# Patient Record
Sex: FEMALE | Race: WHITE | NOT HISPANIC OR LATINO | Employment: OTHER | URBAN - METROPOLITAN AREA
[De-identification: names, ages, dates, MRNs, and addresses within clinical notes are randomized per-mention and may not be internally consistent; named-entity substitution may affect disease eponyms.]

---

## 2017-01-03 ENCOUNTER — HOSPITAL ENCOUNTER (OUTPATIENT)
Dept: SLEEP MEDICINE | Facility: HOSPITAL | Age: 67
Discharge: HOME | End: 2017-01-03

## 2017-01-03 DIAGNOSIS — G47.33 OSA (OBSTRUCTIVE SLEEP APNEA): Primary | ICD-10-CM

## 2017-01-04 ENCOUNTER — TELEPHONE (OUTPATIENT)
Dept: CARDIOLOGY | Facility: CLINIC | Age: 67
End: 2017-01-04

## 2017-01-04 NOTE — PROGRESS NOTES
"   DATE OF SERVICE:  01/03/2017    I had the pleasure of seeing this patient in the office today. Below please find summary of pertinent findings and conclusion.     The patient  is a 66-year-old female who had a mini stroke in November 2016. No etiology was discovered. She was recommended a polysomnogram study to ensure that this was not a cause. Her sleep history is quite unremarkable. Sleep time is 9 p.m., awake time 6 a.m. she wakes up feeling rested. She had a tonsillectomy in 1985. She denies any daytime sleepiness. She has no significant weight gain in the last 5 years. She has very mild snoring. No apneas. She sometimes has a dry mouth. She reports occasional insomnia. Her Fitbit, however, does show restless sleep. The patient does not report symptoms. She tends to have \"weird dreams.     PAST MEDICAL HISTORY:   1.  Bypass surgery for a congenital coronary disease.   2.  She had a recent stroke.   3.  She had a brain aneurysm in 08/18/2015.     MEDICATIONS:   1.  Plavix.  2.  Atorvastatin.   3.  Biotin.  4.  Magnesium.  5.  Omega-3.  6.  Vitamin D.    FAMILY HISTORY: Lung cancer in a brother.  Diabetes, obesity, stroke, thyroid disease, heart disease, high blood pressure.     SOCIAL HISTORY: No tobacco, no caffeine.     REVIEW OF SYSTEMS:  A10 point review of systems is unremarkable. Alva score is 5.     PHYSICAL EXAMINATION:  VITAL SIGNS: Height is 5 feet 4 inches, weight 155 pounds, BMI 27, blood pressure is 88/49, heart rate is 68. Neck circumference 14 inches.   HEENT: Oropharynx shows normal size tongue, class II Mallampati airway, no redundant lateral pharyngeal tissue.   LUNGS: Clear to auscultation bilaterally. The rest of the examination is unremarkable, noted in the [facility] form.     IMPRESSION:   1.  Stroke.   2.  Restless sleep with parasomnia.   3.  Nightmares.   4.  Congenital coronary artery disease.     PLAN:   1.  The patient will be scheduled for a home sleep test to rule " out obstructive sleep apnea. Though she restless in her sleep. She denies any symptoms from same. I will therefore not pursue an in lab study unless her sleep quality changes. If there is hypoxia or obstructive events on the home sleep test, it may be beneficial to obtain an in-lab titration study. Trial of CPAP machine can also be done. Her nightmares are somewhat disturbing. These may be explained by obstructive sleep apnea. If not she may benefit from eventual use of clonazepam or similar agent.   2.  I plan to see her back in this office after her sleep study.     I appreciate the opportunity of participating in this patient's care.       MD KATHRYN Owusu/braulio  D:  01/03/2017 15:37:13   T:  01/03/2017 18:50:27   Job ID:  [id number]   Document ID:  [id number]  cc:

## 2017-01-04 NOTE — TELEPHONE ENCOUNTER
Pt called. She said that she has been wearing her event monitor for 2 weeks. She wants to know if the info you have received was sufficient or if she needs to keep wearing it. She also mentioned the electrodes are bothering her skin. She can be reached at #[phone]. Please advise.  Thanks,  Kalee

## 2017-01-05 NOTE — TELEPHONE ENCOUNTER
Pt called again today to see if she can take off the monitor. It has been two weeks and the electrodes are irritating her skin...Meagan

## 2017-01-24 ENCOUNTER — HOSPITAL ENCOUNTER (OUTPATIENT)
Dept: SLEEP MEDICINE | Facility: HOSPITAL | Age: 67
Discharge: HOME | End: 2017-01-24
Admitting: INTERNAL MEDICINE

## 2017-01-24 DIAGNOSIS — G47.33 OSA (OBSTRUCTIVE SLEEP APNEA): ICD-10-CM

## 2017-01-24 PROCEDURE — 95806 SLEEP STUDY UNATT&RESP EFFT: CPT

## 2017-01-27 ENCOUNTER — OFFICE VISIT (OUTPATIENT)
Dept: FAMILY MEDICINE CLINIC | Facility: CLINIC | Age: 67
End: 2017-01-27

## 2017-01-27 VITALS
WEIGHT: 158 LBS | HEART RATE: 71 BPM | TEMPERATURE: 98.7 F | BODY MASS INDEX: 26.98 KG/M2 | RESPIRATION RATE: 16 BRPM | OXYGEN SATURATION: 96 % | DIASTOLIC BLOOD PRESSURE: 60 MMHG | SYSTOLIC BLOOD PRESSURE: 128 MMHG | HEIGHT: 64 IN

## 2017-01-27 DIAGNOSIS — I67.1 CEREBRAL ANEURYSM: Primary | ICD-10-CM

## 2017-01-27 DIAGNOSIS — H66.92 LEFT OTITIS MEDIA, UNSPECIFIED CHRONICITY, UNSPECIFIED OTITIS MEDIA TYPE: ICD-10-CM

## 2017-01-27 DIAGNOSIS — H61.91 SKIN LESION OF RIGHT EAR: ICD-10-CM

## 2017-01-27 PROCEDURE — 99213 OFFICE O/P EST LOW 20 MIN: CPT | Performed by: PHYSICIAN ASSISTANT

## 2017-01-27 RX ORDER — CLOPIDOGREL BISULFATE 75 MG/1
75 TABLET ORAL DAILY
Qty: 90 TABLET | Refills: 3 | Status: SHIPPED | OUTPATIENT
Start: 2017-01-27 | End: 2017-12-18 | Stop reason: SDUPTHER

## 2017-01-27 RX ORDER — AMOXICILLIN 875 MG/1
875 TABLET, COATED ORAL 2 TIMES DAILY
Qty: 20 TABLET | Refills: 0 | Status: SHIPPED | OUTPATIENT
Start: 2017-01-27 | End: 2017-02-06

## 2017-01-27 NOTE — PROGRESS NOTES
"Subjective   Maddison Turcios is a 66 y.o. female.   Chief Complaint   Patient presents with   • Earache     Left        History of Present Illness   Maddison is a 66-year-old female who presents with left ear pain for the past week.  She wears a hearing aide and has had to removed the hearing aid.  She feels like there is something in her right ear.  Denied any fevers,chills,ear drainage,rhinorrhea,sore throat,post nasal drip or sore throat.   Maddison has used OTC ear wax  without relief of ar pain.  Appetite has been normal.  Sleep has been okay. She needs a refill on her Plavix medication.      The following portions of the patient's history were reviewed and updated as appropriate: allergies, current medications, past family history, past medical history, past social history and past surgical history.    Review of Systems   Constitutional: Negative.  Negative for chills, fatigue and fever.   HENT: Positive for ear pain. Negative for congestion, ear discharge, postnasal drip, rhinorrhea, sinus pressure and sore throat.    Eyes: Negative.    Respiratory: Negative.  Negative for cough, shortness of breath and wheezing.    Cardiovascular: Negative.  Negative for chest pain, palpitations and leg swelling.   Gastrointestinal: Negative.    Endocrine: Negative.    Genitourinary: Negative.    Musculoskeletal: Negative.    Skin: Negative.         Right ear sore   Allergic/Immunologic: Negative.    Neurological: Negative.    Hematological: Negative.    Psychiatric/Behavioral: Negative.    All other systems reviewed and are negative.    Vitals:    01/27/17 1413   BP: 128/60   BP Location: Right arm   Patient Position: Sitting   Cuff Size: Adult   Pulse: 71   Resp: 16   Temp: 98.7 °F (37.1 °C)   TempSrc: Oral   SpO2: 96%   Weight: 158 lb (71.7 kg)   Height: 64\" (162.6 cm)     Wt Readings from Last 3 Encounters:   01/27/17 158 lb (71.7 kg)   12/16/16 156 lb (70.8 kg)   12/08/16 152 lb (68.9 kg)     BP Readings from Last 3 " Encounters:   01/27/17 128/60   12/16/16 130/88   12/08/16 126/60     Body mass index is 27.12 kg/(m^2).    Allergies   Allergen Reactions   • Adhesive Tape Other (See Comments)     Redness, bruising and peeling of skin    *Use Paper Tape Only*   • Beta Adrenergic Blockers    • Sulfa Antibiotics        Objective   Physical Exam   Constitutional: She is oriented to person, place, and time. Vital signs are normal. She appears well-developed and well-nourished.   HENT:   Head: Normocephalic and atraumatic.   Right Ear: Hearing, tympanic membrane, external ear and ear canal normal.   Left Ear: Hearing, external ear and ear canal normal. Tympanic membrane is injected and erythematous.   Ears:    Nose: Nose normal. Right sinus exhibits no maxillary sinus tenderness and no frontal sinus tenderness. Left sinus exhibits no maxillary sinus tenderness and no frontal sinus tenderness.   Mouth/Throat: Uvula is midline, oropharynx is clear and moist and mucous membranes are normal.   Eyes: Conjunctivae, EOM and lids are normal.   Neck: Trachea normal and phonation normal. Neck supple. Carotid bruit is not present. No edema present.   Cardiovascular: Normal rate, regular rhythm, S1 normal, S2 normal, normal heart sounds and normal pulses.    Pulmonary/Chest: Effort normal and breath sounds normal.   Abdominal: Soft. Normal appearance, normal aorta and bowel sounds are normal. There is no hepatomegaly. There is no tenderness.   Lymphadenopathy:     She has no cervical adenopathy.   Neurological: She is alert and oriented to person, place, and time.   Skin: Skin is warm, dry and intact.   Psychiatric: She has a normal mood and affect. Her speech is normal and behavior is normal. Judgment and thought content normal. Cognition and memory are normal.       Assessment/Plan   Maddison was seen today for earache.    Diagnoses and all orders for this visit:    Cerebral aneurysm  -     clopidogrel (PLAVIX) 75 MG tablet; Take 1 tablet by mouth  Daily.    Left otitis media, unspecified chronicity, unspecified otitis media type  -     amoxicillin (AMOXIL) 875 MG tablet; Take 1 tablet by mouth 2 (Two) Times a Day for 10 days.    Skin lesion of right ear        1.  Chronic cerebral aneurysm: I have refilled her Plavix to pharmacy.  She'll keep her follow-up appointments with her neurologist.  2.  New left otitis media: I have prescribed amoxicillin antibiotic to pharmacy.  3.  New lesion in right ear: She will schedule appointment with her dermatologist for further evaluation and treatment.  Possible referral to ENT specialist if warranted.      Dragon transcription disclaimer    Much of this encounter note is an electronic transcription/translation of spoken language to printed text.  The electronic translation of spoken language may permit erroneous, or at times, nonsensical words or phrases to be inadvertently transcribed.  Although I have reviewed the note for such errors, some may still exist.    Kasandra Mcdowell PA-C  Family Practice

## 2017-01-27 NOTE — MR AVS SNAPSHOT
Maddison Turcios   1/27/2017 2:00 PM   Office Visit    Provider:  Kasandra Mcdowell PA-C   Department:  [facility]ILY MEDICINE   Dept Phone:  [phone]                Your Full Care Plan              Today's Medication Changes          These changes are accurate as of: 1/27/17  2:56 PM.  If you have any questions, ask your nurse or doctor.               Medication(s)that have changed:     clopidogrel 75 MG tablet   Commonly known as:  PLAVIX   Take 1 tablet by mouth Daily.   What changed:  when to take this   Changed by:  Kasandra Mcdowell PA-C            Where to Get Your Medications      These medications were sent to [facility] - [place], [place] - [address] [place] [address] - [phone]  - [phone] [address] [address]     Phone:  [phone]     clopidogrel 75 MG tablet                  Your Updated Medication List          This list is accurate as of: 1/27/17  2:56 PM.  Always use your most recent med list.                Biotin 10 MG tablet       clopidogrel 75 MG tablet   Commonly known as:  PLAVIX   Take 1 tablet by mouth Daily.       Cranberry 1000 MG capsule       diclofenac 1 % gel gel   Commonly known as:  VOLTAREN       FIBER COMPLETE tablet       LIVALO 2 MG tablet tablet   Generic drug:  pitavastatin calcium   TAKE 1 TABLET AT BEDTIME       Magnesium 100 MG tablet       omega-3 acid ethyl esters 1 G capsule   Commonly known as:  LOVAZA       vitamin D3 5000 UNITS capsule capsule               You Were Diagnosed With        Codes Comments    Cerebral aneurysm    -  Primary ICD-10-CM: I67.1  ICD-9-CM: 437.3     Left otitis media, unspecified chronicity, unspecified otitis media type     ICD-10-CM: H66.92  ICD-9-CM: 382.9     Skin lesion of right ear     ICD-10-CM: L98.9  ICD-9-CM: 709.9       Instructions     None    Patient Instructions History      MyChart Signup     Our records indicate that you have an active  "[facility] [employer] account.    You can view your After Visit Summary by going to [employer] and logging in with your [email or web address] username and password.  If you don't have a [email or web address] username and password but a parent or guardian has access to your record, the parent or guardian should login with their own [email or web address] username and password and access your record to view the After Visit Summary.    If you have questions, you can email [email or web address] or call [phone] to talk to our [email or web address] staff.  Remember, [email or web address] is NOT to be used for urgent needs.  For medical emergencies, dial 911.               Other Info from Your Visit           Your Appointments     Jun 26, 2017 10:30 AM EDT   Follow Up with Helen Urbina MD   [facility] CARDIOLOGY (--)    [address]  [facility] [address] [phone]           Arrive 15 minutes prior to appointment.              Allergies     Adhesive Tape  Other (See Comments)    Redness, bruising and peeling of skin  *Use Paper Tape Only*    Beta Adrenergic Blockers      Sulfa Antibiotics        Reason for Visit     Earache Left      Vital Signs     Blood Pressure Pulse Temperature Respirations Height Weight    128/60 (BP Location: Right arm, Patient Position: Sitting, Cuff Size: Adult) 71 98.7 °F (37.1 °C) (Oral) 16 64\" (162.6 cm) 158 lb (71.7 kg)    Oxygen Saturation Body Mass Index Smoking Status             96% 27.12 kg/m2 Never Smoker         Problems and Diagnoses Noted     CAD (coronary artery disease) of bypass graft    Bilateral enlargement of atria    Brain aneurysm    Left middle ear infection    Sinus bradycardia    Skin lesion of right ear    Temporary cerebral vascular dysfunction      No Longer an Issue     Acute respiratory infection    Chronic headache    Difficult or painful urination    Fall on same level from slipping, tripping or stumbling    Left forearm pain    Left leg pain    Left " wrist pain    Lower abdominal pain

## 2017-02-01 ENCOUNTER — TELEPHONE (OUTPATIENT)
Dept: SLEEP MEDICINE | Facility: HOSPITAL | Age: 67
End: 2017-02-01

## 2017-02-03 ENCOUNTER — APPOINTMENT (OUTPATIENT)
Dept: SLEEP MEDICINE | Facility: HOSPITAL | Age: 67
End: 2017-02-03

## 2017-02-07 ENCOUNTER — HOSPITAL ENCOUNTER (OUTPATIENT)
Dept: SLEEP MEDICINE | Facility: HOSPITAL | Age: 67
Discharge: HOME | End: 2017-02-07
Admitting: INTERNAL MEDICINE

## 2017-02-07 ENCOUNTER — TELEPHONE (OUTPATIENT)
Dept: SLEEP MEDICINE | Facility: HOSPITAL | Age: 67
End: 2017-02-07

## 2017-02-07 ENCOUNTER — APPOINTMENT (OUTPATIENT)
Dept: SLEEP MEDICINE | Facility: HOSPITAL | Age: 67
End: 2017-02-07

## 2017-02-07 PROCEDURE — G0463 HOSPITAL OUTPT CLINIC VISIT: HCPCS

## 2017-02-07 NOTE — PROGRESS NOTES
DATE OF SERVICE: 02/07/2017     I had the pleasure of seeing Maddison in the office for followup. She had a home sleep test on 01/30/2017. This showed no evidence of obstructive sleep apnea and the RDI was 6. She had snoring for 29% of sleep time. She comes in today to review results.     The patient states she had a cardiovascular evaluation that was unremarkable. She wakes up rested and her [place] score is 2. She denies other interval health changes.     PHYSICAL EXAMINATION:  VITAL SIGNS: Height 64 inches, weight 145 pounds, BMI 27. Blood pressure 126/76 and heart rate 69.   HEENT: Her oropharynx shows class II Mallampati airway, no redundant lateral pharyngeal tissue, and normal-sized tongue.   LUNGS: Clear to auscultation bilaterally.     The rest of the examination deferred.     IMPRESSION:   1.  Restless sleep with parasomnias.   2.  Recent stroke.   3.  Congenital coronary artery disease.   4.  Nightmares.    PLAN: The patient states that her sleep quality has improved and she is sleeping better. Her [facility] sleep study did not show evidence of significant obstructive sleep apnea. She does not require treatment. Given improvement in sleep quality, she was asked to contact us in case of any further difficulties with her with sleep or restlessness during sleep.     I appreciate the opportunity of participating in this patient's care.       Carlos Patrick MD  SJ/so  D:  02/07/2017 15:26:55   T:  02/07/2017 16:28:33   Job ID:  [id number]   Document ID:  [id number]  cc:

## 2017-02-22 ENCOUNTER — OFFICE VISIT (OUTPATIENT)
Dept: FAMILY MEDICINE CLINIC | Facility: CLINIC | Age: 67
End: 2017-02-22

## 2017-02-22 VITALS
DIASTOLIC BLOOD PRESSURE: 80 MMHG | SYSTOLIC BLOOD PRESSURE: 150 MMHG | OXYGEN SATURATION: 97 % | HEIGHT: 64 IN | WEIGHT: 154 LBS | HEART RATE: 67 BPM | TEMPERATURE: 98.5 F | BODY MASS INDEX: 26.29 KG/M2 | RESPIRATION RATE: 16 BRPM

## 2017-02-22 DIAGNOSIS — Z90.81 H/O SPLENECTOMY: ICD-10-CM

## 2017-02-22 DIAGNOSIS — I10 ESSENTIAL HYPERTENSION: Primary | ICD-10-CM

## 2017-02-22 DIAGNOSIS — Z11.59 NEED FOR HEPATITIS C SCREENING TEST: ICD-10-CM

## 2017-02-22 PROBLEM — R53.83 FATIGUE: Status: ACTIVE | Noted: 2017-02-07

## 2017-02-22 PROCEDURE — 99214 OFFICE O/P EST MOD 30 MIN: CPT | Performed by: PHYSICIAN ASSISTANT

## 2017-02-22 RX ORDER — LISINOPRIL 2.5 MG/1
2.5 TABLET ORAL DAILY
Qty: 30 TABLET | Refills: 3 | Status: SHIPPED | OUTPATIENT
Start: 2017-02-22 | End: 2017-04-12 | Stop reason: SDUPTHER

## 2017-02-22 NOTE — PROGRESS NOTES
Subjective   Maddison Turcios is a 66 y.o. female.   Chief Complaint   Patient presents with   • Follow-up   • Hypertension   • Immunizations     HIB- due to splenectomy   • Hepatitis C     testing       History of Present Illness     Maddison is a 66-year-old female who presents with management, updated immunizations and Hepatitis C testing.  Maddison states she has been under stress at home due to her live-in boyfriend.  His health is poor.  She is dealing with his adult children and his health care.  Maddison has had a TIA last year.  She denied any vision changes, headache, shortness of breath, vision changes, chest pain, dizziness, or swelling of her ankles.  Her appetite has been healthy and sleep has been normal.  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  She also needs updated immunization with her HIB.   Maddison has had splenectomy in the past.      The following portions of the patient's history were reviewed and updated as appropriate: allergies, current medications, past family history, past medical history, past social history and past surgical history.    Review of Systems   Constitutional: Negative.  Negative for chills, fatigue and fever.   HENT: Negative.    Eyes: Negative.    Respiratory: Negative.  Negative for cough, shortness of breath and wheezing.    Cardiovascular: Negative.  Negative for chest pain, palpitations and leg swelling.   Gastrointestinal: Negative.    Endocrine: Negative.    Genitourinary: Negative.    Musculoskeletal: Negative.    Skin: Negative.    Allergic/Immunologic: Negative.    Neurological: Negative.  Negative for dizziness, light-headedness and headaches.   Hematological: Negative.    Psychiatric/Behavioral: Negative.    All other systems reviewed and are negative.    Vitals:    02/22/17 1417   BP: 150/80   BP Location: Right arm   Patient Position: Sitting   Cuff Size: Adult   Pulse: 67   Resp: 16   Temp: 98.5 °F (36.9 °C)   TempSrc: Oral   SpO2: 97%  "  Weight: 154 lb (69.9 kg)   Height: 64\" (162.6 cm)     Wt Readings from Last 3 Encounters:   02/22/17 154 lb (69.9 kg)   01/27/17 158 lb (71.7 kg)   12/16/16 156 lb (70.8 kg)       BP Readings from Last 3 Encounters:   02/22/17 150/80   01/27/17 128/60   12/16/16 130/88     Body mass index is 26.43 kg/(m^2).    Allergies   Allergen Reactions   • Adhesive Tape Other (See Comments)     Redness, bruising and peeling of skin    *Use Paper Tape Only*   • Beta Adrenergic Blockers    • Sulfa Antibiotics        Objective   Physical Exam   Constitutional: She is oriented to person, place, and time. Vital signs are normal. She appears well-developed and well-nourished.   Neck: Trachea normal and phonation normal. Neck supple. Carotid bruit is not present. No edema present.   Cardiovascular: Normal rate, regular rhythm, S1 normal, S2 normal, normal heart sounds and normal pulses.    Pulmonary/Chest: Effort normal and breath sounds normal.   Abdominal: Soft. Normal appearance and bowel sounds are normal. There is no hepatomegaly. There is no tenderness.   Neurological: She is alert and oriented to person, place, and time.   Skin: Skin is warm, dry and intact.   Psychiatric: She has a normal mood and affect. Her speech is normal and behavior is normal. Judgment and thought content normal. Cognition and memory are normal.       Assessment/Plan   Maddison was seen today for follow-up, hypertension, immunizations and hepatitis c.    Diagnoses and all orders for this visit:    Essential hypertension  -     lisinopril (PRINIVIL,ZESTRIL) 2.5 MG tablet; Take 1 tablet by mouth Daily.    Need for hepatitis C screening test  -     Hepatitis C antibody    H/O splenectomy  -     Haemophilus B Polysac Conj Vac (PEDVAXHIB) injection 0.5 mL; Inject 0.5 mL into the shoulder, thigh, or buttocks 1 (One) Time.      1.  Uncontrolled hypertension: I have rechecked her blood pressure today's office visit and got 160/90 in left arm.  I will start Maddison " on lisinopril 2.5 mg once a day.  The prescription was sent to her local pharmacy.  Maddison will return to office in one month for reevaluation.  2.  Need for hepatitis C screening testing: Maddison will have the hepatitis C testing at today's office visit.  This is highly recommended per CDC for baby tumors.  She'll be notified of her test results when completed.  3.  History of splenectomy: Maddison will have an updated Hib vaccine at today's office visit.  She has given written consent and will receive immunization.      Dragon transcription disclaimer    Much of this encounter note is an electronic transcription/translation of spoken language to printed text.  The electronic translation of spoken language may permit erroneous, or at times, nonsensical words or phrases to be inadvertently transcribed.  Although I have reviewed the note for such errors, some may still exist.    Kasandra Mcdowell PA-C  Family Practice

## 2017-02-23 LAB — HCV AB S/CO SERPL IA: 0.1 S/CO RATIO (ref 0–0.9)

## 2017-02-27 ENCOUNTER — TELEPHONE (OUTPATIENT)
Dept: FAMILY MEDICINE CLINIC | Facility: CLINIC | Age: 67
End: 2017-02-27

## 2017-02-27 NOTE — TELEPHONE ENCOUNTER
----- Message from Madelaine Aaron sent at 2/27/2017 12:33 PM EST -----      ----- Message -----     From: Kasandra Mcdowell PA-C     Sent: 2/23/2017   6:57 AM       To: Sukhwinder Thornton MA    Notify patient that hepatitis C testing was negative

## 2017-03-02 ENCOUNTER — TRANSCRIBE ORDERS (OUTPATIENT)
Dept: ADMINISTRATIVE | Facility: HOSPITAL | Age: 67
End: 2017-03-02

## 2017-03-02 DIAGNOSIS — Z13.9 SCREENING: Primary | ICD-10-CM

## 2017-04-12 ENCOUNTER — OFFICE VISIT (OUTPATIENT)
Dept: FAMILY MEDICINE CLINIC | Facility: CLINIC | Age: 67
End: 2017-04-12

## 2017-04-12 VITALS
SYSTOLIC BLOOD PRESSURE: 134 MMHG | RESPIRATION RATE: 16 BRPM | OXYGEN SATURATION: 96 % | DIASTOLIC BLOOD PRESSURE: 70 MMHG | HEART RATE: 62 BPM | HEIGHT: 64 IN | TEMPERATURE: 98.6 F | WEIGHT: 150 LBS | BODY MASS INDEX: 25.61 KG/M2

## 2017-04-12 DIAGNOSIS — I10 ESSENTIAL HYPERTENSION: ICD-10-CM

## 2017-04-12 DIAGNOSIS — Z00.00 MEDICARE ANNUAL WELLNESS VISIT, SUBSEQUENT: Primary | ICD-10-CM

## 2017-04-12 PROCEDURE — G0439 PPPS, SUBSEQ VISIT: HCPCS | Performed by: PHYSICIAN ASSISTANT

## 2017-04-12 RX ORDER — LISINOPRIL 2.5 MG/1
2.5 TABLET ORAL DAILY
Qty: 90 TABLET | Refills: 2 | Status: SHIPPED | OUTPATIENT
Start: 2017-04-12 | End: 2017-04-27 | Stop reason: SINTOL

## 2017-04-12 RX ORDER — ICOSAPENT ETHYL 1000 MG/1
2 CAPSULE ORAL 4 TIMES DAILY
COMMUNITY
End: 2019-04-08 | Stop reason: ALTCHOICE

## 2017-04-12 NOTE — PROGRESS NOTES
QUICK REFERENCE INFORMATION:  The ABCs of the Annual Wellness Visit    Subsequent Medicare Wellness Visit    HEALTH RISK ASSESSMENT    1950    Recent Hospitalizations:  No recent hospitalization(s)..        Current Medical Providers:  Patient Care Team:  Sara Araiza MD as PCP - Ob/Gyn (Obstetrics and Gynecology)  Helen Urbina MD as PCP - Cardiology (Cardiology)  Ruben Valderrama MD (Dermatology)  Bennett Whelan MD as MDS Coordinator (Allergy)        Smoking Status:  History   Smoking Status   • Never Smoker   Smokeless Tobacco   • Not on file       Alcohol Consumption:  History   Alcohol Use No       Depression Screen:   PHQ-9 Depression Screening 4/12/2017   Little interest or pleasure in doing things 0   Feeling down, depressed, or hopeless 0   Trouble falling or staying asleep, or sleeping too much 0   Feeling tired or having little energy 0   Poor appetite or overeating 0   Feeling bad about yourself - or that you are a failure or have let yourself or your family down 0   Trouble concentrating on things, such as reading the newspaper or watching television 0   Moving or speaking so slowly that other people could have noticed. Or the opposite - being so fidgety or restless that you have been moving around a lot more than usual 0   Thoughts that you would be better off dead, or of hurting yourself in some way 0   PHQ-9 Total Score 0   If you checked off any problems, how difficult have these problems made it for you to do your work, take care of things at home, or get along with other people? Not difficult at all       Health Habits and Functional and Cognitive Screening:  Functional & Cognitive Status 4/12/2017   Do you have difficulty preparing food and eating? No   Do you have difficulty bathing yourself? No   Do you have difficulty getting dressed? No   Do you have difficulty using the toilet? No   Do you have difficulty moving around from place to place? No   In the past year  have you fallen or experienced a near fall? No   Do you need help using the phone?  No   Are you deaf or do you have serious difficulty hearing?  No   Do you need help with transportation? No   Do you need help shopping? No   Do you need help preparing meals?  No   Do you need help with housework?  No   Do you need help with laundry? No   Do you need help taking your medications? No   Do you need help managing money? No   Do you have difficulty concentrating, remembering or making decisions? -       Health Habits  Current Diet: Well Balanced Diet  Dental Exam: Up to date  Eye Exam: Up to date  Exercise (times per week): 7 times per week  Current Exercise Activities Include: Walking      Does the patient have evidence of cognitive impairment? No    Aspirin use counseling: On clopidrogel as an alternative (due to ASA contraindication)      Recent Lab Results:  CMP:  Lab Results   Component Value Date    BUN 19 06/02/2016    CREATININE 0.75 06/02/2016    EGFRIFNONA 78 06/02/2016    BCR 25.3 (H) 06/02/2016     06/02/2016    K 4.2 06/02/2016    CO2 25.0 06/02/2016    CALCIUM 10.0 06/02/2016    ALBUMIN 4.80 06/02/2016    LABIL2 1.7 06/02/2016    BILITOT 0.7 06/02/2016    ALKPHOS 65 06/02/2016    AST 29 06/02/2016    ALT 20 06/02/2016     Lipid Panel:  Lab Results   Component Value Date    CHLPL 202 (H) 12/29/2015    TRIG 97 12/29/2015    HDL 92 (H) 12/29/2015    VLDL 19 12/29/2015    LDL 91 12/29/2015     HbA1c:       Visual Acuity:  No exam data present    Age-appropriate Screening Schedule:  Refer to the list below for future screening recommendations based on patient's age, sex and/or medical conditions. Orders for these recommended tests are listed in the plan section. The patient has been provided with a written plan.    Health Maintenance   Topic Date Due   • LIPID PANEL  12/29/2016   • PNEUMOCOCCAL VACCINES (65+ LOW/MEDIUM RISK) (2 of 2 - PPSV23) 01/13/2017   • MAMMOGRAM  04/26/2018   • COLONOSCOPY   12/29/2025   • TDAP/TD VACCINES (2 - Td) 01/08/2026   • INFLUENZA VACCINE  Completed   • ZOSTER VACCINE  Addressed        Subjective   History of Present Illness    Maddison Turcios is a 67 y.o. female who presents for an Subsequent Wellness Visit.  Currently doing well.  She has upcoming cardiology and neurology appointments.  Compliant with medication.  Needs refill on her lisinopril medication.    The following portions of the patient's history were reviewed and updated as appropriate: allergies, current medications, past family history, past medical history, past social history and past surgical history.    Outpatient Medications Prior to Visit   Medication Sig Dispense Refill   • Biotin 10 MG tablet Take by mouth.     • Cholecalciferol (VITAMIN D3) 5000 UNITS capsule capsule Take 1 capsule by mouth.     • clopidogrel (PLAVIX) 75 MG tablet Take 1 tablet by mouth Daily. 90 tablet 3   • Cranberry 1000 MG capsule Take by mouth.     • diclofenac (VOLTAREN) 1 % gel gel Apply 4 g topically 4 (four) times a day as needed.     • FIBER COMPLETE tablet Take by mouth.     • LIVALO 2 MG tablet tablet TAKE 1 TABLET AT BEDTIME 90 tablet 0   • Magnesium 100 MG tablet Take by mouth.     • lisinopril (PRINIVIL,ZESTRIL) 2.5 MG tablet Take 1 tablet by mouth Daily. 30 tablet 3   • omega-3 acid ethyl esters (LOVAZA) 1 G capsule Take by mouth daily.       No facility-administered medications prior to visit.        Patient Active Problem List   Diagnosis   • Abnormal blood chemistry level   • Cerebral aneurysm   • Bloating   • Coronary artery disease   • Abnormal gait   • Hyperlipidemia   • Essential hypertension   • Hyperthyroidism   • Insomnia   • Onychomycosis of toenail   • Pelvic pressure in female   • Right foot pain   • Preoperative cardiovascular examination   • Welcome to Medicare preventive visit   • Onychomycosis   • Menopausal symptoms   • Bilateral enlargement of atria   • Atherosclerosis of coronary artery bypass graft   •  "Sinus bradycardia   • Temporary cerebral vascular dysfunction   • Transient cerebral ischemia   • Left otitis media   • Skin lesion of right ear   • AKIRA (obstructive sleep apnea)   • Fatigue   • Medicare annual wellness visit, subsequent       Advance Care Planning:  has an advance directive - a copy HAS NOT been provided. Have asked the patient to send this to us to add to record.    Identification of Risk Factors:  Risk factors include: cardiovascular risk.    Review of Systems    Compared to one year ago, the patient feels her physical health is better.  Compared to one year ago, the patient feels her mental health is the same.    Objective     Physical Exam    Vitals:    04/12/17 1258   BP: 134/70   BP Location: Right arm   Patient Position: Sitting   Cuff Size: Adult   Pulse: 62   Resp: 16   Temp: 98.6 °F (37 °C)   SpO2: 96%   Weight: 150 lb (68 kg)   Height: 64\" (162.6 cm)   PainSc: 0-No pain     Wt Readings from Last 3 Encounters:   04/12/17 150 lb (68 kg)   02/22/17 154 lb (69.9 kg)   01/27/17 158 lb (71.7 kg)     BP Readings from Last 3 Encounters:   04/12/17 134/70   02/22/17 150/80   01/27/17 128/60     Body mass index is 25.75 kg/(m^2).  Discussed the patient's BMI with her. The BMI is above average; BMI management plan is completed.    Assessment/Plan   Patient Self-Management and Personalized Health Advice  The patient has been provided with information about: diet and exercise and preventive services including:   · Exercise counseling provided.    Visit Diagnoses:    ICD-10-CM ICD-9-CM   1. Medicare annual wellness visit, subsequent Z00.00 V70.0   2. Essential hypertension I10 401.9     Ms. Maddison Turcios was seen in office today for sequential Medicare adult wellness visit.  I have refilled her lisinopril medication to her mail-order pharmacy.  Maddison will return to office and 6 months for hypertension management.  She will keep her follow-up appointment with neurology and cardiology.      No orders of " the defined types were placed in this encounter.      Outpatient Encounter Prescriptions as of 4/12/2017   Medication Sig Dispense Refill   • Biotin 10 MG tablet Take by mouth.     • Cholecalciferol (VITAMIN D3) 5000 UNITS capsule capsule Take 1 capsule by mouth.     • clopidogrel (PLAVIX) 75 MG tablet Take 1 tablet by mouth Daily. 90 tablet 3   • Cranberry 1000 MG capsule Take by mouth.     • diclofenac (VOLTAREN) 1 % gel gel Apply 4 g topically 4 (four) times a day as needed.     • FIBER COMPLETE tablet Take by mouth.     • icosapent ethyl (VASCEPA) 1 G capsule capsule Take 2 g by mouth 4 (Four) Times a Day.     • lisinopril (PRINIVIL,ZESTRIL) 2.5 MG tablet Take 1 tablet by mouth Daily. 90 tablet 2   • LIVALO 2 MG tablet tablet TAKE 1 TABLET AT BEDTIME 90 tablet 0   • Magnesium 100 MG tablet Take by mouth.     • [DISCONTINUED] lisinopril (PRINIVIL,ZESTRIL) 2.5 MG tablet Take 1 tablet by mouth Daily. 30 tablet 3   • [DISCONTINUED] omega-3 acid ethyl esters (LOVAZA) 1 G capsule Take by mouth daily.       No facility-administered encounter medications on file as of 4/12/2017.        Reviewed use of high risk medication in the elderly: yes  Reviewed for potential of harmful drug interactions in the elderly: yes    Follow Up:  Return in about 6 months (around 10/12/2017).     An After Visit Summary and PPPS with all of these plans were given to the patient.

## 2017-04-12 NOTE — PATIENT INSTRUCTIONS
Medicare Wellness  Personal Prevention Plan of Service     Date of Office Visit:  2017  Encounter Provider:  Kasandra Mcdowell PA-C  Place of Service:  [facility]  Patient Name: Maddison Turcios  :  1950    As part of the Medicare Wellness portion of your visit today, we are providing you with this personalized preventive plan of services (PPPS). This plan is based upon recommendations of the [place] Preventive Services Task Force (USPSTF) and the Advisory Committee on Immunization Practices (ACIP).    This lists the preventive care services that should be considered, and provides dates of when you are due. Items listed as completed are up-to-date and do not require any further intervention.    Health Maintenance   Topic Date Due   • LIPID PANEL  2016   • PNEUMOCOCCAL VACCINES (65+ LOW/MEDIUM RISK) (2 of 2 - PPSV23) 2017   • MEDICARE ANNUAL WELLNESS  2017   • MAMMOGRAM  2018   • COLONOSCOPY  2025   • TDAP/TD VACCINES (2 - Td) 2026   • HEPATITIS C SCREENING  Completed   • INFLUENZA VACCINE  Completed   • ZOSTER VACCINE  Addressed       No orders of the defined types were placed in this encounter.      Return in about 6 months (around 10/12/2017).

## 2017-04-13 PROBLEM — Z00.00 MEDICARE ANNUAL WELLNESS VISIT, SUBSEQUENT: Status: ACTIVE | Noted: 2017-04-13

## 2017-04-27 ENCOUNTER — TELEPHONE (OUTPATIENT)
Dept: FAMILY MEDICINE CLINIC | Facility: CLINIC | Age: 67
End: 2017-04-27

## 2017-04-27 DIAGNOSIS — I10 ESSENTIAL HYPERTENSION: Primary | ICD-10-CM

## 2017-04-27 RX ORDER — VALSARTAN 40 MG/1
40 TABLET ORAL DAILY
Qty: 30 TABLET | Refills: 1 | Status: SHIPPED | OUTPATIENT
Start: 2017-04-27 | End: 2017-05-31 | Stop reason: SDUPTHER

## 2017-04-27 NOTE — TELEPHONE ENCOUNTER
Please notify patient to stop the lisinopril medication.  I have sent generic Diovan 40 mg to pharmacy.  Please stop by office next week for blood pressure check.

## 2017-04-27 NOTE — TELEPHONE ENCOUNTER
Pt states that she has acquired a cough ? From Lisinopril. The cough is making it difficult to sleep. Pt. Like to be changes to a different medication.  Please advise

## 2017-05-03 ENCOUNTER — OFFICE VISIT (OUTPATIENT)
Dept: OBSTETRICS AND GYNECOLOGY | Facility: CLINIC | Age: 67
End: 2017-05-03

## 2017-05-03 ENCOUNTER — HOSPITAL ENCOUNTER (OUTPATIENT)
Dept: MAMMOGRAPHY | Facility: HOSPITAL | Age: 67
Discharge: HOME | End: 2017-05-03
Attending: OBSTETRICS & GYNECOLOGY | Admitting: OBSTETRICS & GYNECOLOGY

## 2017-05-03 VITALS
SYSTOLIC BLOOD PRESSURE: 130 MMHG | WEIGHT: 148 LBS | HEIGHT: 64 IN | BODY MASS INDEX: 25.27 KG/M2 | DIASTOLIC BLOOD PRESSURE: 86 MMHG

## 2017-05-03 DIAGNOSIS — Z13.9 SCREENING: Primary | ICD-10-CM

## 2017-05-03 DIAGNOSIS — Z13.9 SCREENING: ICD-10-CM

## 2017-05-03 LAB
BILIRUB BLD-MCNC: NEGATIVE MG/DL
CLARITY, POC: CLEAR
COLOR UR: YELLOW
GLUCOSE UR STRIP-MCNC: NEGATIVE MG/DL
KETONES UR QL: NEGATIVE
LEUKOCYTE EST, POC: NEGATIVE
NITRITE UR-MCNC: NEGATIVE MG/ML
PH UR: 5 [PH] (ref 5–8)
PROT UR STRIP-MCNC: NEGATIVE MG/DL
RBC # UR STRIP: NEGATIVE /UL
SP GR UR: 1.01 (ref 1–1.03)
UROBILINOGEN UR QL: NORMAL

## 2017-05-03 PROCEDURE — G0202 SCR MAMMO BI INCL CAD: HCPCS

## 2017-05-03 PROCEDURE — 81002 URINALYSIS NONAUTO W/O SCOPE: CPT | Performed by: OBSTETRICS & GYNECOLOGY

## 2017-05-03 PROCEDURE — G0101 CA SCREEN;PELVIC/BREAST EXAM: HCPCS | Performed by: OBSTETRICS & GYNECOLOGY

## 2017-05-03 RX ORDER — TERBINAFINE HYDROCHLORIDE 250 MG/1
TABLET ORAL
COMMUNITY
Start: 2017-03-15 | End: 2017-05-03

## 2017-05-10 PROBLEM — M85.80 OSTEOPENIA: Status: ACTIVE | Noted: 2017-05-10

## 2017-05-10 PROBLEM — H66.92 LEFT OTITIS MEDIA: Status: RESOLVED | Noted: 2017-01-27 | Resolved: 2017-05-10

## 2017-05-31 DIAGNOSIS — I10 ESSENTIAL HYPERTENSION: ICD-10-CM

## 2017-05-31 RX ORDER — VALSARTAN 40 MG/1
40 TABLET ORAL DAILY
Qty: 90 TABLET | Refills: 1 | Status: SHIPPED | OUTPATIENT
Start: 2017-05-31 | End: 2017-09-20 | Stop reason: SDUPTHER

## 2017-06-01 ENCOUNTER — TELEPHONE (OUTPATIENT)
Dept: FAMILY MEDICINE CLINIC | Facility: CLINIC | Age: 67
End: 2017-06-01

## 2017-06-02 ENCOUNTER — OFFICE VISIT (OUTPATIENT)
Dept: FAMILY MEDICINE CLINIC | Facility: CLINIC | Age: 67
End: 2017-06-02

## 2017-06-02 ENCOUNTER — TELEPHONE (OUTPATIENT)
Dept: FAMILY MEDICINE CLINIC | Facility: CLINIC | Age: 67
End: 2017-06-02

## 2017-06-02 VITALS
OXYGEN SATURATION: 97 % | HEART RATE: 72 BPM | WEIGHT: 145 LBS | SYSTOLIC BLOOD PRESSURE: 124 MMHG | BODY MASS INDEX: 24.75 KG/M2 | TEMPERATURE: 98.6 F | HEIGHT: 64 IN | DIASTOLIC BLOOD PRESSURE: 82 MMHG

## 2017-06-02 DIAGNOSIS — F43.0 ANXIETY IN ACUTE STRESS REACTION: ICD-10-CM

## 2017-06-02 DIAGNOSIS — I10 ESSENTIAL HYPERTENSION: Primary | ICD-10-CM

## 2017-06-02 DIAGNOSIS — F41.1 ANXIETY IN ACUTE STRESS REACTION: ICD-10-CM

## 2017-06-02 PROCEDURE — 99213 OFFICE O/P EST LOW 20 MIN: CPT | Performed by: PHYSICIAN ASSISTANT

## 2017-06-02 RX ORDER — CLONAZEPAM 0.5 MG/1
TABLET ORAL
Qty: 30 TABLET | Refills: 0
Start: 2017-06-02 | End: 2017-09-20

## 2017-06-02 NOTE — PROGRESS NOTES
I have reviewed the notes, assessments, and/or procedures performed by Kasandra Mcdowell PA-C, I concur with her/his documentation of Maddison Turcios.

## 2017-06-02 NOTE — PROGRESS NOTES
Subjective   Maddison Turcios is a 67 y.o. female.   Chief Complaint   Patient presents with   • Hypertension     Here for issues with blood pressure.  Her cuff reads 137/68       History of Present Illness     Maddison is a 67-year-old female who presents for hypertension management.  She has lost 3 pounds since May 3, 2017. Her blood pressure readings yesterday was 172/98 and 158/81 at home.  Maddison has been under stress dealing with her significant other's family. When she gets stress her blood pressure is elevated.  Her partner has Dementia.  She was in [place] last week.  Her partner's family was supposed to take care of their ather while she was gone.  Maddison got multiple phone calls from family/him.   States that his family didn't take care of her very well.    Maddison denied any chest pain,shortness of air,wheezing,His, headache or swelling of her ankles.  She denied any suicidal or homicidal ideation.  States her blood pressure is usually normal when she is not dealing with his family.  Does not want any daily medication at this time.  Maddison would like a letter written stating about her health and taking care of her significant other.    The following portions of the patient's history were reviewed and updated as appropriate: allergies, current medications, past family history, past medical history, past social history and past surgical history.    Review of Systems   Constitutional: Negative.    HENT: Negative.    Eyes: Negative.    Respiratory: Negative.  Negative for cough, shortness of breath and wheezing.    Cardiovascular: Negative.  Negative for chest pain, palpitations and leg swelling.   Gastrointestinal: Negative.    Endocrine: Negative.    Genitourinary: Negative.    Musculoskeletal: Negative.    Skin: Negative.    Allergic/Immunologic: Negative.    Neurological: Negative.  Negative for dizziness, light-headedness and headaches.   Hematological: Negative.    Psychiatric/Behavioral: Negative for sleep  "disturbance and suicidal ideas. The patient is nervous/anxious.    All other systems reviewed and are negative.      Vitals:    06/02/17 0727   BP: 124/82   Pulse: 72   Temp: 98.6 °F (37 °C)   SpO2: 97%   Weight: 145 lb (65.8 kg)   Height: 64\" (162.6 cm)     Body mass index is 24.89 kg/(m^2).     Allergies   Allergen Reactions   • Adhesive Tape Other (See Comments)     Redness, bruising and peeling of skin    *Use Paper Tape Only*   • Beta Adrenergic Blockers    • Sulfa Antibiotics        Wt Readings from Last 3 Encounters:   06/02/17 145 lb (65.8 kg)   05/03/17 148 lb (67.1 kg)   04/12/17 150 lb (68 kg)       BP Readings from Last 3 Encounters:   06/02/17 124/82   05/03/17 130/86   04/12/17 134/70     Objective   Physical Exam   Constitutional: She is oriented to person, place, and time. Vital signs are normal. She appears well-developed and well-nourished.   Neck: Trachea normal and phonation normal. Neck supple. Carotid bruit is not present. No edema present.   Cardiovascular: Normal rate, regular rhythm, S1 normal, S2 normal, normal heart sounds and normal pulses.    Pulmonary/Chest: Effort normal and breath sounds normal.   Abdominal: Soft. Normal appearance and bowel sounds are normal. There is no hepatomegaly. There is no tenderness.   Neurological: She is alert and oriented to person, place, and time.   Skin: Skin is warm, dry and intact.   Psychiatric: Her speech is normal and behavior is normal. Judgment and thought content normal. Her mood appears anxious. Cognition and memory are normal.       Assessment/Plan   Maddison was seen today for hypertension.    Diagnoses and all orders for this visit:    Essential hypertension    1.  Hypertension: I have rechecked her blood pressure at today's office visit when she was stress and got 170/80 in left arm.  When she is calm her blood pressure returns to normal at 126/82.  I suspect her elevated blood pressure is due to her stress level.  Maddison will continue her " current blood pressure medicine at home.  I've encouraged her to keep her at home blood pressure readings and follow up in 3 weeks.  2.  New stress anxiety: Maddison has increased stress taking care of her significant other who is suffering declining Alzheimer's dementia.  Dr. Siegel has approved a short-term Klonopin prescription to use for her increased stress and anxiety.  Maddison has signed the appropriate agreement and consent forms.  See below for Blaire information.  Maddison will return to office in 3 weeks for reevaluation.  We'll consider possible referral to therapy if warranted.        As part of this patient's treatment plan I am prescribing controlled substances.  The patient has been made aware of appropriate use of such medications, including potential risk of somnolence. Limited ability to drive and/or work safely, and potential for dependence or overdose.  It has also been made clear that these medications are for use by this patient only, without concomitant use of alcohol or other substances unless prescribed.  BLAIRE report has been reviewed and scanned into the patient's chart.  Blaire number is [id number] dated May 31, 2017        Dragon transcription disclaimer    Much of this encounter note is an electronic transcription/translation of spoken language to printed text.  The electronic translation of spoken language may permit erroneous, or at times, nonsensical words or phrases to be inadvertently transcribed.  Although I have reviewed the note for such errors, some may still exist.    Kasandra Mcdowell PA-C  Family Practice

## 2017-06-23 ENCOUNTER — OFFICE VISIT (OUTPATIENT)
Dept: FAMILY MEDICINE CLINIC | Facility: CLINIC | Age: 67
End: 2017-06-23

## 2017-06-23 VITALS
RESPIRATION RATE: 16 BRPM | OXYGEN SATURATION: 98 % | HEART RATE: 63 BPM | BODY MASS INDEX: 25.1 KG/M2 | SYSTOLIC BLOOD PRESSURE: 138 MMHG | HEIGHT: 64 IN | WEIGHT: 147 LBS | TEMPERATURE: 98.3 F | DIASTOLIC BLOOD PRESSURE: 78 MMHG

## 2017-06-23 DIAGNOSIS — I10 ESSENTIAL HYPERTENSION: Primary | ICD-10-CM

## 2017-06-23 DIAGNOSIS — F41.9 ANXIETY: ICD-10-CM

## 2017-06-23 PROCEDURE — 99213 OFFICE O/P EST LOW 20 MIN: CPT | Performed by: PHYSICIAN ASSISTANT

## 2017-06-23 NOTE — PROGRESS NOTES
"Subjective   Maddison Turcios is a 67 y.o. female.   Chief Complaint   Patient presents with   • Hypertension     management       History of Present Illness     Maddison is a 67-year-old female who presents for hypertension and anxiety management.  Maddison is doing well with her blood pressure medication.  She has been taking the Klonopin only occasionally.  States she's had 3 pills since getting the prescription last month.  She has been handling her stress by exercising.  She is trying to make good choices for her healthcare and is looking for a new place to live.  She denied any suicidal or homicidal ideation.  Maddison has not had any chest pain, shortness of air, headache, dizziness or swelling of ankles.  Her appetite has been healthy and sleep has been normal.      The following portions of the patient's history were reviewed and updated as appropriate: allergies, current medications, past family history, past medical history, past social history and past surgical history.    Review of Systems   Respiratory: Negative for cough, shortness of breath and wheezing.    Cardiovascular: Negative for chest pain, palpitations and leg swelling.   Neurological: Negative for dizziness, light-headedness and headaches.   Psychiatric/Behavioral: Negative for sleep disturbance and suicidal ideas.   All other systems reviewed and are negative.    Vitals:    06/23/17 0832   BP: 138/78   BP Location: Right arm   Patient Position: Sitting   Cuff Size: Adult   Pulse: 63   Resp: 16   Temp: 98.3 °F (36.8 °C)   TempSrc: Oral   SpO2: 98%   Weight: 147 lb (66.7 kg)   Height: 64\" (162.6 cm)       Wt Readings from Last 3 Encounters:   06/23/17 147 lb (66.7 kg)   06/02/17 145 lb (65.8 kg)   05/03/17 148 lb (67.1 kg)     BP Readings from Last 3 Encounters:   06/23/17 138/78   06/02/17 124/82   05/03/17 130/86     Body mass index is 25.23 kg/(m^2).  Allergies   Allergen Reactions   • Adhesive Tape Other (See Comments)     Redness, bruising and " peeling of skin    *Use Paper Tape Only*   • Beta Adrenergic Blockers    • Sulfa Antibiotics        Objective   Physical Exam   Constitutional: She is oriented to person, place, and time. Vital signs are normal. She appears well-developed and well-nourished.   Cardiovascular: Normal rate, regular rhythm, S1 normal, S2 normal, normal heart sounds and normal pulses.    Pulmonary/Chest: Effort normal and breath sounds normal.   Abdominal: Soft. Normal appearance and bowel sounds are normal. There is no hepatomegaly. There is no tenderness.   Neurological: She is alert and oriented to person, place, and time.   Skin: Skin is warm, dry and intact.   Psychiatric: She has a normal mood and affect. Her speech is normal and behavior is normal. Judgment and thought content normal. Cognition and memory are normal.       Assessment/Plan   Maddison was seen today for hypertension.    Diagnoses and all orders for this visit:    Essential hypertension    Ms. Maddison Turcios was seen in office today for follow-up for hypertension.  I have rechecked her blood pressure at today's office visit and got 120/80 in left arm.  Maddison will continue her current medication at home.  I've asked her to return to office in 3 months for reevaluation.  She voiced understanding.  Maddison is doing well with her stress by exercising.  She may use her Klonopin as needed.  We'll reevaluate at next office visit.        Dragon transcription disclaimer    Much of this encounter note is an electronic transcription/translation of spoken language to printed text.  The electronic translation of spoken language may permit erroneous, or at times, nonsensical words or phrases to be inadvertently transcribed.  Although I have reviewed the note for such errors, some may still exist.    Kasandra Mcdowell PA-C  Family Practice

## 2017-06-26 ENCOUNTER — OFFICE VISIT (OUTPATIENT)
Dept: CARDIOLOGY | Facility: CLINIC | Age: 67
End: 2017-06-26

## 2017-06-26 VITALS
HEART RATE: 61 BPM | SYSTOLIC BLOOD PRESSURE: 110 MMHG | DIASTOLIC BLOOD PRESSURE: 70 MMHG | BODY MASS INDEX: 25.08 KG/M2 | WEIGHT: 146.9 LBS | HEIGHT: 64 IN

## 2017-06-26 DIAGNOSIS — I25.810 ATHEROSCLEROSIS OF CORONARY ARTERY BYPASS GRAFT OF NATIVE HEART WITHOUT ANGINA PECTORIS: Primary | ICD-10-CM

## 2017-06-26 DIAGNOSIS — I25.10 CORONARY ARTERY DISEASE INVOLVING NATIVE CORONARY ARTERY OF NATIVE HEART WITHOUT ANGINA PECTORIS: ICD-10-CM

## 2017-06-26 DIAGNOSIS — I10 ESSENTIAL HYPERTENSION: ICD-10-CM

## 2017-06-26 DIAGNOSIS — G47.33 OSA (OBSTRUCTIVE SLEEP APNEA): ICD-10-CM

## 2017-06-26 DIAGNOSIS — E78.5 HYPERLIPIDEMIA, UNSPECIFIED HYPERLIPIDEMIA TYPE: ICD-10-CM

## 2017-06-26 PROCEDURE — 99214 OFFICE O/P EST MOD 30 MIN: CPT | Performed by: INTERNAL MEDICINE

## 2017-06-26 PROCEDURE — 93000 ELECTROCARDIOGRAM COMPLETE: CPT | Performed by: INTERNAL MEDICINE

## 2017-06-29 ENCOUNTER — HOSPITAL ENCOUNTER (EMERGENCY)
Facility: HOSPITAL | Age: 67
Discharge: HOME | End: 2017-06-29
Attending: EMERGENCY MEDICINE | Admitting: EMERGENCY MEDICINE

## 2017-06-29 ENCOUNTER — APPOINTMENT (OUTPATIENT)
Dept: GENERAL RADIOLOGY | Facility: HOSPITAL | Age: 67
End: 2017-06-29

## 2017-06-29 VITALS
WEIGHT: 149.4 LBS | BODY MASS INDEX: 25.51 KG/M2 | TEMPERATURE: 98.3 F | HEART RATE: 70 BPM | DIASTOLIC BLOOD PRESSURE: 84 MMHG | OXYGEN SATURATION: 97 % | HEIGHT: 64 IN | RESPIRATION RATE: 16 BRPM | SYSTOLIC BLOOD PRESSURE: 132 MMHG

## 2017-06-29 DIAGNOSIS — S61.452A DOG BITE OF LEFT HAND WITHOUT COMPLICATION, INITIAL ENCOUNTER: Primary | ICD-10-CM
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
## 2017-06-29 PROCEDURE — 25010000002 TETANUS-DIPHTHERIA TOXOIDS (ADULT) PER 0.5 ML

## 2017-06-29 PROCEDURE — 99283 EMERGENCY DEPT VISIT LOW MDM: CPT

## 2017-06-29 PROCEDURE — 99282 EMERGENCY DEPT VISIT SF MDM: CPT | Performed by: EMERGENCY MEDICINE

## 2017-06-29 PROCEDURE — 90471 IMMUNIZATION ADMIN: CPT

## 2017-06-29 PROCEDURE — 90714 TD VACC NO PRESV 7 YRS+ IM: CPT

## 2017-06-29 PROCEDURE — 73130 X-RAY EXAM OF HAND: CPT

## 2017-06-29 PROCEDURE — 12044 INTMD RPR N-HF/GENIT7.6-12.5: CPT | Performed by: EMERGENCY MEDICINE

## 2017-06-29 RX ORDER — LIDOCAINE HYDROCHLORIDE 20 MG/ML
10 INJECTION, SOLUTION INFILTRATION; PERINEURAL ONCE
Status: DISCONTINUED | OUTPATIENT
Start: 2017-06-29 | End: 2017-06-29 | Stop reason: HOSPADM

## 2017-06-29 RX ORDER — AMOXICILLIN AND CLAVULANATE POTASSIUM 500; 125 MG/1; MG/1
1 TABLET, FILM COATED ORAL 3 TIMES DAILY
Qty: 21 TABLET | Refills: 0 | Status: SHIPPED | OUTPATIENT
Start: 2017-06-29 | End: 2017-07-06

## 2017-06-29 RX ORDER — BUPIVACAINE HYDROCHLORIDE AND EPINEPHRINE 5; 5 MG/ML; UG/ML
10 INJECTION, SOLUTION EPIDURAL; INTRACAUDAL; PERINEURAL ONCE
Status: DISCONTINUED | OUTPATIENT
Start: 2017-06-29 | End: 2017-06-29 | Stop reason: HOSPADM

## 2017-06-29 RX ADMIN — CLOSTRIDIUM TETANI TOXOID ANTIGEN (FORMALDEHYDE INACTIVATED) AND CORYNEBACTERIUM DIPHTHERIAE TOXOID ANTIGEN (FORMALDEHYDE INACTIVATED) 0.5 ML: 5; 2 INJECTION, SUSPENSION INTRAMUSCULAR at 16:49

## 2017-06-30 ENCOUNTER — HOSPITAL ENCOUNTER (EMERGENCY)
Facility: HOSPITAL | Age: 67
Discharge: HOME | End: 2017-06-30
Attending: EMERGENCY MEDICINE | Admitting: EMERGENCY MEDICINE

## 2017-06-30 VITALS
BODY MASS INDEX: 24.75 KG/M2 | SYSTOLIC BLOOD PRESSURE: 147 MMHG | RESPIRATION RATE: 16 BRPM | DIASTOLIC BLOOD PRESSURE: 91 MMHG | TEMPERATURE: 98.1 F | WEIGHT: 145 LBS | HEART RATE: 73 BPM | HEIGHT: 64 IN | OXYGEN SATURATION: 97 %
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
## 2017-06-30 DIAGNOSIS — S61.452S DOG BITE OF LEFT HAND, SEQUELA: Primary | ICD-10-CM

## 2017-06-30 PROCEDURE — 99283 EMERGENCY DEPT VISIT LOW MDM: CPT

## 2017-06-30 PROCEDURE — 99282 EMERGENCY DEPT VISIT SF MDM: CPT | Performed by: EMERGENCY MEDICINE

## 2017-07-03 ENCOUNTER — OFFICE VISIT (OUTPATIENT)
Dept: FAMILY MEDICINE CLINIC | Facility: CLINIC | Age: 67
End: 2017-07-03

## 2017-07-03 VITALS
WEIGHT: 145 LBS | DIASTOLIC BLOOD PRESSURE: 68 MMHG | RESPIRATION RATE: 16 BRPM | TEMPERATURE: 98.6 F | HEART RATE: 68 BPM | BODY MASS INDEX: 24.75 KG/M2 | HEIGHT: 64 IN | SYSTOLIC BLOOD PRESSURE: 120 MMHG | OXYGEN SATURATION: 100 %

## 2017-07-03 DIAGNOSIS — S61.452S: Primary | ICD-10-CM
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
## 2017-07-03 PROBLEM — S61.459A DOG BITE OF HAND WITHOUT COMPLICATION: Status: ACTIVE | Noted: 2017-07-03

## 2017-07-03 PROCEDURE — 99212 OFFICE O/P EST SF 10 MIN: CPT | Performed by: PHYSICIAN ASSISTANT

## 2017-07-03 NOTE — PROGRESS NOTES
"Subjective   Maddison Turcios is a 67 y.o. female.   Chief Complaint   Patient presents with   • Animal Bite     left hand         History of Present Illness     Maddison is a 67-year-old female who presents for follow-up from [facility] emergency room on June 29, 2017.  States she was outside of her home which she went to pad her neighbor's dog.  The dog bit her left hand.  She went to the ER for evaluation.  States she was given a tetanus with pertussis shot at ER visit.  She did have a 6 mm laceration to her left hand.  She had approximately 6 sutures placed into her hand until to follow-up with her PCP for removal in 10 days.  She was prescribed Augmentin antibiotic.  Maddison states by the next morning, her hand was bleeding from the suture site.  She noticed increased swelling as well.  She will return to [facility] for further evaluation and treatment.  She states they were able to clean the area without complications.      The following portions of the patient's history were reviewed and updated as appropriate: allergies, current medications, past family history, past medical history, past social history and past surgical history.    Review of Systems   Constitutional: Negative.  Negative for chills and fever.   HENT: Negative.    Eyes: Negative.    Respiratory: Negative.    Cardiovascular: Negative.    Gastrointestinal: Negative.    Endocrine: Negative.    Genitourinary: Negative.    Musculoskeletal: Negative.    Skin: Positive for wound.   Allergic/Immunologic: Negative.    Neurological: Negative.    Hematological: Negative.    Psychiatric/Behavioral: Negative.    All other systems reviewed and are negative.    Vitals:    07/03/17 1117   BP: 120/68   BP Location: Right arm   Patient Position: Sitting   Cuff Size: Adult   Pulse: 68   Resp: 16   Temp: 98.6 °F (37 °C)   TempSrc: Oral   SpO2: 100%   Weight: 145 lb (65.8 kg)   Height: 64\" (162.6 cm)     Wt Readings from Last 3 Encounters: "   07/03/17 145 lb (65.8 kg)   06/30/17 145 lb (65.8 kg)   06/29/17 149 lb 6.4 oz (67.8 kg)       BP Readings from Last 3 Encounters:   07/03/17 120/68   06/30/17 147/91   06/29/17 132/84     Body mass index is 24.89 kg/(m^2).  Allergies   Allergen Reactions   • Adhesive Tape Other (See Comments)     Redness, bruising and peeling of skin    *Use Paper Tape Only*   • Beta Adrenergic Blockers    • Sulfa Antibiotics        Objective   Physical Exam   Constitutional: Vital signs are normal. She appears well-developed and well-nourished.   Musculoskeletal:        Hands:  Skin: Skin is warm, dry and intact. No bruising noted. No erythema.   Psychiatric: She has a normal mood and affect. Her speech is normal and behavior is normal. Judgment and thought content normal. Cognition and memory are normal.       Assessment/Plan   Maddison was seen today for animal bite.    Diagnoses and all orders for this visit:    Dog bite of hand without complication, left, sequela    Ms. Maddison Turcios was seen in office today for follow-up from dog bite.  I have reviewed her ER reports with her at today's office visit.  Maddison was instructed to continue her Augmentin antibiotic.  She will return next week for suture removal.  If she develops any fevers, chills or increased bleeding she'll return to office.  She voiced understanding.        Osvaldo transcription disclaimer    Much of this encounter note is an electronic transcription/translation of spoken language to printed text.  The electronic translation of spoken language may permit erroneous, or at times, nonsensical words or phrases to be inadvertently transcribed.  Although I have reviewed the note for such errors, some may still exist.    Kasandra Mcdowell PA-C  Family Practice

## 2017-07-14 ENCOUNTER — OFFICE VISIT (OUTPATIENT)
Dept: FAMILY MEDICINE CLINIC | Facility: CLINIC | Age: 67
End: 2017-07-14

## 2017-07-14 VITALS
SYSTOLIC BLOOD PRESSURE: 130 MMHG | DIASTOLIC BLOOD PRESSURE: 90 MMHG | RESPIRATION RATE: 16 BRPM | WEIGHT: 145 LBS | OXYGEN SATURATION: 99 % | BODY MASS INDEX: 24.75 KG/M2 | TEMPERATURE: 98.3 F | HEART RATE: 67 BPM | HEIGHT: 64 IN

## 2017-07-14 DIAGNOSIS — S61.402A NECROTIC HAND WOUND, LEFT, INITIAL ENCOUNTER (HCC): Primary | ICD-10-CM

## 2017-07-14 DIAGNOSIS — Z48.02 VISIT FOR SUTURE REMOVAL: ICD-10-CM

## 2017-07-14 DIAGNOSIS — I96 NECROTIC HAND WOUND, LEFT, INITIAL ENCOUNTER (HCC): Primary | ICD-10-CM

## 2017-07-14 PROBLEM — S61.409A: Status: ACTIVE | Noted: 2017-07-14

## 2017-07-14 PROCEDURE — 99212 OFFICE O/P EST SF 10 MIN: CPT | Performed by: PHYSICIAN ASSISTANT

## 2017-07-14 NOTE — PROGRESS NOTES
"Subjective   Maddison Turcios is a 67 y.o. female.   Chief Complaint   Patient presents with   • Suture / Staple Removal     left hand       History of Present Illness     Maddison is a 67 year old female who presents for suture removal. She was seen at [facility] ED on June 29,2017 after a dog bite.  She had 6 sutures place in her left hand by ED provider.  She was prescribed Augmentin which she has completed.  She return to [facility] the next day due to some bleeding at laceration repair site.  Since this time the wound was healing good.  Maddison noticed a day or so ago,that her laceration site was turning a dark black color and was slight swollen.  Denied any fevers,chills, or redness of laceration site. She had an updated TD immunization at the ED on June 29,2017.      The following portions of the patient's history were reviewed and updated as appropriate: allergies, current medications, past family history, past medical history, past social history and past surgical history.    Review of Systems   Constitutional: Negative.  Negative for chills, diaphoresis, fatigue and fever.   HENT: Negative.    Eyes: Negative.    Respiratory: Negative.    Cardiovascular: Negative.    Gastrointestinal: Negative.    Endocrine: Negative.    Genitourinary: Negative.    Musculoskeletal: Negative.    Skin: Positive for color change and wound.   Allergic/Immunologic: Negative.    Neurological: Negative.    Hematological: Negative.    Psychiatric/Behavioral: Negative.    All other systems reviewed and are negative.    Vitals:    07/14/17 1346   BP: 130/90   BP Location: Right arm   Patient Position: Sitting   Cuff Size: Adult   Pulse: 67   Resp: 16   Temp: 98.3 °F (36.8 °C)   TempSrc: Oral   SpO2: 99%   Weight: 145 lb (65.8 kg)   Height: 64\" (162.6 cm)     Body mass index is 24.89 kg/(m^2).  Allergies   Allergen Reactions   • Adhesive Tape Other (See Comments)     Redness, bruising and peeling of skin    *Use Paper Tape Only*   • Beta Adrenergic " Blockers    • Sulfa Antibiotics        Objective   Physical Exam   Constitutional: She is oriented to person, place, and time. Vital signs are normal. She appears well-developed and well-nourished.   Neck: Trachea normal and phonation normal. Neck supple. No edema present.   Cardiovascular: Normal rate, regular rhythm, S1 normal, S2 normal, normal heart sounds and normal pulses.    Pulmonary/Chest: Effort normal and breath sounds normal.   Abdominal: Soft. Normal appearance and bowel sounds are normal. There is no hepatomegaly. There is no tenderness.   Musculoskeletal:        Left hand: She exhibits laceration. She exhibits normal range of motion, no tenderness, no bony tenderness and normal capillary refill. Normal sensation noted. Normal strength noted.        Hands:  Neurological: She is alert and oriented to person, place, and time.   Skin: Skin is warm and dry. Laceration noted.        Psychiatric: She has a normal mood and affect. Her speech is normal and behavior is normal. Judgment and thought content normal. Cognition and memory are normal.         Suture Removal  Date/Time: 7/14/2017 2:01 PM  Performed by: LADY OSEGUERA  Authorized by: LADY OSEGUERA   Consent: Verbal consent obtained. Written consent not obtained.  Risks and benefits: risks, benefits and alternatives were discussed  Consent given by: patient  Patient understanding: patient states understanding of the procedure being performed  Patient consent: the patient's understanding of the procedure matches consent given  Procedure consent: procedure consent matches procedure scheduled  Relevant documents: relevant documents present and verified  Test results: test results available and properly labeled  Patient identity confirmed: verbally with patient  Body area: upper extremity  Location details: left hand  Sutures Removed: 6  Patient tolerance: Patient tolerated the procedure well with no immediate complications  Comments: Necrotic tissue  is noted along laceration site.  No discharge is noted.  Slightly tender to touch but erythema is noted.        Assessment/Plan   Maddison was seen today for suture / staple removal.    Diagnoses and all orders for this visit:    Necrotic hand wound, left, initial encounter    Visit for suture removal  -     Suture Removal      Ms. Maddison Turcios was seen in office today for left hand suture removal.I have reviewed her [facility] ED report with her at today's office visit.  6 sutures were removed and shown to patient.  She has necrotic tissue around laceration site.  I have spoken with Dr. Salomon's office and they will see her on Monday, July 17, 2017, further evaluation and treatment.  I have instructed Maddison if she develops a fever, any discharge from area or redness she is to go to the nearest emergency room for evaluation and treatment.  She voiced understanding.        Dragon transcription disclaimer    Much of this encounter note is an electronic transcription/translation of spoken language to printed text.  The electronic translation of spoken language may permit erroneous, or at times, nonsensical words or phrases to be inadvertently transcribed.  Although I have reviewed the note for such errors, some may still exist.    Kasandra Mcdowell PA-C  Family Practice

## 2017-07-15 ENCOUNTER — HOSPITAL ENCOUNTER (EMERGENCY)
Facility: HOSPITAL | Age: 67
Discharge: HOME | End: 2017-07-15
Attending: EMERGENCY MEDICINE | Admitting: EMERGENCY MEDICINE

## 2017-07-15 VITALS
HEART RATE: 67 BPM | RESPIRATION RATE: 18 BRPM | TEMPERATURE: 99.1 F | SYSTOLIC BLOOD PRESSURE: 132 MMHG | HEIGHT: 64 IN | OXYGEN SATURATION: 98 % | BODY MASS INDEX: 24.75 KG/M2 | WEIGHT: 145 LBS | DIASTOLIC BLOOD PRESSURE: 76 MMHG
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
## 2017-07-15 DIAGNOSIS — S61.452S DOG BITE OF LEFT HAND, SEQUELA: Primary | ICD-10-CM

## 2017-07-15 PROBLEM — Z48.02 VISIT FOR SUTURE REMOVAL: Status: ACTIVE | Noted: 2017-07-15

## 2017-07-15 PROCEDURE — 99282 EMERGENCY DEPT VISIT SF MDM: CPT

## 2017-07-15 PROCEDURE — 11042 DBRDMT SUBQ TIS 1ST 20SQCM/<: CPT | Performed by: EMERGENCY MEDICINE

## 2017-07-15 NOTE — ED NOTES
Pt's wound has hardened dead skin over it.  No signs of infection: no redness, swelling, or purulent drainage.     Tere Randall RN  07/15/17 1242

## 2017-07-15 NOTE — ED PROVIDER NOTES
Subjective   History of Present Illness  History of Present Illness    Chief complaint: Bleeding from partly healed dog bite    Location: Dorsum of left hand over the distal second metacarpal    Quality/Severity:  Intermittent oozing and mild    Timing/Duration: Initial dog bite was on June 29 and the patient has been having some intermittent oozing since last evening    Modifying Factors: Initial repair was done in our department on June 29 and wound has been poorly healing    Associated Symptoms: Mild discomfort    Narrative: The patient is a 67-year-old white female who was initially seen in our department June 29 after a dog bite to her left hand.  The wound was sutured and has not healed very well.  The patient has been seen in follow-up once in our department and twice by her primary care physician.  Patient does have a scheduled follow-up with general surgery in 2 days.  This follow-up was for debridement of devitalized tissue.  Last evening, the wound began to ooze a small amount of blood intermittently.  No increase in pain, swelling or erythema.    Review of Systems   Constitutional: Negative for fever.   Skin: Positive for wound (poorly healing and intermittent oozing of blood).   All other systems reviewed and are negative.      Past Medical History:   Diagnosis Date   • Abnormal blood chemistry    • Acute UTI (urinary tract infection)    • Aneurysm, cerebral    • Bloating    • Brain aneurysm    • CAD (coronary artery disease)    • Chronic headaches    • Coronary artery disease    • Coronary artery stenosis    • Dysuria    • Encounter for screening colonoscopy    • Environmental allergies    • Fall from slip, trip, or stumble    • Gait disturbance    • H/O cardiovascular stress test 09/17/2013    Treadmill   • H/O colonoscopy    • H/O echocardiogram 09/25/2013   • H/O fall    • History of EKG 07/31/2015   • Hyperlipemia    • Hyperlipidemia    • Hypertension    • Hyperthyroidism    • Insomnia    • Left  forearm pain    • Left leg pain    • Left wrist pain    • Lower abdominal pain    • Need for pneumococcal vaccination    • Onychomycosis of toenail    • Pelvic pressure in female    • Right foot pain    • TIA (transient ischemic attack) 11/30/2016   • URI (upper respiratory infection)    • Urine frequency        Allergies   Allergen Reactions   • Adhesive Tape Other (See Comments)     Redness, bruising and peeling of skin    *Use Paper Tape Only*   • Beta Adrenergic Blockers    • Sulfa Antibiotics        Past Surgical History:   Procedure Laterality Date   • BREAST EXCISIONAL BIOPSY Right 1977    b9   • BREAST EXCISIONAL BIOPSY Right 1979    b9   • BUNIONECTOMY     • CARDIAC SURGERY     • CEREBRAL ANEURYSM REPAIR     • CHOLECYSTECTOMY     • CORONARY ARTERY BYPASS GRAFT     • ROTATOR CUFF REPAIR     • ROTATOR CUFF REPAIR     • SPLENECTOMY     • TONSILLECTOMY     • TUBAL ABDOMINAL LIGATION         Family History   Problem Relation Age of Onset   • Heart failure Mother    • Hypertension Mother    • Stroke Mother    • Heart failure Father    • Aneurysm Sister    • Heart attack Brother    • Cancer Brother    • Breast cancer Neg Hx    • Ovarian cancer Neg Hx    • Colon cancer Neg Hx        Social History     Social History   • Marital status:      Spouse name: N/A   • Number of children: N/A   • Years of education: N/A     Social History Main Topics   • Smoking status: Never Smoker   • Smokeless tobacco: None   • Alcohol use No   • Drug use: No   • Sexual activity: No     Other Topics Concern   • None     Social History Narrative           Objective   Physical Exam   Constitutional: She is oriented to person, place, and time. She appears well-developed and well-nourished.   HENT:   Head: Normocephalic and atraumatic.   Eyes: Conjunctivae and EOM are normal.   Musculoskeletal:   The dorsum of the left hand does show a 2.5 cm area that had been previously sutured.  There is a crescent shaped area of devitalized  tissue that is mildly heaped up.  No evidence of acute infection.  All neuromuscular vascular exams intact.   Neurological: She is alert and oriented to person, place, and time.   Nursing note and vitals reviewed.      Procedures         ED Course  ED Course   Comment By Time   07/15/17, 9:05 AM  Options explained to patient and she elected to have me remove the devitalized tissue in the department without anesthesia.  The necrotic tissue was removed by sharp dissection without difficulty.  Wound subsequently cleansed with Hibiclens, covered with a bacitracin and a sterile dressing.  Wound care, expectations and warnings discussed with patient. Morris Mantilla MD 07/15 0906                  MDM  Number of Diagnoses or Management Options  Dog bite of left hand, sequela: established and worsening  Risk of Complications, Morbidity, and/or Mortality  Presenting problems: low  Management options: moderate        Final diagnoses:   Dog bite of left hand, sequela            Morris Mantilla MD  07/15/17 0912

## 2017-07-15 NOTE — DISCHARGE INSTRUCTIONS
Keep wound clean, covered and dry for 24 hours.  Clean with soap and water once a day after that until healed.

## 2017-07-18 ENCOUNTER — OFFICE VISIT (OUTPATIENT)
Dept: SURGERY | Facility: CLINIC | Age: 67
End: 2017-07-18

## 2017-07-18 VITALS
HEIGHT: 64 IN | BODY MASS INDEX: 24.75 KG/M2 | DIASTOLIC BLOOD PRESSURE: 82 MMHG | SYSTOLIC BLOOD PRESSURE: 126 MMHG | WEIGHT: 145 LBS

## 2017-07-18 DIAGNOSIS — T14.8XXA OPEN WOUND: Primary | ICD-10-CM

## 2017-07-18 PROCEDURE — 99202 OFFICE O/P NEW SF 15 MIN: CPT | Performed by: SURGERY

## 2017-07-18 NOTE — PROGRESS NOTES
PATIENT INFORMATION  Maddison Turcios  PT SEEN IN ER, DOG BITE ONSET 17, HAS HAD AREA DEBRIDED AND STITCHED UP, NECROTIC TISSUE AROUND INCISION WAS EXCISED ON 7/15, HERE TO F/U FOR WOUND CHECK     - 1950    CHIEF COMPLAINT  Chief Complaint   Patient presents with   • Wound Check       HISTORY OF PRESENT ILLNESS  HPI he suffered a dog bite to her hand that was sutured in the emergency room and she was placed on an antibiotic.  Subsequently she developed some necrotic areas in the wound and went to the emergency room on the  of this month and it was locally debrided by Dr. Mantilla.  Both Dr. Murphy and Dr. Mantilla's notes were reviewed.  She is on Plavix.  She is soaking the wound for 30 minutes and Hibiclens and using an antibiotic ointment to the site.        REVIEW OF SYSTEMS  Review of Systems   Constitutional: Negative.    HENT: Positive for ear pain.    Eyes: Negative.    Respiratory: Negative.    Cardiovascular: Negative.    Gastrointestinal: Negative.    Endocrine: Negative.    Genitourinary: Negative.    Musculoskeletal: Negative.    Skin:        OPEN WOUND   Allergic/Immunologic: Negative.    Neurological: Negative.    Hematological: Negative.    Psychiatric/Behavioral: Negative.          ACTIVE PROBLEMS  Patient Active Problem List    Diagnosis   • Visit for suture removal [Z48.02]   • Necrotic hand wound [S61.409A]   • Dog bite of hand without complication [S61.459A, W54.0XXA]   • Anxiety [F41.9]   • Osteopenia [M85.80]   • Medicare annual wellness visit, subsequent [Z00.00]   • Fatigue [R53.83]   • AKIRA (obstructive sleep apnea) [G47.33]   • Skin lesion of right ear [L98.9]   • Transient cerebral ischemia [G45.9]   • Bilateral enlargement of atria [I51.7]   • Sinus bradycardia [R00.1]   • Atherosclerosis of coronary artery bypass graft [I25.810]     Overview Note:     Overview:   Thelma Coker triple bypass  Overview:   Thelma Coker triple bypass     • Temporary cerebral vascular  dysfunction [G93.9]   • Onychomycosis [B35.1]   • Menopausal symptoms [N95.1]   • Welcome to Medicare preventive visit [Z00.00]   • Preoperative cardiovascular examination [Z01.810]   • Abnormal blood chemistry level [R79.9]   • Cerebral aneurysm [I67.1]     Overview Note:     Overview:   5 mm right superior hypophyseal aneurysm  Overview:   5 mm right superior hypophyseal aneurysm     • Bloating [R14.0]   • Coronary artery disease [I25.10]   • Abnormal gait [R26.9]   • Hyperlipidemia [E78.5]   • Essential hypertension [I10]   • Hyperthyroidism [E05.90]   • Insomnia [G47.00]   • Onychomycosis of toenail [B35.1]   • Right foot pain [M79.671]         PAST MEDICAL HISTORY  Past Medical History:   Diagnosis Date   • Abnormal blood chemistry    • Acute UTI (urinary tract infection)    • Aneurysm, cerebral    • Bloating    • Brain aneurysm    • CAD (coronary artery disease)    • Chronic headaches    • Coronary artery disease    • Coronary artery stenosis    • Dysuria    • Encounter for screening colonoscopy    • Environmental allergies    • Fall from slip, trip, or stumble    • Gait disturbance    • H/O cardiovascular stress test 09/17/2013    Treadmill   • H/O colonoscopy    • H/O echocardiogram 09/25/2013   • H/O fall    • History of EKG 07/31/2015   • Hyperlipemia    • Hyperlipidemia    • Hypertension    • Hyperthyroidism    • Insomnia    • Left forearm pain    • Left leg pain    • Left wrist pain    • Lower abdominal pain    • Need for pneumococcal vaccination    • Onychomycosis of toenail    • Pelvic pressure in female    • Right foot pain    • TIA (transient ischemic attack) 11/30/2016   • URI (upper respiratory infection)    • Urine frequency          SURGICAL HISTORY  Past Surgical History:   Procedure Laterality Date   • BREAST EXCISIONAL BIOPSY Right 1977    b9   • BREAST EXCISIONAL BIOPSY Right 1979    b9   • BUNIONECTOMY     • CARDIAC SURGERY     • CEREBRAL ANEURYSM REPAIR     • CHOLECYSTECTOMY     • CORONARY  ARTERY BYPASS GRAFT     • ROTATOR CUFF REPAIR     • ROTATOR CUFF REPAIR     • SPLENECTOMY     • TONSILLECTOMY     • TUBAL ABDOMINAL LIGATION           FAMILY HISTORY  Family History   Problem Relation Age of Onset   • Heart failure Mother    • Hypertension Mother    • Stroke Mother    • Heart failure Father    • Aneurysm Sister    • Heart attack Brother    • Cancer Brother    • Breast cancer Neg Hx    • Ovarian cancer Neg Hx    • Colon cancer Neg Hx          SOCIAL HISTORY  Social History     Occupational History   • Not on file.     Social History Main Topics   • Smoking status: Never Smoker   • Smokeless tobacco: Not on file   • Alcohol use No   • Drug use: No   • Sexual activity: No         CURRENT MEDICATIONS    Current Outpatient Prescriptions:   •  aluminum chloride (DRYSOL) 20 % external solution, Apply  topically Every Night., Disp: , Rfl:   •  Biotin 10 MG tablet, Take by mouth., Disp: , Rfl:   •  Cholecalciferol (VITAMIN D3) 5000 UNITS capsule capsule, Take 1 capsule by mouth Daily., Disp: , Rfl:   •  clonazePAM (KLONOPIN) 0.5 MG tablet, take one half to one tablet twice a day as needed for anxiety, Disp: 30 tablet, Rfl: 0  •  clopidogrel (PLAVIX) 75 MG tablet, Take 1 tablet by mouth Daily., Disp: 90 tablet, Rfl: 3  •  Cranberry 1000 MG capsule, Take by mouth., Disp: , Rfl:   •  diclofenac (VOLTAREN) 1 % gel gel, Apply 4 g topically 4 (four) times a day as needed., Disp: , Rfl:   •  FIBER COMPLETE tablet, Take by mouth., Disp: , Rfl:   •  icosapent ethyl (VASCEPA) 1 G capsule capsule, Take 2 g by mouth 4 (Four) Times a Day., Disp: , Rfl:   •  LIVALO 2 MG tablet tablet, TAKE 1 TABLET AT BEDTIME, Disp: 90 tablet, Rfl: 0  •  Magnesium 100 MG tablet, Take by mouth., Disp: , Rfl:   •  valsartan (DIOVAN) 40 MG tablet, Take 1 tablet by mouth Daily., Disp: 90 tablet, Rfl: 1    ALLERGIES  Adhesive tape; Beta adrenergic blockers; and Sulfa antibiotics    VITALS  Vitals:    07/18/17 1050   BP: 126/82   Weight: 145 lb  "(65.8 kg)   Height: 64\" (162.6 cm)       LAST RESULTS   Office Visit on 05/03/2017   Component Date Value Ref Range Status   • Color 05/03/2017 Yellow  Yellow, Straw, Dark Yellow, Matilde Final   • Clarity, UA 05/03/2017 Clear  Clear Final   • Glucose, UA 05/03/2017 Negative  Negative, 1000 mg/dL (3+) mg/dL Final   • Bilirubin 05/03/2017 Negative  Negative Final   • Ketones, UA 05/03/2017 Negative  Negative Final   • Specific Gravity  05/03/2017 1.010  1.005 - 1.030 Final   • Blood, UA 05/03/2017 Negative  Negative Final   • pH, Urine 05/03/2017 5.0  5.0 - 8.0 Final   • Protein, POC 05/03/2017 Negative  Negative mg/dL Final   • Urobilinogen, UA 05/03/2017 Normal  Normal Final   • Leukocytes 05/03/2017 Negative  Negative Final   • Nitrite, UA 05/03/2017 Negative  Negative Final     Xr Hand 3+ View Left    Result Date: 6/30/2017  Narrative: THREE VIEWS LEFT HAND  DATE: 06/29/2017.  HISTORY: Bitten by a neighbors dog on the hand with laceration and pain to the middle metacarpal region. Injury occurred today.  COMPARISON: None.  FINDINGS: Extensive subcutaneous gas is demonstrated within the left hand, particularly at the dorsum of the metacarpals. No retained radiopaque foreign body is seen in the soft tissues. No acute fracture or joint dislocation is seen. Advanced osteoarthritic changes are demonstrated within the 4th and 5th proximal interphalangeal joints, moderate degenerative change at the 1st carpometacarpal junction and interphalangeal joint of the thumb. There is osteoarthritic change with suggestion of partial osseous fusion across the distal interphalangeal joint of the 5th finger.      Impression: 1. Left hand subcutaneous emphysema suggesting laceration injury, greatest at the dorsum of the metacarpals. 2. Osteoarthritic changes, most advanced at the 4th and 5th proximal interphalangeal joints and the 5th distal interphalangeal joint. 3. No acute fracture or dislocation.  This report was finalized on " 6/30/2017 11:07 AM by Dr. Nazia Sharif MD.        PHYSICAL EXAM  Physical Exam she is an open wound on the extensor surface of her hand.  This is slightly heaped up where there is some skin loss.  Centrally there is a 2 mm area of dark tissue.  There is no surrounding cellulitis or abscess.    ASSESSMENT  Open wound      PLAN  I discussed was benefits options with her in detail.  I would like to change her wound care regimen.  I have asked her to clean the area with a Q-tip and hydrogen peroxide and then wash the area off with water twice a day.  I will see her back in a week to assess for improvement.

## 2017-08-01 ENCOUNTER — OFFICE VISIT (OUTPATIENT)
Dept: SURGERY | Facility: CLINIC | Age: 67
End: 2017-08-01

## 2017-08-01 VITALS
BODY MASS INDEX: 24.75 KG/M2 | SYSTOLIC BLOOD PRESSURE: 122 MMHG | DIASTOLIC BLOOD PRESSURE: 74 MMHG | HEIGHT: 64 IN | WEIGHT: 145 LBS

## 2017-08-01 DIAGNOSIS — T14.8XXA OPEN WOUND: Primary | ICD-10-CM

## 2017-08-01 PROCEDURE — 99212 OFFICE O/P EST SF 10 MIN: CPT | Performed by: SURGERY

## 2017-08-01 NOTE — PROGRESS NOTES
2 WK F/U OPEN WOUND OF L HAND, PT STATES AREA IS HEALING WELL  Without complaints.  The wound is healing well and is almost closed.  It is open for about 2 x 4 mm.  There is no evidence of abscess or cellulitis.  I recommended she stop using the hydrogen peroxide at this point but should continue to clean the area with soap and water twice a day.  I will see her back if it does not heal.  I discussed the scar with her and will continue to remodel over the next year.

## 2017-09-20 ENCOUNTER — OFFICE VISIT (OUTPATIENT)
Dept: FAMILY MEDICINE CLINIC | Facility: CLINIC | Age: 67
End: 2017-09-20

## 2017-09-20 VITALS
WEIGHT: 140 LBS | HEART RATE: 70 BPM | TEMPERATURE: 97.9 F | DIASTOLIC BLOOD PRESSURE: 68 MMHG | RESPIRATION RATE: 16 BRPM | BODY MASS INDEX: 23.9 KG/M2 | SYSTOLIC BLOOD PRESSURE: 120 MMHG | HEIGHT: 64 IN | OXYGEN SATURATION: 98 %

## 2017-09-20 DIAGNOSIS — E78.2 MIXED HYPERLIPIDEMIA: ICD-10-CM

## 2017-09-20 DIAGNOSIS — I10 ESSENTIAL HYPERTENSION: Primary | ICD-10-CM

## 2017-09-20 PROCEDURE — 99213 OFFICE O/P EST LOW 20 MIN: CPT | Performed by: PHYSICIAN ASSISTANT

## 2017-09-20 RX ORDER — VALSARTAN 40 MG/1
TABLET ORAL
Qty: 90 TABLET | Refills: 1
Start: 2017-09-20 | End: 2018-06-06 | Stop reason: SDUPTHER

## 2017-09-20 NOTE — PROGRESS NOTES
Subjective   Maddison Turcios is a 67 y.o. female.   She  is here today to f/u on   Chief Complaint   Patient presents with   • Hyperlipidemia   • Hypertension   .     History of Present Illness     Maddison is a 67 showed female who presents for hypertension and hyperlipidemia.  She has lost 5 pounds since August 1, 2017. States she is feeling better since she is living by herself.  Her boyfriend now is living at an assisted care facility.  Maddison has noticed that her blood pressure has been running on the lower side.  She has been getting around 100/60's at home.  She still taking her Diovan 40 mg daily.  She denied any headaches, dizziness, lightheadedness, chest pain, shortness of air or swelling of ankles.  She has not been taking Klonopin medication.  Maddison has noticed that her anxiety level has decreased dramatically.  Her appetite has been healthy.  Sleep has been normal.  She is trying to exercise for walking.  Maddison had her flu shot at her [facility] pharmacy today.      The following portions of the patient's history were reviewed and updated as appropriate: allergies, current medications, past family history, past medical history, past social history and past surgical history.    Review of Systems   Constitutional: Negative.    HENT: Negative.    Eyes: Negative.    Respiratory: Negative.  Negative for cough, shortness of breath and wheezing.    Cardiovascular: Negative.  Negative for chest pain, palpitations and leg swelling.   Gastrointestinal: Negative.    Endocrine: Negative.    Genitourinary: Negative.    Musculoskeletal: Negative.    Skin: Negative.    Allergic/Immunologic: Negative.    Neurological: Negative.    Hematological: Negative.    Psychiatric/Behavioral: Negative.  Negative for sleep disturbance and suicidal ideas.   All other systems reviewed and are negative.      Objective      Vitals:    09/20/17 1350 09/20/17 1433   BP: 130/70 120/68   BP Location: Right arm Right arm   Patient Position: Sitting  "Sitting   Cuff Size: Adult Adult   Pulse: 70    Resp: 16    Temp: 97.9 °F (36.6 °C)    SpO2: 98%    Weight: 140 lb (63.5 kg)    Height: 64\" (162.6 cm)        Wt Readings from Last 3 Encounters:   09/20/17 140 lb (63.5 kg)   08/01/17 145 lb (65.8 kg)   07/18/17 145 lb (65.8 kg)     Body mass index is 24.03 kg/(m^2).  Allergies   Allergen Reactions   • Adhesive Tape Other (See Comments)     Redness, bruising and peeling of skin    *Use Paper Tape Only*   • Beta Adrenergic Blockers    • Sulfa Antibiotics        BP Readings from Last 3 Encounters:   09/20/17 120/68   08/01/17 122/74   07/18/17 126/82       Physical Exam   Constitutional: She is oriented to person, place, and time. Vital signs are normal. She appears well-developed and well-nourished.   Neck: Trachea normal and phonation normal. Neck supple. Carotid bruit is not present. No edema present.   Cardiovascular: Normal rate, regular rhythm, S1 normal, S2 normal, normal heart sounds and normal pulses.    Pulmonary/Chest: Effort normal and breath sounds normal.   Abdominal: Soft. Normal appearance and bowel sounds are normal. There is no hepatomegaly. There is no tenderness.   Neurological: She is alert and oriented to person, place, and time.   Skin: Skin is warm, dry and intact.   Psychiatric: She has a normal mood and affect. Her speech is normal and behavior is normal. Judgment and thought content normal. Cognition and memory are normal.       Invalid input(s): 2W    Assessment/Plan   Maddison was seen today for hyperlipidemia and hypertension.    Diagnoses and all orders for this visit:    Essential hypertension  -     valsartan (DIOVAN) 40 MG tablet; 1/2 tablet once a day    Mixed hyperlipidemia    1.  Chronic hypertension: I have rechecked her blood pressure at today's office visit and got 120/68 in right arm.  I've asked her to decrease her Diovan prescription half a tablet a day.  She will keep a blood pressure log at home.  I suspect since her stress " level has gone down she may be able to come off of her hypertension medication.  She may be able to control by diet and exercise.  I've asked Maddison to return to office in 6 -8 weeks for reevaluation.  2.  Chronic hyperlipidemia: She recently had fasting blood work performed at her hospitals and [facility].  She will get updated lab work for my review.  I've encouraged Maddison continue eating healthy and exercising routinely.  She will continue her current medication at home as directed.    There are no Patient Instructions on file for this visit.      Dragon transcription disclaimer    Much of this encounter note is an electronic transcription/translation of spoken language to printed text.  The electronic translation of spoken language may permit erroneous, or at times, nonsensical words or phrases to be inadvertently transcribed.  Although I have reviewed the note for such errors, some may still exist.    Kasandra Mcdowell PA-C  Family Practice

## 2017-10-04 ENCOUNTER — TELEPHONE (OUTPATIENT)
Dept: FAMILY MEDICINE CLINIC | Facility: CLINIC | Age: 67
End: 2017-10-04

## 2017-10-05 DIAGNOSIS — I10 ESSENTIAL HYPERTENSION: ICD-10-CM

## 2017-10-05 RX ORDER — VALSARTAN 40 MG/1
TABLET ORAL
Qty: 90 TABLET | Refills: 1 | Status: SHIPPED | OUTPATIENT
Start: 2017-10-05 | End: 2018-06-06 | Stop reason: SDUPTHER

## 2017-11-06 ENCOUNTER — OFFICE VISIT (OUTPATIENT)
Dept: FAMILY MEDICINE CLINIC | Facility: CLINIC | Age: 67
End: 2017-11-06

## 2017-11-06 VITALS
BODY MASS INDEX: 24.41 KG/M2 | RESPIRATION RATE: 16 BRPM | DIASTOLIC BLOOD PRESSURE: 78 MMHG | TEMPERATURE: 98.3 F | HEIGHT: 64 IN | WEIGHT: 143 LBS | HEART RATE: 62 BPM | OXYGEN SATURATION: 98 % | SYSTOLIC BLOOD PRESSURE: 114 MMHG

## 2017-11-06 DIAGNOSIS — N63.20 BREAST MASS, LEFT: Primary | ICD-10-CM

## 2017-11-06 PROCEDURE — 99213 OFFICE O/P EST LOW 20 MIN: CPT | Performed by: PHYSICIAN ASSISTANT

## 2017-11-06 NOTE — PROGRESS NOTES
"Subjective   Maddison Turcios is a 67 y.o. female.   Chief Complaint   Patient presents with   • Genetic Evaluation       History of Present Illness     Maddison is a 67 year old female who presents for possible  genetic testing for breast adenocarcinoma .  Her sister was diagnosed stage 2 right breast adenocarcinoma.   States she has been doing monthly breast examinations.  She had a mammogram earlier this year with normal results.  States she has not found any lumps.  Denied any breast changes or breast discharge.  She has questions about genetic testing.  She is unsure if she should get this done or not.  Maddison states she has had 2 biopsies of right breast due to fibrocystic lumps.  They were benign.  She has not had any recent abnormal mammograms.  Appetite and sleep have been normal.  She does not drink caffeine.      The following portions of the patient's history were reviewed and updated as appropriate: allergies, current medications, past family history, past medical history, past social history and past surgical history.    Review of Systems   Constitutional: Negative.    HENT: Negative.    Eyes: Negative.    Respiratory: Negative.    Cardiovascular: Negative.    Gastrointestinal: Negative.    Endocrine: Negative.    Genitourinary: Negative.    Musculoskeletal: Negative.    Skin: Negative.    Allergic/Immunologic: Negative.    Neurological: Negative.    Hematological: Negative.    Psychiatric/Behavioral: Negative.    All other systems reviewed and are negative.    Vitals:    11/06/17 0951   BP: 114/78   BP Location: Left arm   Patient Position: Sitting   Cuff Size: Adult   Pulse: 62   Resp: 16   Temp: 98.3 °F (36.8 °C)   TempSrc: Oral   SpO2: 98%   Weight: 143 lb (64.9 kg)   Height: 64\" (162.6 cm)       Wt Readings from Last 3 Encounters:   11/06/17 143 lb (64.9 kg)   09/20/17 140 lb (63.5 kg)   08/01/17 145 lb (65.8 kg)       BP Readings from Last 3 Encounters:   11/06/17 114/78   09/20/17 120/68   08/01/17 " 122/74     Body mass index is 24.55 kg/(m^2).  Allergies   Allergen Reactions   • Adhesive Tape Other (See Comments)     Redness, bruising and peeling of skin    *Use Paper Tape Only*   • Beta Adrenergic Blockers    • Sulfa Antibiotics        Objective   Physical Exam   Constitutional: She is oriented to person, place, and time. Vital signs are normal. She appears well-developed and well-nourished.   Neck: Trachea normal and phonation normal. Neck supple. No edema present.   Cardiovascular: Normal rate, regular rhythm, S1 normal, S2 normal, normal heart sounds and normal pulses.    Pulmonary/Chest: Effort normal and breath sounds normal. Right breast exhibits no inverted nipple, no mass, no nipple discharge, no skin change and no tenderness. Left breast exhibits mass and tenderness. Left breast exhibits no inverted nipple, no nipple discharge and no skin change.       Abdominal: Soft. Normal appearance and bowel sounds are normal. There is no hepatomegaly. There is no tenderness.   Lymphadenopathy:     She has no axillary adenopathy.   Neurological: She is alert and oriented to person, place, and time.   Skin: Skin is warm, dry and intact.   Psychiatric: She has a normal mood and affect. Her speech is normal and behavior is normal. Judgment and thought content normal. Cognition and memory are normal.       Assessment/Plan   Maddison was seen today for genetic evaluation.    Diagnoses and all orders for this visit:    Breast mass, left  -     Mammo Diagnostic Left With CAD; Future  -     US Breast Left Complete; Future    Ms. Maddison Turcios was seen in office today and diagnosed with new left breast mass: I will schedule a diagnostic left mammogram and left breast ultrasound for further evaluation.  Her sister was recently diagnosed with stage II adenoid carcinoma of right breast.    Dragon transcription disclaimer    Much of this encounter note is an electronic transcription/translation of spoken language to printed  text.  The electronic translation of spoken language may permit erroneous, or at times, nonsensical words or phrases to be inadvertently transcribed.  Although I have reviewed the note for such errors, some may still exist.    Kasandra Mcdowell PA-C  Family Practice

## 2017-11-14 ENCOUNTER — HOSPITAL ENCOUNTER (OUTPATIENT)
Dept: ULTRASOUND IMAGING | Facility: HOSPITAL | Age: 67
Discharge: HOME | End: 2017-11-14

## 2017-11-14 ENCOUNTER — HOSPITAL ENCOUNTER (OUTPATIENT)
Dept: MAMMOGRAPHY | Facility: HOSPITAL | Age: 67
Discharge: HOME | End: 2017-11-14
Admitting: PHYSICIAN ASSISTANT

## 2017-11-14 DIAGNOSIS — N63.20 BREAST MASS, LEFT: ICD-10-CM

## 2017-11-14 PROCEDURE — G0206 DX MAMMO INCL CAD UNI: HCPCS

## 2017-11-14 PROCEDURE — G0279 TOMOSYNTHESIS, MAMMO: HCPCS

## 2017-11-14 PROCEDURE — 76642 ULTRASOUND BREAST LIMITED: CPT

## 2017-11-16 ENCOUNTER — TELEPHONE (OUTPATIENT)
Dept: FAMILY MEDICINE CLINIC | Facility: CLINIC | Age: 67
End: 2017-11-16

## 2017-11-16 NOTE — TELEPHONE ENCOUNTER
----- Message from Kasandra Mcdowell PA-C sent at 11/15/2017  7:46 AM EST -----  Notify patient that her mammogram and ultrasound was benign.  Plan to follow-up in May 2018.

## 2017-11-22 ENCOUNTER — PROCEDURE VISIT (OUTPATIENT)
Dept: OBSTETRICS AND GYNECOLOGY | Facility: CLINIC | Age: 67
End: 2017-11-22

## 2017-11-22 ENCOUNTER — OFFICE VISIT (OUTPATIENT)
Dept: OBSTETRICS AND GYNECOLOGY | Facility: CLINIC | Age: 67
End: 2017-11-22

## 2017-11-22 VITALS
DIASTOLIC BLOOD PRESSURE: 88 MMHG | WEIGHT: 146.75 LBS | HEIGHT: 64 IN | SYSTOLIC BLOOD PRESSURE: 142 MMHG | BODY MASS INDEX: 25.05 KG/M2

## 2017-11-22 DIAGNOSIS — R10.2 PELVIC PAIN IN FEMALE: Primary | ICD-10-CM

## 2017-11-22 DIAGNOSIS — Z80.3 FAMILY HISTORY OF BREAST CANCER: ICD-10-CM

## 2017-11-22 DIAGNOSIS — Z13.9 SCREENING FOR CONDITION: Primary | ICD-10-CM

## 2017-11-22 DIAGNOSIS — R10.31 RLQ ABDOMINAL PAIN: ICD-10-CM

## 2017-11-22 LAB
BILIRUB BLD-MCNC: NEGATIVE MG/DL
CLARITY, POC: CLEAR
COLOR UR: YELLOW
GLUCOSE UR STRIP-MCNC: NEGATIVE MG/DL
KETONES UR QL: NEGATIVE
LEUKOCYTE EST, POC: NEGATIVE
NITRITE UR-MCNC: NEGATIVE MG/ML
PH UR: 5 [PH] (ref 5–8)
PROT UR STRIP-MCNC: NEGATIVE MG/DL
RBC # UR STRIP: NEGATIVE /UL
SP GR UR: 1 (ref 1–1.03)
UROBILINOGEN UR QL: NORMAL

## 2017-11-22 PROCEDURE — 99213 OFFICE O/P EST LOW 20 MIN: CPT | Performed by: OBSTETRICS & GYNECOLOGY

## 2017-11-22 PROCEDURE — 81002 URINALYSIS NONAUTO W/O SCOPE: CPT | Performed by: OBSTETRICS & GYNECOLOGY

## 2017-11-22 PROCEDURE — 76830 TRANSVAGINAL US NON-OB: CPT | Performed by: OBSTETRICS & GYNECOLOGY

## 2017-11-22 NOTE — PROGRESS NOTES
"      Maddison Turcios is a 67 y.o. patient who presents for follow up of   Chief Complaint   Patient presents with   • Follow-up     recheck right ovarian cyst/painful/sister just diagnoised with breast cancer       67-year-old established patient here to discuss right lower quadrant pain.  She's had a two-month history of pain in her right lower quadrant.  She states this pain is intermittent and chronic home starts out a sharp and then transitioned to a dull pain that does not radiate.  She has not had any changes in her general health.  She denies any weight changes, fevers, dominant distention, nausea or vomiting or constipation.  She has had a little bit of mild diarrhea.  She's had no postmenopausal bleeding.  Of note, her sister was just diagnosed with gestational breast cancer at age 67.  Her sister declines any genetic screening.  She should begin concerned about a new finding in her left breast that had a normal left mammogram and breast ultrasound that showed small benign cyst.  She is recently moved to [place] into a memory care unit.      The following portions of the patient's history were reviewed and updated as appropriate: allergies, current medications and problem list.    Review of Systems   Constitutional: Negative for activity change, appetite change, fatigue, fever and unexpected weight change.   Gastrointestinal: Positive for diarrhea. Negative for abdominal distention, abdominal pain, blood in stool, constipation and nausea.   Endocrine: Negative for cold intolerance and heat intolerance.   Genitourinary: Positive for pelvic pain (RLQ). Negative for flank pain, menstrual problem and vaginal bleeding.   All other systems reviewed and are negative.      /88  Ht 64\" (162.6 cm)  Wt 146 lb 12 oz (66.6 kg)  BMI 25.19 kg/m2    Physical Exam   Constitutional: She is oriented to person, place, and time. She appears well-developed and well-nourished.   HENT:   Head: Normocephalic and " atraumatic.   Abdominal: Soft. Bowel sounds are normal. She exhibits no distension and no mass. Tenderness: mild TTp in RLQ. There is no rebound and no guarding. No hernia.   Genitourinary: Uterus normal. Pelvic exam was performed with patient supine. There is no rash, tenderness, lesion or injury on the right labia. There is no rash, tenderness, lesion or injury on the left labia. Cervix exhibits no motion tenderness, no discharge and no friability. Right adnexum displays tenderness. Right adnexum displays no mass and no fullness. Left adnexum displays no mass, no tenderness and no fullness. No erythema, tenderness or bleeding in the vagina. No foreign body in the vagina. No signs of injury around the vagina. No vaginal discharge found.   Genitourinary Comments: Mild TTP in RLQ, no rebound, no guarding, no masses appreciated.    Neurological: She is alert and oriented to person, place, and time.   Skin: Skin is warm and dry.   Psychiatric: She has a normal mood and affect. Her behavior is normal. Judgment and thought content normal.   Nursing note and vitals reviewed.    A/P:  1. RLQ pain -check TVUS today. If US is normal, rec she return to her primary care doctor to continue workup as doubt gyn cause.   2. Family history of breast cancer in mom and sister-  We discussed the possibility of genetic screening.  She is unsure when her mother had breast cancer as they did not grow up living with her mother.  Her sister was diagnosed at age 67 and is not interested in any genetic screening and is unclear whether or not she will qualify for genetic screening anyway.  The patient herself is not interested in genetic screening at this time.  3. RHM- RTO 5/2018 annual or prn.  Assessment/Plan   Maddison was seen today for follow-up.    Diagnoses and all orders for this visit:    Screening for condition  -     POC Urinalysis Dipstick    Family history of breast cancer    RLQ abdominal pain                   No Follow-up on  file.      Sara Araiza MD    11/22/2017  2:40 PM

## 2017-11-27 NOTE — PROGRESS NOTES
11/22/17= US d/w pt at visit. Uterus 5.6 cm uterus, El 0.71 cm, 2 fibroids seen 1.8 x1.7 cm and 1.2 x 1.2 cm. Normal ovaries

## 2017-12-18 DIAGNOSIS — I67.1 CEREBRAL ANEURYSM: ICD-10-CM

## 2017-12-18 RX ORDER — CLOPIDOGREL BISULFATE 75 MG/1
TABLET ORAL
Qty: 90 TABLET | Refills: 3 | Status: SHIPPED | OUTPATIENT
Start: 2017-12-18 | End: 2018-12-13 | Stop reason: SDUPTHER

## 2018-03-30 ENCOUNTER — TRANSCRIBE ORDERS (OUTPATIENT)
Dept: ADMINISTRATIVE | Facility: HOSPITAL | Age: 68
End: 2018-03-30

## 2018-03-30 DIAGNOSIS — Z12.39 SCREENING BREAST EXAMINATION: Primary | ICD-10-CM

## 2018-04-23 RX ORDER — VALSARTAN 40 MG/1
TABLET ORAL
Qty: 90 TABLET | Refills: 1 | Status: SHIPPED | OUTPATIENT
Start: 2018-04-23 | End: 2018-07-25 | Stop reason: RX

## 2018-05-16 ENCOUNTER — HOSPITAL ENCOUNTER (OUTPATIENT)
Dept: MAMMOGRAPHY | Facility: HOSPITAL | Age: 68
Discharge: HOME | End: 2018-05-16
Attending: OBSTETRICS & GYNECOLOGY | Admitting: OBSTETRICS & GYNECOLOGY

## 2018-05-16 DIAGNOSIS — Z12.39 SCREENING BREAST EXAMINATION: ICD-10-CM

## 2018-05-16 PROCEDURE — 77067 SCR MAMMO BI INCL CAD: CPT

## 2018-05-16 PROCEDURE — 77063 BREAST TOMOSYNTHESIS BI: CPT

## 2018-06-06 ENCOUNTER — OFFICE VISIT (OUTPATIENT)
Dept: FAMILY MEDICINE CLINIC | Facility: CLINIC | Age: 68
End: 2018-06-06

## 2018-06-06 VITALS
BODY MASS INDEX: 25.95 KG/M2 | TEMPERATURE: 98.1 F | RESPIRATION RATE: 16 BRPM | OXYGEN SATURATION: 97 % | SYSTOLIC BLOOD PRESSURE: 132 MMHG | HEART RATE: 66 BPM | DIASTOLIC BLOOD PRESSURE: 70 MMHG | WEIGHT: 152 LBS | HEIGHT: 64 IN

## 2018-06-06 DIAGNOSIS — I10 ESSENTIAL HYPERTENSION: ICD-10-CM

## 2018-06-06 DIAGNOSIS — E78.2 MIXED HYPERLIPIDEMIA: ICD-10-CM

## 2018-06-06 DIAGNOSIS — Z00.00 MEDICARE ANNUAL WELLNESS VISIT, SUBSEQUENT: Primary | ICD-10-CM
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
## 2018-06-06 PROBLEM — S61.459A DOG BITE OF HAND WITHOUT COMPLICATION: Status: RESOLVED | Noted: 2017-07-03 | Resolved: 2018-06-06

## 2018-06-06 PROCEDURE — 99212 OFFICE O/P EST SF 10 MIN: CPT | Performed by: PHYSICIAN ASSISTANT

## 2018-06-06 PROCEDURE — G0439 PPPS, SUBSEQ VISIT: HCPCS | Performed by: PHYSICIAN ASSISTANT

## 2018-06-06 RX ORDER — EZETIMIBE 10 MG/1
10 TABLET ORAL DAILY
Qty: 90 TABLET | Refills: 3 | Status: SHIPPED | OUTPATIENT
Start: 2018-06-06 | End: 2018-08-30 | Stop reason: SDUPTHER

## 2018-06-06 NOTE — PROGRESS NOTES
QUICK REFERENCE INFORMATION:  The ABCs of the Annual Wellness Visit    Subsequent Medicare Wellness Visit    HEALTH RISK ASSESSMENT    1950    Recent Hospitalizations:  No hospitalization(s) within the last year..        Current Medical Providers:  Patient Care Team:  Kasandra Mcdowell PA-C as PCP - General (Family Medicine)  Helen Urbina MD as Consulting Physician (Cardiology)  Ruben Valderrama MD (Dermatology)  Bennett Whelan MD as MDS Coordinator (Allergy)  Sara Araiza MD as Consulting Physician (Obstetrics and Gynecology)        Smoking Status:  History   Smoking Status   • Never Smoker   Smokeless Tobacco   • Not on file       Alcohol Consumption:  History   Alcohol Use No       Depression Screen:   PHQ-2/PHQ-9 Depression Screening 6/6/2018   Little interest or pleasure in doing things 0   Feeling down, depressed, or hopeless 0   Trouble falling or staying asleep, or sleeping too much 0   Feeling tired or having little energy 1   Poor appetite or overeating 0   Feeling bad about yourself - or that you are a failure or have let yourself or your family down 0   Trouble concentrating on things, such as reading the newspaper or watching television 0   Moving or speaking so slowly that other people could have noticed. Or the opposite - being so fidgety or restless that you have been moving around a lot more than usual 0   Thoughts that you would be better off dead, or of hurting yourself in some way 0   Total Score 1   If you checked off any problems, how difficult have these problems made it for you to do your work, take care of things at home, or get along with other people? -       Health Habits and Functional and Cognitive Screening:  Functional & Cognitive Status 6/6/2018   Do you have difficulty preparing food and eating? No   Do you have difficulty bathing yourself, getting dressed or grooming yourself? No   Do you have difficulty using the toilet? No   Do you have difficulty  moving around from place to place? No   Do you have trouble with steps or getting out of a bed or a chair? No   In the past year have you fallen or experienced a near fall? No   Current Diet Well Balanced Diet   Dental Exam Up to date   Eye Exam Up to date   Exercise (times per week) 7 times per week   Current Exercise Activities Include Walking   Do you need help using the phone?  No   Are you deaf or do you have serious difficulty hearing?  No   Do you need help with transportation? No   Do you need help shopping? No   Do you need help preparing meals?  No   Do you need help with housework?  No   Do you need help with laundry? No   Do you need help taking your medications? No   Do you need help managing money? No   Do you ever drive or ride in a car without wearing a seat belt? No   Have you felt unusual stress, anger or loneliness in the last month? No   Who do you live with? Alone   If you need help, do you have trouble finding someone available to you? No   Have you been bothered in the last four weeks by sexual problems? No   Do you have difficulty concentrating, remembering or making decisions? No           Does the patient have evidence of cognitive impairment? No    Aspirin use counseling: On clopidrogel as an alternative (due to ASA contraindication)      Recent Lab Results:  CMP:  Lab Results   Component Value Date    BUN 19 06/02/2016    CREATININE 0.75 06/02/2016    EGFRIFNONA 78 06/02/2016    BCR 25.3 (H) 06/02/2016     06/02/2016    K 4.2 06/02/2016    CO2 25.0 06/02/2016    CALCIUM 10.0 06/02/2016    ALBUMIN 4.80 06/02/2016    LABIL2 1.7 06/02/2016    BILITOT 0.7 06/02/2016    ALKPHOS 65 06/02/2016    AST 29 06/02/2016    ALT 20 06/02/2016     Lipid Panel:  Lab Results   Component Value Date    TRIG 97 12/29/2015    HDL 92 (H) 12/29/2015    VLDL 19 12/29/2015     HbA1c:       Visual Acuity:  No exam data present    Age-appropriate Screening Schedule:  Refer to the list below for future  screening recommendations based on patient's age, sex and/or medical conditions. Orders for these recommended tests are listed in the plan section. The patient has been provided with a written plan.    Health Maintenance   Topic Date Due   • ZOSTER VACCINE (2 of 2) 03/17/2013   • LIPID PANEL  12/29/2016   • INFLUENZA VACCINE  08/01/2018   • MAMMOGRAM  05/16/2020   • DXA SCAN  06/06/2020   • COLONOSCOPY  12/29/2025   • TDAP/TD VACCINES (2 - Td) 06/29/2027   • PNEUMOCOCCAL VACCINES (65+ LOW/MEDIUM RISK)  Completed        Subjective   History of Present Illness    Maddison Turcios is a 68 y.o. female who presents for an Subsequent Wellness Visit.  Maddison states she is feeling well at today's office visit.  She had lab work performed by her specialist in [facility] on March 22, 2018.  She has propped these for my review.  States her insurance is not covering her Livalo anymore.  Her testing from specialists showed that sodium may be beneficial for her cholesterol issues.  Maddison had her shingles immunization at [facility] pharmacy on April 4, 2018.  She is due the next shot in the next month.  States she is feeling well.  Diet has been healthy.  He has been normal.  Bowel movements have been daily without dark black tarry stools.  Sometimes she feels bloated and gassy.  Dr. Araiza, GYN, did an ultrasound of pelvis/ovary in November due to similar symptoms.  This was normal.  Colonoscopy was in 2015 with normal results.  Maddison has been checking her blood pressure at home and has been getting 110-120/66.  Denied any chest pain, shortness of air, dizziness, vision changes, upper trunk upper respiratory symptoms or swelling of ankles.  She has been exercising by doing Jazzercise and walking daily.  He refused the DEXA scan at office visit today.    The following portions of the patient's history were reviewed and updated as appropriate: allergies, current medications, past family history, past medical history, past social  history and past surgical history.    Outpatient Medications Prior to Visit   Medication Sig Dispense Refill   • aluminum chloride (DRYSOL) 20 % external solution Apply  topically Every Night.     • Cholecalciferol (VITAMIN D3) 5000 UNITS capsule capsule Take 1 capsule by mouth Daily.     • clopidogrel (PLAVIX) 75 MG tablet TAKE 1 TABLET DAILY 90 tablet 3   • FIBER COMPLETE tablet Take by mouth.     • icosapent ethyl (VASCEPA) 1 G capsule capsule Take 2 g by mouth 4 (Four) Times a Day.     • LIVALO 2 MG tablet tablet TAKE 1 TABLET AT BEDTIME 90 tablet 0   • valsartan (DIOVAN) 40 MG tablet 1/2 tablet once a day 90 tablet 1   • Biotin 10 MG tablet Take by mouth.     • Cranberry 1000 MG capsule Take by mouth.     • diclofenac (VOLTAREN) 1 % gel gel Apply 4 g topically 4 (four) times a day as needed.     • Magnesium 100 MG tablet Take by mouth.     • valsartan (DIOVAN) 40 MG tablet TAKE 1 TABLET DAILY 90 tablet 1   • valsartan (DIOVAN) 40 MG tablet TAKE 1 TABLET DAILY 90 tablet 1     No facility-administered medications prior to visit.        Patient Active Problem List   Diagnosis   • Abnormal blood chemistry level   • Cerebral aneurysm   • Bloating   • Coronary artery disease   • Abnormal gait   • Mixed hyperlipidemia   • Essential hypertension   • Hyperthyroidism   • Insomnia   • Onychomycosis of toenail   • Right foot pain   • Preoperative cardiovascular examination   • Welcome to Medicare preventive visit   • Onychomycosis   • Menopausal symptoms   • Bilateral enlargement of atria   • Atherosclerosis of coronary artery bypass graft   • Sinus bradycardia   • Temporary cerebral vascular dysfunction   • Transient cerebral ischemia   • Skin lesion of right ear   • AKIRA (obstructive sleep apnea)   • Fatigue   • Medicare annual wellness visit, subsequent   • Osteopenia   • Anxiety   • Necrotic hand wound   • Visit for suture removal   • Breast mass, left   • Family history of breast cancer   • RLQ abdominal pain        Advance Care Planning:  power of  for healthcare on file    Identification of Risk Factors:  Risk factors include: weight .    Review of Systems   Constitutional: Negative.    HENT: Negative.    Eyes: Negative.    Respiratory: Negative.  Negative for cough, shortness of breath and wheezing.    Cardiovascular: Negative.  Negative for chest pain, palpitations and leg swelling.   Gastrointestinal: Negative.    Endocrine: Negative.    Genitourinary: Negative.    Musculoskeletal: Negative.    Skin: Negative.    Allergic/Immunologic: Negative.    Neurological: Negative.    Hematological: Negative.    Psychiatric/Behavioral: Negative.    All other systems reviewed and are negative.    Social History   Substance Use Topics   • Smoking status: Never Smoker   • Smokeless tobacco: Not on file   • Alcohol use No       Compared to one year ago, the patient feels her physical health is the same.  Compared to one year ago, the patient feels her mental health is better.    Objective     Physical Exam   Constitutional: She is oriented to person, place, and time. Vital signs are normal. She appears well-developed and well-nourished.   HENT:   Head: Normocephalic and atraumatic.   Right Ear: Hearing, tympanic membrane, external ear and ear canal normal.   Left Ear: Hearing, tympanic membrane, external ear and ear canal normal.   Nose: Nose normal. Right sinus exhibits no maxillary sinus tenderness and no frontal sinus tenderness. Left sinus exhibits no maxillary sinus tenderness and no frontal sinus tenderness.   Mouth/Throat: Uvula is midline, oropharynx is clear and moist and mucous membranes are normal.   Eyes: Conjunctivae, EOM and lids are normal. Pupils are equal, round, and reactive to light.   Neck: Trachea normal and phonation normal. Neck supple. Carotid bruit is not present. No edema present. No thyromegaly present.   Cardiovascular: Normal rate, regular rhythm, S1 normal, S2 normal, normal heart sounds and  "normal pulses.    Pulmonary/Chest: Effort normal and breath sounds normal.   Abdominal: Soft. Normal appearance, normal aorta and bowel sounds are normal. There is no hepatomegaly. There is no tenderness.   Lymphadenopathy:     She has no cervical adenopathy.   Neurological: She is alert and oriented to person, place, and time.   Reflex Scores:       Patellar reflexes are 2+ on the right side and 2+ on the left side.  Skin: Skin is warm, dry and intact. Capillary refill takes less than 2 seconds.   Psychiatric: She has a normal mood and affect. Her speech is normal and behavior is normal. Judgment and thought content normal. Cognition and memory are normal.       Vitals:    06/06/18 0932   BP: 132/70   BP Location: Right arm   Patient Position: Sitting   Cuff Size: Adult   Pulse: 66   Resp: 16   Temp: 98.1 °F (36.7 °C)   SpO2: 97%   Weight: 68.9 kg (152 lb)   Height: 162.6 cm (64\")   PainSc: 0-No pain     Wt Readings from Last 3 Encounters:   06/06/18 68.9 kg (152 lb)   11/22/17 66.6 kg (146 lb 12 oz)   11/06/17 64.9 kg (143 lb)       BP Readings from Last 3 Encounters:   06/06/18 132/70   11/22/17 142/88   11/06/17 114/78     Patient's Body mass index is 26.09 kg/m². BMI is above normal parameters. Recommendations include: exercise counseling and nutrition counseling.      Assessment/Plan   Patient Self-Management and Personalized Health Advice  The patient has been provided with information about: diet and exercise and preventive services including:   · Zostavax vaccine (Herpes Zoster).    Visit Diagnoses:    ICD-10-CM ICD-9-CM   1. Medicare annual wellness visit, subsequent Z00.00 V70.0   2. Mixed hyperlipidemia E78.2 272.2   3. Essential hypertension I10 401.9     1. Annual Medicare wellness examination with hyperlipidemia: I have reviewed Maddison's lab work that she had brought to office for my review from March 22, 2018.  Cholesterol was 194, LDL 99, HDL 90 and triglycerides were 61.  I've asked her to stop the " Livalo medication due to cost.  I have prescribed Zetia 10 mg to her mail-order pharmacy.  Plan to follow-up with her annual blood work with specialist in September.  She will send us a copy of lab work for my review.  I've encouraged her to continue to exercise and eat healthy.  She will have her last shingles immunization at her pharmacy.  2.  Chronic and stable hypertension: I have rechecked her blood pressure at today's office visit and got 110/60 in left arm.  She will continue her current Diovan medication at home.  Maddison will return to office in 6 months.      No orders of the defined types were placed in this encounter.      Outpatient Encounter Prescriptions as of 6/6/2018   Medication Sig Dispense Refill   • aluminum chloride (DRYSOL) 20 % external solution Apply  topically Every Night.     • Cholecalciferol (VITAMIN D3) 5000 UNITS capsule capsule Take 1 capsule by mouth Daily.     • clopidogrel (PLAVIX) 75 MG tablet TAKE 1 TABLET DAILY 90 tablet 3   • FIBER COMPLETE tablet Take by mouth.     • icosapent ethyl (VASCEPA) 1 G capsule capsule Take 2 g by mouth 4 (Four) Times a Day.     • [DISCONTINUED] LIVALO 2 MG tablet tablet TAKE 1 TABLET AT BEDTIME 90 tablet 0   • [DISCONTINUED] valsartan (DIOVAN) 40 MG tablet 1/2 tablet once a day 90 tablet 1   • Biotin 10 MG tablet Take by mouth.     • Cranberry 1000 MG capsule Take by mouth.     • diclofenac (VOLTAREN) 1 % gel gel Apply 4 g topically 4 (four) times a day as needed.     • ezetimibe (ZETIA) 10 MG tablet Take 1 tablet by mouth Daily. 90 tablet 3   • Magnesium 100 MG tablet Take by mouth.     • valsartan (DIOVAN) 40 MG tablet TAKE 1 TABLET DAILY 90 tablet 1   • [DISCONTINUED] valsartan (DIOVAN) 40 MG tablet TAKE 1 TABLET DAILY 90 tablet 1     No facility-administered encounter medications on file as of 6/6/2018.        Reviewed use of high risk medication in the elderly: yes  Reviewed for potential of harmful drug interactions in the elderly: yes    Follow  Up:  Return in about 6 months (around 12/6/2018).     An After Visit Summary and PPPS with all of these plans were given to the patient.

## 2018-07-25 ENCOUNTER — TELEPHONE (OUTPATIENT)
Dept: FAMILY MEDICINE CLINIC | Facility: CLINIC | Age: 68
End: 2018-07-25

## 2018-07-25 DIAGNOSIS — I10 ESSENTIAL HYPERTENSION: Primary | ICD-10-CM

## 2018-07-25 DIAGNOSIS — I10 ESSENTIAL HYPERTENSION: ICD-10-CM

## 2018-07-25 RX ORDER — LOSARTAN POTASSIUM 25 MG/1
25 TABLET ORAL DAILY
Qty: 30 TABLET | Refills: 3 | Status: SHIPPED | OUTPATIENT
Start: 2018-07-25 | End: 2018-07-25 | Stop reason: SDUPTHER

## 2018-07-25 RX ORDER — LOSARTAN POTASSIUM 25 MG/1
25 TABLET ORAL DAILY
Qty: 90 TABLET | Refills: 1 | Status: SHIPPED | OUTPATIENT
Start: 2018-07-25 | End: 2018-07-30 | Stop reason: SDUPTHER

## 2018-07-25 NOTE — TELEPHONE ENCOUNTER
Notify Patient there is a recall on her valsartan medication.  I have changed to losartan 25 mg daily.  This was sent to her pharmacy.  Please schedule follow-up appointment in 4-6 weeks for hypertension management

## 2018-07-30 ENCOUNTER — TELEPHONE (OUTPATIENT)
Dept: FAMILY MEDICINE CLINIC | Facility: CLINIC | Age: 68
End: 2018-07-30

## 2018-07-30 DIAGNOSIS — I10 ESSENTIAL HYPERTENSION: ICD-10-CM

## 2018-07-30 RX ORDER — LOSARTAN POTASSIUM 25 MG/1
25 TABLET ORAL DAILY
Qty: 90 TABLET | Refills: 1 | Status: SHIPPED | OUTPATIENT
Start: 2018-07-30 | End: 2019-04-08 | Stop reason: ALTCHOICE

## 2018-08-03 ENCOUNTER — OFFICE VISIT (OUTPATIENT)
Dept: CARDIOLOGY | Facility: CLINIC | Age: 68
End: 2018-08-03

## 2018-08-03 VITALS
BODY MASS INDEX: 26.12 KG/M2 | SYSTOLIC BLOOD PRESSURE: 162 MMHG | WEIGHT: 153 LBS | HEART RATE: 66 BPM | HEIGHT: 64 IN | DIASTOLIC BLOOD PRESSURE: 80 MMHG

## 2018-08-03 DIAGNOSIS — Z95.1 S/P CABG (CORONARY ARTERY BYPASS GRAFT): ICD-10-CM

## 2018-08-03 DIAGNOSIS — I10 ESSENTIAL HYPERTENSION: ICD-10-CM

## 2018-08-03 DIAGNOSIS — I67.1 CEREBRAL ANEURYSM: ICD-10-CM

## 2018-08-03 DIAGNOSIS — E78.2 MIXED HYPERLIPIDEMIA: ICD-10-CM

## 2018-08-03 DIAGNOSIS — I25.10 CORONARY ARTERY DISEASE INVOLVING NATIVE CORONARY ARTERY OF NATIVE HEART WITHOUT ANGINA PECTORIS: Primary | ICD-10-CM

## 2018-08-03 DIAGNOSIS — G47.33 OSA (OBSTRUCTIVE SLEEP APNEA): ICD-10-CM

## 2018-08-03 PROBLEM — Z48.02 VISIT FOR SUTURE REMOVAL: Status: RESOLVED | Noted: 2017-07-15 | Resolved: 2018-08-03

## 2018-08-03 PROCEDURE — 93000 ELECTROCARDIOGRAM COMPLETE: CPT | Performed by: INTERNAL MEDICINE

## 2018-08-03 PROCEDURE — 99214 OFFICE O/P EST MOD 30 MIN: CPT | Performed by: INTERNAL MEDICINE

## 2018-08-03 NOTE — PROGRESS NOTES
Date of Office Visit: 2018  Encounter Provider: Helen Urbina MD  Place of Service: [facility] CARDIOLOGY  Patient Name: Maddison Turcios  :1950      Patient ID:  Maddison Turcios is a 68 y.o. female is here for  followup for CAD, h/o CABG.         History of Present Illness    She has a history of episodes of severe fatigue and had a stress study  which showed a very mild abnormality. She continued to have the severe fatigue and had a  heart catheterization done in 2004 showing severe left main coronary stenosis.   She was transferred emergently for bypass surgery at [facility] which was performed  2004 by Dr. Cade. He has cardiac catheterization done 2004 that showed  ostial 60% stenosis of the left main coronary artery. The left anterior descending artery  and left circumflex also showed no significant disease. The left circumflex vessel was  dominant. The cardiac catheterization was done at [facility] in  [place], [place]. Bypass 2004 she received a LIMA to mid LAD and sequential  saphenous vein graft to the ramus and intermedius and first obtuse marginal branch done at  [facility].       The last echocardiogram she had done was in [place], [place] on 2010 showing an  ejection fraction of 62% with mild left and right atrial enlargement, mild mitral  insufficiency, mild tricuspid insufficiency, and mild pulmonic insufficiency. Her last  stress nuclear perfusion study that was done I think prior to her cardiac catheterization  in  and showed fixed anterior wall defect with minimal reversibility in the apex.      In 2013, she had a stress nuclear perfusion study  showing no ischemia. She had a 2D echocardiogram with Doppler showing ejection fraction  of 60% to 65% with trace mitral and tricuspid insufficiency. She had a carotid duplex  study done in 2013 which was normal.      She had  an ECG done 2/2/2016 which shows poor R-wave progression sinus rhythm and otherwise appears normal.      She had a cerebral angiogram with Dr. Hewitt and it showed no cerebral aneurysm.      She was at [facility], admitted there on 11/30/2016 through 12/03/2016. At that time she was diagnosed with a TIA. During her hospitalization she had an MRI which was negative for an acute infarct. They felt possible TIA versus complicated migraine. She had had for 2-3 days prior to this a feeling of imbalance and leaned to the left along with some blurred vision. She had a transesophageal echocardiogram whether did not show anything to explain her TIA. There was bilateral atrial enlargement but otherwise looked fine. She was loaded with Plavix 300 mg and then continued with 75 mg of Plavix daily. Neurosurgery was consulted because she had a history of prior pipelined right internal carotid artery aneurysm. Dr. Hewitt had seen her and treated her for his in 08/2015. Neurosurgery said there was no evidence of aneurysm. Her statin medication was increased because of elevated hyperlipidemia. She actually had garbled speech, visual changes and balance issues all with the TIA and they happened over a matter of three days, kind of intermittently.       She had a normal 21 day event recorder 1/2017.     She was tolerating Livalo without muscle complaints.  She had genetic testing showing that zetia was a better cholesterol medication for her, however we talked about the fact that zetia is not as robust for reduction of cardiovascular events.  She said she would go back on Livalo in addition to that area.  She's had no tachycardia, dizziness or syncope.  She is exercising.  She has no dyspnea.  She has no chest tightness or pressure.  She has no orthopnea.  She feels well.  Her blood pressure at home runs 150-120/70.    Past Medical History:   Diagnosis Date   • Abnormal blood chemistry    • Acute UTI (urinary tract  infection)    • Aneurysm, cerebral    • Bloating    • Brain aneurysm    • CAD (coronary artery disease)    • Chronic headaches    • Coronary artery disease    • Coronary artery stenosis    • Dysuria    • Encounter for screening colonoscopy    • Environmental allergies    • Fall from slip, trip, or stumble    • Gait disturbance    • H/O cardiovascular stress test 09/17/2013    Treadmill   • H/O colonoscopy    • H/O echocardiogram 09/25/2013   • H/O fall    • History of EKG 07/31/2015   • Hyperlipemia    • Hyperlipidemia    • Hypertension    • Hyperthyroidism    • Insomnia    • Left forearm pain    • Left leg pain    • Left wrist pain    • Lower abdominal pain    • Need for pneumococcal vaccination    • Onychomycosis of toenail    • Pelvic pressure in female    • Right foot pain    • TIA (transient ischemic attack) 11/30/2016   • URI (upper respiratory infection)    • Urine frequency          Past Surgical History:   Procedure Laterality Date   • BREAST EXCISIONAL BIOPSY Right 1977    b9   • BREAST EXCISIONAL BIOPSY Right 1979    b9   • BUNIONECTOMY     • CARDIAC SURGERY     • CEREBRAL ANEURYSM REPAIR     • CHOLECYSTECTOMY     • CORONARY ARTERY BYPASS GRAFT     • ROTATOR CUFF REPAIR     • ROTATOR CUFF REPAIR     • SPLENECTOMY     • TONSILLECTOMY     • TUBAL ABDOMINAL LIGATION         Current Outpatient Prescriptions on File Prior to Visit   Medication Sig Dispense Refill   • aluminum chloride (DRYSOL) 20 % external solution Apply  topically Every Night.     • Cholecalciferol (VITAMIN D3) 5000 UNITS capsule capsule Take 1 capsule by mouth Daily.     • clopidogrel (PLAVIX) 75 MG tablet TAKE 1 TABLET DAILY 90 tablet 3   • diclofenac (VOLTAREN) 1 % gel gel Apply 4 g topically 4 (four) times a day as needed.     • ezetimibe (ZETIA) 10 MG tablet Take 1 tablet by mouth Daily. 90 tablet 3   • icosapent ethyl (VASCEPA) 1 G capsule capsule Take 2 g by mouth 4 (Four) Times a Day.     • losartan (COZAAR) 25 MG tablet Take 1  tablet by mouth Daily. 90 tablet 1   • [DISCONTINUED] Biotin 10 MG tablet Take by mouth.     • [DISCONTINUED] Cranberry 1000 MG capsule Take by mouth.     • [DISCONTINUED] FIBER COMPLETE tablet Take by mouth.     • [DISCONTINUED] Magnesium 100 MG tablet Take by mouth.       No current facility-administered medications on file prior to visit.        Social History     Social History   • Marital status:      Spouse name: N/A   • Number of children: N/A   • Years of education: N/A     Occupational History   • Not on file.     Social History Main Topics   • Smoking status: Never Smoker   • Smokeless tobacco: Never Used   • Alcohol use No   • Drug use: No   • Sexual activity: No     Other Topics Concern   • Not on file     Social History Narrative   • No narrative on file           Review of Systems   Constitution: Negative.   HENT: Negative for congestion.    Eyes: Negative for vision loss in left eye and vision loss in right eye.   Respiratory: Negative.  Negative for cough, hemoptysis, shortness of breath, sleep disturbances due to breathing, snoring, sputum production and wheezing.    Endocrine: Negative.    Hematologic/Lymphatic: Negative.    Skin: Negative for poor wound healing and rash.   Musculoskeletal: Negative for falls, gout, muscle cramps and myalgias.   Gastrointestinal: Negative for abdominal pain, diarrhea, dysphagia, hematemesis, melena, nausea and vomiting.   Neurological: Negative for excessive daytime sleepiness, dizziness, headaches, light-headedness, loss of balance, seizures and vertigo.   Psychiatric/Behavioral: Negative for depression and substance abuse. The patient is not nervous/anxious.        Procedures    ECG 12 Lead  Date/Time: 8/3/2018 10:26 AM  Performed by: AMY SPARKS  Authorized by: AMY SPARKS   Comparison: compared with previous ECG   Similar to previous ECG  Rhythm: sinus rhythm  Clinical impression: normal ECG                Objective:      Vitals:     "08/03/18 1016   BP: 162/80   BP Location: Right arm   Patient Position: Sitting   Pulse: 66   Weight: 69.4 kg (153 lb)   Height: 162.6 cm (64\")     Body mass index is 26.26 kg/m².    Physical Exam   Constitutional: She is oriented to person, place, and time. She appears well-developed and well-nourished. No distress.   HENT:   Head: Normocephalic and atraumatic.   Eyes: Conjunctivae are normal. No scleral icterus.   Neck: Neck supple. No JVD present. Carotid bruit is not present. No thyromegaly present.   Cardiovascular: Normal rate, regular rhythm, S1 normal, S2 normal, normal heart sounds and intact distal pulses.   No extrasystoles are present. PMI is not displaced.  Exam reveals no gallop.    No murmur heard.  Pulses:       Carotid pulses are 2+ on the right side, and 2+ on the left side.       Radial pulses are 2+ on the right side, and 2+ on the left side.        Dorsalis pedis pulses are 2+ on the right side, and 2+ on the left side.        Posterior tibial pulses are 2+ on the right side, and 2+ on the left side.   Pulmonary/Chest: Effort normal and breath sounds normal. No respiratory distress. She has no wheezes. She has no rhonchi. She has no rales. She exhibits no tenderness.   Abdominal: Soft. Bowel sounds are normal. She exhibits no distension, no abdominal bruit and no mass. There is no tenderness.   Musculoskeletal: She exhibits no edema or deformity.   Lymphadenopathy:     She has no cervical adenopathy.   Neurological: She is alert and oriented to person, place, and time. No cranial nerve deficit.   Skin: Skin is warm and dry. No rash noted. She is not diaphoretic. No cyanosis. No pallor. Nails show no clubbing.   Psychiatric: She has a normal mood and affect. Judgment normal.   Vitals reviewed.      Lab Review:       Assessment:      Diagnosis Plan   1. Coronary artery disease involving native coronary artery of native heart without angina pectoris     2. Essential hypertension     3. Mixed " hyperlipidemia     4. Cerebral aneurysm     5. AKIRA (obstructive sleep apnea)     6. S/P CABG (coronary artery bypass graft)       1. TIA. The etiology for this is uncertain. We will schedule her for a Zio patch. She has rare palpitations but has never had any tachycardias. She has had no further instances since being on Plavix.   2. Hyperlipidemia, restart Livalo and continue zetia.   3. Essential hypertension which is well controlled.   4. History of coronary artery disease. She has no current angina.   5. Mild biatrial enlargement on her transesophageal echocardiogram but otherwise, the DENY was normal.   6. Possible obstructive sleep apnea.      Plan:       See back in 1 year, no changes.     Heart Failure  Subjective/Objective    • Physical exam findings negative for rales and elevated JVP.      Coronary Artery Disease  Assessment  • The patient has no angina    Subjective - Objective  • There is a history of previous coronary artery bypass graft  • Current antiplatelet therapy includes clopidogrel 75 mg

## 2018-08-24 ENCOUNTER — OFFICE VISIT (OUTPATIENT)
Dept: FAMILY MEDICINE CLINIC | Facility: CLINIC | Age: 68
End: 2018-08-24

## 2018-08-24 VITALS
OXYGEN SATURATION: 98 % | HEIGHT: 64 IN | DIASTOLIC BLOOD PRESSURE: 68 MMHG | SYSTOLIC BLOOD PRESSURE: 120 MMHG | RESPIRATION RATE: 16 BRPM | HEART RATE: 68 BPM | WEIGHT: 153 LBS | TEMPERATURE: 97.9 F | BODY MASS INDEX: 26.12 KG/M2

## 2018-08-24 DIAGNOSIS — I10 ESSENTIAL HYPERTENSION: Primary | ICD-10-CM

## 2018-08-24 PROCEDURE — 99212 OFFICE O/P EST SF 10 MIN: CPT | Performed by: PHYSICIAN ASSISTANT

## 2018-08-24 NOTE — PROGRESS NOTES
"Subjective   Maddison Turcios is a 68 y.o. female.   Chief Complaint   Patient presents with   • Hypertension     management       History of Present Illness     Maddison is a 68-year-old female who presents for hypertension management. She was changed to Cozaar medication due to FDA recall.  She saw Dr. Urbina,cardiology. 2 weeks ago.  States she will be going to [facility] in February 2019 on a mission trip.  Maddison is doing well. Denied any chest pain,shortness of air,wheezing,headaches,or swelling of ankles.  Appetite and sleep has been normal.      The following portions of the patient's history were reviewed and updated as appropriate: allergies, current medications, past family history, past medical history, past social history and past surgical history.    Review of Systems   Constitutional: Negative.    HENT: Negative.    Eyes: Negative.    Respiratory: Negative.  Negative for cough, shortness of breath and wheezing.    Cardiovascular: Negative.  Negative for chest pain, palpitations and leg swelling.   Gastrointestinal: Negative.  Negative for constipation, diarrhea, nausea and vomiting.   Endocrine: Negative.    Genitourinary: Negative.    Musculoskeletal: Negative.    Skin: Negative.    Allergic/Immunologic: Negative.    Neurological: Negative.    Hematological: Negative.    Psychiatric/Behavioral: Negative.    All other systems reviewed and are negative.      Social History   Substance Use Topics   • Smoking status: Never Smoker   • Smokeless tobacco: Never Used   • Alcohol use No     Vitals:    08/24/18 1556 08/24/18 1621   BP: 130/84 120/68   BP Location: Left arm Left arm   Patient Position: Sitting Sitting   Cuff Size: Adult    Pulse: 68    Resp: 16    Temp: 97.9 °F (36.6 °C)    TempSrc: Oral    SpO2: 98%    Weight: 69.4 kg (153 lb)    Height: 162.6 cm (64\")        Wt Readings from Last 3 Encounters:   08/24/18 69.4 kg (153 lb)   08/03/18 69.4 kg (153 lb)   06/06/18 68.9 kg (152 lb)       BP Readings " from Last 3 Encounters:   08/24/18 120/68   08/03/18 162/80   06/06/18 132/70     Body mass index is 26.26 kg/m².  Allergies   Allergen Reactions   • Beta Adrenergic Blockers Other (See Comments)     hypotension   • Adhesive Tape Other (See Comments)     Redness, bruising and peeling of skin    *Use Paper Tape Only*   • Sulfa Antibiotics Rash       Objective   Physical Exam   Constitutional: She is oriented to person, place, and time. Vital signs are normal. She appears well-developed and well-nourished.   Neck: Trachea normal and phonation normal. Neck supple. Carotid bruit is not present. No edema present.   Cardiovascular: Normal rate, regular rhythm, S1 normal, S2 normal, normal heart sounds and normal pulses.    Pulmonary/Chest: Effort normal and breath sounds normal.   Abdominal: Soft. Normal appearance, normal aorta and bowel sounds are normal. There is no hepatomegaly. There is no tenderness.   Neurological: She is alert and oriented to person, place, and time.   Skin: Skin is warm, dry and intact. Capillary refill takes less than 2 seconds.   Psychiatric: She has a normal mood and affect. Her speech is normal and behavior is normal. Judgment and thought content normal. Cognition and memory are normal.       Assessment/Plan   Maddison was seen today for hypertension.    Diagnoses and all orders for this visit:    Essential hypertension      Ms. Maddison Turcios was seen in office today for hypertension management.  She was recently changed from her Diovan medication due to FDA recall to losartan medication.  She is doing well with the losartan medication.  I have rechecked blood pressure at office visit today in got a/68 in left arm.  I have asked Maddison to return to office in 6 months for reevaluation.  She voiced understanding.

## 2018-08-30 DIAGNOSIS — E78.2 MIXED HYPERLIPIDEMIA: ICD-10-CM

## 2018-08-30 RX ORDER — EZETIMIBE 10 MG/1
10 TABLET ORAL DAILY
Qty: 90 TABLET | Refills: 3 | Status: SHIPPED | OUTPATIENT
Start: 2018-08-30 | End: 2018-12-17 | Stop reason: SDUPTHER

## 2018-11-30 ENCOUNTER — TELEPHONE (OUTPATIENT)
Dept: FAMILY MEDICINE CLINIC | Facility: CLINIC | Age: 68
End: 2018-11-30

## 2018-12-13 DIAGNOSIS — I67.1 CEREBRAL ANEURYSM: ICD-10-CM

## 2018-12-13 RX ORDER — CLOPIDOGREL BISULFATE 75 MG/1
TABLET ORAL
Qty: 90 TABLET | Refills: 3 | Status: SHIPPED | OUTPATIENT
Start: 2018-12-13 | End: 2019-12-30 | Stop reason: SDUPTHER

## 2018-12-15 ENCOUNTER — APPOINTMENT (OUTPATIENT)
Dept: GENERAL RADIOLOGY | Facility: HOSPITAL | Age: 68
End: 2018-12-15

## 2018-12-15 ENCOUNTER — HOSPITAL ENCOUNTER (EMERGENCY)
Facility: HOSPITAL | Age: 68
Discharge: HOME | End: 2018-12-15
Admitting: EMERGENCY MEDICINE

## 2018-12-15 VITALS
BODY MASS INDEX: 25.1 KG/M2 | HEART RATE: 77 BPM | OXYGEN SATURATION: 95 % | SYSTOLIC BLOOD PRESSURE: 161 MMHG | DIASTOLIC BLOOD PRESSURE: 88 MMHG | TEMPERATURE: 98 F | WEIGHT: 147 LBS | HEIGHT: 64 IN | RESPIRATION RATE: 14 BRPM

## 2018-12-15 DIAGNOSIS — M25.561 ACUTE PAIN OF RIGHT KNEE: ICD-10-CM
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
## 2018-12-15 DIAGNOSIS — S96.912A STRAIN OF LEFT ANKLE, INITIAL ENCOUNTER: ICD-10-CM

## 2018-12-15 PROCEDURE — 73610 X-RAY EXAM OF ANKLE: CPT

## 2018-12-15 PROCEDURE — 73560 X-RAY EXAM OF KNEE 1 OR 2: CPT

## 2018-12-15 PROCEDURE — 99283 EMERGENCY DEPT VISIT LOW MDM: CPT

## 2018-12-15 PROCEDURE — 99282 EMERGENCY DEPT VISIT SF MDM: CPT | Performed by: NURSE PRACTITIONER

## 2018-12-16 NOTE — ED PROVIDER NOTES
Subjective   History of Present Illness  History of Present Illness    Chief complaint: right knee and left ankle pain     Location: right knee and left ankle    Quality/Severity:  Minor     Timing/Duration: just prior to arrival     Modifying Factors: movement and palpation make it hurt worse. Rest is better     Associated Symptoms: none    Narrative: 68 year old female presented to the ED with her friend after she fell and landed on her right knee with her left leg bent underneath her about 1830 this evening as she was leaving her friends house.  She said she was walking out of her house through the garage and down two steps when she slipped.  She denies hitting her head or loss of consciousness.        Review of Systems  General: Denies fevers or chills.  Denies any weakness or fatigue.  Denies any weight loss or weight gain.  SKIN: Denies any rashes lesions or ulcers.  Denies color change.  ENT: Denies sore throat or rhinorrhea.  Denies ear pain.    EYES: Denies any blurred vision.  Denies any change in vision.  Denies any photophobia.  Denies any vision loss.  LUNGS: Denies any shortness of breath or wheezing.  Denies any cough.  Denies any hemoptysis.  CARDIAC: Denies any chest pain.  Denies palpitations.  Denies syncope.  Denies any edema  ABD: Denies any abdominal pain.  Denies any nausea or vomiting or diarrhea.  Denies any rectal bleeding.  Denies constipation  : Denies any dysuria, urgency, frequency or hematuria.  Denies discharge.  Denies flank pain.  NEURO: Denies any focal weakness.  Denies headache.  Denies seizures.  Denies changes in speech or difficulty walking.  ENDOCRINE: Denies polydipsia and polyuria  M/S: Denies arthralgias, back pain, myalgias or neck pain. + pain with right knee and left ankle  HEME/LYMPH: Negative for adenopathy. Does not bruise/bleed easily.   PSYCH: Negative for suicidal ideas. Denies anxiety or depression  review was performed in addition to those in the above all  other reviews are negative.    Past Medical History:   Diagnosis Date   • Abnormal blood chemistry    • Acute UTI (urinary tract infection)    • Aneurysm, cerebral    • Bloating    • Brain aneurysm    • CAD (coronary artery disease)    • Chronic headaches    • Coronary artery disease    • Coronary artery stenosis    • Dysuria    • Encounter for screening colonoscopy    • Environmental allergies    • Fall from slip, trip, or stumble    • Gait disturbance    • H/O cardiovascular stress test 09/17/2013    Treadmill   • H/O colonoscopy    • H/O echocardiogram 09/25/2013   • H/O fall    • History of EKG 07/31/2015   • Hyperlipemia    • Hyperlipidemia    • Hypertension    • Hyperthyroidism    • Insomnia    • Left forearm pain    • Left leg pain    • Left wrist pain    • Lower abdominal pain    • Need for pneumococcal vaccination    • Onychomycosis of toenail    • Pelvic pressure in female    • Right foot pain    • TIA (transient ischemic attack) 11/30/2016   • URI (upper respiratory infection)    • Urine frequency        Allergies   Allergen Reactions   • Beta Adrenergic Blockers Other (See Comments)     hypotension  bradycardic   • Adhesive Tape Other (See Comments)     Redness, bruising and peeling of skin    *Use Paper Tape Only*  Redness, bruising and peeling of skin    *Use Paper Tape Only*   • Sulfa Antibiotics Rash   • Sulfur Rash       Past Surgical History:   Procedure Laterality Date   • BREAST EXCISIONAL BIOPSY Right 1977    b9   • BREAST EXCISIONAL BIOPSY Right 1979    b9   • BUNIONECTOMY     • CARDIAC SURGERY     • CEREBRAL ANEURYSM REPAIR     • CHOLECYSTECTOMY     • CORONARY ARTERY BYPASS GRAFT     • ROTATOR CUFF REPAIR     • ROTATOR CUFF REPAIR     • SPLENECTOMY     • TONSILLECTOMY     • TUBAL ABDOMINAL LIGATION         Family History   Problem Relation Age of Onset   • Heart failure Mother    • Hypertension Mother    • Stroke Mother    • Cervical cancer Mother    • Heart failure Father    • Aneurysm Sister     • Breast cancer Sister    • Heart attack Brother    • Cancer Brother    • Lung cancer Brother    • No Known Problems Son    • No Known Problems Daughter    • No Known Problems Sister    • No Known Problems Sister    • Ovarian cancer Neg Hx    • Colon cancer Neg Hx        Social History     Socioeconomic History   • Marital status:      Spouse name: Not on file   • Number of children: Not on file   • Years of education: Not on file   • Highest education level: Not on file   Tobacco Use   • Smoking status: Never Smoker   • Smokeless tobacco: Never Used   Substance and Sexual Activity   • Alcohol use: No   • Drug use: No   • Sexual activity: No     Partners: Male       No current facility-administered medications for this encounter.     Current Outpatient Medications:   •  Cholecalciferol (VITAMIN D3) 5000 UNITS capsule capsule, Take 1 capsule by mouth Daily., Disp: , Rfl:   •  clopidogrel (PLAVIX) 75 MG tablet, TAKE 1 TABLET DAILY, Disp: 90 tablet, Rfl: 3  •  ezetimibe (ZETIA) 10 MG tablet, Take 1 tablet by mouth Daily., Disp: 90 tablet, Rfl: 3  •  icosapent ethyl (VASCEPA) 1 G capsule capsule, Take 2 g by mouth 4 (Four) Times a Day., Disp: , Rfl:   •  losartan (COZAAR) 25 MG tablet, Take 1 tablet by mouth Daily., Disp: 90 tablet, Rfl: 1  •  pitavastatin calcium (LIVALO) 2 MG tablet tablet, Take 2 mg by mouth Every Night., Disp: , Rfl:   •  aluminum chloride (DRYSOL) 20 % external solution, Apply  topically Every Night., Disp: , Rfl:   •  diclofenac (VOLTAREN) 1 % gel gel, Apply 4 g topically 4 (four) times a day as needed., Disp: , Rfl:     Vitals:    12/15/18 2009   BP: 155/83   Pulse: 76   Resp: 15   Temp: 97.9 °F (36.6 °C)   SpO2: 98%         Objective   Physical Exam  General: NAD, alert and oriented x 4  HEENT: NCAT, PERRL, no nasal drainage  Cardiac: S1, S2, RRR, no murmur, no edema  Pulmonary: CTA bilaterally, no wheezing   Abdomen: soft, non-tender, non-distended  : Voids independently, no CVA  tenderness   Musculoskeletal: Moves all extremities, equal strength bilaterally, tenderness with palpation and movement with left ankle and right knee  Neuro: alert and oriented x 3, CN 2 - 12 grossly intact  Skin: warm, dry, and intact.  No abrasions, erythema, edema, or ecchymosis seen    Procedures           ED Course    Patient seen and examined.  Imaging ordered.    Reviewed right knee x-ray. Independently viewed by me. Will be interpreted by radiologist. Discussed with patient.  -no acute body abnormalities seen    Reviewed left ankle x-ray. Independently viewed by me. Will be interpreted by radiologist. Discussed with patient.    -no acute bony abnormalities seen    Discussed all results with the patient.  Follow up with PCP this coming week.  Can use over the counter pain medication if needed.  Can use ice for any swelling seen.  Return to the ED with any worsening symptoms.      Return to the emergency department with worsening symptoms, uncontrolled pain, inability to tolerate oral liquids, fever greater than 101°F not controlled by Tylenol or as needed with emergent concerns.      Air cast placed to left ankle.          MDM  Number of Diagnoses or Management Options  Acute pain of right knee: new and requires workup  Fall, initial encounter: new and does not require workup  Strain of left ankle, initial encounter: new and requires workup     Amount and/or Complexity of Data Reviewed  Tests in the radiology section of CPT®: reviewed    Risk of Complications, Morbidity, and/or Mortality  Presenting problems: low  Diagnostic procedures: low  Management options: low    Patient Progress  Patient progress: stable        Final diagnoses:   Fall, initial encounter   Acute pain of right knee   Strain of left ankle, initial encounter            Genesis Cruz, APRN  12/15/18 2049

## 2018-12-16 NOTE — DISCHARGE INSTRUCTIONS
Follow up with PCP this week.  Can use over the counter pain medication for pain.  Return to the ED with any worsening symptoms.  Elevate and rest left ankle.  Can use ice if needed for any swelling.     Return to the emergency department with worsening symptoms, uncontrolled pain, inability to tolerate oral liquids, fever greater than 101° F not controlled by Tylenol or as needed with emergent concerns.

## 2018-12-17 ENCOUNTER — OFFICE VISIT (OUTPATIENT)
Dept: FAMILY MEDICINE CLINIC | Facility: CLINIC | Age: 68
End: 2018-12-17

## 2018-12-17 VITALS
BODY MASS INDEX: 25.23 KG/M2 | RESPIRATION RATE: 16 BRPM | DIASTOLIC BLOOD PRESSURE: 82 MMHG | TEMPERATURE: 98 F | HEART RATE: 76 BPM | OXYGEN SATURATION: 96 % | SYSTOLIC BLOOD PRESSURE: 130 MMHG | HEIGHT: 64 IN

## 2018-12-17 DIAGNOSIS — E78.2 MIXED HYPERLIPIDEMIA: ICD-10-CM

## 2018-12-17 DIAGNOSIS — S82.892A CLOSED FRACTURE OF LEFT ANKLE, INITIAL ENCOUNTER: Primary | ICD-10-CM

## 2018-12-17 PROCEDURE — 99213 OFFICE O/P EST LOW 20 MIN: CPT | Performed by: NURSE PRACTITIONER

## 2018-12-17 RX ORDER — EZETIMIBE 10 MG/1
10 TABLET ORAL DAILY
Qty: 90 TABLET | Refills: 3 | Status: SHIPPED | OUTPATIENT
Start: 2018-12-17 | End: 2020-01-02

## 2018-12-17 NOTE — PATIENT INSTRUCTIONS
Ankle Fracture  A fracture is a break in a bone. A cast or splint may be used to protect the ankle and heal the break. Sometimes, surgery is needed.  Follow these instructions at home:  · Use crutches as told by your doctor. It is very important that you use your crutches correctly.  · Do not put weight or pressure on the injured ankle until told by your doctor.  · Keep your ankle raised (elevated) when sitting or lying down.  · Apply ice to the ankle:  ? Put ice in a plastic bag.  ? Place a towel between your cast and the bag.  ? Leave the ice on for 20 minutes, 2-3 times a day.  · If you have a plaster or fiberglass cast:  ? Do not try to scratch under the cast with any objects.  ? Check the skin around the cast every day. You may put lotion on red or sore areas.  ? Keep your cast dry and clean.  · If you have a plaster splint:  ? Wear the splint as told by your doctor.  ? You can loosen the elastic around the splint if your toes get numb, tingle, or turn cold or blue.  · Do not put pressure on any part of your cast or splint. It may break. Rest your plaster splint or cast only on a pillow the first 24 hours until it is fully hardened.  · Cover your cast or splint with a plastic bag during showers.  · Do not lower your cast or splint into water.  · Take medicine as told by your doctor.  · Do not drive until your doctor says it is safe.  · Follow-up with your doctor as told. It is very important that you go to your follow-up visits.  Contact a doctor if:  The swelling and discomfort gets worse.  Get help right away if:  · Your splint or cast breaks.  · You continue to have very bad pain.  · You have new pain or swelling after your splint or cast was put on.  · Your skin or toes below the injured ankle:  ? Turn blue or gray.  ? Feel cold, numb, or you cannot feel them.  · There is a bad smell or yellowish white fluid (pus) coming from under the splint or cast.  This information is not intended to replace advice  given to you by your health care provider. Make sure you discuss any questions you have with your health care provider.  Document Released: 10/15/2010 Document Revised: 05/25/2017 Document Reviewed: 07/17/2014  Elsevier Interactive Patient Education © 2017 [employer]

## 2018-12-17 NOTE — PROGRESS NOTES
Patient ID: Maddison Turcios is a 68 y.o. female     Subjective     Chief Complaint   Patient presents with   • Ankle Pain     left       History of Present Illness    Maddison Turcios presents to the office today for left ankle pain.  She fell on Saturday when going down steps into garage at a friend's house.  She landed on right knee and left leg went under her.  Went to ER for evaluation.  Had right knee xray which showed no acute findings.  Left ankle xray showed possible acute fracture of left ankle.  Pain controlled with Tylenol.  Has been wearing air cast and using cane for ambulation.  She has seen Dr. Arnold in the past for left ankle fracture.  She also needs refill on cholesterol med.  Had recent labs completed in October.      She denies any complaints of fever, chills, cough, chest pain, shortness of air, abdominal pain, nausea, or any other concerns.     The following portions of the patient's history were reviewed and updated as appropriate: allergies, current medications, past family history, past medical history, past social history, past surgical history and problem list.       Review of Systems   Musculoskeletal:        Left ankle pain     All other systems reviewed and are negative.      Vitals:    12/17/18 1548   BP: 130/82   Pulse: 76   Resp: 16   Temp: 98 °F (36.7 °C)   SpO2: 96%       Documented weights       Results for orders placed or performed in visit on 11/22/17   POC Urinalysis Dipstick   Result Value Ref Range    Color Yellow Yellow, Straw, Dark Yellow, Matilde    Clarity, UA Clear Clear    Glucose, UA Negative Negative, 1000 mg/dL (3+) mg/dL    Bilirubin Negative Negative    Ketones, UA Negative Negative    Specific Gravity  1.005 1.005 - 1.030    Blood, UA Negative Negative    pH, Urine 5.0 5.0 - 8.0    Protein, POC Negative Negative mg/dL    Urobilinogen, UA Normal Normal    Leukocytes Negative Negative    Nitrite, UA Negative Negative     Study Result     LEFT ANKLE         HISTORY:  Fell  today with left anterior and lateral ankle pain     TECHNIQUE:  3 views left ankle     FINDINGS:  There is evidence for old trauma to the medial and lateral malleolus.  One of the ossific densities at the inferior aspect lateral malleolus is  more indistinct and could be an age-indeterminate avulsion type  fracture. There is however no dislocation. Bones are mildly  demineralized. There is mild anterior soft tissue swelling. No  radiopaque foreign body     IMPRESSION:  There is age-indeterminate avulsion type fracture fragment  inferior to the lateral malleolus. There is at least evidence of prior  ankle trauma. Please correlate for clinical concern for more acute  avulsion type fracture from the distal end of the lateral malleolus.  There is no dislocation.     This report was finalized on 12/16/2018 1:03 PM by Dr. Connie Martínez MD.         Objective     Physical Exam   Constitutional: She appears well-developed and well-nourished. No distress.   Cardiovascular: Normal rate and intact distal pulses.   Pulmonary/Chest: Effort normal.   Musculoskeletal: She exhibits tenderness (lateral aspect of left ankle with slight bruising.  No soft tissue swelling.  Decreased ROM of left foot and ankle due to discomfort.  ).   Uses cane with ambulation          Assessment/Plan     Assessment/Plan   Maddison was seen today for ankle pain.    Diagnoses and all orders for this visit:    Closed fracture of left ankle, initial encounter  -     Ambulatory Referral to Orthopedic Surgery    Mixed hyperlipidemia  -     ezetimibe (ZETIA) 10 MG tablet; Take 1 tablet by mouth Daily.   Most recent labs noted in chart from March.  She is to bring in up dated labs which were completed in October.        Summary:  Maddison Turcios was seen in ER for right knee and pain after fall.  Her knee pain has improved however her left ankle is still causing pain to the lateral aspect of ankle.  Reviewed recent xray results which mentioned possible avulsion  type fracture distal end of left ankle.  No dislocation.  Instructed to continue to wear air cast when up.  Keep left leg elevated when able.  Apply ice.  Tylenol as needed for pain.  Referral to ortho surgery, Dr. Arnold.      In the meantime, instructed to contact us sooner for any problems or concerns.    Sara Reno, APRN  Family Medicine  [facility]  12/17/18  4:21 PM

## 2018-12-18 DIAGNOSIS — S82.892A CLOSED FRACTURE OF LEFT ANKLE, INITIAL ENCOUNTER: Primary | ICD-10-CM

## 2019-04-01 DIAGNOSIS — Z00.00 PHYSICAL EXAM, ANNUAL: Primary | ICD-10-CM

## 2019-04-02 ENCOUNTER — TRANSCRIBE ORDERS (OUTPATIENT)
Dept: OBSTETRICS AND GYNECOLOGY | Facility: CLINIC | Age: 69
End: 2019-04-02

## 2019-04-02 DIAGNOSIS — Z12.31 VISIT FOR SCREENING MAMMOGRAM: Primary | ICD-10-CM

## 2019-04-02 LAB
ALBUMIN SERPL-MCNC: 4.6 G/DL (ref 3.5–5.2)
ALBUMIN/GLOB SERPL: 1.7 G/DL
ALP SERPL-CCNC: 55 U/L (ref 39–117)
ALT SERPL-CCNC: 20 U/L (ref 1–33)
AST SERPL-CCNC: 25 U/L (ref 1–32)
BASOPHILS # BLD AUTO: (no result) 10*3/UL
BILIRUB SERPL-MCNC: 1 MG/DL (ref 0.2–1.2)
BUN SERPL-MCNC: 13 MG/DL (ref 8–23)
BUN/CREAT SERPL: 17.6 (ref 7–25)
CALCIUM SERPL-MCNC: 10.3 MG/DL (ref 8.6–10.5)
CHLORIDE SERPL-SCNC: 100 MMOL/L (ref 98–107)
CHOLEST SERPL-MCNC: 165 MG/DL (ref 0–200)
CHOLEST/HDLC SERPL: 2.14 {RATIO}
CO2 SERPL-SCNC: 25.8 MMOL/L (ref 22–29)
CREAT SERPL-MCNC: 0.74 MG/DL (ref 0.57–1)
DIFFERENTIAL COMMENT: NORMAL
EOSINOPHIL # BLD AUTO: (no result) 10*3/UL
EOSINOPHIL # BLD MANUAL: 0.14 10*3/MM3 (ref 0–0.4)
EOSINOPHIL NFR BLD AUTO: (no result) %
EOSINOPHIL NFR BLD MANUAL: 2 % (ref 0.3–6.2)
ERYTHROCYTE [DISTWIDTH] IN BLOOD BY AUTOMATED COUNT: 12.5 % (ref 12.3–15.4)
GLOBULIN SER CALC-MCNC: 2.7 GM/DL
GLUCOSE SERPL-MCNC: 94 MG/DL (ref 65–99)
HCT VFR BLD AUTO: 40.4 % (ref 34–46.6)
HDLC SERPL-MCNC: 77 MG/DL (ref 40–60)
HGB BLD-MCNC: 13.5 G/DL (ref 12–15.9)
LDLC SERPL CALC-MCNC: 76 MG/DL (ref 0–100)
LYMPHOCYTES # BLD AUTO: (no result) 10*3/UL
LYMPHOCYTES # BLD MANUAL: 2.53 10*3/MM3 (ref 0.7–3.1)
LYMPHOCYTES NFR BLD AUTO: (no result) %
LYMPHOCYTES NFR BLD MANUAL: 36.4 % (ref 19.6–45.3)
MCH RBC QN AUTO: 31.9 PG (ref 26.6–33)
MCHC RBC AUTO-ENTMCNC: 33.4 G/DL (ref 31.5–35.7)
MCV RBC AUTO: 95.5 FL (ref 79–97)
MONOCYTES # BLD MANUAL: 0.63 10*3/MM3 (ref 0.1–0.9)
MONOCYTES NFR BLD AUTO: (no result) %
MONOCYTES NFR BLD MANUAL: 9.1 % (ref 5–12)
NEUTROPHILS # BLD MANUAL: 3.65 10*3/MM3 (ref 1.4–7)
NEUTROPHILS NFR BLD AUTO: (no result) %
NEUTROPHILS NFR BLD MANUAL: 52.5 % (ref 42.7–76)
PLATELET # BLD AUTO: 318 10*3/MM3 (ref 140–450)
PLATELET BLD QL SMEAR: NORMAL
POTASSIUM SERPL-SCNC: 5.2 MMOL/L (ref 3.5–5.2)
PROT SERPL-MCNC: 7.3 G/DL (ref 6–8.5)
RBC # BLD AUTO: 4.23 10*6/MM3 (ref 3.77–5.28)
RBC MORPH BLD: NORMAL
SODIUM SERPL-SCNC: 139 MMOL/L (ref 136–145)
TRIGL SERPL-MCNC: 59 MG/DL (ref 0–150)
VLDLC SERPL CALC-MCNC: 11.8 MG/DL (ref 5–40)
WBC # BLD AUTO: 6.96 10*3/MM3 (ref 3.4–10.8)

## 2019-04-08 ENCOUNTER — HOSPITAL ENCOUNTER (OUTPATIENT)
Dept: CARDIOLOGY | Facility: HOSPITAL | Age: 69
Discharge: HOME | End: 2019-04-08

## 2019-04-08 ENCOUNTER — HOSPITAL ENCOUNTER (OUTPATIENT)
Facility: HOSPITAL | Age: 69
Setting detail: HOSPITAL OUTPATIENT SURGERY
Discharge: HOME | End: 2019-04-08
Attending: INTERNAL MEDICINE | Admitting: INTERNAL MEDICINE

## 2019-04-08 ENCOUNTER — APPOINTMENT (OUTPATIENT)
Dept: GENERAL RADIOLOGY | Facility: HOSPITAL | Age: 69
End: 2019-04-08

## 2019-04-08 ENCOUNTER — HOSPITAL ENCOUNTER (EMERGENCY)
Facility: HOSPITAL | Age: 69
Discharge: ANOTHER HOSPITAL | End: 2019-04-08
Attending: EMERGENCY MEDICINE | Admitting: EMERGENCY MEDICINE

## 2019-04-08 VITALS
DIASTOLIC BLOOD PRESSURE: 77 MMHG | HEART RATE: 71 BPM | TEMPERATURE: 97.8 F | HEIGHT: 64 IN | OXYGEN SATURATION: 97 % | RESPIRATION RATE: 16 BRPM | SYSTOLIC BLOOD PRESSURE: 128 MMHG | WEIGHT: 150 LBS | BODY MASS INDEX: 25.61 KG/M2

## 2019-04-08 VITALS
HEIGHT: 64 IN | WEIGHT: 150 LBS | OXYGEN SATURATION: 96 % | SYSTOLIC BLOOD PRESSURE: 149 MMHG | BODY MASS INDEX: 25.61 KG/M2 | DIASTOLIC BLOOD PRESSURE: 75 MMHG | HEART RATE: 60 BPM | RESPIRATION RATE: 16 BRPM

## 2019-04-08 VITALS
RESPIRATION RATE: 16 BRPM | WEIGHT: 151 LBS | BODY MASS INDEX: 25.78 KG/M2 | HEART RATE: 62 BPM | HEIGHT: 64 IN | OXYGEN SATURATION: 97 % | SYSTOLIC BLOOD PRESSURE: 138 MMHG | TEMPERATURE: 97.9 F | DIASTOLIC BLOOD PRESSURE: 74 MMHG

## 2019-04-08 DIAGNOSIS — R07.9 CHEST PAIN, UNSPECIFIED TYPE: Primary | ICD-10-CM

## 2019-04-08 DIAGNOSIS — R07.9 CHEST PAIN, UNSPECIFIED TYPE: ICD-10-CM

## 2019-04-08 LAB
ALBUMIN SERPL-MCNC: 4.4 G/DL (ref 3.5–5.2)
ALBUMIN/GLOB SERPL: 1.8 G/DL
ALP SERPL-CCNC: 55 U/L (ref 39–117)
ALT SERPL W P-5'-P-CCNC: 21 U/L (ref 1–33)
ANION GAP SERPL CALCULATED.3IONS-SCNC: 8.8 MMOL/L
AST SERPL-CCNC: 23 U/L (ref 1–32)
BASOPHILS # BLD AUTO: 0.04 10*3/MM3 (ref 0–0.2)
BASOPHILS NFR BLD AUTO: 0.7 % (ref 0–1.5)
BILIRUB SERPL-MCNC: 0.7 MG/DL (ref 0.2–1.2)
BUN BLD-MCNC: 11 MG/DL (ref 8–23)
BUN/CREAT SERPL: 15.7 (ref 7–25)
CALCIUM SPEC-SCNC: 8.9 MG/DL (ref 8.6–10.5)
CHLORIDE SERPL-SCNC: 107 MMOL/L (ref 98–107)
CO2 SERPL-SCNC: 26.2 MMOL/L (ref 22–29)
CREAT BLD-MCNC: 0.7 MG/DL (ref 0.57–1)
DEPRECATED RDW RBC AUTO: 40 FL (ref 37–54)
EOSINOPHIL # BLD AUTO: 0.18 10*3/MM3 (ref 0–0.4)
EOSINOPHIL NFR BLD AUTO: 3.2 % (ref 0.3–6.2)
ERYTHROCYTE [DISTWIDTH] IN BLOOD BY AUTOMATED COUNT: 11.9 % (ref 12.3–15.4)
GFR SERPL CREATININE-BSD FRML MDRD: 83 ML/MIN/1.73
GLOBULIN UR ELPH-MCNC: 2.4 GM/DL
GLUCOSE BLD-MCNC: 109 MG/DL (ref 65–99)
HCT VFR BLD AUTO: 35.4 % (ref 34–46.6)
HGB BLD-MCNC: 12.3 G/DL (ref 12–15.9)
IMM GRANULOCYTES # BLD AUTO: 0.01 10*3/MM3 (ref 0–0.05)
IMM GRANULOCYTES NFR BLD AUTO: 0.2 % (ref 0–0.5)
LYMPHOCYTES # BLD AUTO: 2.74 10*3/MM3 (ref 0.7–3.1)
LYMPHOCYTES NFR BLD AUTO: 49.4 % (ref 19.6–45.3)
MCH RBC QN AUTO: 31.9 PG (ref 26.6–33)
MCHC RBC AUTO-ENTMCNC: 34.7 G/DL (ref 31.5–35.7)
MCV RBC AUTO: 91.9 FL (ref 79–97)
MONOCYTES # BLD AUTO: 0.45 10*3/MM3 (ref 0.1–0.9)
MONOCYTES NFR BLD AUTO: 8.1 % (ref 5–12)
NEUTROPHILS # BLD AUTO: 2.13 10*3/MM3 (ref 1.4–7)
NEUTROPHILS NFR BLD AUTO: 38.4 % (ref 42.7–76)
NRBC BLD AUTO-RTO: 0 /100 WBC (ref 0–0)
PLATELET # BLD AUTO: 269 10*3/MM3 (ref 140–450)
PMV BLD AUTO: 9.2 FL (ref 6–12)
POTASSIUM BLD-SCNC: 4.3 MMOL/L (ref 3.5–5.2)
PROT SERPL-MCNC: 6.8 G/DL (ref 6–8.5)
RBC # BLD AUTO: 3.85 10*6/MM3 (ref 3.77–5.28)
SODIUM BLD-SCNC: 142 MMOL/L (ref 136–145)
TROPONIN T SERPL-MCNC: <0.01 NG/ML (ref 0–0.03)
WBC NRBC COR # BLD: 5.55 10*3/MM3 (ref 3.4–10.8)

## 2019-04-08 PROCEDURE — 99284 EMERGENCY DEPT VISIT MOD MDM: CPT

## 2019-04-08 PROCEDURE — 84484 ASSAY OF TROPONIN QUANT: CPT | Performed by: PHYSICIAN ASSISTANT

## 2019-04-08 PROCEDURE — 71045 X-RAY EXAM CHEST 1 VIEW: CPT

## 2019-04-08 PROCEDURE — 93459 L HRT ART/GRFT ANGIO: CPT | Performed by: INTERNAL MEDICINE

## 2019-04-08 PROCEDURE — 80053 COMPREHEN METABOLIC PANEL: CPT | Performed by: PHYSICIAN ASSISTANT

## 2019-04-08 PROCEDURE — C1769 GUIDE WIRE: HCPCS | Performed by: INTERNAL MEDICINE

## 2019-04-08 PROCEDURE — 93005 ELECTROCARDIOGRAM TRACING: CPT | Performed by: PHYSICIAN ASSISTANT

## 2019-04-08 PROCEDURE — 93010 ELECTROCARDIOGRAM REPORT: CPT | Performed by: INTERNAL MEDICINE

## 2019-04-08 PROCEDURE — 25010000002 MIDAZOLAM PER 1 MG: Performed by: INTERNAL MEDICINE

## 2019-04-08 PROCEDURE — 0 IOPAMIDOL PER 1 ML: Performed by: INTERNAL MEDICINE

## 2019-04-08 PROCEDURE — 25010000002 FENTANYL CITRATE (PF) 100 MCG/2ML SOLUTION: Performed by: INTERNAL MEDICINE

## 2019-04-08 PROCEDURE — 99152 MOD SED SAME PHYS/QHP 5/>YRS: CPT | Performed by: INTERNAL MEDICINE

## 2019-04-08 PROCEDURE — C1894 INTRO/SHEATH, NON-LASER: HCPCS | Performed by: INTERNAL MEDICINE

## 2019-04-08 PROCEDURE — 99220 PR INITIAL OBSERVATION CARE/DAY 70 MINUTES: CPT | Performed by: INTERNAL MEDICINE

## 2019-04-08 PROCEDURE — 85025 COMPLETE CBC W/AUTO DIFF WBC: CPT | Performed by: PHYSICIAN ASSISTANT

## 2019-04-08 PROCEDURE — 94760 N-INVAS EAR/PLS OXIMETRY 1: CPT

## 2019-04-08 RX ORDER — SODIUM CHLORIDE 0.9 % (FLUSH) 0.9 %
10 SYRINGE (ML) INJECTION AS NEEDED
Status: DISCONTINUED | OUTPATIENT
Start: 2019-04-08 | End: 2019-04-08 | Stop reason: HOSPADM

## 2019-04-08 RX ORDER — SODIUM CHLORIDE 0.9 % (FLUSH) 0.9 %
10 SYRINGE (ML) INJECTION AS NEEDED
Status: DISCONTINUED | OUTPATIENT
Start: 2019-04-08 | End: 2020-12-24

## 2019-04-08 RX ORDER — SODIUM CHLORIDE 0.9 % (FLUSH) 0.9 %
3-10 SYRINGE (ML) INJECTION AS NEEDED
Status: CANCELLED | OUTPATIENT
Start: 2019-04-08

## 2019-04-08 RX ORDER — SODIUM CHLORIDE 0.9 % (FLUSH) 0.9 %
3 SYRINGE (ML) INJECTION EVERY 12 HOURS SCHEDULED
Status: CANCELLED | OUTPATIENT
Start: 2019-04-08

## 2019-04-08 RX ORDER — LIDOCAINE HYDROCHLORIDE 10 MG/ML
0.1 INJECTION, SOLUTION EPIDURAL; INFILTRATION; INTRACAUDAL; PERINEURAL ONCE AS NEEDED
Status: DISCONTINUED | OUTPATIENT
Start: 2019-04-08 | End: 2019-04-08 | Stop reason: HOSPADM

## 2019-04-08 RX ORDER — NITROGLYCERIN 0.4 MG/1
0.4 TABLET SUBLINGUAL
Status: DISCONTINUED | OUTPATIENT
Start: 2019-04-08 | End: 2020-12-24

## 2019-04-08 RX ORDER — SODIUM CHLORIDE 9 MG/ML
75 INJECTION, SOLUTION INTRAVENOUS CONTINUOUS
Status: DISCONTINUED | OUTPATIENT
Start: 2019-04-08 | End: 2019-04-08 | Stop reason: HOSPADM

## 2019-04-08 RX ORDER — FENTANYL CITRATE 50 UG/ML
INJECTION, SOLUTION INTRAMUSCULAR; INTRAVENOUS AS NEEDED
Status: DISCONTINUED | OUTPATIENT
Start: 2019-04-08 | End: 2019-04-08 | Stop reason: HOSPADM

## 2019-04-08 RX ORDER — LIDOCAINE HYDROCHLORIDE 20 MG/ML
INJECTION, SOLUTION INFILTRATION; PERINEURAL AS NEEDED
Status: DISCONTINUED | OUTPATIENT
Start: 2019-04-08 | End: 2019-04-08 | Stop reason: HOSPADM

## 2019-04-08 RX ORDER — MIDAZOLAM HYDROCHLORIDE 1 MG/ML
INJECTION INTRAMUSCULAR; INTRAVENOUS AS NEEDED
Status: DISCONTINUED | OUTPATIENT
Start: 2019-04-08 | End: 2019-04-08 | Stop reason: HOSPADM

## 2019-04-08 RX ORDER — SODIUM CHLORIDE 0.9 % (FLUSH) 0.9 %
3 SYRINGE (ML) INJECTION EVERY 12 HOURS SCHEDULED
Status: DISCONTINUED | OUTPATIENT
Start: 2019-04-08 | End: 2019-04-08 | Stop reason: HOSPADM

## 2019-04-08 RX ORDER — SODIUM CHLORIDE 0.9 % (FLUSH) 0.9 %
3-10 SYRINGE (ML) INJECTION AS NEEDED
Status: DISCONTINUED | OUTPATIENT
Start: 2019-04-08 | End: 2019-04-08 | Stop reason: HOSPADM

## 2019-04-08 RX ADMIN — SODIUM CHLORIDE 75 ML/HR: 9 INJECTION, SOLUTION INTRAVENOUS at 11:14

## 2019-04-08 NOTE — ED NOTES
Report given to ALEC Mandujano at [facility]     Marti Guerrero RN  04/08/19 0812

## 2019-04-08 NOTE — ED PROVIDER NOTES
Pt presents to the ED c/o intermittent sharp midsternal CP for 3 days. Pt states she had an episode of CP that woke her at 2:00AM. The pain has been constant but improved with NTG and ASA. Pt has a h/o CAD.   Cardiology Dr. Urbina    On exam,   Heart RRR, lungs CTAB    EKG          EKG time: 6:14 AM  Rhythm/Rate: NSR 61  P waves and IN: normal  QRS, axis: normal   ST and T waves: normal     Interpreted Contemporaneously by me, independently viewed  unchanged compared to prior 8/2018      Attestation:  The OSMIN and I have discussed this patient's history, physical exam, and treatment plan.  I have reviewed the documentation and personally had a face to face interaction with the patient. I affirm the documentation and agree with the treatment and plan.  The attached note describes my personal findings.      Documentation assistance provided by neil Ayers for Dr. Albert. Information recorded by the scribe was done at my direction and has been verified and validated by me.     Kimberly Ayers  04/08/19 0734       Rodrigo Albert MD  04/08/19 0802

## 2019-04-08 NOTE — ED NOTES
"Pt c/o mid-sternal chest pain and SOA that began Saturday afternoon and worsened this morning when she woke up. Rates pain 4/10, gets worse with activity. States it was \"much worse\" before she called EMS today. Pt had moderate relief with 325mg ASA, 2x SL NG given by EMS enroute. Reports a dry, non-productive, intermittent cough. States she feels \"more tired than normal for the past couple of months.\" PT has had CABG x3 in 2004, states she has never had a heart attack. She is A&O x4, able to ambulate independently, vitals stable, NAD noted at this time.      Yanique Isaac RN  04/08/19 0691    "

## 2019-04-08 NOTE — DISCHARGE INSTRUCTIONS
[facility]  [address] [place] [address]      Coronary Angiogram (Femoral Approach) After Care     Refer to this sheet in the next few weeks. These instructions provide you with information on caring for yourself after your procedure. Your health care provider may also give you more specific instructions. Your treatment has been planned according to current medical practices, but problems sometimes occur. Call your health care provider if you have any problems or questions after your procedure.      What to Expect After the Procedure:  After your procedure, it is typical to have the following sensations:  · Minor discomfort or tenderness and a small bump at the catheter insertion site. The bump should usually decrease in size and tenderness within 1 to 2 weeks.  · Any bruising will usually fade within 2 to 4 weeks.  Home Care Instructions:  · Do not apply powder or lotion to the site.  · Do not take baths, swim, or use a hot tub until your health care provider approves and the site is completely healed.  · Do not bend, squat, or lift anything over 20 lb (9 kg) or as directed by your health care provider. However, we recommend lifting nothing heavier than a gallon of milk.    · You may shower 24 hours after the procedure. Remove the bandage (dressing) and gently wash the site with plain soap and water. Gently pat the site dry. You may apply a band aid daily for 2 days if desired.    · Inspect the site at least twice daily.  · Increase your fluid intake for the next 2 days.    · Limit your activity for the first 48 hours. .    · Avoid strenuous activity for 1 week or as advised by your physician.    · Follow instructions about when you can drive or return to work as directed by your physician.    · Hold direct pressure over the site when you cough, sneeze, laugh or change positions.  Do this for the next 2 days.    · Do not operate machinery or power tools for 24 hours.  · A responsible adult  should be with you for the first 24 hours after you arrive home. Do not make any important legal decisions or sign legal papers for 24 hours.  Do not drink alcohol for 24 hours.  · Metformin or any medications containing Metformin should not be taken for 48 hours after your procedure.    Call Your Doctor If:  · You have drainage (other than a small amount of blood on the dressing).  · You have chills or a fever > 101.  · You have redness, warmth, swelling(larger than a walnut), or pain at the insertion site  · .You have heavy bleeding from the site. If this happens, hold pressure on the site and call 911.  · You develop chest pain or shortness of breath, feel faint, or pass out.  · You develop pain, discoloration, coldness, numbness, tingling, or severe bruising in the leg that held the catheter.  · You develop bleeding from any other place, such as the bowels.    Make Sure You:  · Understand these instructions.  · Will watch your condition.  · Will get help right away if you are not doing well or get worse.

## 2019-04-08 NOTE — H&P (VIEW-ONLY)
Patient Name: Maddison Turcios  :1950  69 y.o.    Date of Admission: 2019  Date of Consultation:  19  Encounter Provider: Marsha Goldstein RN  Place of Service: [facility] CARDIOLOGY  Referring Provider: Helen Urbina MD  Patient Care Team:  Kasandra Mcdowell PA-C as PCP - General (Family Medicine)  Helen Urbina MD as Consulting Physician (Cardiology)  Ruben Valderrama MD (Dermatology)  Bennett Whelan MD as MDS Coordinator (Allergy)  Sara Araiza MD as Consulting Physician (Obstetrics and Gynecology)      Chief complaint: Chest pain    History of Present Illness: .    69-year-old female who is usually followed by Dr. Urbina.  Patient has a history of coronary artery disease.  In  she presented with increasing fatigue.  At that time she was found to have a 60% left main disease and underwent a coronary artery bypass from the LIMA to the LAD.  Patient actually been doing very well until the past several weeks.  Patient said that she started experiencing the fatigue that she had prior to her bypass surgery in .  She said that she was going to bed at 6:30 at night and just really could not do anything that she would normally do.  Multiple family members had commented that she was very very lethargic and had no exercise tolerance.  Patient had contacted our office and was going to be set up for an evaluation because of the symptoms.  Patient went out and mow the lawn on Saturday suffered substernal chest discomfort.  Patient got short of breath with it.  She described it as midsternal chest pressure.  She said that when she took a rest it did relieve her discomfort.  She then went back to mowing again and got the pain also to recur.  She stopped and the symptoms resolved.  On  she had some more discomfort.  Last night she was woke up at 2 AM with substernal discomfort shortness of breath clamminess nausea.  She called EMS  she received 2 sublingual nitroglycerin in route which relieved the discomfort completely.  She was seen in the emergency room and subsequently sent over to the [facility] for further evaluation.  Patient was pain-free at the current time.      Past Medical History:   Diagnosis Date   • Abnormal blood chemistry    • Acute UTI (urinary tract infection)    • Aneurysm, cerebral    • Bloating    • Brain aneurysm    • CAD (coronary artery disease)    • Chronic headaches    • Coronary artery disease    • Coronary artery stenosis    • Dysuria    • Encounter for screening colonoscopy    • Environmental allergies    • Fall from slip, trip, or stumble    • Gait disturbance    • H/O cardiovascular stress test 09/17/2013    Treadmill   • H/O colonoscopy    • H/O echocardiogram 09/25/2013   • H/O fall    • History of EKG 07/31/2015   • Hyperlipemia    • Hyperlipidemia    • Hypertension    • Hyperthyroidism    • Insomnia    • Left forearm pain    • Left leg pain    • Left wrist pain    • Lower abdominal pain    • Need for pneumococcal vaccination    • Onychomycosis of toenail    • Pelvic pressure in female    • Right foot pain    • TIA (transient ischemic attack) 11/30/2016   • URI (upper respiratory infection)    • Urine frequency        Past Surgical History:   Procedure Laterality Date   • BREAST EXCISIONAL BIOPSY Right 1977    b9   • BREAST EXCISIONAL BIOPSY Right 1979    b9   • BUNIONECTOMY     • CARDIAC SURGERY     • CEREBRAL ANEURYSM REPAIR     • CHOLECYSTECTOMY     • CORONARY ARTERY BYPASS GRAFT     • ROTATOR CUFF REPAIR     • ROTATOR CUFF REPAIR     • SPLENECTOMY     • TONSILLECTOMY     • TUBAL ABDOMINAL LIGATION           Prior to Admission medications    Medication Sig Start Date End Date Taking? Authorizing Provider   aluminum chloride (DRYSOL) 20 % external solution Apply  topically Every Night.   Yes Provider, MD Toya   Cholecalciferol (VITAMIN D3) 5000 UNITS capsule capsule Take 1 capsule by mouth Daily. 8/20/12   Yes Provider, MD Toya   clopidogrel (PLAVIX) 75 MG tablet TAKE 1 TABLET DAILY 12/13/18  Yes Kasandra Mcdowell PA-C   diclofenac (VOLTAREN) 1 % gel gel Apply 4 g topically 4 (four) times a day as needed.   Yes Emergency, Nurse Jean, RN   ezetimibe (ZETIA) 10 MG tablet Take 1 tablet by mouth Daily. 12/17/18  Yes Head, RENATE Hernandez   pitavastatin calcium (LIVALO) 2 MG tablet tablet Take 2 mg by mouth Every Night.   Yes Provider, MD Toya   icosapent ethyl (VASCEPA) 1 G capsule capsule Take 2 g by mouth 4 (Four) Times a Day.  4/8/19  ProviderToya MD   losartan (COZAAR) 25 MG tablet Take 1 tablet by mouth Daily. 7/30/18 4/8/19  Kasandra Mcdowell PA-C       Allergies   Allergen Reactions   • Beta Adrenergic Blockers Other (See Comments)     hypotension  bradycardic   • Adhesive Tape Other (See Comments)     Redness, bruising and peeling of skin    *Use Paper Tape Only*  Redness, bruising and peeling of skin    *Use Paper Tape Only*   • Sulfa Antibiotics Rash   • Sulfur Rash       Social History     Socioeconomic History   • Marital status:      Spouse name: Not on file   • Number of children: Not on file   • Years of education: Not on file   • Highest education level: Not on file   Tobacco Use   • Smoking status: Never Smoker   • Smokeless tobacco: Never Used   Substance and Sexual Activity   • Alcohol use: No   • Drug use: No   • Sexual activity: No     Partners: Male       Family History   Problem Relation Age of Onset   • Heart failure Mother    • Hypertension Mother    • Stroke Mother    • Cervical cancer Mother    • Heart failure Father    • Aneurysm Sister    • Breast cancer Sister    • Heart attack Brother    • Cancer Brother    • Lung cancer Brother    • No Known Problems Son    • No Known Problems Daughter    • No Known Problems Sister    • No Known Problems Sister    • Ovarian cancer Neg Hx    • Colon cancer Neg Hx        REVIEW OF SYSTEMS:   All systems reviewed.  Pertinent  positives identified in HPI.  All other systems are negative.      Objective:   There were no vitals filed for this visit.  There is no height or weight on file to calculate BMI.    General Appearance:    Alert, cooperative, in no acute distress   Head:    Normocephalic, without obvious abnormality, atraumatic   Eyes:            Lids and lashes normal, conjunctivae and sclerae normal, no   icterus, no pallor, corneas clear, PERRLA   Ears:    Ears appear intact with no abnormalities noted   Throat:   No oral lesions, no thrush, oral mucosa moist   Neck:   No adenopathy, supple, trachea midline, no thyromegaly, no   carotid bruit, no JVD   Back:     No kyphosis present, no scoliosis present, no skin lesions, erythema or scars, no tenderness to percussion or palpation, range of motion normal   Lungs:     Clear to auscultation,respirations regular, even and unlabored    Heart:    Regular rhythm and normal rate, normal S1 and S2, no murmur, no gallop, no rub, no click   Chest Wall:    No abnormalities observed   Abdomen:     Normal bowel sounds, no masses, no organomegaly, soft        non-tender, non-distended, no guarding, no rebound  tenderness   Extremities:   Moves all extremities well, no edema, no cyanosis, no redness   Pulses:   Pulses palpable and equal bilaterally. Normal radial, carotid, femoral, dorsalis pedis and posterior tibial pulses bilaterally. Normal abdominal aorta   Skin:  Psychiatric:   No bleeding, bruising or rash    Alert and oriented x 3, normal mood and affect   Lab Review:     Results from last 7 days   Lab Units 04/08/19  0602   SODIUM mmol/L 142   POTASSIUM mmol/L 4.3   CHLORIDE mmol/L 107   CO2 mmol/L 26.2   BUN mg/dL 11   CREATININE mg/dL 0.70   CALCIUM mg/dL 8.9   BILIRUBIN mg/dL 0.7   ALK PHOS U/L 55   ALT (SGPT) U/L 21   AST (SGOT) U/L 23   GLUCOSE mg/dL 109*     Results from last 7 days   Lab Units 04/08/19  0602   TROPONIN T ng/mL <0.010     Results from last 7 days   Lab Units  04/08/19  0602   WBC 10*3/mm3 5.55   HEMOGLOBIN g/dL 12.3   HEMATOCRIT % 35.4   PLATELETS 10*3/mm3 269             Results from last 7 days   Lab Units 04/02/19  0941   TRIGLYCERIDES mg/dL 59   HDL CHOL mg/dL 77*   LDL CHOL mg/dL 76            8/3/2019            I personally viewed and interpreted the patient's EKG/Telemetry data.        Assessment and Plan:   1.  Classic unstable angina.  Patient did not have chest discomfort prior to her last bypass surgery but did have fatigue and exercise intolerance which this patient had been experiencing in the past several weeks.  It now progressed to the point where she had chest discomfort that was recurrent with exertion and woke her up at rest this morning at 2 AM relieved with nitroglycerin.  Would proceed with a cardiac catheterization for definitive diagnosis.  Discussed with Dr. Urbina will set up for the cath today.  Risk and benefits were rereviewed also explained the radial approach.  Last time she had a cath in 2004 there was a femoral approach.  Time spent was 25 minutes face-to-face total time 40 minutes reviewing past medical records obtaining history reviewing catheterization risks    Marsha Goldstein RN  04/08/19  9:12 AM      John Crabtree MD  [facility]  4/8/2019 10:32 AM

## 2019-04-08 NOTE — ED NOTES
"Pt called 911 d/t chest pain that began Saturday afternoon, worsened this AM. Hx of CABG, no hx of AMI. Reports that pain is mostly resolved at this time but \"it scared me at the time.\" ECG shows NSR at this time, NAD distress noted at this time.             Yanique Isaac RN  04/08/19 0619    "

## 2019-04-08 NOTE — ED PROVIDER NOTES
"EMERGENCY DEPARTMENT ENCOUNTER    Room Number:  25/25  Date seen:  4/8/2019  Time seen: 6:11 AM  PCP: Kasandra Mcdowell PA-C  Cardiology: Dr. Urbina    HPI:  Chief complaint:Chest pain  Context:Maddison Turcios is a 69 y.o. female who presents to the ED with c/o intermittent \"sharp\" and \"pressure\" type central chest pain for the last 3 days. She reports her first episode of pain was 2 days ago when she was mowing her lawn that eventually resolved, and occurred yesterday at rest, and again today at 0200 this morning that woke her from sleep with associated nausea and been constant since onset. She states she is currently having 4/10 chest pain. She reports her pain is worse with exertion. She reports her pain is improved with changing positions, deep breathing, and antacids. She denies radiation of her pain. Pt also c/o fatigue for the last few months and dyspnea on exertion that occurs with and without her episodes of chest pain. She denies diaphoresis, leg pain or leg swelling. She states she was given 324 mg of ASA and 2 of NTG by EMS which is improved her pain. She has a hx of CAD and is s/p CABG in 2004, and states her fatigue is similar to her sx prior to her CABG.     Onset: gradual  Location:central chest  Radiation: none  Duration: intermittent for 3 days, but constant since 0200 this morning  Timing: constant since 0200  Character:\"sharp\" and \"pressure\"  Aggravating Factors: exertion  Alleviating Factors: changing positions, deep breath, antacids, NTG  Severity: moderate    MEDICAL RECORD REVIEW    Reviewed pt's cardiology visit on 08/3/2019 with Dr. Urbina.     ALLERGIES  Beta adrenergic blockers; Adhesive tape; Sulfa antibiotics; and Sulfur    PAST MEDICAL HISTORY  Active Ambulatory Problems     Diagnosis Date Noted   • Abnormal blood chemistry level 02/01/2016   • Cerebral aneurysm 02/01/2016   • Bloating 02/01/2016   • Coronary artery disease 02/01/2016   • Abnormal gait 02/01/2016   • Mixed " hyperlipidemia 02/01/2016   • Essential hypertension 02/01/2016   • Hyperthyroidism 02/01/2016   • Insomnia 02/01/2016   • Onychomycosis of toenail 02/01/2016   • Right foot pain 02/01/2016   • Preoperative cardiovascular examination 02/05/2016   • Menopausal symptoms 04/05/2016   • Bilateral enlargement of atria 12/02/2016   • Sinus bradycardia 12/02/2016   • Transient cerebral ischemia 12/08/2016   • Skin lesion of right ear 01/27/2017   • AKIRA (obstructive sleep apnea)    • Fatigue 02/07/2017   • Medicare annual wellness visit, subsequent 04/13/2017   • Osteopenia 05/10/2017   • Anxiety 06/23/2017   • Necrotic hand wound (CMS/HCC) 07/14/2017   • Breast mass, left 11/06/2017   • Family history of breast cancer 11/22/2017   • RLQ abdominal pain 11/22/2017   • S/P CABG (coronary artery bypass graft) 08/03/2018     Resolved Ambulatory Problems     Diagnosis Date Noted   • Acute UTI (urinary tract infection) 02/01/2016   • Chronic headache 02/01/2016   • Fall on same level from slipping, tripping or stumbling 02/01/2016   • Dysuria 02/01/2016   • Left forearm pain 02/01/2016   • Left leg pain 02/01/2016   • Left wrist pain 02/01/2016   • Lower abdominal pain 02/01/2016   • Pelvic pressure in female 02/01/2016   • Acute respiratory infection 02/01/2016   • Urine frequency 02/01/2016   • Welcome to Medicare preventive visit 04/04/2016   • Onychomycosis 04/05/2016   • Atherosclerosis of coronary artery bypass graft 11/30/2016   • Temporary cerebral vascular dysfunction 11/30/2016   • Left otitis media 01/27/2017   • Dog bite of hand without complication 07/03/2017   • Visit for suture removal 07/15/2017     Past Medical History:   Diagnosis Date   • Abnormal blood chemistry    • Acute UTI (urinary tract infection)    • Aneurysm, cerebral    • Bloating    • Brain aneurysm    • CAD (coronary artery disease)    • Chronic headaches    • Coronary artery disease    • Coronary artery stenosis    • Dysuria    • Encounter for  screening colonoscopy    • Environmental allergies    • Fall from slip, trip, or stumble    • Gait disturbance    • H/O cardiovascular stress test 09/17/2013   • H/O colonoscopy    • H/O echocardiogram 09/25/2013   • H/O fall    • History of EKG 07/31/2015   • Hyperlipemia    • Hyperlipidemia    • Hypertension    • Hyperthyroidism    • Insomnia    • Left forearm pain    • Left leg pain    • Left wrist pain    • Lower abdominal pain    • Need for pneumococcal vaccination    • Onychomycosis of toenail    • Pelvic pressure in female    • Right foot pain    • TIA (transient ischemic attack) 11/30/2016   • URI (upper respiratory infection)    • Urine frequency        PAST SURGICAL HISTORY  Past Surgical History:   Procedure Laterality Date   • BREAST EXCISIONAL BIOPSY Right 1977    b9   • BREAST EXCISIONAL BIOPSY Right 1979    b9   • BUNIONECTOMY     • CARDIAC SURGERY     • CEREBRAL ANEURYSM REPAIR     • CHOLECYSTECTOMY     • CORONARY ARTERY BYPASS GRAFT     • ROTATOR CUFF REPAIR     • ROTATOR CUFF REPAIR     • SPLENECTOMY     • TONSILLECTOMY     • TUBAL ABDOMINAL LIGATION         FAMILY HISTORY  Family History   Problem Relation Age of Onset   • Heart failure Mother    • Hypertension Mother    • Stroke Mother    • Cervical cancer Mother    • Heart failure Father    • Aneurysm Sister    • Breast cancer Sister    • Heart attack Brother    • Cancer Brother    • Lung cancer Brother    • No Known Problems Son    • No Known Problems Daughter    • No Known Problems Sister    • No Known Problems Sister    • Ovarian cancer Neg Hx    • Colon cancer Neg Hx        SOCIAL HISTORY  Social History     Socioeconomic History   • Marital status:      Spouse name: Not on file   • Number of children: Not on file   • Years of education: Not on file   • Highest education level: Not on file   Tobacco Use   • Smoking status: Never Smoker   • Smokeless tobacco: Never Used   Substance and Sexual Activity   • Alcohol use: No   • Drug use:  No   • Sexual activity: No     Partners: Male       REVIEW OF SYSTEMS  Review of Systems   Constitutional: Positive for fatigue. Negative for chills, diaphoresis and fever.   HENT: Negative.    Eyes: Negative.    Respiratory: Positive for shortness of breath.    Cardiovascular: Positive for chest pain.   Gastrointestinal: Positive for nausea. Negative for abdominal pain.   Genitourinary: Negative.    Musculoskeletal: Negative.    Skin: Negative.    Neurological: Negative.    Psychiatric/Behavioral: Negative.        PHYSICAL EXAM  ED Triage Vitals [04/08/19 0556]   Temp Heart Rate Resp BP SpO2   98.4 °F (36.9 °C) 60 16 146/75 97 %      Temp src Heart Rate Source Patient Position BP Location FiO2 (%)   Oral Monitor Lying Right arm --     Physical Exam   Constitutional: She is oriented to person, place, and time and well-developed, well-nourished, and in no distress.   HENT:   Head: Normocephalic and atraumatic.   Right Ear: External ear normal.   Left Ear: External ear normal.   Nose: Nose normal.   Eyes: Conjunctivae are normal.   Neck: Normal range of motion.   Cardiovascular: Normal rate and regular rhythm.   Pulmonary/Chest: Effort normal and breath sounds normal.   Abdominal: Soft. She exhibits no distension. There is no tenderness.   Musculoskeletal: Normal range of motion. She exhibits no edema (BLE).   Neurological: She is alert and oriented to person, place, and time.   Skin: Skin is warm and dry.   Psychiatric: Affect normal.   Nursing note and vitals reviewed.      LAB RESULTS  Recent Results (from the past 24 hour(s))   Comprehensive Metabolic Panel    Collection Time: 04/08/19  6:02 AM   Result Value Ref Range    Glucose 109 (H) 65 - 99 mg/dL    BUN 11 8 - 23 mg/dL    Creatinine 0.70 0.57 - 1.00 mg/dL    Sodium 142 136 - 145 mmol/L    Potassium 4.3 3.5 - 5.2 mmol/L    Chloride 107 98 - 107 mmol/L    CO2 26.2 22.0 - 29.0 mmol/L    Calcium 8.9 8.6 - 10.5 mg/dL    Total Protein 6.8 6.0 - 8.5 g/dL    Albumin  4.40 3.50 - 5.20 g/dL    ALT (SGPT) 21 1 - 33 U/L    AST (SGOT) 23 1 - 32 U/L    Alkaline Phosphatase 55 39 - 117 U/L    Total Bilirubin 0.7 0.2 - 1.2 mg/dL    eGFR Non African Amer 83 >60 mL/min/1.73    Globulin 2.4 gm/dL    A/G Ratio 1.8 g/dL    BUN/Creatinine Ratio 15.7 7.0 - 25.0    Anion Gap 8.8 mmol/L   Troponin    Collection Time: 04/08/19  6:02 AM   Result Value Ref Range    Troponin T <0.010 0.000 - 0.030 ng/mL   CBC Auto Differential    Collection Time: 04/08/19  6:02 AM   Result Value Ref Range    WBC 5.55 3.40 - 10.80 10*3/mm3    RBC 3.85 3.77 - 5.28 10*6/mm3    Hemoglobin 12.3 12.0 - 15.9 g/dL    Hematocrit 35.4 34.0 - 46.6 %    MCV 91.9 79.0 - 97.0 fL    MCH 31.9 26.6 - 33.0 pg    MCHC 34.7 31.5 - 35.7 g/dL    RDW 11.9 (L) 12.3 - 15.4 %    RDW-SD 40.0 37.0 - 54.0 fl    MPV 9.2 6.0 - 12.0 fL    Platelets 269 140 - 450 10*3/mm3    Neutrophil % 38.4 (L) 42.7 - 76.0 %    Lymphocyte % 49.4 (H) 19.6 - 45.3 %    Monocyte % 8.1 5.0 - 12.0 %    Eosinophil % 3.2 0.3 - 6.2 %    Basophil % 0.7 0.0 - 1.5 %    Immature Grans % 0.2 0.0 - 0.5 %    Neutrophils, Absolute 2.13 1.40 - 7.00 10*3/mm3    Lymphocytes, Absolute 2.74 0.70 - 3.10 10*3/mm3    Monocytes, Absolute 0.45 0.10 - 0.90 10*3/mm3    Eosinophils, Absolute 0.18 0.00 - 0.40 10*3/mm3    Basophils, Absolute 0.04 0.00 - 0.20 10*3/mm3    Immature Grans, Absolute 0.01 0.00 - 0.05 10*3/mm3    nRBC 0.0 0.0 - 0.0 /100 WBC       I ordered the above labs and reviewed the results    RADIOLOGY  XR Chest 1 View   Final Result   1.  Asymmetric elevation of right hemidiaphragm. Otherwise, no findings   of acute cardiopulmonary pathology.       This report was finalized on 4/8/2019 7:01 AM by Dr. Tru Ryan M.D.              I ordered the above noted radiological studies and reviewed the images on the PACS system.     MEDICATIONS GIVEN IN ER  Medications   sodium chloride 0.9 % flush 10 mL (not administered)       EKG  Interpreted by ED  "Physician    PROCEDURES  Procedures      PROGRESS AND CONSULTS    Progress Notes:         0721 - Reviewed pt's history and workup with Dr. Albert.  After a bedside evaluation; Dr Albert agrees with the plan of care    0747 - Discussed pt care with Dr. rUbina ([facility]) who would like the pt to be sent to the chest pain unit, but that is currently closed as of right now. She states she will call to make plans for the pt.     0751 - Spoke with Dr. Urbina again who states that the chest pain unit opens at 0800 this morning and requests that the pt be sent now. She requests leaving the pt's IV in place, which I will do.     0752 - Rechecked pt. Pt is resting comfortably in NAD. Informed pt of the results of her lab work including negative troponin, unremarkable EKG, and her CXR which was negative acute. Stated that I have spoken with Dr. Urbina and we will send her to the chest pain unit for further evaluation. Pt understands and agrees with plan. All questions answered.       Disposition vitals:  /78   Pulse 63   Temp 98.4 °F (36.9 °C) (Oral)   Resp 15   Ht 162.6 cm (64\")   Wt 68.5 kg (151 lb)   SpO2 95%   BMI 25.92 kg/m²       DIAGNOSIS  Final diagnoses:   Chest pain, unspecified type       DISPOSITION:  Send to the chest pain clinic    Documentation assistance provided by neil Estrada for Esperanza Chanel PA-C.  Information recorded by the neil was done at my direction and has been verified and validated by me.          Omi Estrada  04/08/19 0809       Esperanza Chanel PA  04/08/19 0821    "

## 2019-04-08 NOTE — PROGRESS NOTES
Patient Name: Maddison Turcios  :1950  69 y.o.    Date of Admission: 2019  Date of Consultation:  19  Encounter Provider: Marsha Goldstein RN  Place of Service: [facility] CARDIOLOGY  Referring Provider: Helen Urbina MD  Patient Care Team:  Kasandra Mcdowell PA-C as PCP - General (Family Medicine)  Helen Urbina MD as Consulting Physician (Cardiology)  Ruben Valderrama MD (Dermatology)  Bennett Whelan MD as MDS Coordinator (Allergy)  Sara Araiza MD as Consulting Physician (Obstetrics and Gynecology)      Chief complaint: Chest pain    History of Present Illness: .    69-year-old female who is usually followed by Dr. Urbina.  Patient has a history of coronary artery disease.  In  she presented with increasing fatigue.  At that time she was found to have a 60% left main disease and underwent a coronary artery bypass from the LIMA to the LAD.  Patient actually been doing very well until the past several weeks.  Patient said that she started experiencing the fatigue that she had prior to her bypass surgery in .  She said that she was going to bed at 6:30 at night and just really could not do anything that she would normally do.  Multiple family members had commented that she was very very lethargic and had no exercise tolerance.  Patient had contacted our office and was going to be set up for an evaluation because of the symptoms.  Patient went out and mow the lawn on Saturday suffered substernal chest discomfort.  Patient got short of breath with it.  She described it as midsternal chest pressure.  She said that when she took a rest it did relieve her discomfort.  She then went back to mowing again and got the pain also to recur.  She stopped and the symptoms resolved.  On  she had some more discomfort.  Last night she was woke up at 2 AM with substernal discomfort shortness of breath clamminess nausea.  She called EMS  she received 2 sublingual nitroglycerin in route which relieved the discomfort completely.  She was seen in the emergency room and subsequently sent over to the [facility] for further evaluation.  Patient was pain-free at the current time.      Past Medical History:   Diagnosis Date   • Abnormal blood chemistry    • Acute UTI (urinary tract infection)    • Aneurysm, cerebral    • Bloating    • Brain aneurysm    • CAD (coronary artery disease)    • Chronic headaches    • Coronary artery disease    • Coronary artery stenosis    • Dysuria    • Encounter for screening colonoscopy    • Environmental allergies    • Fall from slip, trip, or stumble    • Gait disturbance    • H/O cardiovascular stress test 09/17/2013    Treadmill   • H/O colonoscopy    • H/O echocardiogram 09/25/2013   • H/O fall    • History of EKG 07/31/2015   • Hyperlipemia    • Hyperlipidemia    • Hypertension    • Hyperthyroidism    • Insomnia    • Left forearm pain    • Left leg pain    • Left wrist pain    • Lower abdominal pain    • Need for pneumococcal vaccination    • Onychomycosis of toenail    • Pelvic pressure in female    • Right foot pain    • TIA (transient ischemic attack) 11/30/2016   • URI (upper respiratory infection)    • Urine frequency        Past Surgical History:   Procedure Laterality Date   • BREAST EXCISIONAL BIOPSY Right 1977    b9   • BREAST EXCISIONAL BIOPSY Right 1979    b9   • BUNIONECTOMY     • CARDIAC SURGERY     • CEREBRAL ANEURYSM REPAIR     • CHOLECYSTECTOMY     • CORONARY ARTERY BYPASS GRAFT     • ROTATOR CUFF REPAIR     • ROTATOR CUFF REPAIR     • SPLENECTOMY     • TONSILLECTOMY     • TUBAL ABDOMINAL LIGATION           Prior to Admission medications    Medication Sig Start Date End Date Taking? Authorizing Provider   aluminum chloride (DRYSOL) 20 % external solution Apply  topically Every Night.   Yes Provider, MD Toya   Cholecalciferol (VITAMIN D3) 5000 UNITS capsule capsule Take 1 capsule by mouth Daily. 8/20/12   Yes Provider, MD Toya   clopidogrel (PLAVIX) 75 MG tablet TAKE 1 TABLET DAILY 12/13/18  Yes Kasandra Mcdowell PA-C   diclofenac (VOLTAREN) 1 % gel gel Apply 4 g topically 4 (four) times a day as needed.   Yes Emergency, Nurse Jean, RN   ezetimibe (ZETIA) 10 MG tablet Take 1 tablet by mouth Daily. 12/17/18  Yes Head, RENATE Hernandez   pitavastatin calcium (LIVALO) 2 MG tablet tablet Take 2 mg by mouth Every Night.   Yes Provider, MD Toya   icosapent ethyl (VASCEPA) 1 G capsule capsule Take 2 g by mouth 4 (Four) Times a Day.  4/8/19  ProviderToya MD   losartan (COZAAR) 25 MG tablet Take 1 tablet by mouth Daily. 7/30/18 4/8/19  Kasandra Mcdowell PA-C       Allergies   Allergen Reactions   • Beta Adrenergic Blockers Other (See Comments)     hypotension  bradycardic   • Adhesive Tape Other (See Comments)     Redness, bruising and peeling of skin    *Use Paper Tape Only*  Redness, bruising and peeling of skin    *Use Paper Tape Only*   • Sulfa Antibiotics Rash   • Sulfur Rash       Social History     Socioeconomic History   • Marital status:      Spouse name: Not on file   • Number of children: Not on file   • Years of education: Not on file   • Highest education level: Not on file   Tobacco Use   • Smoking status: Never Smoker   • Smokeless tobacco: Never Used   Substance and Sexual Activity   • Alcohol use: No   • Drug use: No   • Sexual activity: No     Partners: Male       Family History   Problem Relation Age of Onset   • Heart failure Mother    • Hypertension Mother    • Stroke Mother    • Cervical cancer Mother    • Heart failure Father    • Aneurysm Sister    • Breast cancer Sister    • Heart attack Brother    • Cancer Brother    • Lung cancer Brother    • No Known Problems Son    • No Known Problems Daughter    • No Known Problems Sister    • No Known Problems Sister    • Ovarian cancer Neg Hx    • Colon cancer Neg Hx        REVIEW OF SYSTEMS:   All systems reviewed.  Pertinent  positives identified in HPI.  All other systems are negative.      Objective:   There were no vitals filed for this visit.  There is no height or weight on file to calculate BMI.    General Appearance:    Alert, cooperative, in no acute distress   Head:    Normocephalic, without obvious abnormality, atraumatic   Eyes:            Lids and lashes normal, conjunctivae and sclerae normal, no   icterus, no pallor, corneas clear, PERRLA   Ears:    Ears appear intact with no abnormalities noted   Throat:   No oral lesions, no thrush, oral mucosa moist   Neck:   No adenopathy, supple, trachea midline, no thyromegaly, no   carotid bruit, no JVD   Back:     No kyphosis present, no scoliosis present, no skin lesions, erythema or scars, no tenderness to percussion or palpation, range of motion normal   Lungs:     Clear to auscultation,respirations regular, even and unlabored    Heart:    Regular rhythm and normal rate, normal S1 and S2, no murmur, no gallop, no rub, no click   Chest Wall:    No abnormalities observed   Abdomen:     Normal bowel sounds, no masses, no organomegaly, soft        non-tender, non-distended, no guarding, no rebound  tenderness   Extremities:   Moves all extremities well, no edema, no cyanosis, no redness   Pulses:   Pulses palpable and equal bilaterally. Normal radial, carotid, femoral, dorsalis pedis and posterior tibial pulses bilaterally. Normal abdominal aorta   Skin:  Psychiatric:   No bleeding, bruising or rash    Alert and oriented x 3, normal mood and affect   Lab Review:     Results from last 7 days   Lab Units 04/08/19  0602   SODIUM mmol/L 142   POTASSIUM mmol/L 4.3   CHLORIDE mmol/L 107   CO2 mmol/L 26.2   BUN mg/dL 11   CREATININE mg/dL 0.70   CALCIUM mg/dL 8.9   BILIRUBIN mg/dL 0.7   ALK PHOS U/L 55   ALT (SGPT) U/L 21   AST (SGOT) U/L 23   GLUCOSE mg/dL 109*     Results from last 7 days   Lab Units 04/08/19  0602   TROPONIN T ng/mL <0.010     Results from last 7 days   Lab Units  04/08/19  0602   WBC 10*3/mm3 5.55   HEMOGLOBIN g/dL 12.3   HEMATOCRIT % 35.4   PLATELETS 10*3/mm3 269             Results from last 7 days   Lab Units 04/02/19  0941   TRIGLYCERIDES mg/dL 59   HDL CHOL mg/dL 77*   LDL CHOL mg/dL 76            8/3/2019            I personally viewed and interpreted the patient's EKG/Telemetry data.        Assessment and Plan:   1.  Classic unstable angina.  Patient did not have chest discomfort prior to her last bypass surgery but did have fatigue and exercise intolerance which this patient had been experiencing in the past several weeks.  It now progressed to the point where she had chest discomfort that was recurrent with exertion and woke her up at rest this morning at 2 AM relieved with nitroglycerin.  Would proceed with a cardiac catheterization for definitive diagnosis.  Discussed with Dr. Urbina will set up for the cath today.  Risk and benefits were rereviewed also explained the radial approach.  Last time she had a cath in 2004 there was a femoral approach.  Time spent was 25 minutes face-to-face total time 40 minutes reviewing past medical records obtaining history reviewing catheterization risks    Marsha Goldstein RN  04/08/19  9:12 AM      John Crabtree MD  [facility]  4/8/2019 10:32 AM

## 2019-04-10 ENCOUNTER — OFFICE VISIT (OUTPATIENT)
Dept: FAMILY MEDICINE CLINIC | Facility: CLINIC | Age: 69
End: 2019-04-10

## 2019-04-10 VITALS
HEIGHT: 64 IN | HEART RATE: 63 BPM | RESPIRATION RATE: 16 BRPM | DIASTOLIC BLOOD PRESSURE: 80 MMHG | SYSTOLIC BLOOD PRESSURE: 130 MMHG | TEMPERATURE: 98.2 F | OXYGEN SATURATION: 98 % | BODY MASS INDEX: 26.12 KG/M2 | WEIGHT: 153 LBS

## 2019-04-10 DIAGNOSIS — Z09 HOSPITAL DISCHARGE FOLLOW-UP: Primary | ICD-10-CM

## 2019-04-10 DIAGNOSIS — R53.82 CHRONIC FATIGUE: ICD-10-CM

## 2019-04-10 PROBLEM — F41.9 ANXIETY: Status: RESOLVED | Noted: 2017-06-23 | Resolved: 2019-04-10

## 2019-04-10 PROBLEM — R10.31 RLQ ABDOMINAL PAIN: Status: RESOLVED | Noted: 2017-11-22 | Resolved: 2019-04-10

## 2019-04-10 PROCEDURE — 99214 OFFICE O/P EST MOD 30 MIN: CPT | Performed by: PHYSICIAN ASSISTANT

## 2019-04-10 NOTE — PROGRESS NOTES
Subjective   Maddison Turcios is a 69 y.o. female presents for   Chief Complaint   Patient presents with   • Follow-up     hospital follow up       History of Present Illness     Maddison is a 69 year old female who presents for hospital follow for chest pain.  She was seen at the ED [facility] on April 8,2019 and was admitted for 23 hours hold. She had a heart cath with normal results.  She states yard work over the weekend and started having chest pain.  She thought it would go away but by Monday morning around 4 AM the chest pain worsened.  She called 911 and went to [facility].. 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  Bowel movements are daily without dark black tarry stools.  Last colonoscopy in December 2015 with Dr. White at [facility].  States she has had chronic fatigue over the past several months.  States she has a hard time getting up in the morning.  Denied any hair loss, dry skin, mood swings or swelling of ankles.  Her appetite has been healthy and sleep has been normal.  She is goes to [employer] several days a week and also walks 2 miles daily.  Denied any current chest pain, shortness of air, dizziness, vision changes or swelling of ankles.    The following portions of the patient's history were reviewed and updated as appropriate: allergies, current medications, past family history, past medical history, past social history, past surgical history and problem list.    Review of Systems   Constitutional: Positive for fatigue. Negative for chills and fever.   HENT: Negative.    Eyes: Negative.    Respiratory: Negative.  Negative for cough, shortness of breath and wheezing.    Cardiovascular: Negative.  Negative for chest pain, palpitations and leg swelling.   Gastrointestinal: Negative.  Negative for abdominal pain, blood in stool, constipation, diarrhea, nausea and vomiting.   Endocrine: Negative.    Genitourinary: Negative.    Musculoskeletal: Negative.    Skin:  "Negative.    Allergic/Immunologic: Negative.    Neurological: Negative.  Negative for dizziness, light-headedness and headaches.   Hematological: Negative.    Psychiatric/Behavioral: Positive for sleep disturbance. Negative for suicidal ideas.   All other systems reviewed and are negative.        Vitals:    04/10/19 0810 04/10/19 0859   BP: 140/85 130/80   BP Location:  Right arm   Patient Position:  Sitting   Cuff Size:  Adult   Pulse: 63    Resp: 16    Temp: 98.2 °F (36.8 °C)    SpO2: 98%    Weight: 69.4 kg (153 lb)    Height: 162.6 cm (64\")      Wt Readings from Last 3 Encounters:   04/10/19 69.4 kg (153 lb)   04/08/19 68 kg (150 lb)   04/08/19 68 kg (150 lb)     BP Readings from Last 3 Encounters:   04/10/19 130/80   04/08/19 149/75   04/08/19 128/77     Social History     Socioeconomic History   • Marital status:      Spouse name: Not on file   • Number of children: Not on file   • Years of education: Not on file   • Highest education level: Not on file   Tobacco Use   • Smoking status: Never Smoker   • Smokeless tobacco: Never Used   Substance and Sexual Activity   • Alcohol use: No   • Drug use: No   • Sexual activity: No     Partners: Male       Allergies   Allergen Reactions   • Beta Adrenergic Blockers Other (See Comments)     hypotension  bradycardic   • Adhesive Tape Other (See Comments)     Redness, bruising and peeling of skin    *Use Paper Tape Only*  Redness, bruising and peeling of skin    *Use Paper Tape Only*   • Sulfa Antibiotics Rash   • Sulfur Rash       Body mass index is 26.26 kg/m².    Objective   Physical Exam   Constitutional: She is oriented to person, place, and time. Vital signs are normal. She appears well-developed and well-nourished.   HENT:   Head: Normocephalic and atraumatic.   Right Ear: Hearing, tympanic membrane, external ear and ear canal normal.   Left Ear: Hearing, tympanic membrane, external ear and ear canal normal.   Nose: Nose normal. Right sinus exhibits no " maxillary sinus tenderness and no frontal sinus tenderness. Left sinus exhibits no maxillary sinus tenderness and no frontal sinus tenderness.   Mouth/Throat: Uvula is midline, oropharynx is clear and moist and mucous membranes are normal.   Eyes: Conjunctivae, EOM and lids are normal.   Neck: Trachea normal and phonation normal. Neck supple. Carotid bruit is not present. No edema present. No thyroid mass and no thyromegaly present.   Cardiovascular: Normal rate, regular rhythm, S1 normal, S2 normal, normal heart sounds and normal pulses.   Pulmonary/Chest: Effort normal and breath sounds normal.   Abdominal: Soft. Normal appearance, normal aorta and bowel sounds are normal. There is no hepatomegaly. There is no tenderness.   Lymphadenopathy:     She has no cervical adenopathy.   Neurological: She is alert and oriented to person, place, and time.   Skin: Skin is warm, dry and intact. Capillary refill takes less than 2 seconds.   Psychiatric: She has a normal mood and affect. Her speech is normal and behavior is normal. Judgment and thought content normal. Cognition and memory are normal.       Assessment/Plan   Maddison was seen today for follow-up.    Diagnoses and all orders for this visit:    Hospital discharge follow-up    Chronic fatigue  -     Vitamin B12  -     Folate  -     Thyroid Panel With TSH  -     CBC & Differential    1.  New hospital discharge follow-up: I have reviewed Maddison's hospital discharge, heart catheterization and laboratory results from [facility] on April 8, 2019.  She was discharged on April 9, 2019.  She will keep her follow-up appointments with cardiology.  I have reviewed her lab work from April 2, 2019.  She will continue her current medications at home as directed.  2.  New fatigue: Maddison has been feeling tired for the past few months.  She will have a vitamin B12, folate, thyroid profile TSH and a CBC collected office visit today.  She will be notified of test results when  completed.      [facility]  [address]  [place] [address]  Dept: [phone]  Dept Fax: [phone]  Loc: [phone]  Loc Fax: [phone]        Admission on 04/08/2019, Discharged on 04/08/2019   Component Date Value Ref Range Status   • Glucose 04/08/2019 109* 65 - 99 mg/dL Final   • BUN 04/08/2019 11  8 - 23 mg/dL Final   • Creatinine 04/08/2019 0.70  0.57 - 1.00 mg/dL Final   • Sodium 04/08/2019 142  136 - 145 mmol/L Final   • Potassium 04/08/2019 4.3  3.5 - 5.2 mmol/L Final   • Chloride 04/08/2019 107  98 - 107 mmol/L Final   • CO2 04/08/2019 26.2  22.0 - 29.0 mmol/L Final   • Calcium 04/08/2019 8.9  8.6 - 10.5 mg/dL Final   • Total Protein 04/08/2019 6.8  6.0 - 8.5 g/dL Final   • Albumin 04/08/2019 4.40  3.50 - 5.20 g/dL Final   • ALT (SGPT) 04/08/2019 21  1 - 33 U/L Final   • AST (SGOT) 04/08/2019 23  1 - 32 U/L Final   • Alkaline Phosphatase 04/08/2019 55  39 - 117 U/L Final   • Total Bilirubin 04/08/2019 0.7  0.2 - 1.2 mg/dL Final   • eGFR Non African Amer 04/08/2019 83  >60 mL/min/1.73 Final   • Globulin 04/08/2019 2.4  gm/dL Final   • A/G Ratio 04/08/2019 1.8  g/dL Final   • BUN/Creatinine Ratio 04/08/2019 15.7  7.0 - 25.0 Final   • Anion Gap 04/08/2019 8.8  mmol/L Final   • Troponin T 04/08/2019 <0.010  0.000 - 0.030 ng/mL Final   • WBC 04/08/2019 5.55  3.40 - 10.80 10*3/mm3 Final   • RBC 04/08/2019 3.85  3.77 - 5.28 10*6/mm3 Final   • Hemoglobin 04/08/2019 12.3  12.0 - 15.9 g/dL Final   • Hematocrit 04/08/2019 35.4  34.0 - 46.6 % Final   • MCV 04/08/2019 91.9  79.0 - 97.0 fL Final   • MCH 04/08/2019 31.9  26.6 - 33.0 pg Final   • MCHC 04/08/2019 34.7  31.5 - 35.7 g/dL Final   • RDW 04/08/2019 11.9* 12.3 - 15.4 % Final   • RDW-SD 04/08/2019 40.0  37.0 - 54.0 fl Final   • MPV 04/08/2019 9.2  6.0 - 12.0 fL Final   • Platelets 04/08/2019 269  140 - 450 10*3/mm3 Final   • Neutrophil % 04/08/2019 38.4*  42.7 - 76.0 % Final   • Lymphocyte % 04/08/2019 49.4* 19.6 - 45.3 % Final   • Monocyte % 04/08/2019 8.1  5.0 - 12.0 % Final   • Eosinophil % 04/08/2019 3.2  0.3 - 6.2 % Final   • Basophil % 04/08/2019 0.7  0.0 - 1.5 % Final   • Immature Grans % 04/08/2019 0.2  0.0 - 0.5 % Final   • Neutrophils, Absolute 04/08/2019 2.13  1.40 - 7.00 10*3/mm3 Final   • Lymphocytes, Absolute 04/08/2019 2.74  0.70 - 3.10 10*3/mm3 Final   • Monocytes, Absolute 04/08/2019 0.45  0.10 - 0.90 10*3/mm3 Final   • Eosinophils, Absolute 04/08/2019 0.18  0.00 - 0.40 10*3/mm3 Final   • Basophils, Absolute 04/08/2019 0.04  0.00 - 0.20 10*3/mm3 Final   • Immature Grans, Absolute 04/08/2019 0.01  0.00 - 0.05 10*3/mm3 Final   • nRBC 04/08/2019 0.0  0.0 - 0.0 /100 WBC Final   Orders Only on 04/01/2019   Component Date Value Ref Range Status   • Glucose 04/02/2019 94  65 - 99 mg/dL Final   • BUN 04/02/2019 13  8 - 23 mg/dL Final   • Creatinine 04/02/2019 0.74  0.57 - 1.00 mg/dL Final   • eGFR Non African Am 04/02/2019 78  >60 mL/min/1.73 Final   • eGFR African Am 04/02/2019 95  >60 mL/min/1.73 Final   • BUN/Creatinine Ratio 04/02/2019 17.6  7.0 - 25.0 Final   • Sodium 04/02/2019 139  136 - 145 mmol/L Final   • Potassium 04/02/2019 5.2  3.5 - 5.2 mmol/L Final   • Chloride 04/02/2019 100  98 - 107 mmol/L Final   • Total CO2 04/02/2019 25.8  22.0 - 29.0 mmol/L Final   • Calcium 04/02/2019 10.3  8.6 - 10.5 mg/dL Final   • Total Protein 04/02/2019 7.3  6.0 - 8.5 g/dL Final   • Albumin 04/02/2019 4.60  3.50 - 5.20 g/dL Final   • Globulin 04/02/2019 2.7  gm/dL Final   • A/G Ratio 04/02/2019 1.7  g/dL Final   • Total Bilirubin 04/02/2019 1.0  0.2 - 1.2 mg/dL Final   • Alkaline Phosphatase 04/02/2019 55  39 - 117 U/L Final   • AST (SGOT) 04/02/2019 25  1 - 32 U/L Final   • ALT (SGPT) 04/02/2019 20  1 - 33 U/L Final   • Total Cholesterol 04/02/2019 165  0 - 200 mg/dL Final   • Triglycerides 04/02/2019 59  0 - 150 mg/dL Final   • HDL Cholesterol 04/02/2019 77* 40  - 60 mg/dL Final   • VLDL Cholesterol 04/02/2019 11.8  5 - 40 mg/dL Final   • LDL Cholesterol  04/02/2019 76  0 - 100 mg/dL Final   • Chol/HDL Ratio 04/02/2019 2.14   Final   • WBC 04/02/2019 6.96  3.40 - 10.80 10*3/mm3 Final   • RBC 04/02/2019 4.23  3.77 - 5.28 10*6/mm3 Final   • Hemoglobin 04/02/2019 13.5  12.0 - 15.9 g/dL Final   • Hematocrit 04/02/2019 40.4  34.0 - 46.6 % Final   • MCV 04/02/2019 95.5  79.0 - 97.0 fL Final   • MCH 04/02/2019 31.9  26.6 - 33.0 pg Final   • MCHC 04/02/2019 33.4  31.5 - 35.7 g/dL Final   • RDW 04/02/2019 12.5  12.3 - 15.4 % Final   • Platelets 04/02/2019 318  140 - 450 10*3/mm3 Final   • Neutrophil Rel % 04/02/2019 CANCELED   Final-Edited    Comment: Test not performed    Result canceled by the ancillary.     • Lymphocyte Rel % 04/02/2019 CANCELED   Final-Edited    Comment: Test not performed    Result canceled by the ancillary.     • Monocyte Rel % 04/02/2019 CANCELED   Final-Edited    Comment: Test not performed    Result canceled by the ancillary.     • Eosinophil Rel % 04/02/2019 CANCELED   Final-Edited    Comment: Test not performed    Result canceled by the ancillary.     • Lymphocytes Absolute 04/02/2019 CANCELED   Final-Edited    Comment: Test not performed    Result canceled by the ancillary.     • Eosinophils Absolute 04/02/2019 CANCELED   Final-Edited    Comment: Test not performed    Result canceled by the ancillary.     • Basophils Absolute 04/02/2019 CANCELED   Final-Edited    Comment: Test not performed    Result canceled by the ancillary.     • Neutrophil Rel % 04/02/2019 52.5  42.7 - 76.0 % Final   • Lymphocyte Rel % 04/02/2019 36.4  19.6 - 45.3 % Final   • Monocyte Rel % 04/02/2019 9.1  5.0 - 12.0 % Final   • Eosinophil Rel % 04/02/2019 2.0  0.3 - 6.2 % Final   • Neutrophils Absolute 04/02/2019 3.65  1.40 - 7.00 10*3/mm3 Final   • Lymphocytes Absolute 04/02/2019 2.53  0.70 - 3.10 10*3/mm3 Final   • Monocytes Absolute 04/02/2019 0.63  0.10 - 0.90 10*3/mm3 Final   •  Eosinophil Abs 04/02/2019 0.14  0.00 - 0.40 10*3/mm3 Final   • Differential Comment 04/02/2019 Comment   Final    WBC MORPHOLOGY               Normal                      Normal     N   • Comment 04/02/2019 Comment   Final    ANISOCYTOSIS                 Slight/1+                   None Seen  N   • Plt Comment 04/02/2019 Comment   Final    PLATELET MORPHOLOGY          Normal                      Normal     N

## 2019-04-11 PROBLEM — Z09 HOSPITAL DISCHARGE FOLLOW-UP: Status: ACTIVE | Noted: 2019-04-11

## 2019-04-11 LAB
BASOPHILS # BLD AUTO: 0.08 10*3/MM3 (ref 0–0.2)
BASOPHILS NFR BLD AUTO: 1.3 % (ref 0–1.5)
EOSINOPHIL # BLD AUTO: 0.16 10*3/MM3 (ref 0–0.4)
EOSINOPHIL NFR BLD AUTO: 2.5 % (ref 0.3–6.2)
ERYTHROCYTE [DISTWIDTH] IN BLOOD BY AUTOMATED COUNT: 13.4 % (ref 12.3–15.4)
FOLATE SERPL-MCNC: >20 NG/ML (ref 4.78–24.2)
FT4I SERPL CALC-MCNC: 2 (ref 1.2–4.9)
HCT VFR BLD AUTO: 40.4 % (ref 34–46.6)
HGB BLD-MCNC: 13.1 G/DL (ref 12–15.9)
IMM GRANULOCYTES # BLD AUTO: 0.02 10*3/MM3 (ref 0–0.05)
IMM GRANULOCYTES NFR BLD AUTO: 0.3 % (ref 0–0.5)
LYMPHOCYTES # BLD AUTO: 2.25 10*3/MM3 (ref 0.7–3.1)
LYMPHOCYTES NFR BLD AUTO: 35.8 % (ref 19.6–45.3)
MCH RBC QN AUTO: 31.8 PG (ref 26.6–33)
MCHC RBC AUTO-ENTMCNC: 32.4 G/DL (ref 31.5–35.7)
MCV RBC AUTO: 98.1 FL (ref 79–97)
MONOCYTES # BLD AUTO: 0.5 10*3/MM3 (ref 0.1–0.9)
MONOCYTES NFR BLD AUTO: 8 % (ref 5–12)
NEUTROPHILS # BLD AUTO: 3.27 10*3/MM3 (ref 1.4–7)
NEUTROPHILS NFR BLD AUTO: 52.1 % (ref 42.7–76)
NRBC BLD AUTO-RTO: 0.2 /100 WBC (ref 0–0)
PLATELET # BLD AUTO: 296 10*3/MM3 (ref 140–450)
RBC # BLD AUTO: 4.12 10*6/MM3 (ref 3.77–5.28)
T3RU NFR SERPL: 25 % (ref 24–39)
T4 SERPL-MCNC: 7.9 UG/DL (ref 4.5–12)
TSH SERPL DL<=0.005 MIU/L-ACNC: 2.49 UIU/ML (ref 0.45–4.5)
VIT B12 SERPL-MCNC: 1356 PG/ML (ref 211–946)
WBC # BLD AUTO: 6.28 10*3/MM3 (ref 3.4–10.8)

## 2019-04-12 ENCOUNTER — PATIENT MESSAGE (OUTPATIENT)
Dept: CARDIOLOGY | Facility: CLINIC | Age: 69
End: 2019-04-12

## 2019-04-12 NOTE — TELEPHONE ENCOUNTER
From: Maddison Turcios  To: Helen Urbina MD  Sent: 4/12/2019 10:57 AM EDT  Subject: Test Results Question    Dr Burrell said my heart was fine yet he mentioned one of the by pass had shut done. If I needed a triple by pass 15 years ago why can it be okay that one shut down    Also the nitro pills took away the chest pain. They did not cause a headache. Would that not say it was the heart

## 2019-04-12 NOTE — TELEPHONE ENCOUNTER
8/5/19. Pt cancelled her appointment with Jose Cruz DEWITT on 5/219. Do you want the pt to be seen sooner?......Laya SANTOS

## 2019-04-15 DIAGNOSIS — R05.9 COUGH: Primary | ICD-10-CM

## 2019-04-15 DIAGNOSIS — J06.9 ACUTE URI: ICD-10-CM

## 2019-04-15 RX ORDER — BENZONATATE 200 MG/1
200 CAPSULE ORAL 3 TIMES DAILY PRN
Qty: 30 CAPSULE | Refills: 0 | Status: SHIPPED | OUTPATIENT
Start: 2019-04-15 | End: 2019-05-02

## 2019-04-15 RX ORDER — AMOXICILLIN 875 MG/1
875 TABLET, COATED ORAL 2 TIMES DAILY
Qty: 20 TABLET | Refills: 0 | Status: SHIPPED | OUTPATIENT
Start: 2019-04-15 | End: 2019-04-25

## 2019-04-16 DIAGNOSIS — J06.9 ACUTE URI: Primary | ICD-10-CM

## 2019-04-16 DIAGNOSIS — R05.9 COUGH: ICD-10-CM

## 2019-04-16 RX ORDER — DEXTROMETHORPHAN HYDROBROMIDE AND PROMETHAZINE HYDROCHLORIDE 15; 6.25 MG/5ML; MG/5ML
5 SYRUP ORAL 4 TIMES DAILY PRN
Qty: 118 ML | Refills: 0 | Status: SHIPPED | OUTPATIENT
Start: 2019-04-16 | End: 2019-06-14

## 2019-04-18 ENCOUNTER — LAB (OUTPATIENT)
Dept: LAB | Facility: HOSPITAL | Age: 69
End: 2019-04-18

## 2019-04-18 DIAGNOSIS — R05.9 COUGH: Primary | ICD-10-CM

## 2019-04-18 DIAGNOSIS — R06.00 DYSPNEA, UNSPECIFIED TYPE: ICD-10-CM

## 2019-04-18 DIAGNOSIS — R05.9 COUGH: ICD-10-CM

## 2019-04-18 LAB — D DIMER PPP FEU-MCNC: 0.55 MCGFEU/ML (ref 0–0.46)

## 2019-04-18 PROCEDURE — 85379 FIBRIN DEGRADATION QUANT: CPT

## 2019-04-18 RX ORDER — PREDNISONE 10 MG/1
10 TABLET ORAL 2 TIMES DAILY
Qty: 10 TABLET | Refills: 0 | Status: SHIPPED | OUTPATIENT
Start: 2019-04-18 | End: 2019-05-02

## 2019-04-19 ENCOUNTER — HOSPITAL ENCOUNTER (OUTPATIENT)
Dept: CT IMAGING | Facility: HOSPITAL | Age: 69
Discharge: HOME | End: 2019-04-19
Admitting: PHYSICIAN ASSISTANT

## 2019-04-19 DIAGNOSIS — R06.00 DYSPNEA, UNSPECIFIED TYPE: Primary | ICD-10-CM

## 2019-04-19 PROCEDURE — 0 IOPAMIDOL PER 1 ML: Performed by: PHYSICIAN ASSISTANT

## 2019-04-19 PROCEDURE — 71275 CT ANGIOGRAPHY CHEST: CPT

## 2019-04-19 RX ORDER — SODIUM CHLORIDE 9 MG/ML
INJECTION, SOLUTION INTRAVENOUS
Status: DISPENSED
Start: 2019-04-19 | End: 2019-04-20

## 2019-04-19 RX ADMIN — IOPAMIDOL 100 ML: 755 INJECTION, SOLUTION INTRAVENOUS at 14:30

## 2019-04-25 RX ORDER — LOSARTAN POTASSIUM 25 MG/1
TABLET ORAL
Qty: 90 TABLET | Refills: 1 | Status: SHIPPED | OUTPATIENT
Start: 2019-04-25 | End: 2019-10-02

## 2019-05-02 ENCOUNTER — OFFICE VISIT (OUTPATIENT)
Dept: CARDIOLOGY | Facility: CLINIC | Age: 69
End: 2019-05-02

## 2019-05-02 VITALS
BODY MASS INDEX: 26.63 KG/M2 | HEART RATE: 62 BPM | HEIGHT: 64 IN | SYSTOLIC BLOOD PRESSURE: 128 MMHG | WEIGHT: 156 LBS | DIASTOLIC BLOOD PRESSURE: 82 MMHG

## 2019-05-02 DIAGNOSIS — I25.10 CORONARY ARTERY DISEASE INVOLVING NATIVE CORONARY ARTERY OF NATIVE HEART WITHOUT ANGINA PECTORIS: Primary | ICD-10-CM

## 2019-05-02 DIAGNOSIS — Z95.1 S/P CABG (CORONARY ARTERY BYPASS GRAFT): ICD-10-CM

## 2019-05-02 DIAGNOSIS — I10 ESSENTIAL HYPERTENSION: ICD-10-CM

## 2019-05-02 DIAGNOSIS — E78.2 MIXED HYPERLIPIDEMIA: ICD-10-CM

## 2019-05-02 PROCEDURE — 99214 OFFICE O/P EST MOD 30 MIN: CPT | Performed by: INTERNAL MEDICINE

## 2019-05-02 PROCEDURE — 93000 ELECTROCARDIOGRAM COMPLETE: CPT | Performed by: INTERNAL MEDICINE

## 2019-05-02 RX ORDER — ICOSAPENT ETHYL 1000 MG/1
4 CAPSULE ORAL DAILY
COMMUNITY
End: 2020-12-17 | Stop reason: SDUPTHER

## 2019-05-02 RX ORDER — ISOSORBIDE DINITRATE 5 MG/1
5 TABLET ORAL 2 TIMES DAILY
Qty: 180 TABLET | Refills: 3 | Status: SHIPPED | OUTPATIENT
Start: 2019-05-02 | End: 2019-08-05

## 2019-05-02 RX ORDER — ISOSORBIDE DINITRATE 5 MG/1
10 TABLET ORAL 2 TIMES DAILY
Qty: 180 TABLET | Refills: 3 | Status: SHIPPED | OUTPATIENT
Start: 2019-05-02 | End: 2019-05-02 | Stop reason: SDUPTHER

## 2019-05-02 NOTE — PROGRESS NOTES
Date of Office Visit: 2019  Encounter Provider: Helen Urbina MD  Place of Service: [facility]  Patient Name: Maddison Turcios  :1950      Patient ID:  Maddison Turcios is a 69 y.o. female is here for  followup for CAD, status post CABG.        History of Present Illness    She has a history of episodes of severe fatigue and had a stress study  which showed a very mild abnormality. She continued to have the severe fatigue and had a  heart catheterization done in 2004 showing severe left main coronary stenosis.   She was transferred emergently for bypass surgery at [facility] which was performed  2004 by Dr. Cade. He has cardiac catheterization done 2004 that showed  ostial 60% stenosis of the left main coronary artery. The left anterior descending artery  and left circumflex also showed no significant disease. The left circumflex vessel was  dominant. The cardiac catheterization was done at [facility] in  [place], [place]. Bypass 2004 she received a LIMA to mid LAD and sequential  saphenous vein graft to the ramus and intermedius and first obtuse marginal branch done at  [facility].     In 2013, she had a stress nuclear perfusion study  showing no ischemia. She had a 2D echocardiogram with Doppler showing ejection fraction  of 60% to 65% with trace mitral and tricuspid insufficiency. She had a carotid duplex  study done in 2013 which was normal.      She had an ECG done 2016 which shows poor R-wave progression sinus rhythm and otherwise appears normal.      She had a cerebral angiogram with Dr. Hewitt and it showed no cerebral aneurysm.      She was at [facility], admitted there on 2016 through 2016. At that time she was diagnosed with a TIA. During her hospitalization she had an MRI which was negative for an acute infarct. They felt possible TIA versus  complicated migraine. She had had for 2-3 days prior to this a feeling of imbalance and leaned to the left along with some blurred vision. She had a transesophageal echocardiogram whether did not show anything to explain her TIA. There was bilateral atrial enlargement but otherwise looked fine. She was loaded with Plavix 300 mg and then continued with 75 mg of Plavix daily. Neurosurgery was consulted because she had a history of prior pipelined right internal carotid artery aneurysm. Dr. Hewitt had seen her and treated her for his in 08/2015. Neurosurgery said there was no evidence of aneurysm. Her statin medication was increased because of elevated hyperlipidemia. She actually had garbled speech, visual changes and balance issues all with the TIA and they happened over a matter of three days, kind of intermittently.         She had a normal 21 day event recorder 1/2017.      She was tolerating Livalo without muscle complaints.  She is on zetia  And vascepa.     She called 911 on 4/8/2019 because she was having exertional chest pressure with doing mowing the normal household activities.  When EMS arrived, they administered 2 nitroglycerin and it helped her chest pressure.  She was taken to [facility] and there she ruled out for myocardial infarction.  She was sent to [facility] and then return to the hospital for cardiac catheterization which showed atretic LIMA to LAD, patent SVG to ramus intermedius then to OM, 30% left main stenosis proximally but normal LAD, left circumflex and RCA.  Her ejection fraction was normal.  She was dismissed from the hospital.  Since dismissal, she continues to have exertional dyspnea and fatigue.  She does not feel her heart racing or skipping and she has had really no chest pain or pressure.  Her presentation prior to bypass was exertional fatigue.  She had no nausea, vomiting, fevers or chills.  She is been taking her medications as directed.  She just does not feel quite  right.    Past Medical History:   Diagnosis Date   • Abnormal blood chemistry    • Acute UTI (urinary tract infection)    • Aneurysm, cerebral    • Bloating    • Brain aneurysm    • CAD (coronary artery disease)    • Chronic headaches    • Coronary artery disease    • Coronary artery stenosis    • Dysuria    • Encounter for screening colonoscopy    • Environmental allergies    • Fall from slip, trip, or stumble    • Gait disturbance    • H/O cardiovascular stress test 09/17/2013    Treadmill   • H/O colonoscopy    • H/O echocardiogram 09/25/2013   • H/O fall    • History of EKG 07/31/2015   • Hyperlipemia    • Hyperlipidemia    • Hypertension    • Hyperthyroidism    • Insomnia    • Left forearm pain    • Left leg pain    • Left wrist pain    • Lower abdominal pain    • Need for pneumococcal vaccination    • Onychomycosis of toenail    • AKIRA (obstructive sleep apnea)    • Pelvic pressure in female    • Right foot pain    • TIA (transient ischemic attack) 11/30/2016   • URI (upper respiratory infection)    • Urine frequency          Past Surgical History:   Procedure Laterality Date   • BREAST EXCISIONAL BIOPSY Right 1977    b9   • BREAST EXCISIONAL BIOPSY Right 1979    b9   • BUNIONECTOMY     • CARDIAC CATHETERIZATION N/A 4/8/2019    Procedure: Left Heart Cath;  Surgeon: Jayme Ramirez MD;  Location:  [facility];  Service: Cardiovascular   • CARDIAC CATHETERIZATION N/A 4/8/2019    Procedure: Coronary angiography;  Surgeon: Jayme Ramirez MD;  Location:  [facility];  Service: Cardiovascular   • CARDIAC CATHETERIZATION N/A 4/8/2019    Procedure: Left ventriculography;  Surgeon: Jayme Ramirez MD;  Location:  [facility];  Service: Cardiovascular   • CEREBRAL ANEURYSM REPAIR     • CHOLECYSTECTOMY     • CORONARY ARTERY BYPASS GRAFT     • ROTATOR CUFF REPAIR Left    • SPLENECTOMY     • TONSILLECTOMY     • TUBAL ABDOMINAL LIGATION         Current  Outpatient Medications on File Prior to Visit   Medication Sig Dispense Refill   • aluminum chloride (DRYSOL) 20 % external solution Apply  topically Every Night.     • Cholecalciferol (VITAMIN D3) 5000 UNITS capsule capsule Take 5,000 Units by mouth Daily.     • clopidogrel (PLAVIX) 75 MG tablet TAKE 1 TABLET DAILY 90 tablet 3   • diclofenac (VOLTAREN) 1 % gel gel Apply 4 g topically 4 (four) times a day as needed.     • ezetimibe (ZETIA) 10 MG tablet Take 1 tablet by mouth Daily. 90 tablet 3   • FIBER PO Take 1 tablet by mouth Daily.     • icosapent ethyl (VASCEPA) 1 g capsule capsule Take 4 g by mouth Daily.     • losartan (COZAAR) 25 MG tablet TAKE 1 TABLET DAILY 90 tablet 1   • pitavastatin calcium (LIVALO) 2 MG tablet tablet Take 2 mg by mouth Every Night.     • promethazine-dextromethorphan (PROMETHAZINE-DM) 6.25-15 MG/5ML syrup Take 5 mL by mouth 4 (Four) Times a Day As Needed for Cough. 118 mL 0   • [DISCONTINUED] benzonatate (TESSALON) 200 MG capsule Take 1 capsule by mouth 3 (Three) Times a Day As Needed for Cough. 30 capsule 0   • [DISCONTINUED] predniSONE (DELTASONE) 10 MG tablet Take 1 tablet by mouth 2 (Two) Times a Day. 10 tablet 0     Current Facility-Administered Medications on File Prior to Visit   Medication Dose Route Frequency Provider Last Rate Last Dose   • nitroglycerin (NITROSTAT) SL tablet 0.4 mg  0.4 mg Sublingual Q5 Min PRN John Crabtree MD       • sodium chloride 0.9 % flush 10 mL  10 mL Intravenous PRN John Crabtree MD           Social History     Socioeconomic History   • Marital status:      Spouse name: Not on file   • Number of children: Not on file   • Years of education: Not on file   • Highest education level: Not on file   Tobacco Use   • Smoking status: Never Smoker   • Smokeless tobacco: Never Used   Substance and Sexual Activity   • Alcohol use: No   • Drug use: No   • Sexual activity: No     Partners: Male           Review of Systems   Constitution:  "Positive for malaise/fatigue.   HENT: Negative for congestion.    Eyes: Negative for vision loss in left eye and vision loss in right eye.   Respiratory: Positive for shortness of breath. Negative for cough, hemoptysis, sleep disturbances due to breathing, snoring, sputum production and wheezing.    Endocrine: Negative.    Hematologic/Lymphatic: Negative.    Skin: Negative for poor wound healing and rash.   Musculoskeletal: Negative for falls, gout, muscle cramps and myalgias.   Gastrointestinal: Negative for abdominal pain, diarrhea, dysphagia, hematemesis, melena, nausea and vomiting.   Neurological: Negative for excessive daytime sleepiness, dizziness, headaches, light-headedness, loss of balance, seizures and vertigo.   Psychiatric/Behavioral: Negative for depression and substance abuse. The patient is not nervous/anxious.        Procedures    ECG 12 Lead  Date/Time: 5/2/2019 12:05 PM  Performed by: Helen Urbina MD  Authorized by: Helen Urbina MD   Comparison: compared with previous ECG   Similar to previous ECG  Rhythm: sinus rhythm    Clinical impression: normal ECG                Objective:      Vitals:    05/02/19 1125   BP: 128/82   BP Location: Right arm   Patient Position: Sitting   Pulse: 62   Weight: 70.8 kg (156 lb)   Height: 162.6 cm (64\")     Body mass index is 26.78 kg/m².    Physical Exam   Constitutional: She is oriented to person, place, and time. She appears well-developed and well-nourished. No distress.   HENT:   Head: Normocephalic and atraumatic.   Eyes: Conjunctivae are normal. No scleral icterus.   Neck: Neck supple. No JVD present. Carotid bruit is not present. No thyromegaly present.   Cardiovascular: Normal rate, regular rhythm, S1 normal, S2 normal, normal heart sounds and intact distal pulses.  No extrasystoles are present. PMI is not displaced. Exam reveals no gallop.   No murmur heard.  Pulses:       Carotid pulses are 2+ on the right side, and 2+ on the left " side.       Radial pulses are 2+ on the right side, and 2+ on the left side.        Dorsalis pedis pulses are 2+ on the right side, and 2+ on the left side.        Posterior tibial pulses are 2+ on the right side, and 2+ on the left side.   Pulmonary/Chest: Effort normal and breath sounds normal. No respiratory distress. She has no wheezes. She has no rhonchi. She has no rales. She exhibits no tenderness.   Abdominal: Soft. Bowel sounds are normal. She exhibits no distension, no abdominal bruit and no mass. There is no tenderness.   Musculoskeletal: She exhibits no edema or deformity.   Lymphadenopathy:     She has no cervical adenopathy.   Neurological: She is alert and oriented to person, place, and time. No cranial nerve deficit.   Skin: Skin is warm and dry. No rash noted. She is not diaphoretic. No cyanosis. No pallor. Nails show no clubbing.   Psychiatric: She has a normal mood and affect. Judgment normal.   Vitals reviewed.      Lab Review:       Assessment:      Diagnosis Plan   1. Coronary artery disease involving native coronary artery of native heart without angina pectoris  Adult Stress Echo W/ Cont or Stress Agent if Necessary Per Protocol   2. Essential hypertension     3. Mixed hyperlipidemia     4. S/P CABG (coronary artery bypass graft)  Adult Stress Echo W/ Cont or Stress Agent if Necessary Per Protocol     1. TIA. The etiology for this is uncertain.  She has had no further instances since being on Plavix.   2. Hyperlipidemia, restart Livalo and continue zetia.   3. Essential hypertension which is well controlled.   4. History of coronary artery disease, s/p CABG. She has angina.   5. Mild biatrial enlargement on her transesophageal echocardiogram but otherwise, the DENY was normal.   6. Possible obstructive sleep apnea.      Plan:       See back in 3 months, start isordil 5mg BID.     Coronary Artery Disease  Assessment  • The patient has no angina    Subjective - Objective  • There is a history of  previous coronary artery bypass graft  • Current antiplatelet therapy includes clopidogrel 75 mg

## 2019-05-14 ENCOUNTER — HOSPITAL ENCOUNTER (OUTPATIENT)
Dept: CARDIOLOGY | Facility: HOSPITAL | Age: 69
Discharge: HOME | End: 2019-05-14
Admitting: INTERNAL MEDICINE

## 2019-05-14 ENCOUNTER — PATIENT MESSAGE (OUTPATIENT)
Dept: CARDIOLOGY | Facility: CLINIC | Age: 69
End: 2019-05-14

## 2019-05-14 ENCOUNTER — TELEPHONE (OUTPATIENT)
Dept: CARDIOLOGY | Facility: CLINIC | Age: 69
End: 2019-05-14

## 2019-05-14 VITALS
BODY MASS INDEX: 26.63 KG/M2 | OXYGEN SATURATION: 96 % | SYSTOLIC BLOOD PRESSURE: 100 MMHG | HEART RATE: 74 BPM | WEIGHT: 156 LBS | DIASTOLIC BLOOD PRESSURE: 60 MMHG | HEIGHT: 64 IN

## 2019-05-14 DIAGNOSIS — I25.10 CORONARY ARTERY DISEASE INVOLVING NATIVE CORONARY ARTERY OF NATIVE HEART WITHOUT ANGINA PECTORIS: ICD-10-CM

## 2019-05-14 DIAGNOSIS — Z95.1 S/P CABG (CORONARY ARTERY BYPASS GRAFT): ICD-10-CM

## 2019-05-14 LAB
ASCENDING AORTA: 3.2 CM
BH CV ECHO MEAS - ACS: 2.2 CM
BH CV ECHO MEAS - AO MAX PG: 5.6 MMHG
BH CV ECHO MEAS - AO ROOT AREA (BSA CORRECTED): 1.7
BH CV ECHO MEAS - AO ROOT AREA: 7.3 CM^2
BH CV ECHO MEAS - AO ROOT DIAM: 3.1 CM
BH CV ECHO MEAS - AO V2 MAX: 118.7 CM/SEC
BH CV ECHO MEAS - BSA(HAYCOCK): 1.8 M^2
BH CV ECHO MEAS - BSA: 1.8 M^2
BH CV ECHO MEAS - BZI_BMI: 26.8 KILOGRAMS/M^2
BH CV ECHO MEAS - BZI_METRIC_HEIGHT: 162.6 CM
BH CV ECHO MEAS - BZI_METRIC_WEIGHT: 70.8 KG
BH CV ECHO MEAS - EDV(MOD-SP4): 81 ML
BH CV ECHO MEAS - EDV(TEICH): 149.4 ML
BH CV ECHO MEAS - EF(CUBED): 75.3 %
BH CV ECHO MEAS - EF(MOD-BP): 56 %
BH CV ECHO MEAS - EF(MOD-SP4): 81.5 %
BH CV ECHO MEAS - EF(TEICH): 66.6 %
BH CV ECHO MEAS - ESV(MOD-SP4): 15 ML
BH CV ECHO MEAS - ESV(TEICH): 49.9 ML
BH CV ECHO MEAS - FS: 37.2 %
BH CV ECHO MEAS - IVS/LVPW: 1.1
BH CV ECHO MEAS - IVSD: 0.89 CM
BH CV ECHO MEAS - LAT PEAK E' VEL: 11 CM/SEC
BH CV ECHO MEAS - LV DIASTOLIC VOL/BSA (35-75): 46 ML/M^2
BH CV ECHO MEAS - LV MASS(C)D: 177.1 GRAMS
BH CV ECHO MEAS - LV MASS(C)DI: 100.6 GRAMS/M^2
BH CV ECHO MEAS - LV SYSTOLIC VOL/BSA (12-30): 8.5 ML/M^2
BH CV ECHO MEAS - LVIDD: 5.5 CM
BH CV ECHO MEAS - LVIDS: 3.5 CM
BH CV ECHO MEAS - LVLD AP4: 6.6 CM
BH CV ECHO MEAS - LVLS AP4: 5.4 CM
BH CV ECHO MEAS - LVPWD: 0.83 CM
BH CV ECHO MEAS - MED PEAK E' VEL: 8 CM/SEC
BH CV ECHO MEAS - MV A DUR: 0.14 SEC
BH CV ECHO MEAS - MV A MAX VEL: 66.5 CM/SEC
BH CV ECHO MEAS - MV DEC SLOPE: 132.4 CM/SEC^2
BH CV ECHO MEAS - MV DEC TIME: 0.36 SEC
BH CV ECHO MEAS - MV E MAX VEL: 50.4 CM/SEC
BH CV ECHO MEAS - MV E/A: 0.76
BH CV ECHO MEAS - MV P1/2T MAX VEL: 50.4 CM/SEC
BH CV ECHO MEAS - MV P1/2T: 111.6 MSEC
BH CV ECHO MEAS - MVA P1/2T LCG: 4.4 CM^2
BH CV ECHO MEAS - MVA(P1/2T): 2 CM^2
BH CV ECHO MEAS - PULM A REVS DUR: 0.1 SEC
BH CV ECHO MEAS - PULM A REVS VEL: 33 CM/SEC
BH CV ECHO MEAS - PULM DIAS VEL: 54.3 CM/SEC
BH CV ECHO MEAS - PULM S/D: 0.95
BH CV ECHO MEAS - PULM SYS VEL: 51.4 CM/SEC
BH CV ECHO MEAS - SI(CUBED): 72.4 ML/M^2
BH CV ECHO MEAS - SI(MOD-SP4): 37.5 ML/M^2
BH CV ECHO MEAS - SI(TEICH): 56.5 ML/M^2
BH CV ECHO MEAS - SV(CUBED): 127.4 ML
BH CV ECHO MEAS - SV(MOD-SP4): 66 ML
BH CV ECHO MEAS - SV(TEICH): 99.4 ML
BH CV ECHO MEAS - TAPSE (>1.6): 1.4 CM2
BH CV ECHO MEAS - TR MAX VEL: 207.7 CM/SEC
BH CV ECHO MEASUREMENTS AVERAGE E/E' RATIO: 5.31
BH CV STRESS BP STAGE 1: NORMAL
BH CV STRESS BP STAGE 2: NORMAL
BH CV STRESS DURATION MIN STAGE 1: 3
BH CV STRESS DURATION MIN STAGE 2: 3
BH CV STRESS DURATION MIN STAGE 3: 1
BH CV STRESS DURATION SEC STAGE 1: 0
BH CV STRESS DURATION SEC STAGE 2: 0
BH CV STRESS DURATION SEC STAGE 3: 0
BH CV STRESS ECHO POST STRESS EJECTION FRACTION EF: 81 %
BH CV STRESS GRADE STAGE 1: 10
BH CV STRESS GRADE STAGE 2: 12
BH CV STRESS GRADE STAGE 3: 14
BH CV STRESS HR STAGE 1: 114
BH CV STRESS HR STAGE 2: 130
BH CV STRESS HR STAGE 3: 140
BH CV STRESS METS STAGE 1: 5
BH CV STRESS METS STAGE 2: 7.5
BH CV STRESS METS STAGE 3: 10
BH CV STRESS PROTOCOL 1: NORMAL
BH CV STRESS SPEED STAGE 1: 1.7
BH CV STRESS SPEED STAGE 2: 2.5
BH CV STRESS SPEED STAGE 3: 3.4
BH CV STRESS STAGE 1: 1
BH CV STRESS STAGE 2: 2
BH CV STRESS STAGE 3: 3
BH CV XLRA - RV BASE: 2.8 CM
BH CV XLRA - TDI S': 11 CM/SEC
LEFT ATRIUM VOLUME INDEX: 26 ML/M2
MAXIMAL PREDICTED HEART RATE: 152 BPM
PERCENT MAX PREDICTED HR: 92.11 %
SINUS: 3.5 CM
STJ: 2.8 CM
STRESS BASELINE BP: NORMAL MMHG
STRESS BASELINE HR: 74 BPM
STRESS PERCENT HR: 108 %
STRESS POST ESTIMATED WORKLOAD: 8 METS
STRESS POST EXERCISE DUR MIN: 7 MIN
STRESS POST EXERCISE DUR SEC: 0 SEC
STRESS POST PEAK BP: NORMAL MMHG
STRESS POST PEAK HR: 140 BPM
STRESS TARGET HR: 129 BPM

## 2019-05-14 PROCEDURE — 93350 STRESS TTE ONLY: CPT | Performed by: INTERNAL MEDICINE

## 2019-05-14 PROCEDURE — 93325 DOPPLER ECHO COLOR FLOW MAPG: CPT | Performed by: INTERNAL MEDICINE

## 2019-05-14 PROCEDURE — 93350 STRESS TTE ONLY: CPT

## 2019-05-14 PROCEDURE — 93017 CV STRESS TEST TRACING ONLY: CPT

## 2019-05-14 PROCEDURE — 93352 ADMIN ECG CONTRAST AGENT: CPT | Performed by: INTERNAL MEDICINE

## 2019-05-14 PROCEDURE — 93320 DOPPLER ECHO COMPLETE: CPT

## 2019-05-14 PROCEDURE — 93016 CV STRESS TEST SUPVJ ONLY: CPT | Performed by: INTERNAL MEDICINE

## 2019-05-14 PROCEDURE — 93325 DOPPLER ECHO COLOR FLOW MAPG: CPT

## 2019-05-14 PROCEDURE — 93320 DOPPLER ECHO COMPLETE: CPT | Performed by: INTERNAL MEDICINE

## 2019-05-14 PROCEDURE — 93018 CV STRESS TEST I&R ONLY: CPT | Performed by: INTERNAL MEDICINE

## 2019-05-14 PROCEDURE — 25010000002 PERFLUTREN (DEFINITY) 8.476 MG IN SODIUM CHLORIDE 0.9 % 10 ML INJECTION: Performed by: INTERNAL MEDICINE

## 2019-05-14 RX ADMIN — PERFLUTREN 3 ML: 6.52 INJECTION, SUSPENSION INTRAVENOUS at 09:13

## 2019-05-21 ENCOUNTER — HOSPITAL ENCOUNTER (OUTPATIENT)
Dept: MAMMOGRAPHY | Facility: HOSPITAL | Age: 69
Discharge: HOME | End: 2019-05-21
Admitting: OBSTETRICS & GYNECOLOGY

## 2019-05-21 DIAGNOSIS — Z12.31 VISIT FOR SCREENING MAMMOGRAM: ICD-10-CM

## 2019-05-21 PROCEDURE — 77067 SCR MAMMO BI INCL CAD: CPT

## 2019-05-21 PROCEDURE — 77063 BREAST TOMOSYNTHESIS BI: CPT

## 2019-06-14 ENCOUNTER — OFFICE VISIT (OUTPATIENT)
Dept: FAMILY MEDICINE CLINIC | Facility: CLINIC | Age: 69
End: 2019-06-14

## 2019-06-14 VITALS
DIASTOLIC BLOOD PRESSURE: 80 MMHG | OXYGEN SATURATION: 98 % | BODY MASS INDEX: 25.95 KG/M2 | TEMPERATURE: 97.9 F | RESPIRATION RATE: 16 BRPM | HEIGHT: 64 IN | HEART RATE: 69 BPM | SYSTOLIC BLOOD PRESSURE: 140 MMHG | WEIGHT: 152 LBS

## 2019-06-14 DIAGNOSIS — R89.9 ABNORMAL LABORATORY TEST: Primary | ICD-10-CM

## 2019-06-14 DIAGNOSIS — S16.1XXA NECK STRAIN, INITIAL ENCOUNTER: Primary | ICD-10-CM

## 2019-06-14 PROCEDURE — 99213 OFFICE O/P EST LOW 20 MIN: CPT | Performed by: PHYSICIAN ASSISTANT

## 2019-06-14 RX ORDER — BACLOFEN 10 MG/1
10 TABLET ORAL 3 TIMES DAILY
Qty: 30 TABLET | Refills: 0 | Status: SHIPPED | OUTPATIENT
Start: 2019-06-14 | End: 2019-08-05

## 2019-06-14 NOTE — PROGRESS NOTES
Subjective   Maddison Turcios is a 69 y.o. female presents for   Chief Complaint   Patient presents with   • Neck Pain     x2 weeks       History of Present Illness     Maddison a 69-year-old female who presents with neck pain for the past 2 weeks. She was digging a itch an was lifting mud/grass.  Describes the neck pain as a pulling sensation with a tightness in the muscles of her neck.  Certain movements will increase the pain.  The pain radiates to her left shoulder.   Describes the neck pain as an intense pain then she pushes on it. The pain is worse when she on her right side. Maddison has tried ICE and heat without relief of symptoms.  She has used OTC sports creams and tylenol without relief of symptoms.  Appetite has been normal . Sleep has been restless.       The following portions of the patient's history were reviewed and updated as appropriate: allergies, current medications, past family history, past medical history, past social history, past surgical history and problem list.    Review of Systems   Constitutional: Negative.  Negative for fever.   HENT: Negative.    Eyes: Negative.    Respiratory: Negative.  Negative for cough, shortness of breath and wheezing.    Cardiovascular: Negative.  Negative for chest pain, palpitations and leg swelling.   Gastrointestinal: Negative.  Negative for abdominal pain.   Endocrine: Negative.    Genitourinary: Negative.  Negative for dysuria and pelvic pain.   Musculoskeletal: Positive for neck pain. Negative for back pain and neck stiffness.   Skin: Negative.    Allergic/Immunologic: Negative.    Neurological: Negative.  Negative for dizziness, facial asymmetry, weakness, light-headedness, numbness and headaches.   Hematological: Negative.    Psychiatric/Behavioral: Negative.    All other systems reviewed and are negative.        Vitals:    06/14/19 0838   BP: 140/80   Pulse: 69   Resp: 16   Temp: 97.9 °F (36.6 °C)   SpO2: 98%   Weight: 68.9 kg (152 lb)   Height: 162.6 cm  "(64\")     Wt Readings from Last 3 Encounters:   06/14/19 68.9 kg (152 lb)   05/14/19 70.8 kg (156 lb)   05/02/19 70.8 kg (156 lb)     BP Readings from Last 3 Encounters:   06/14/19 140/80   05/14/19 100/60   05/02/19 128/82     Social History     Socioeconomic History   • Marital status:      Spouse name: Not on file   • Number of children: Not on file   • Years of education: Not on file   • Highest education level: Not on file   Tobacco Use   • Smoking status: Never Smoker   • Smokeless tobacco: Never Used   Substance and Sexual Activity   • Alcohol use: No   • Drug use: No   • Sexual activity: No     Partners: Male       Allergies   Allergen Reactions   • Beta Adrenergic Blockers Other (See Comments)     hypotension  bradycardic   • Adhesive Tape Other (See Comments)     Redness, bruising and peeling of skin    *Use Paper Tape Only*  Redness, bruising and peeling of skin    *Use Paper Tape Only*   • Sulfa Antibiotics Rash   • Sulfur Rash       Body mass index is 26.09 kg/m².    Objective   Physical Exam   Constitutional: She is oriented to person, place, and time. Vital signs are normal. She appears well-developed and well-nourished.   Neck: Trachea normal and phonation normal. Muscular tenderness present. No spinous process tenderness present. No neck rigidity. Decreased range of motion present. No edema and no erythema present.       Musculoskeletal:        Left shoulder: Normal. She exhibits normal range of motion, no tenderness, no bony tenderness, no swelling, no effusion, no crepitus, no pain, no spasm, normal pulse and normal strength.        Cervical back: She exhibits decreased range of motion, tenderness and spasm. She exhibits no bony tenderness, no swelling, no pain and normal pulse.        Back:    Neurological: She is alert and oriented to person, place, and time. She has normal strength. No sensory deficit.   Skin: Skin is warm, dry and intact. Capillary refill takes less than 2 seconds. "   Psychiatric: She has a normal mood and affect. Her speech is normal and behavior is normal. Judgment and thought content normal. Cognition and memory are normal.       Assessment/Plan   Maddison was seen today for neck pain.    Diagnoses and all orders for this visit:    Neck strain, initial encounter  -     baclofen (LIORESAL) 10 MG tablet; Take 1 tablet by mouth 3 (Three) Times a Day. As needed for neck strain  -     Ambulatory Referral to Physical Therapy Evaluate and treat    Ms. Maddison Turcios seen in office today with new neck strain.  She has reconsidered and would like a muscle relaxer.  I have prescribed baclofen to pharmacy.  I have given her exercises to do at home as well.  I will send to physical therapy for further evaluation and treatment options.  She may use moist heat and her Tylenol at home as needed.  Maddison voiced understanding.      CELI Corado [facility]  [address] [address]  Dept: [phone]  Dept Fax: [phone]  Loc: [phone]  Loc Fax: [phone]

## 2019-06-14 NOTE — PATIENT INSTRUCTIONS
Neck Exercises  Neck exercises can be important for many reasons:  · They can help you to improve and maintain flexibility in your neck. This can be especially important as you age.  · They can help to make your neck stronger. This can make movement easier.  · They can reduce or prevent neck pain.  · They may help your upper back.    Ask your health care provider which neck exercises would be best for you.  Exercises  Neck Press  Repeat this exercise 10 times. Do it first thing in the morning and right before bed or as told by your health care provider.  1. Lie on your back on a firm bed or on the floor with a pillow under your head.  2. Use your neck muscles to push your head down on the pillow and straighten your spine.  3. Hold the position as well as you can. Keep your head facing up and your chin tucked.  4. Slowly count to 5 while holding this position.  5. Relax for a few seconds. Then repeat.    Isometric Strengthening  Do a full set of these exercises 2 times a day or as told by your health care provider.  1. Sit in a supportive chair and place your hand on your forehead.  2. Push forward with your head and neck while pushing back with your hand. Hold for 10 seconds.  3. Relax. Then repeat the exercise 3 times.  4. Next, do the sequence again, this time putting your hand against the back of your head. Use your head and neck to push backward against the hand pressure.  5. Finally, do the same exercise on either side of your head, pushing sideways against the pressure of your hand.    Prone Head Lifts  Repeat this exercise 5 times. Do this 2 times a day or as told by your health care provider.  1. Lie face-down, resting on your elbows so that your chest and upper back are raised.  2. Start with your head facing downward, near your chest. Position your chin either on or near your chest.  3. Slowly lift your head upward. Lift until you are looking straight ahead. Then continue lifting your head as far back as  you can stretch.  4. Hold your head up for 5 seconds. Then slowly lower it to your starting position.    Supine Head Lifts  Repeat this exercise 8-10 times. Do this 2 times a day or as told by your health care provider.  1. Lie on your back, bending your knees to point to the ceiling and keeping your feet flat on the floor.  2. Lift your head slowly off the floor, raising your chin toward your chest.  3. Hold for 5 seconds.  4. Relax and repeat.    Scapular Retraction  Repeat this exercise 5 times. Do this 2 times a day or as told by your health care provider.  1. Stand with your arms at your sides. Look straight ahead.  2. Slowly pull both shoulders backward and downward until you feel a stretch between your shoulder blades in your upper back.  3. Hold for 10-30 seconds.  4. Relax and repeat.    Contact a health care provider if:  · Your neck pain or discomfort gets much worse when you do an exercise.  · Your neck pain or discomfort does not improve within 2 hours after you exercise.  If you have any of these problems, stop exercising right away. Do not do the exercises again unless your health care provider says that you can.  Get help right away if:  · You develop sudden, severe neck pain. If this happens, stop exercising right away. Do not do the exercises again unless your health care provider says that you can.  Exercises  Neck Stretch    Repeat this exercise 3-5 times.  1. Do this exercise while standing or while sitting in a chair.  2. Place your feet flat on the floor, shoulder-width apart.  3. Slowly turn your head to the right. Turn it all the way to the right so you can look over your right shoulder. Do not tilt or tip your head.  4. Hold this position for 10-30 seconds.  5. Slowly turn your head to the left, to look over your left shoulder.  6. Hold this position for 10-30 seconds.    Neck Retraction  Repeat this exercise 8-10 times. Do this 3-4 times a day or as told by your health care  provider.  1. Do this exercise while standing or while sitting in a sturdy chair.  2. Look straight ahead. Do not bend your neck.  3. Use your fingers to push your chin backward. Do not bend your neck for this movement. Continue to face straight ahead. If you are doing the exercise properly, you will feel a slight sensation in your throat and a stretch at the back of your neck.  4. Hold the stretch for 1-2 seconds. Relax and repeat.    This information is not intended to replace advice given to you by your health care provider. Make sure you discuss any questions you have with your health care provider.  Document Released: 11/28/2016 Document Revised: 05/25/2017 Document Reviewed: 06/27/2016  [employer] Interactive Patient Education © 2019 [employer]

## 2019-06-20 ENCOUNTER — TREATMENT (OUTPATIENT)
Dept: PHYSICAL THERAPY | Facility: CLINIC | Age: 69
End: 2019-06-20

## 2019-06-20 DIAGNOSIS — S16.1XXD STRAIN OF NECK MUSCLE, SUBSEQUENT ENCOUNTER: Primary | ICD-10-CM

## 2019-06-20 DIAGNOSIS — Z98.890 HX OF REPAIR OF LEFT ROTATOR CUFF: ICD-10-CM

## 2019-06-20 PROCEDURE — 97110 THERAPEUTIC EXERCISES: CPT | Performed by: PHYSICAL THERAPIST

## 2019-06-20 PROCEDURE — 97161 PT EVAL LOW COMPLEX 20 MIN: CPT | Performed by: PHYSICAL THERAPIST

## 2019-06-20 NOTE — PROGRESS NOTES
Physical Therapy Initial Evaluation and Plan of Care        Patient: Maddison Turcios   : 1950  Diagnosis/ICD-10 Code:  Strain of neck muscle, subsequent encounter [S16.1XXD]  Referring practitioner: Kasandra Mcdowell PA-C  Date of Initial Visit: 2019  Today's Date: 2019  Patient seen for 1 sessions           Subjective Questionnaire: NDI      Subjective Evaluation    History of Present Illness  Date of onset: 2019  Mechanism of injury: Shoveling mud with left sided neck pain. Improved since starting ex, rest, and meds at night, but still c/o numbness in either hand dania at night when laying on her side or turning to her stomach.  Also stated recent admit in May of this year with chest pain, ruled out. Admits mid back tension. Denies sleep apnea.     Subjective comment: 69 year old stated onset after digging a [place] ditch early . Felt a strain and consulted with MD for Baclofen at night, isometric ex HEP, and no x-rays at this time. PMH whiplash in  improved with PT. Hx L rotator cuff repair in  and did well but admits she does not do regular strength work. Also right rot cuff strain better with PT, year . PMH Basal headaches dx as TIA with drop spells. Stated this resolved when her stress resolved. Pt is a  and lives alone. Teaches as a sub.  Patient Occupation: sub teacher   Precautions and Work Restrictions: off for the summer. Quality of life: excellent    Pain  Current pain ratin  At best pain ratin  At worst pain ratin  Location: L lateral neck to UT  Quality: dull ache, tight and discomfort  Relieving factors: change in position, relaxation, rest and medications  Aggravating factors: sleeping and prolonged positioning  Progression: improved    Social Support  Lives in: multiple-level home  Lives with: alone    Hand dominance: right    Diagnostic Tests  No diagnostic tests performed    Treatments  Current treatment: home therapy  Patient Goals  Patient  goals for therapy: decreased pain, increased motion and return to sport/leisure activities             Objective       Static Posture     Thoracic Spine  Flattened thoracic spine.    Cervical/Thoracic Screen   Cervical range of motion within normal limits with the following exceptions: Mild reduction in end range extension for age.   Thoracic range of motion within normal limits with the following exceptions: Mild restriction in rotation and segmental mobility with diffuse muscle guarding    Neurological Testing     Sensation     Shoulder   Left Shoulder   Intact: light touch    Right Shoulder   Intact: light touch    Reflexes   Left   Biceps (C5/C6): normal (2+)  Brachioradialis (C6): normal (2+)  Triceps (C7): normal (2+)    Right   Biceps (C5/C6): normal (2+)  Brachioradialis (C6): normal (2+)  Triceps (C7): normal (2+)    Additional Neurological Details  Denies parasthesia at rest    Passive Range of Motion   Left Shoulder   External rotation 90°: 80 degrees with pain    Right Shoulder   External rotation 90°: WFL    Additional Passive Range of Motion Details  Some discomfort L shoulder with med n. Tension test but able to do test. Reports more n/t 4th 5th fingers.     Strength/Myotome Testing   Cervical Spine     Left   Normal strength    Right   Normal strength    Left Shoulder     Planes of Motion   Abduction: 4-   External rotation at 0°: 4-     Isolated Muscles   Middle trapezius: 3+     Right Shoulder     Planes of Motion   Abduction: 4-   External rotation at 0°: 4-     Isolated Muscles   Middle trapezius: 3+     Additional Strength Details  Strength deficits related to prior surgery L  and injury R shoulder    Tests   Cervical     Left   Positive cervical distraction.     Left Shoulder   Positive ULTT1.     Right Shoulder   Positive ULTT1.     Additional Tests Details  Mild strain to palp lat C spine. Improved with light distraction and soft tissue work. Pt does not like manipulation.   Compression C  spine did not reproduce UE symptoms       Neck distraction during ULTT1 decreased numbness in L hand, 4th 5th fingers  Assessment & Plan     Assessment  Impairments: abnormal or restricted ROM, impaired physical strength and pain with function  Assessment details: Combination of upper quarter deficits including probably DDD c spine, thoracic strain with muscle guarding, prior L rotator cuff with mild deficits in ROM and strength, intermittent parasthesia relieved with decompression.  Prognosis: good  Prognosis details: Pt has a [employer] unit at home so will start using at 12-15#, 10 minutes bid intermittent.  Functional Limitations: sleeping, uncomfortable because of pain and unable to perform repetitive tasks  Goals  Plan Goals: 4 week STGs.   1. Resolve numbness in lateral fingers both hands.  2. Reduce tension strain mid back by 50%  3. Resolve tenderness to palpation L C spine by 50%  8 week LTGs:  1. Improve power UEs to 4+, dania L  2. Pt to be indep in postural program for mid back weakness  3. NDI to improve from 30 to < 20%      Plan  Therapy options: will be seen for skilled physical therapy services  Planned modality interventions: microcurrent electrical stimulation and ultrasound  Planned therapy interventions: home exercise program, stretching, strengthening, spinal/joint mobilization, soft tissue mobilization, postural training and manual therapy  Frequency: 1x week  Duration in visits: 4  Duration in weeks: 6  Treatment plan discussed with: patient  Plan details: Increase duration as needed        Manual Therapy:         mins  08643;  Therapeutic Exercise:   15      mins  92480;     Neuromuscular Tay:        mins  44360;    Therapeutic Activity:          mins  56186;         Ultrasound:          mins  58139;    Electrical Stimulation:         mins  93648 ( );      Timed Treatment:   15   mins   Total Treatment:     30   mins    PT SIGNATURE: Zorre Zeno Kimura, PT   DATE TREATMENT  INITIATED: 6/20/2019    Initial Certification  Certification Period: 9/18/2019  I certify that the therapy services are furnished while this patient is under my care.  The services outlined above are required by this patient, and will be reviewed every 90 days.     PHYSICIAN: Kasandra Mcdowell PA-C      DATE:     Please sign and return via fax to [phone].. Thank you, [facility] Physical Therapy.

## 2019-06-27 ENCOUNTER — TREATMENT (OUTPATIENT)
Dept: PHYSICAL THERAPY | Facility: CLINIC | Age: 69
End: 2019-06-27

## 2019-06-27 DIAGNOSIS — S16.1XXD STRAIN OF NECK MUSCLE, SUBSEQUENT ENCOUNTER: Primary | ICD-10-CM

## 2019-06-27 NOTE — PROGRESS NOTES
Discharge Summary  Discharge Summary from Physical Therapy Report    Number of Visits: 2    Discharge Status of Patient: see MD note    Goals: All Met    Discharge Plan: Continue with current home exercise program as instructed    Comments: Excellent results with indep massage, nerve gliding ex, easy ROM program. Given more HEP she is familiar with from prior rot cuff surgery including open book, T, W prone, low rows, serratus punch, reverse pendulums, etc.     Date of Discharge: 6-26-19        Zorre Zeno Kimura, PT  Physical Therapist

## 2019-06-27 NOTE — PROGRESS NOTES
Physical Therapy Daily Progress Note        Patient: Maddison Turcios   : 1950  Diagnosis/ICD-10 Code:  Strain of neck muscle, subsequent encounter [S16.1XXD]  Referring practitioner: Kasandra Mcdowell PA-C  Date of Initial Visit: Type: THERAPY  Noted: 2019  Today's Date: 2019  Patient seen for 2 sessions         Maddison Turcios reports: ready for dc. Feels great and indep        Objective   See Exercise, Manual, and Modality Logs for complete treatment.     PT Education      Assessment/Plan: cont HEP    Other           Manual Therapy:    15     mins  04750;  Therapeutic Exercise:    15     mins  15841;     Neuromuscular Tay:        mins  22101;    Therapeutic Activity:          mins  03100;          Ultrasound:          mins  70481;    Electrical Stimulation:         mins  07139 ( );      Timed Treatment:   30   mins   Total Treatment:     30   mins    Zorre Zeno Kimura, PT  Physical Therapist

## 2019-07-14 ENCOUNTER — RESULTS ENCOUNTER (OUTPATIENT)
Dept: FAMILY MEDICINE CLINIC | Facility: CLINIC | Age: 69
End: 2019-07-14

## 2019-07-14 DIAGNOSIS — R89.9 ABNORMAL LABORATORY TEST: ICD-10-CM

## 2019-07-18 LAB — VIT B12 SERPL-MCNC: 748 PG/ML (ref 211–946)

## 2019-08-05 ENCOUNTER — OFFICE VISIT (OUTPATIENT)
Dept: OBSTETRICS AND GYNECOLOGY | Facility: CLINIC | Age: 69
End: 2019-08-05

## 2019-08-05 VITALS
WEIGHT: 154 LBS | DIASTOLIC BLOOD PRESSURE: 74 MMHG | BODY MASS INDEX: 26.29 KG/M2 | HEIGHT: 64 IN | SYSTOLIC BLOOD PRESSURE: 120 MMHG

## 2019-08-05 DIAGNOSIS — Z01.419 PAP SMEAR, LOW-RISK: ICD-10-CM

## 2019-08-05 DIAGNOSIS — Z13.9 SCREENING FOR CONDITION: Primary | ICD-10-CM

## 2019-08-05 DIAGNOSIS — M85.859 OSTEOPENIA OF HIP, UNSPECIFIED LATERALITY: ICD-10-CM

## 2019-08-05 DIAGNOSIS — Z01.419 ENCNTR FOR GYN EXAM (GENERAL) (ROUTINE) W/O ABN FINDINGS: ICD-10-CM

## 2019-08-05 PROBLEM — Z00.00 MEDICARE ANNUAL WELLNESS VISIT, SUBSEQUENT: Status: RESOLVED | Noted: 2017-04-13 | Resolved: 2019-08-05

## 2019-08-05 PROBLEM — N63.20 BREAST MASS, LEFT: Status: RESOLVED | Noted: 2017-11-06 | Resolved: 2019-08-05

## 2019-08-05 PROBLEM — Z09 HOSPITAL DISCHARGE FOLLOW-UP: Status: RESOLVED | Noted: 2019-04-11 | Resolved: 2019-08-05

## 2019-08-05 PROCEDURE — 99397 PER PM REEVAL EST PAT 65+ YR: CPT | Performed by: OBSTETRICS & GYNECOLOGY

## 2019-08-05 PROCEDURE — 81002 URINALYSIS NONAUTO W/O SCOPE: CPT | Performed by: OBSTETRICS & GYNECOLOGY

## 2019-08-05 RX ORDER — TURMERIC/TURMERIC EXT/PEPR EXT 900-100 MG
CAPSULE ORAL
COMMUNITY
Start: 2019-08-02 | End: 2020-05-28

## 2019-08-05 NOTE — PROGRESS NOTES
GYN Annual Exam     CC- Here for annual exam.     Maddison Turcios is a 69 y.o. female established patient who presents for annual well woman exam. She denies  VB. She has decided that she does not want to take any medications for osteoporosis if she has it. So I recommend she not get the imaging if she declines all treatment.       OB History      Para Term  AB Living    2 2 2     2    SAB TAB Ectopic Molar Multiple Live Births                       Obstetric Comments    2           Menarche:13  Menopause:55  HRT:none  Current contraception: post menopausal status and tubal ligation  History of abnormal Pap smear: no  History of abnormal mammogram: yes - s/p B9 breast bx  Family history of uterine, colon or ovarian cancer: yes - mom cervical cancer  Family history of breast cancer: yes - sister age 67  STD's: none  Last pap smear:2016  Gardasil: none  TRACI: none      Health Maintenance   Topic Date Due   • MEDICARE ANNUAL WELLNESS  2019   • INFLUENZA VACCINE  2019   • LIPID PANEL  2020   • DXA SCAN  2020   • MAMMOGRAM  2021   • COLONOSCOPY  2025   • TDAP/TD VACCINES (4 - Td) 2027   • HEPATITIS C SCREENING  Completed   • PNEUMOCOCCAL VACCINES (65+ LOW/MEDIUM RISK)  Completed   • ZOSTER VACCINE  Addressed       Past Medical History:   Diagnosis Date   • Abnormal blood chemistry    • Acute UTI (urinary tract infection)    • Allergic    • Anemia    • Aneurysm, cerebral    • Bloating    • Brain aneurysm    • CAD (coronary artery disease)    • Chronic headaches    • Coronary artery disease    • Coronary artery stenosis    • Dysuria    • Encounter for screening colonoscopy    • Environmental allergies    • Fall from slip, trip, or stumble    • Gait disturbance    • H/O cardiovascular stress test 2013    Treadmill   • H/O colonoscopy    • H/O echocardiogram 2013   • H/O fall    • History of EKG 2015   • Hyperlipemia    • Hyperlipidemia    •  Hypertension    • Hyperthyroidism    • Injury of back    • Insomnia    • Left forearm pain    • Left leg pain    • Left wrist pain    • Lower abdominal pain    • Need for pneumococcal vaccination    • Onychomycosis of toenail    • AKIRA (obstructive sleep apnea)    • Pelvic pressure in female    • Right foot pain    • TIA (transient ischemic attack) 11/30/2016   • URI (upper respiratory infection)    • Urine frequency        Past Surgical History:   Procedure Laterality Date   • BREAST EXCISIONAL BIOPSY Right 1977    b9   • BREAST EXCISIONAL BIOPSY Right 1979    b9   • BUNIONECTOMY     • CARDIAC CATHETERIZATION N/A 4/8/2019    Procedure: Left Heart Cath;  Surgeon: Jayme Ramirez MD;  Location:  [facility];  Service: Cardiovascular   • CARDIAC CATHETERIZATION N/A 4/8/2019    Procedure: Coronary angiography;  Surgeon: Jayme Ramirez MD;  Location:  [facility];  Service: Cardiovascular   • CARDIAC CATHETERIZATION N/A 4/8/2019    Procedure: Left ventriculography;  Surgeon: Jayme Ramirez MD;  Location:  [facility];  Service: Cardiovascular   • CEREBRAL ANEURYSM REPAIR     • CHOLECYSTECTOMY     • CORONARY ARTERY BYPASS GRAFT     • ROTATOR CUFF REPAIR Left    • SPLENECTOMY     • TONSILLECTOMY     • TUBAL ABDOMINAL LIGATION           Current Outpatient Medications:   •  Black Pepper-Turmeric (TURMERIC CURCUMIN) 5-1000 MG capsule, , Disp: , Rfl:   •  aluminum chloride (DRYSOL) 20 % external solution, Apply  topically Every Night., Disp: , Rfl:   •  Cholecalciferol (VITAMIN D3) 5000 UNITS capsule capsule, Take 5,000 Units by mouth Daily., Disp: , Rfl:   •  clopidogrel (PLAVIX) 75 MG tablet, TAKE 1 TABLET DAILY, Disp: 90 tablet, Rfl: 3  •  ezetimibe (ZETIA) 10 MG tablet, Take 1 tablet by mouth Daily., Disp: 90 tablet, Rfl: 3  •  FIBER PO, Take 1 tablet by mouth Daily., Disp: , Rfl:   •  icosapent ethyl (VASCEPA) 1 g capsule capsule, Take 4 g by mouth  Daily., Disp: , Rfl:   •  losartan (COZAAR) 25 MG tablet, TAKE 1 TABLET DAILY, Disp: 90 tablet, Rfl: 1  •  pitavastatin calcium (LIVALO) 2 MG tablet tablet, Take 2 mg by mouth Every Night., Disp: , Rfl:     Current Facility-Administered Medications:   •  nitroglycerin (NITROSTAT) SL tablet 0.4 mg, 0.4 mg, Sublingual, Q5 Min PRN, John Crabtree MD  •  sodium chloride 0.9 % flush 10 mL, 10 mL, Intravenous, PRN, John Crabtree MD    Allergies   Allergen Reactions   • Beta Adrenergic Blockers Other (See Comments)     hypotension  bradycardic   • Adhesive Tape Other (See Comments)     Redness, bruising and peeling of skin    *Use Paper Tape Only*  Redness, bruising and peeling of skin    *Use Paper Tape Only*   • Sulfa Antibiotics Rash   • Sulfur Rash       Social History     Tobacco Use   • Smoking status: Never Smoker   • Smokeless tobacco: Never Used   Substance Use Topics   • Alcohol use: No   • Drug use: No       Family History   Problem Relation Age of Onset   • Heart failure Mother    • Hypertension Mother    • Stroke Mother    • Cervical cancer Mother    • Heart failure Father    • Aneurysm Sister    • Breast cancer Sister    • Heart attack Brother    • Cancer Brother    • Lung cancer Brother    • No Known Problems Son    • No Known Problems Daughter    • No Known Problems Sister    • No Known Problems Sister    • Ovarian cancer Neg Hx    • Colon cancer Neg Hx    • Pulmonary embolism Neg Hx    • Deep vein thrombosis Neg Hx        Review of Systems   Constitutional: Negative for appetite change, fatigue, fever and unexpected weight change.   Eyes: Negative for photophobia and visual disturbance.   Respiratory: Negative for cough and shortness of breath.    Cardiovascular: Negative for chest pain and palpitations.   Gastrointestinal: Negative for abdominal distention, abdominal pain, constipation, diarrhea and nausea.   Endocrine: Negative for cold intolerance and heat intolerance.   Genitourinary:  "Negative for dyspareunia, dysuria, menstrual problem, pelvic pain and vaginal discharge.   Musculoskeletal: Negative for back pain.   Skin: Negative for color change and rash.   Neurological: Negative for headaches.   Hematological: Negative for adenopathy. Does not bruise/bleed easily.   Psychiatric/Behavioral: Negative for dysphoric mood. The patient is not nervous/anxious.        /74   Ht 162.6 cm (64\")   Wt 69.9 kg (154 lb)   Breastfeeding? No   BMI 26.43 kg/m²     Physical Exam   Constitutional: She is oriented to person, place, and time. She appears well-developed and well-nourished.   HENT:   Head: Normocephalic and atraumatic.   Eyes: Conjunctivae are normal. No scleral icterus.   Neck: Neck supple. No thyromegaly present.   Cardiovascular: Normal rate and regular rhythm.   Pulmonary/Chest: Effort normal and breath sounds normal. Right breast exhibits no inverted nipple, no mass, no nipple discharge, no skin change and no tenderness. Left breast exhibits no inverted nipple, no mass, no nipple discharge, no skin change and no tenderness.   Abdominal: Soft. Bowel sounds are normal. She exhibits no distension and no mass. There is no tenderness. There is no rebound and no guarding. No hernia.   Genitourinary: Pelvic exam was performed with patient supine. There is no rash, tenderness or lesion on the right labia. There is no rash, tenderness or lesion on the left labia. Uterus is not deviated, not enlarged, not fixed and not tender. Cervix exhibits no motion tenderness, no discharge and no friability. Right adnexum displays no mass, no tenderness and no fullness. Left adnexum displays no mass, no tenderness and no fullness. No erythema, tenderness or bleeding in the vagina. No foreign body in the vagina. No signs of injury around the vagina. No vaginal discharge found.   Genitourinary Comments: Mild atrophy noted   Neurological: She is alert and oriented to person, place, and time.   Skin: Skin is " warm and dry.   Psychiatric: She has a normal mood and affect. Her behavior is normal. Judgment and thought content normal.   Nursing note and vitals reviewed.         Assessment/Plan    1) GYN HM: pap  SBE demonstrated and encouraged.  2) STD screening: declines Condoms encouraged.  3) Bone health - Weight bearing exercise, dietary calcium recommendations and vitamin D reviewed.   4) Diet and Exercise discussed  5) Smoking Status: No  6) Social: no issues  7)MMG: UTD 5/2019 - B1  8) DEXA-UTD 4/2016- osteopenia- pt declines all osteoporosis meds. Do not rec imaging if she does not want treatment.   9)C scope-UTD 12/2015- repeat 10 yeras   Follow up prn and 1 year       Maddison was seen today for gynecologic exam.    Diagnoses and all orders for this visit:    Screening for condition  -     POC Urinalysis Dipstick    Pap smear, low-risk  -     Pap IG (Image Guided)        Sara Araiza MD  8/5/2019  9:04 PM

## 2019-08-06 LAB
CYTOLOGIST CVX/VAG CYTO: NORMAL
CYTOLOGY CVX/VAG DOC CYTO: NORMAL
CYTOLOGY CVX/VAG DOC THIN PREP: NORMAL
DX ICD CODE: NORMAL
HIV 1 & 2 AB SER-IMP: NORMAL
OTHER STN SPEC: NORMAL
STAT OF ADQ CVX/VAG CYTO-IMP: NORMAL

## 2019-08-09 ENCOUNTER — OFFICE VISIT (OUTPATIENT)
Dept: CARDIOLOGY | Facility: CLINIC | Age: 69
End: 2019-08-09

## 2019-08-09 VITALS
SYSTOLIC BLOOD PRESSURE: 118 MMHG | HEIGHT: 64 IN | HEART RATE: 57 BPM | BODY MASS INDEX: 26.17 KG/M2 | WEIGHT: 153.3 LBS | DIASTOLIC BLOOD PRESSURE: 80 MMHG

## 2019-08-09 DIAGNOSIS — I25.10 CORONARY ARTERY DISEASE INVOLVING NATIVE CORONARY ARTERY OF NATIVE HEART WITHOUT ANGINA PECTORIS: Primary | ICD-10-CM

## 2019-08-09 DIAGNOSIS — I10 ESSENTIAL HYPERTENSION: ICD-10-CM

## 2019-08-09 DIAGNOSIS — Z95.1 S/P CABG (CORONARY ARTERY BYPASS GRAFT): ICD-10-CM

## 2019-08-09 DIAGNOSIS — E78.2 MIXED HYPERLIPIDEMIA: ICD-10-CM

## 2019-08-09 PROCEDURE — 99214 OFFICE O/P EST MOD 30 MIN: CPT | Performed by: INTERNAL MEDICINE

## 2019-08-09 PROCEDURE — 93000 ELECTROCARDIOGRAM COMPLETE: CPT | Performed by: INTERNAL MEDICINE

## 2019-08-09 RX ORDER — ISOSORBIDE DINITRATE 5 MG/1
5 TABLET ORAL 2 TIMES DAILY
Qty: 180 TABLET | Refills: 3 | Status: SHIPPED | OUTPATIENT
Start: 2019-08-09 | End: 2020-07-22

## 2019-08-09 RX ORDER — ISOSORBIDE DINITRATE 5 MG/1
5 TABLET ORAL 2 TIMES DAILY
COMMUNITY
End: 2019-08-09 | Stop reason: SDUPTHER

## 2019-08-09 NOTE — PROGRESS NOTES
Date of Office Visit: 2019  Encounter Provider: Helen Urbina MD  Place of Service: [facility] CARDIOLOGY  Patient Name: Maddison Turcios  :1950      Patient ID:  Maddison Turcios is a 69 y.o. female is here for  followup for CAD, s/p CABG.         History of Present Illness    She has a history of episodes of severe fatigue and had a stress study  which showed a very mild abnormality. She continued to have the severe fatigue and had a  heart catheterization done in 2004 showing severe left main coronary stenosis.   She was transferred emergently for bypass surgery at [facility] which was performed  2004 by Dr. Cade. He has cardiac catheterization done 2004 that showed  ostial 60% stenosis of the left main coronary artery. The left anterior descending artery  and left circumflex also showed no significant disease. The left circumflex vessel was  dominant. The cardiac catheterization was done at [facility] in  [place], [place]. Bypass 2004 she received a LIMA to mid LAD and sequential  saphenous vein graft to the ramus and intermedius and first obtuse marginal branch done at  [facility].      In 2013, she had a stress nuclear perfusion study  showing no ischemia. She had a 2D echocardiogram with Doppler showing ejection fraction  of 60% to 65% with trace mitral and tricuspid insufficiency. She had a carotid duplex  study done in 2013 which was normal.      She had an ECG done 2016 which shows poor R-wave progression sinus rhythm and otherwise appears normal.      She had a cerebral angiogram with Dr. Hewitt and it showed no cerebral aneurysm.      She was at [facility], admitted there on 2016 through 2016. At that time she was diagnosed with a TIA. During her hospitalization she had an MRI which was negative for an acute infarct. They felt possible TIA versus  complicated migraine. She had had for 2-3 days prior to this a feeling of imbalance and leaned to the left along with some blurred vision. She had a transesophageal echocardiogram whether did not show anything to explain her TIA. There was bilateral atrial enlargement but otherwise looked fine. She was loaded with Plavix 300 mg and then continued with 75 mg of Plavix daily. Neurosurgery was consulted because she had a history of prior pipelined right internal carotid artery aneurysm. Dr. Hewitt had seen her and treated her for his in 08/2015. Neurosurgery said there was no evidence of aneurysm. Her statin medication was increased because of elevated hyperlipidemia. She actually had garbled speech, visual changes and balance issues all with the TIA and they happened over a matter of three days, kind of intermittently.         She had a normal 21 day event recorder 1/2017.      She was tolerating Livalo without muscle complaints.  She is on zetia  And vascepa.      She called 911 on 4/8/2019 because she was having exertional chest pressure with doing mowing the normal household activities.  When EMS arrived, they administered 2 nitroglycerin and it helped her chest pressure.  She was taken to [facility] and there she ruled out for myocardial infarction.  She was sent to [facility] and then return to the hospital for cardiac catheterization which showed atretic LIMA to LAD, patent SVG to ramus intermedius then to OM, 30% left main stenosis proximally but normal LAD, left circumflex and RCA.  Her ejection fraction was normal.  She was dismissed from the hospital.  Since dismissal, she continued to have exertional dyspnea and fatigue. Laboratory values on 4/8/2019 show normal CMP, normal thyroid, HDL 77, LDL 76.  She had a nonischemic stress echocardiogram done 5/14/2019.      She has had no further chest pain since being on Isordil.  She is had no tachycardia, dizziness, syncope.  She continues to exercise.  She has no  orthopnea or PND.  She has no exertional dyspnea.  She is taking her medications as directed without difficulty.    Past Medical History:   Diagnosis Date   • Abnormal blood chemistry    • Acute UTI (urinary tract infection)    • Allergic    • Anemia    • Aneurysm, cerebral    • Bloating    • Brain aneurysm    • CAD (coronary artery disease)    • Chronic headaches    • Coronary artery disease    • Coronary artery stenosis    • Dysuria    • Encounter for screening colonoscopy    • Environmental allergies    • Fall from slip, trip, or stumble    • Gait disturbance    • H/O cardiovascular stress test 09/17/2013    Treadmill   • H/O colonoscopy    • H/O echocardiogram 09/25/2013   • H/O fall    • History of EKG 07/31/2015   • Hyperlipemia    • Hyperlipidemia    • Hypertension    • Hyperthyroidism    • Injury of back    • Insomnia    • Left forearm pain    • Left leg pain    • Left wrist pain    • Lower abdominal pain    • Need for pneumococcal vaccination    • Onychomycosis of toenail    • AKIRA (obstructive sleep apnea)    • Pelvic pressure in female    • Right foot pain    • TIA (transient ischemic attack) 11/30/2016   • URI (upper respiratory infection)    • Urine frequency          Past Surgical History:   Procedure Laterality Date   • BREAST EXCISIONAL BIOPSY Right 1977    b9   • BREAST EXCISIONAL BIOPSY Right 1979    b9   • BUNIONECTOMY     • CARDIAC CATHETERIZATION N/A 4/8/2019    Procedure: Left Heart Cath;  Surgeon: Jayme Ramirez MD;  Location:  [facility];  Service: Cardiovascular   • CARDIAC CATHETERIZATION N/A 4/8/2019    Procedure: Coronary angiography;  Surgeon: Jayme Ramirez MD;  Location:  [facility];  Service: Cardiovascular   • CARDIAC CATHETERIZATION N/A 4/8/2019    Procedure: Left ventriculography;  Surgeon: Jayme Ramirez MD;  Location:  [facility];  Service: Cardiovascular   • CEREBRAL ANEURYSM REPAIR     • CHOLECYSTECTOMY      • CORONARY ARTERY BYPASS GRAFT     • ROTATOR CUFF REPAIR Left    • SPLENECTOMY     • TONSILLECTOMY     • TUBAL ABDOMINAL LIGATION         Current Outpatient Medications on File Prior to Visit   Medication Sig Dispense Refill   • aluminum chloride (DRYSOL) 20 % external solution Apply  topically Every Night.     • Black Pepper-Turmeric (TURMERIC CURCUMIN) 5-1000 MG capsule      • Cholecalciferol (VITAMIN D3) 5000 UNITS capsule capsule Take 5,000 Units by mouth Daily.     • clopidogrel (PLAVIX) 75 MG tablet TAKE 1 TABLET DAILY 90 tablet 3   • ezetimibe (ZETIA) 10 MG tablet Take 1 tablet by mouth Daily. 90 tablet 3   • FIBER PO Take 1 tablet by mouth Daily.     • icosapent ethyl (VASCEPA) 1 g capsule capsule Take 4 g by mouth Daily.     • isosorbide dinitrate (ISORDIL) 5 MG tablet Take 5 mg by mouth 2 (Two) Times a Day.     • losartan (COZAAR) 25 MG tablet TAKE 1 TABLET DAILY 90 tablet 1   • pitavastatin calcium (LIVALO) 2 MG tablet tablet Take 2 mg by mouth Every Night.       Current Facility-Administered Medications on File Prior to Visit   Medication Dose Route Frequency Provider Last Rate Last Dose   • nitroglycerin (NITROSTAT) SL tablet 0.4 mg  0.4 mg Sublingual Q5 Min PRN John Crabtree MD       • sodium chloride 0.9 % flush 10 mL  10 mL Intravenous PRN John Crabtree MD           Social History     Socioeconomic History   • Marital status:      Spouse name: Not on file   • Number of children: Not on file   • Years of education: Not on file   • Highest education level: Not on file   Tobacco Use   • Smoking status: Never Smoker   • Smokeless tobacco: Never Used   Substance and Sexual Activity   • Alcohol use: No   • Drug use: No   • Sexual activity: No     Partners: Male     Birth control/protection: Post-menopausal, Surgical     Comment: BTL           Review of Systems   Constitution: Negative.   HENT: Negative for congestion.    Eyes: Negative for vision loss in left eye and vision loss in  "right eye.   Respiratory: Negative.  Negative for cough, hemoptysis, shortness of breath, sleep disturbances due to breathing, snoring, sputum production and wheezing.    Endocrine: Negative.    Hematologic/Lymphatic: Negative.    Skin: Negative for poor wound healing and rash.   Musculoskeletal: Negative for falls, gout, muscle cramps and myalgias.   Gastrointestinal: Negative for abdominal pain, diarrhea, dysphagia, hematemesis, melena, nausea and vomiting.   Neurological: Negative for excessive daytime sleepiness, dizziness, headaches, light-headedness, loss of balance, seizures and vertigo.   Psychiatric/Behavioral: Negative for depression and substance abuse. The patient is not nervous/anxious.        Procedures    ECG 12 Lead  Date/Time: 8/9/2019 11:06 AM  Performed by: Helen Urbina MD  Authorized by: Helen Urbina MD   Comparison: compared with previous ECG   Similar to previous ECG  Rhythm: sinus rhythm    Clinical impression: normal ECG                Objective:      Vitals:    08/09/19 1051   BP: 118/80   BP Location: Right arm   Patient Position: Sitting   Pulse: 57   Weight: 69.5 kg (153 lb 4.8 oz)   Height: 162.6 cm (64\")     Body mass index is 26.31 kg/m².    Physical Exam   Constitutional: She is oriented to person, place, and time. She appears well-developed and well-nourished. No distress.   HENT:   Head: Normocephalic and atraumatic.   Eyes: Conjunctivae are normal. No scleral icterus.   Neck: Neck supple. No JVD present. Carotid bruit is not present. No thyromegaly present.   Cardiovascular: Normal rate, regular rhythm, S1 normal, S2 normal, normal heart sounds and intact distal pulses.  No extrasystoles are present. PMI is not displaced. Exam reveals no gallop.   No murmur heard.  Pulses:       Carotid pulses are 2+ on the right side, and 2+ on the left side.       Radial pulses are 2+ on the right side, and 2+ on the left side.        Dorsalis pedis pulses are 2+ on the right " side, and 2+ on the left side.        Posterior tibial pulses are 2+ on the right side, and 2+ on the left side.   Pulmonary/Chest: Effort normal and breath sounds normal. No respiratory distress. She has no wheezes. She has no rhonchi. She has no rales. She exhibits no tenderness.   Abdominal: Soft. Bowel sounds are normal. She exhibits no distension, no abdominal bruit and no mass. There is no tenderness.   Musculoskeletal: She exhibits no edema or deformity.   Lymphadenopathy:     She has no cervical adenopathy.   Neurological: She is alert and oriented to person, place, and time. No cranial nerve deficit.   Skin: Skin is warm and dry. No rash noted. She is not diaphoretic. No cyanosis. No pallor. Nails show no clubbing.   Psychiatric: She has a normal mood and affect. Judgment normal.   Vitals reviewed.      Lab Review:       Assessment:      Diagnosis Plan   1. Coronary artery disease involving native coronary artery of native heart without angina pectoris     2. S/P CABG (coronary artery bypass graft)     3. Essential hypertension     4. Mixed hyperlipidemia       1. TIA. The etiology for this is uncertain.  She has had no further instances since being on Plavix.   2. Hyperlipidemia, on Livalo and continue zetia.   3. Essential hypertension which is well controlled.   4. History of coronary artery disease, s/p CABG. She has angina, well treated with isordil. .   5. Mild biatrial enlargement on her transesophageal echocardiogram but otherwise, the DENY was normal.   6. Possible obstructive sleep apnea.      Plan:       See cindy in 6 months, no med changes.     Coronary Artery Disease  Assessment  • The patient has no angina    Plan  • Lifestyle modifications discussed include maintenance of a healthy weight, medication compliance, regular exercise and regular monitoring of cholesterol and blood pressure    Subjective - Objective  • There is a history of previous coronary artery bypass graft  • Current  antiplatelet therapy includes clopidogrel 75 mg

## 2019-09-04 ENCOUNTER — OFFICE VISIT (OUTPATIENT)
Dept: FAMILY MEDICINE CLINIC | Facility: CLINIC | Age: 69
End: 2019-09-04

## 2019-09-04 VITALS
WEIGHT: 156 LBS | HEIGHT: 64 IN | SYSTOLIC BLOOD PRESSURE: 140 MMHG | HEART RATE: 78 BPM | BODY MASS INDEX: 26.63 KG/M2 | OXYGEN SATURATION: 98 % | DIASTOLIC BLOOD PRESSURE: 76 MMHG | RESPIRATION RATE: 18 BRPM | TEMPERATURE: 97.9 F

## 2019-09-04 DIAGNOSIS — L23.7 POISON IVY DERMATITIS: Primary | ICD-10-CM

## 2019-09-04 PROCEDURE — 99213 OFFICE O/P EST LOW 20 MIN: CPT | Performed by: PHYSICIAN ASSISTANT

## 2019-09-04 RX ORDER — PREDNISONE 10 MG/1
TABLET ORAL
Qty: 31 TABLET | Refills: 0 | Status: SHIPPED | OUTPATIENT
Start: 2019-09-04 | End: 2019-10-02

## 2019-10-02 ENCOUNTER — OFFICE VISIT (OUTPATIENT)
Dept: FAMILY MEDICINE CLINIC | Facility: CLINIC | Age: 69
End: 2019-10-02

## 2019-10-02 VITALS
SYSTOLIC BLOOD PRESSURE: 110 MMHG | OXYGEN SATURATION: 98 % | DIASTOLIC BLOOD PRESSURE: 68 MMHG | HEART RATE: 71 BPM | BODY MASS INDEX: 25.1 KG/M2 | WEIGHT: 147 LBS | RESPIRATION RATE: 16 BRPM | HEIGHT: 64 IN | TEMPERATURE: 98.2 F

## 2019-10-02 DIAGNOSIS — Z23 NEED FOR INFLUENZA VACCINATION: ICD-10-CM

## 2019-10-02 DIAGNOSIS — Z00.00 MEDICARE ANNUAL WELLNESS VISIT, SUBSEQUENT: Primary | ICD-10-CM

## 2019-10-02 DIAGNOSIS — I10 ESSENTIAL HYPERTENSION: ICD-10-CM

## 2019-10-02 PROCEDURE — G0439 PPPS, SUBSEQ VISIT: HCPCS | Performed by: PHYSICIAN ASSISTANT

## 2019-10-02 PROCEDURE — 90653 IIV ADJUVANT VACCINE IM: CPT | Performed by: PHYSICIAN ASSISTANT

## 2019-10-02 PROCEDURE — G0008 ADMIN INFLUENZA VIRUS VAC: HCPCS | Performed by: PHYSICIAN ASSISTANT

## 2019-10-02 RX ORDER — LOSARTAN POTASSIUM 25 MG/1
25 TABLET ORAL DAILY
Qty: 90 TABLET | Refills: 3 | Status: SHIPPED | OUTPATIENT
Start: 2019-10-02 | End: 2020-01-02

## 2019-10-02 NOTE — PROGRESS NOTES
"Subjective   Maddison Turcios is a 69 y.o. female presents for   Chief Complaint   Patient presents with   • Hypertension     management       History of Present Illness     Maddison is a 69-year-old female who presents for hypertension management.  She has lost 9 pounds since September 4, 2019.  She is feeling well at office visit today.  She would like the flu shot today.  Denied any ffevers,chills,URI symptoms,chest pai9ns,hrotess of air,wheezing,dizziness or seling ankles.      The following portions of the patient's history were reviewed and updated as appropriate: allergies, current medications, past family history, past medical history, past social history, past surgical history and problem list.    Review of Systems   All other systems reviewed and are negative.        Vitals:    10/02/19 0942   BP: 128/78   Pulse: 71   Resp: 16   Temp: 98.2 °F (36.8 °C)   SpO2: 98%   Weight: 66.7 kg (147 lb)   Height: 162.6 cm (64\")     Wt Readings from Last 3 Encounters:   10/02/19 66.7 kg (147 lb)   09/04/19 70.8 kg (156 lb)   08/09/19 69.5 kg (153 lb 4.8 oz)     BP Readings from Last 3 Encounters:   10/02/19 128/78   09/04/19 140/76   08/09/19 118/80     Social History     Socioeconomic History   • Marital status:      Spouse name: Not on file   • Number of children: Not on file   • Years of education: Not on file   • Highest education level: Not on file   Tobacco Use   • Smoking status: Never Smoker   • Smokeless tobacco: Never Used   Substance and Sexual Activity   • Alcohol use: No   • Drug use: No   • Sexual activity: No     Partners: Male     Birth control/protection: Post-menopausal, Surgical     Comment: BTL       Allergies   Allergen Reactions   • Beta Adrenergic Blockers Other (See Comments)     hypotension  bradycardic   • Adhesive Tape Other (See Comments)     Redness, bruising and peeling of skin    *Use Paper Tape Only*  Redness, bruising and peeling of skin    *Use Paper Tape Only*   • Sulfa Antibiotics " Rash   • Sulfur Rash       Body mass index is 25.23 kg/m².    Objective   Physical Exam    Assessment/Plan   There are no diagnoses linked to this encounter.      [facility]ELI Corado [facility]  [address] [place] [id number]  Dept: [phone]  Dept Fax: [phone]  Loc: [phone]  Loc Fax: [phone]

## 2019-10-02 NOTE — PROGRESS NOTES
The ABCs of the Annual Wellness Visit  Subsequent Medicare Wellness Visit    Chief Complaint   Patient presents with   • Medicare Wellness-subsequent     management   • Hypertension     management       Subjective   History of Present Illness:  Maddison Turcios is a 69 y.o. female who presents for a Subsequent Medicare Wellness Visit.  Maddison is doing well at office visit today.  Her bowel movements have been daily without dark black tarry stools.  Appetite has been healthy.  She is tries to exercise several days a week.  Denied any chest pain, shortness of air, dizziness, vision changes, upper respiratory symptoms, abdominal pain or swelling of ankles.  Maddison would like updated flu shot at office visit today.  Denied any problems today.  Having blood work performed by her cardiologist group in [facility] in a few weeks.    HEALTH RISK ASSESSMENT    Recent Hospitalizations:  No hospitalization(s) within the last year.    Current Medical Providers:  Patient Care Team:  Kasandra Mcdowell PA-C as PCP - General (Family Medicine)  Helen Urbina MD as Consulting Physician (Cardiology)  Ruben Valderrama MD (Dermatology)  Bennett Whelan MD as MDS Coordinator (Allergy)  Sara Araiza MD as Consulting Physician (Obstetrics and Gynecology)    Smoking Status:  Social History     Tobacco Use   Smoking Status Never Smoker   Smokeless Tobacco Never Used       Alcohol Consumption:  Social History     Substance and Sexual Activity   Alcohol Use No       Depression Screen:   PHQ-2/PHQ-9 Depression Screening 6/14/2019   Little interest or pleasure in doing things 0   Feeling down, depressed, or hopeless 0   Trouble falling or staying asleep, or sleeping too much -   Feeling tired or having little energy -   Poor appetite or overeating -   Feeling bad about yourself - or that you are a failure or have let yourself or your family down -   Trouble concentrating on things, such as reading the newspaper or  watching television -   Moving or speaking so slowly that other people could have noticed. Or the opposite - being so fidgety or restless that you have been moving around a lot more than usual -   Thoughts that you would be better off dead, or of hurting yourself in some way -   Total Score 0   If you checked off any problems, how difficult have these problems made it for you to do your work, take care of things at home, or get along with other people? -       Fall Risk Screen:  GAGE Fall Risk Assessment was completed, and patient is at LOW risk for falls.Assessment completed on:10/2/2019    Health Habits and Functional and Cognitive Screening:  Functional & Cognitive Status 6/6/2018   Do you have difficulty preparing food and eating? No   Do you have difficulty bathing yourself, getting dressed or grooming yourself? No   Do you have difficulty using the toilet? No   Do you have difficulty moving around from place to place? No   Do you have trouble with steps or getting out of a bed or a chair? No   Current Diet Well Balanced Diet   Dental Exam Up to date   Eye Exam Up to date   Exercise (times per week) 7 times per week   Current Exercise Activities Include Walking   Do you need help using the phone?  No   Are you deaf or do you have serious difficulty hearing?  No   Do you need help with transportation? No   Do you need help shopping? No   Do you need help preparing meals?  No   Do you need help with housework?  No   Do you need help with laundry? No   Do you need help taking your medications? No   Do you need help managing money? No   Do you ever drive or ride in a car without wearing a seat belt? No   Have you felt unusual stress, anger or loneliness in the last month? No   Who do you live with? Alone   If you need help, do you have trouble finding someone available to you? No   Have you been bothered in the last four weeks by sexual problems? No   Do you have difficulty concentrating, remembering or making  decisions? No         Does the patient have evidence of cognitive impairment? No    Asprin use counseling:Does not need ASA (and currently is not on it)    Age-appropriate Screening Schedule:  Refer to the list below for future screening recommendations based on patient's age, sex and/or medical conditions. Orders for these recommended tests are listed in the plan section. The patient has been provided with a written plan.    Health Maintenance   Topic Date Due   • LIPID PANEL  04/02/2020   • DXA SCAN  06/06/2020   • MAMMOGRAM  05/21/2021   • COLONOSCOPY  12/29/2025   • TDAP/TD VACCINES (4 - Td) 06/29/2027   • INFLUENZA VACCINE  Completed   • PNEUMOCOCCAL VACCINES (65+ LOW/MEDIUM RISK)  Completed   • ZOSTER VACCINE  Addressed          The following portions of the patient's history were reviewed and updated as appropriate:   She  has a past medical history of Abnormal blood chemistry, Acute UTI (urinary tract infection), Allergic, Anemia, Aneurysm, cerebral, Bloating, Brain aneurysm, CAD (coronary artery disease), Chronic headaches, Coronary artery disease, Coronary artery stenosis, Dysuria, Encounter for screening colonoscopy, Environmental allergies, Fall from slip, trip, or stumble, Gait disturbance, H/O cardiovascular stress test (09/17/2013), H/O colonoscopy, H/O echocardiogram (09/25/2013), H/O fall, History of EKG (07/31/2015), Hyperlipemia, Hyperlipidemia, Hypertension, Hyperthyroidism, Injury of back, Insomnia, Left forearm pain, Left leg pain, Left wrist pain, Lower abdominal pain, Need for pneumococcal vaccination, Onychomycosis of toenail, AKIRA (obstructive sleep apnea), Pelvic pressure in female, Right foot pain, TIA (transient ischemic attack) (11/30/2016), URI (upper respiratory infection), and Urine frequency.  She does not have any pertinent problems on file.  She  has a past surgical history that includes Coronary artery bypass graft; Rotator cuff repair (Left); Tonsillectomy; Splenectomy, total;  Tubal ligation; Cholecystectomy; Bunionectomy; Cerebral aneurysm repair; Cardiac catheterization (N/A, 4/8/2019); Cardiac catheterization (N/A, 4/8/2019); Cardiac catheterization (N/A, 4/8/2019); Breast excisional biopsy (Right, 1977); and Breast excisional biopsy (Right, 1979).  Her family history includes Aneurysm in her sister; Breast cancer in her sister; Cancer in her brother; Cervical cancer in her mother; Heart attack in her brother; Heart failure in her father and mother; Hypertension in her mother; Lung cancer in her brother; No Known Problems in her daughter, sister, sister, and son; Stroke in her mother.  She  reports that she has never smoked. She has never used smokeless tobacco. She reports that she does not drink alcohol or use drugs.  Current Outpatient Medications   Medication Sig Dispense Refill   • Black Pepper-Turmeric (TURMERIC CURCUMIN) 5-1000 MG capsule      • Cholecalciferol (VITAMIN D3) 5000 UNITS capsule capsule Take 5,000 Units by mouth Daily.     • clopidogrel (PLAVIX) 75 MG tablet TAKE 1 TABLET DAILY 90 tablet 3   • ezetimibe (ZETIA) 10 MG tablet Take 1 tablet by mouth Daily. 90 tablet 3   • FIBER PO Take 1 tablet by mouth Daily.     • icosapent ethyl (VASCEPA) 1 g capsule capsule Take 4 g by mouth Daily.     • isosorbide dinitrate (ISORDIL) 5 MG tablet Take 1 tablet by mouth 2 (Two) Times a Day. 180 tablet 3   • losartan (COZAAR) 25 MG tablet Take 1 tablet by mouth Daily. 90 tablet 3   • pitavastatin calcium (LIVALO) 2 MG tablet tablet Take 2 mg by mouth Every Night.       Current Facility-Administered Medications   Medication Dose Route Frequency Provider Last Rate Last Dose   • nitroglycerin (NITROSTAT) SL tablet 0.4 mg  0.4 mg Sublingual Q5 Min PRN John Crabtree MD       • sodium chloride 0.9 % flush 10 mL  10 mL Intravenous PRN John Crabtree MD         Current Outpatient Medications on File Prior to Visit   Medication Sig   • Black Pepper-Turmeric (TURMERIC CURCUMIN)  5-1000 MG capsule    • Cholecalciferol (VITAMIN D3) 5000 UNITS capsule capsule Take 5,000 Units by mouth Daily.   • clopidogrel (PLAVIX) 75 MG tablet TAKE 1 TABLET DAILY   • ezetimibe (ZETIA) 10 MG tablet Take 1 tablet by mouth Daily.   • FIBER PO Take 1 tablet by mouth Daily.   • icosapent ethyl (VASCEPA) 1 g capsule capsule Take 4 g by mouth Daily.   • isosorbide dinitrate (ISORDIL) 5 MG tablet Take 1 tablet by mouth 2 (Two) Times a Day.   • pitavastatin calcium (LIVALO) 2 MG tablet tablet Take 2 mg by mouth Every Night.     Current Facility-Administered Medications on File Prior to Visit   Medication   • nitroglycerin (NITROSTAT) SL tablet 0.4 mg   • sodium chloride 0.9 % flush 10 mL     She is allergic to beta adrenergic blockers; adhesive tape; sulfa antibiotics; and sulfur..    Outpatient Medications Prior to Visit   Medication Sig Dispense Refill   • Black Pepper-Turmeric (TURMERIC CURCUMIN) 5-1000 MG capsule      • Cholecalciferol (VITAMIN D3) 5000 UNITS capsule capsule Take 5,000 Units by mouth Daily.     • clopidogrel (PLAVIX) 75 MG tablet TAKE 1 TABLET DAILY 90 tablet 3   • ezetimibe (ZETIA) 10 MG tablet Take 1 tablet by mouth Daily. 90 tablet 3   • FIBER PO Take 1 tablet by mouth Daily.     • icosapent ethyl (VASCEPA) 1 g capsule capsule Take 4 g by mouth Daily.     • isosorbide dinitrate (ISORDIL) 5 MG tablet Take 1 tablet by mouth 2 (Two) Times a Day. 180 tablet 3   • pitavastatin calcium (LIVALO) 2 MG tablet tablet Take 2 mg by mouth Every Night.     • aluminum chloride (DRYSOL) 20 % external solution Apply  topically Every Night.     • losartan (COZAAR) 25 MG tablet TAKE 1 TABLET DAILY 90 tablet 1   • predniSONE (DELTASONE) 10 MG tablet Take 4 po qd X 3 days,3 po qd x 3 days,2 po qd x 3 days,1 po qd x 3 days and 1/2 po qd x 2 days 31 tablet 0     Facility-Administered Medications Prior to Visit   Medication Dose Route Frequency Provider Last Rate Last Dose   • nitroglycerin (NITROSTAT) SL tablet 0.4  mg  0.4 mg Sublingual Q5 Min PRN John Crabtree MD       • sodium chloride 0.9 % flush 10 mL  10 mL Intravenous PRN John Crabtree MD           Patient Active Problem List   Diagnosis   • Cerebral aneurysm   • Coronary artery disease   • Mixed hyperlipidemia   • Essential hypertension   • Hyperthyroidism   • Insomnia   • Onychomycosis of toenail   • Bilateral enlargement of atria   • Sinus bradycardia   • Transient cerebral ischemia   • Skin lesion of right ear   • Fatigue   • Medicare annual wellness visit, subsequent   • Osteopenia   • Necrotic hand wound (CMS/HCC)   • Family history of breast cancer   • S/P CABG (coronary artery bypass graft)   • Chest pain   • Neck strain, initial encounter   • Poison ivy dermatitis   • Need for influenza vaccination       Advanced Care Planning:  Patient has an advance directive - a copy has been provided and is visible in patient header    Review of Systems   Constitutional: Negative.    HENT: Negative.    Eyes: Negative.    Respiratory: Negative.  Negative for cough, chest tightness, shortness of breath and wheezing.    Cardiovascular: Negative.  Negative for chest pain, palpitations and leg swelling.   Gastrointestinal: Negative.  Negative for abdominal pain, constipation, diarrhea and nausea.   Endocrine: Negative.    Genitourinary: Negative.    Musculoskeletal: Negative.    Skin: Negative.    Allergic/Immunologic: Negative.    Neurological: Negative.  Negative for dizziness, light-headedness and headaches.   Hematological: Negative.    Psychiatric/Behavioral: Negative for sleep disturbance.   All other systems reviewed and are negative.      Compared to one year ago, the patient feels her physical health is better.  Compared to one year ago, the patient feels her mental health is better.    Reviewed chart for potential of high risk medication in the elderly: yes  Reviewed chart for potential of harmful drug interactions in the elderly:yes    Objective        "  Vitals:    10/02/19 0942 10/02/19 1002   BP: 128/78 110/68   BP Location:  Right arm   Patient Position:  Sitting   Cuff Size:  Adult   Pulse: 71    Resp: 16    Temp: 98.2 °F (36.8 °C)    SpO2: 98%    Weight: 66.7 kg (147 lb)    Height: 162.6 cm (64\")        Body mass index is 25.23 kg/m².  Discussed the patient's BMI with her. The BMI is in the acceptable range.    Physical Exam   Constitutional: She is oriented to person, place, and time. Vital signs are normal. She appears well-developed and well-nourished.   Neck: Trachea normal and phonation normal. Neck supple. Normal carotid pulses, no hepatojugular reflux and no JVD present. No tracheal tenderness present. Carotid bruit is not present. No tracheal deviation and no edema present. No thyroid mass and no thyromegaly present.   Cardiovascular: Normal rate, regular rhythm, S1 normal, S2 normal, normal heart sounds and normal pulses.   No murmur heard.  Pulmonary/Chest: Effort normal and breath sounds normal.   Abdominal: Soft. Normal appearance, normal aorta and bowel sounds are normal. There is no hepatomegaly. There is no tenderness.   Neurological: She is alert and oriented to person, place, and time.   Skin: Skin is warm, dry and intact. Capillary refill takes less than 2 seconds.   Psychiatric: She has a normal mood and affect. Her speech is normal and behavior is normal. Judgment and thought content normal. Cognition and memory are normal.             Assessment/Plan   Medicare Risks and Personalized Health Plan  CMS Preventative Services Quick Reference  Immunizations Discussed/Encouraged (specific immunizations; Influenza )    The above risks/problems have been discussed with the patient.  Pertinent information has been shared with the patient in the After Visit Summary.  Follow up plans and orders are seen below in the Assessment/Plan Section.    Diagnoses and all orders for this visit:    1. Medicare annual wellness visit, subsequent (Primary)    2. " Essential hypertension  -     losartan (COZAAR) 25 MG tablet; Take 1 tablet by mouth Daily.  Dispense: 90 tablet; Refill: 3    3. Need for influenza vaccination  -     Fluad Quad >65 years    1.  Medicare wellness examination with hypertension: I have rechecked blood pressure at office visit today and got 110/68 in left arm.  I will refill her Cozaar medication to pharmacy for 90 days.  Will return to office in 6 months.  She will be having her fasting blood work performed with her cardiologist group.  2.  Need for high-dose influenza vaccination: She is given written consent to receive updated high-dose influenza that shot at office visit today.      Patient Counseling:  --Nutrition: Stressed importance of moderation in sodium/caffeine intake, saturated fat and cholesterol.  Discussed caloric balance, sufficient intake of fresh fruits, vegetables, fiber, calcium, iron.  --Exercise: Stressed the importance of regular exercise.   --Substance Abuse: Discussed cessation/primary prevention of tobacco, alcohol, or other drug use; driving or other dangerous activities under the influence.    --Dental health: Discussed importance of regular tooth brushing, flossing, and    dental visits.  -- suggested having eyes and vision checked if needed or past due.  --Immunizations reviewed.          Follow Up:  No Follow-up on file.     An After Visit Summary and PPPS were given to the patient.

## 2019-12-26 ENCOUNTER — OFFICE VISIT (OUTPATIENT)
Dept: ORTHOPEDIC SURGERY | Facility: CLINIC | Age: 69
End: 2019-12-26

## 2019-12-26 VITALS
SYSTOLIC BLOOD PRESSURE: 146 MMHG | HEIGHT: 64 IN | BODY MASS INDEX: 25.27 KG/M2 | DIASTOLIC BLOOD PRESSURE: 83 MMHG | WEIGHT: 148 LBS | HEART RATE: 67 BPM

## 2019-12-26 DIAGNOSIS — M70.61 GREATER TROCHANTERIC BURSITIS OF RIGHT HIP: ICD-10-CM

## 2019-12-26 DIAGNOSIS — R52 PAIN: Primary | ICD-10-CM

## 2019-12-26 PROCEDURE — 73502 X-RAY EXAM HIP UNI 2-3 VIEWS: CPT | Performed by: NURSE PRACTITIONER

## 2019-12-26 PROCEDURE — 99203 OFFICE O/P NEW LOW 30 MIN: CPT | Performed by: NURSE PRACTITIONER

## 2019-12-26 PROCEDURE — 20610 DRAIN/INJ JOINT/BURSA W/O US: CPT | Performed by: NURSE PRACTITIONER

## 2019-12-26 RX ORDER — LIDOCAINE HYDROCHLORIDE 10 MG/ML
4 INJECTION, SOLUTION EPIDURAL; INFILTRATION; INTRACAUDAL; PERINEURAL
Status: COMPLETED | OUTPATIENT
Start: 2019-12-26 | End: 2019-12-26

## 2019-12-26 RX ORDER — TRIAMCINOLONE ACETONIDE 40 MG/ML
80 INJECTION, SUSPENSION INTRA-ARTICULAR; INTRAMUSCULAR
Status: COMPLETED | OUTPATIENT
Start: 2019-12-26 | End: 2019-12-26

## 2019-12-26 RX ADMIN — TRIAMCINOLONE ACETONIDE 80 MG: 40 INJECTION, SUSPENSION INTRA-ARTICULAR; INTRAMUSCULAR at 10:59

## 2019-12-26 RX ADMIN — LIDOCAINE HYDROCHLORIDE 4 ML: 10 INJECTION, SOLUTION EPIDURAL; INFILTRATION; INTRACAUDAL; PERINEURAL at 10:59

## 2019-12-26 NOTE — PROGRESS NOTES
Subjective:     Patient ID: Maddison Turcios is a 69 y.o. female.    Chief Complaint:  Right hip pain  History of Present Illness  Maddison Turcios 69-year-old female presents to clinic for evaluation of right hip.  Maximal tenderness present the lateral aspect of the hip and also present in the anterior aspect.  Pain present over the last 1 month increased pain noted when attempting to sleep on the right side at night, walking up steps she is having to lead with the left at this time secondary to pain.  Increased pain noted also with walking.  Rates discomfort worst 5-6 out of 10 aching stabbing in nature.  She has been treated for greater to enteric bursitis left hip in the past received corticosteroid injection which did significantly help to decrease the pain.  She is able to pinpoint tenderness lateral aspect of the hip.  She has tried sleeping with a heating pad on low so that she is able to lie on the right side.  She is also tried home stretches and strengthening exercises right lower extremity.  Pain is not radiating inferiorly denies presence of numbness or tingling right lower extremity.  She has been taking Tylenol and applying Biofreeze.  Denies any recent x-ray, MRI or CT hip in the past.  Denies known specific injury.  Denies other concerns present this time.     Social History     Occupational History   • Not on file   Tobacco Use   • Smoking status: Never Smoker   • Smokeless tobacco: Never Used   Substance and Sexual Activity   • Alcohol use: No   • Drug use: No   • Sexual activity: Never     Partners: Male     Birth control/protection: Post-menopausal, Surgical     Comment: BTL      Past Medical History:   Diagnosis Date   • Abnormal blood chemistry    • Acute UTI (urinary tract infection)    • Allergic    • Anemia    • Aneurysm, cerebral    • Bloating    • Brain aneurysm    • CAD (coronary artery disease)    • Chronic headaches    • Coronary artery disease    • Coronary artery stenosis    • Dysuria    •  Encounter for screening colonoscopy    • Environmental allergies    • Fall from slip, trip, or stumble    • Gait disturbance    • H/O cardiovascular stress test 09/17/2013    Treadmill   • H/O colonoscopy    • H/O echocardiogram 09/25/2013   • H/O fall    • History of EKG 07/31/2015   • Hyperlipemia    • Hyperlipidemia    • Hypertension    • Hyperthyroidism    • Injury of back    • Insomnia    • Left forearm pain    • Left leg pain    • Left wrist pain    • Lower abdominal pain    • Need for pneumococcal vaccination    • Onychomycosis of toenail    • AKIRA (obstructive sleep apnea)    • Pelvic pressure in female    • Right foot pain    • TIA (transient ischemic attack) 11/30/2016   • URI (upper respiratory infection)    • Urine frequency      Past Surgical History:   Procedure Laterality Date   • BREAST EXCISIONAL BIOPSY Right 1977    b9   • BREAST EXCISIONAL BIOPSY Right 1979    b9   • BUNIONECTOMY     • CARDIAC CATHETERIZATION N/A 4/8/2019    Procedure: Left Heart Cath;  Surgeon: Jayme Ramirez MD;  Location[facility] C[facility];  Service: Cardiovascular   • CARDIAC CATHETERIZATION N/A 4/8/2019    Procedure: Coronary angiography;  Surgeon: Jayme Ramirez MD;  Location:  [facility] CATH INVASIVE LOCATION;  Service: Cardiovascular   • CARDIAC CATHETERIZATION N/A 4/8/2019    Procedure: Left ventriculography;  Surgeon: Jayme Ramirez MD;  Location:  [facility] CATH INVASIVE LOCATION;  Service: Cardiovascular   • CEREBRAL ANEURYSM REPAIR     • CHOLECYSTECTOMY     • CORONARY ARTERY BYPASS GRAFT     • ROTATOR CUFF REPAIR Left    • SPLENECTOMY     • TONSILLECTOMY     • TUBAL ABDOMINAL LIGATION         Family History   Problem Relation Age of Onset   • Heart failure Mother    • Hypertension Mother    • Stroke Mother    • Cervical cancer Mother    • Heart failure Father    • Aneurysm Sister    • Breast cancer Sister    • Heart attack Brother    • Cancer Brother    • Lung cancer Brother    • No Known Problems  "Son    • No Known Problems Daughter    • No Known Problems Sister    • No Known Problems Sister    • Ovarian cancer Neg Hx    • Colon cancer Neg Hx    • Pulmonary embolism Neg Hx    • Deep vein thrombosis Neg Hx          Review of Systems   Constitutional: Negative for chills, diaphoresis, fever and unexpected weight change.   HENT: Negative for hearing loss, nosebleeds, sore throat and tinnitus.    Eyes: Negative for pain and visual disturbance.   Respiratory: Negative for cough, shortness of breath and wheezing.    Cardiovascular: Negative for chest pain and palpitations.   Gastrointestinal: Negative for abdominal pain, diarrhea, nausea and vomiting.   Endocrine: Negative for cold intolerance, heat intolerance and polydipsia.   Genitourinary: Negative for difficulty urinating, dysuria and hematuria.   Musculoskeletal: Positive for arthralgias and myalgias. Negative for joint swelling.   Skin: Negative for rash and wound.   Allergic/Immunologic: Negative for environmental allergies.   Neurological: Negative for dizziness, syncope and numbness.   Hematological: Does not bruise/bleed easily.   Psychiatric/Behavioral: Positive for sleep disturbance. Negative for dysphoric mood. The patient is not nervous/anxious.            Objective:  Physical Exam    Vital signs reviewed.   General: No acute distress.  Eyes: conjunctiva clear; pupils equally round and reactive  ENT: external ears and nose atraumatic; oropharynx clear  CV: no peripheral edema  Resp: normal respiratory effort  Skin: no rashes or wounds; normal turgor  Psych: mood and affect appropriate; recent and remote memory intact    Vitals:    12/26/19 1020 12/26/19 1023   BP: 170/90 146/83   BP Location: Right arm Right arm   Pulse: 67 67   Weight: 67.1 kg (148 lb)    Height: 162.6 cm (64\")          12/26/19  1020   Weight: 67.1 kg (148 lb)     Body mass index is 25.4 kg/m².      Right Hip Exam     Tenderness   The patient is experiencing tenderness in the " greater trochanter.    Range of Motion   Abduction: 45   Adduction: 30   Extension: 0   Flexion: 90   External rotation: 50   Internal rotation: 25     Muscle Strength   Abduction: 4/5   Adduction: 4/5   Flexion: 4/5     Tests   JOSE: negative  Channing: negative  Fadir:  Negative FADIR test    Other   Erythema: absent  Scars: absent  Sensation: normal  Pulse: present    Comments:  Minimally positive logroll exam  Negative Stinchfield exam           Imaging:  Right Hip X-Ray  Indication: Pain  AP and Frog Leg views    Findings:  No fracture  No bony lesion  Normal soft tissues  Normal joint spaces    No prior studies were available for comparison.    Assessment:        1. Pain    2. Greater trochanteric bursitis of right hip           Plan:  1.  Discussed plan of care with patient.  Wish to proceed with corticosteroid injection greater trochanteric bursa, right.  I do recommend continued application of heat along with stretching exercises that she is previously completing as discussed and strengthening as discussed in clinic.  Plan to see her back in clinic if not improving the next 4 weeks.  She verbalized understanding of information agrees with plan of care.  Denies other concerns present this time.  Large Joint Arthrocentesis: R greater trochanteric bursa  Date/Time: 12/26/2019 10:59 AM  Consent given by: patient  Site marked: site marked  Timeout: Immediately prior to procedure a time out was called to verify the correct patient, procedure, equipment, support staff and site/side marked as required   Supporting Documentation  Indications: pain   Procedure Details  Location: hip - R greater trochanteric bursa  Preparation: Patient was prepped and draped in the usual sterile fashion  Needle size: 22 G  Approach: lateral  Medications administered: 4 mL lidocaine PF 1% 1 %; 80 mg triamcinolone acetonide 40 MG/ML  Patient tolerance: patient tolerated the procedure well with no immediate  complications          Orders:  Orders Placed This Encounter   Procedures   • Large Joint Arthrocentesis: R greater trochanteric bursa   • XR Hip With or Without Pelvis 2 - 3 View Right         I ordered and reviewed the BLAIRE today.     Dictated utilizing Dragon dictation   Answers for HPI/ROS submitted by the patient on 12/20/2019   How long have you been having these symptoms?: 1-4 weeks ago

## 2019-12-27 ENCOUNTER — TELEPHONE (OUTPATIENT)
Dept: FAMILY MEDICINE CLINIC | Facility: CLINIC | Age: 69
End: 2019-12-27

## 2019-12-30 DIAGNOSIS — I67.1 CEREBRAL ANEURYSM: ICD-10-CM

## 2019-12-30 RX ORDER — CLOPIDOGREL BISULFATE 75 MG/1
75 TABLET ORAL DAILY
Qty: 90 TABLET | Refills: 3 | Status: SHIPPED | OUTPATIENT
Start: 2019-12-30 | End: 2022-02-11 | Stop reason: SDUPTHER

## 2020-01-02 ENCOUNTER — OFFICE VISIT (OUTPATIENT)
Dept: FAMILY MEDICINE CLINIC | Facility: CLINIC | Age: 70
End: 2020-01-02

## 2020-01-02 ENCOUNTER — HOSPITAL ENCOUNTER (OUTPATIENT)
Dept: GENERAL RADIOLOGY | Facility: HOSPITAL | Age: 70
Discharge: HOME | End: 2020-01-02
Admitting: PHYSICIAN ASSISTANT

## 2020-01-02 VITALS
HEART RATE: 72 BPM | BODY MASS INDEX: 25.27 KG/M2 | OXYGEN SATURATION: 98 % | SYSTOLIC BLOOD PRESSURE: 128 MMHG | WEIGHT: 148 LBS | RESPIRATION RATE: 16 BRPM | HEIGHT: 64 IN | TEMPERATURE: 98.4 F | DIASTOLIC BLOOD PRESSURE: 82 MMHG

## 2020-01-02 DIAGNOSIS — M54.2 CHRONIC NECK PAIN: ICD-10-CM

## 2020-01-02 DIAGNOSIS — Z79.2 PROPHYLACTIC ANTIBIOTIC: ICD-10-CM

## 2020-01-02 DIAGNOSIS — I10 ESSENTIAL HYPERTENSION: Primary | ICD-10-CM

## 2020-01-02 DIAGNOSIS — M54.2 CHRONIC NECK PAIN: Primary | ICD-10-CM

## 2020-01-02 DIAGNOSIS — G89.29 CHRONIC NECK PAIN: ICD-10-CM

## 2020-01-02 DIAGNOSIS — G89.29 CHRONIC NECK PAIN: Primary | ICD-10-CM

## 2020-01-02 DIAGNOSIS — E78.2 MIXED HYPERLIPIDEMIA: ICD-10-CM

## 2020-01-02 PROCEDURE — 72040 X-RAY EXAM NECK SPINE 2-3 VW: CPT

## 2020-01-02 PROCEDURE — 99214 OFFICE O/P EST MOD 30 MIN: CPT | Performed by: PHYSICIAN ASSISTANT

## 2020-01-02 RX ORDER — CIPROFLOXACIN 500 MG/1
500 TABLET, FILM COATED ORAL 2 TIMES DAILY
Qty: 14 TABLET | Refills: 0 | Status: SHIPPED | OUTPATIENT
Start: 2020-01-02 | End: 2020-02-11

## 2020-01-02 RX ORDER — EZETIMIBE 10 MG/1
10 TABLET ORAL DAILY
Qty: 90 TABLET | Refills: 3 | Status: SHIPPED | OUTPATIENT
Start: 2020-01-02 | End: 2021-07-06 | Stop reason: SDUPTHER

## 2020-01-02 NOTE — PROGRESS NOTES
Subjective   Maddison Turcios is a 69 y.o. female presents for   Chief Complaint   Patient presents with   • Hyperlipidemia     management   • Neck Pain     managment       History of Present Illness     Maddison is a 69-year-old female who presents for chronic hyperlipidemia and neck pain management.  She was seen in our office in June 2019 for her neck pain after working in the yard.  She underwent physical therapy several times with slight improvement of her neck pain.  Maddison was recently seen by a geriatric provider,Dr. Hope,at the [facility].  She was supposed to have schedule updated imaging and referral to neck specialist by the geriatric provider.  This has not happened.  Maddison states she still has pain from time to time on the left side of her neck.  Describes the pain as a dull ache that waxes and wanes.  She notices slight decreased range of motion at times.  Denied any recent injury or trauma to her neck.  She may have occasional tingling sensation going down her left shoulder to upper arm area with the neck pain.  She has not been taking any over-the-counter medications.    She sees Dr. Helen Urbina, cardiologist for her hypertension needs.  She is also been seeing a nurse practitioner down in [facility] but does blood work for her cholesterol through the [facility] diagnostic clinic.  She has brought these for my review.  Blood work was collected in October 2019.  Doing well with her current medications.  Denied any chest pain, shortness of air, dizziness, vision changes or swelling of her ankles. I next month for a mission trip.  Maddison will be travelling to [place] next month for mission trip for her [employer].  States she would like up antibiotic for possible traveler's diarrhea.  She has taken in past.    The following portions of the patient's history were reviewed and updated as appropriate: allergies, current medications, past family history, past medical history, past  "social history, past surgical history and problem list.    Review of Systems   Constitutional: Negative.    HENT: Negative.    Eyes: Negative.    Respiratory: Negative.  Negative for cough, chest tightness, shortness of breath and wheezing.    Cardiovascular: Negative.  Negative for chest pain, palpitations and leg swelling.   Gastrointestinal: Negative.    Endocrine: Negative.    Genitourinary: Negative.    Musculoskeletal: Positive for neck pain.   Skin: Negative.    Allergic/Immunologic: Negative.    Neurological: Negative.  Negative for dizziness, light-headedness and headaches.   Hematological: Negative.    Psychiatric/Behavioral: Negative.    All other systems reviewed and are negative.        Vitals:    01/02/20 0755   BP: 128/82   Pulse: 72   Resp: 16   Temp: 98.4 °F (36.9 °C)   SpO2: 98%   Weight: 67.1 kg (148 lb)   Height: 162.6 cm (64\")     Wt Readings from Last 3 Encounters:   01/02/20 67.1 kg (148 lb)   12/26/19 67.1 kg (148 lb)   10/02/19 66.7 kg (147 lb)     BP Readings from Last 3 Encounters:   01/02/20 128/82   12/26/19 146/83   10/02/19 110/68     Social History     Socioeconomic History   • Marital status:      Spouse name: Not on file   • Number of children: Not on file   • Years of education: Not on file   • Highest education level: Not on file   Tobacco Use   • Smoking status: Never Smoker   • Smokeless tobacco: Never Used   Substance and Sexual Activity   • Alcohol use: No   • Drug use: No   • Sexual activity: Never     Partners: Male     Birth control/protection: Post-menopausal, Surgical     Comment: BTL       Allergies   Allergen Reactions   • Adhesive Tape Rash     Redness, bruising and peeling of skin    *Use Paper Tape Only*  Redness, bruising and peeling of skin    *Use Paper Tape Only*   • Beta Adrenergic Blockers Unknown - High Severity     hypotension  bradycardic   • Sulfa Antibiotics Rash   • Sulfur Rash       Body mass index is 25.4 kg/m².    Objective   Physical Exam "   Constitutional: She is oriented to person, place, and time. Vital signs are normal. She appears well-developed and well-nourished.   Neck: Trachea normal, normal range of motion, full passive range of motion without pain and phonation normal. Neck supple. Normal carotid pulses, no hepatojugular reflux and no JVD present. Muscular tenderness present. No tracheal tenderness and no spinous process tenderness present. Carotid bruit is not present. No tracheal deviation, no edema and normal range of motion present.       Cardiovascular: Normal rate, regular rhythm, S1 normal, S2 normal, normal heart sounds and normal pulses.   No murmur heard.  Pulmonary/Chest: Effort normal and breath sounds normal.   Abdominal: Soft. Normal appearance, normal aorta and bowel sounds are normal. There is no hepatomegaly. There is no tenderness.   Musculoskeletal: Normal range of motion.        Cervical back: She exhibits tenderness. She exhibits normal range of motion and no bony tenderness.        Back:    Neurological: She is alert and oriented to person, place, and time.   Skin: Skin is warm, dry and intact. Capillary refill takes less than 2 seconds.   Psychiatric: She has a normal mood and affect. Her speech is normal and behavior is normal. Judgment and thought content normal. Cognition and memory are normal.       Assessment/Plan   Maddison was seen today for hyperlipidemia and neck pain.    Diagnoses and all orders for this visit:    Essential hypertension    Mixed hyperlipidemia  -     ezetimibe (ZETIA) 10 MG tablet; Take 1 tablet by mouth Daily.    Prophylactic antibiotic  -     ciprofloxacin (CIPRO) 500 MG tablet; Take 1 tablet by mouth 2 (Two) Times a Day.    Chronic neck pain  -     XR Spine Cervical 3 View; Future      1.  Chronic and stable hypertension: I have rechecked her blood pressure at office visit today got 120/78 in her left arm.  She will continue current medications at home as directed.  She will keep her  follow-up appointments with her cardiologist as well.  Plan to follow-up in October for her Medicare wellness examination.  2.  Chronic and stable hyperlipidemia: I reviewed her lab work that was collected on October 12, 2019 at her [facility] diagnostic provider.  A1c was 5.1, fasting blood sugar 88, cholesterol 157, HDL 76, triglycerides 72 and LDL was 81.  I have refilled her Zetia medication to her local pharmacy.  3.  New prophylactic antibiotic: Maddison will be traveling to [place] in early February for mission trip.  I have prescribed Cipro antibiotic to take for prophylactic treatment of traveler's diarrhea if needed.  She will only take if needed while on mission trip.  4.  Chronic neck pain: She has done physical therapy a few times in June 2019 for this.  She had no improvement.  She has seen Dr. Figueroa, [facility] geriatric department, several months ago for this.  She was supposed to had imaging as well as referral to specialist.  This has not happened.  Since she is still having an occasional neck pain, she will have a cervical spine x-ray at the [facility] today.  Will consider MRI of cervical spine in future due to occasional tingling going down her left arm.  She will be notified of test results when completed.    [facility]  [address] [place] [id number]  Dept: [phone]  Dept Fax: [phone]  Loc: [phone]  Loc Fax: [phone]

## 2020-01-07 ENCOUNTER — OFFICE VISIT (OUTPATIENT)
Dept: FAMILY MEDICINE CLINIC | Facility: CLINIC | Age: 70
End: 2020-01-07

## 2020-01-07 ENCOUNTER — TELEPHONE (OUTPATIENT)
Dept: FAMILY MEDICINE CLINIC | Facility: CLINIC | Age: 70
End: 2020-01-07

## 2020-01-07 ENCOUNTER — HOSPITAL ENCOUNTER (OUTPATIENT)
Dept: GENERAL RADIOLOGY | Facility: HOSPITAL | Age: 70
Discharge: HOME | End: 2020-01-07
Admitting: FAMILY MEDICINE

## 2020-01-07 VITALS
HEIGHT: 64 IN | HEART RATE: 66 BPM | SYSTOLIC BLOOD PRESSURE: 130 MMHG | TEMPERATURE: 98.7 F | OXYGEN SATURATION: 99 % | BODY MASS INDEX: 25.4 KG/M2 | DIASTOLIC BLOOD PRESSURE: 72 MMHG

## 2020-01-07 DIAGNOSIS — R07.89 ANTERIOR CHEST WALL PAIN: Primary | ICD-10-CM

## 2020-01-07 DIAGNOSIS — R07.89 ANTERIOR CHEST WALL PAIN: ICD-10-CM

## 2020-01-07 PROCEDURE — 99214 OFFICE O/P EST MOD 30 MIN: CPT | Performed by: FAMILY MEDICINE

## 2020-01-07 PROCEDURE — 71111 X-RAY EXAM RIBS/CHEST4/> VWS: CPT

## 2020-01-07 NOTE — TELEPHONE ENCOUNTER
Called pt with chest xray results.    Per Dr. Ferguson,  No rib fracture and chest Xray is clear    No answer. LVM for pt to return call.

## 2020-01-07 NOTE — PROGRESS NOTES
Chief Complaint   Patient presents with   • Fall     breathing hurts; happened  1/3/20       Subjective   Maddison Turcios is a 69 y.o. female.     History of Present Illness     69-year-old female here for follow-up after a fall on 1/3/2020    She tripped and had a mechanical while on a walk walking downwards and landed on her anterior chest.  She is on clopidogrel.  This occurred 4 days ago.  Since then she has noted some R sided anterior chest wall discomfort.  It is worse with palpation.  She does not have any bruising present.  She notices some discomfort when she tries to take a very deep breath.  She has a history of osteopenia on DEXA.    No other injuries.    Past Medical History:   Diagnosis Date   • Abnormal blood chemistry    • Acute UTI (urinary tract infection)    • Allergic    • Anemia    • Aneurysm, cerebral    • Bloating    • Brain aneurysm    • CAD (coronary artery disease)    • Chronic headaches    • Coronary artery disease    • Coronary artery stenosis    • Dysuria    • Encounter for screening colonoscopy    • Environmental allergies    • Fall from slip, trip, or stumble    • Gait disturbance    • H/O cardiovascular stress test 09/17/2013    Treadmill   • H/O colonoscopy    • H/O echocardiogram 09/25/2013   • H/O fall    • History of EKG 07/31/2015   • Hyperlipemia    • Hyperlipidemia    • Hypertension    • Hyperthyroidism    • Injury of back    • Insomnia    • Left forearm pain    • Left leg pain    • Left wrist pain    • Lower abdominal pain    • Need for pneumococcal vaccination    • Onychomycosis of toenail    • AKIRA (obstructive sleep apnea)    • Pelvic pressure in female    • Right foot pain    • TIA (transient ischemic attack) 11/30/2016   • URI (upper respiratory infection)    • Urine frequency        Past Surgical History:   Procedure Laterality Date   • BREAST EXCISIONAL BIOPSY Right 1977    b9   • BREAST EXCISIONAL BIOPSY Right 1979    b9   • BUNIONECTOMY     • CARDIAC CATHETERIZATION N/A  4/8/2019    Procedure: Left Heart Cath;  Surgeon: Jayme Ramirez MD;  Location:  [facility];  Service: Cardiovascular   • CARDIAC CATHETERIZATION N/A 4/8/2019    Procedure: Coronary angiography;  Surgeon: Jayme Ramirez MD;  Location:  [facility];  Service: Cardiovascular   • CARDIAC CATHETERIZATION N/A 4/8/2019    Procedure: Left ventriculography;  Surgeon: Jayme Ramirez MD;  Location:  [facility];  Service: Cardiovascular   • CEREBRAL ANEURYSM REPAIR     • CHOLECYSTECTOMY     • CORONARY ARTERY BYPASS GRAFT     • ROTATOR CUFF REPAIR Left    • SPLENECTOMY     • TONSILLECTOMY     • TUBAL ABDOMINAL LIGATION         Family History   Problem Relation Age of Onset   • Heart failure Mother    • Hypertension Mother    • Stroke Mother    • Cervical cancer Mother    • Heart failure Father    • Aneurysm Sister    • Breast cancer Sister    • Heart attack Brother    • Cancer Brother    • Lung cancer Brother    • No Known Problems Son    • No Known Problems Daughter    • No Known Problems Sister    • No Known Problems Sister    • Ovarian cancer Neg Hx    • Colon cancer Neg Hx    • Pulmonary embolism Neg Hx    • Deep vein thrombosis Neg Hx        Social History     Socioeconomic History   • Marital status:      Spouse name: Not on file   • Number of children: Not on file   • Years of education: Not on file   • Highest education level: Not on file   Tobacco Use   • Smoking status: Never Smoker   • Smokeless tobacco: Never Used   Substance and Sexual Activity   • Alcohol use: No   • Drug use: No   • Sexual activity: Never     Partners: Male     Birth control/protection: Post-menopausal, Surgical     Comment: BTL       Current Outpatient Medications on File Prior to Visit   Medication Sig Dispense Refill   • Black Pepper-Turmeric (TURMERIC CURCUMIN) 5-1000 MG capsule      • Cholecalciferol (VITAMIN D3) 5000 UNITS capsule capsule Take 5,000 Units by mouth  Daily.     • ciprofloxacin (CIPRO) 500 MG tablet Take 1 tablet by mouth 2 (Two) Times a Day. 14 tablet 0   • clopidogrel (PLAVIX) 75 MG tablet Take 1 tablet by mouth Daily. 90 tablet 3   • ezetimibe (ZETIA) 10 MG tablet Take 1 tablet by mouth Daily. 90 tablet 3   • FIBER PO Take 1 tablet by mouth Daily.     • icosapent ethyl (VASCEPA) 1 g capsule capsule Take 4 g by mouth Daily.     • isosorbide dinitrate (ISORDIL) 5 MG tablet Take 1 tablet by mouth 2 (Two) Times a Day. 180 tablet 3   • pitavastatin calcium (LIVALO) 2 MG tablet tablet Take 2 mg by mouth Every Night.       Current Facility-Administered Medications on File Prior to Visit   Medication Dose Route Frequency Provider Last Rate Last Dose   • nitroglycerin (NITROSTAT) SL tablet 0.4 mg  0.4 mg Sublingual Q5 Min PRN John Crabtree MD       • sodium chloride 0.9 % flush 10 mL  10 mL Intravenous PRN John Crabtree MD         The following portions of the patient's history were reviewed and updated as appropriate: allergies, current medications, past family history, past medical history, past social history, past surgical history and problem list.      Review of Systems   Constitutional: Negative for activity change, chills and fever.   HENT: Negative for congestion, postnasal drip and rhinorrhea.    Eyes: Negative for blurred vision and pain.   Respiratory: Negative for cough, chest tightness and shortness of breath.    Cardiovascular: Negative for chest pain and palpitations.   Gastrointestinal: Negative for abdominal pain, constipation, diarrhea, nausea and vomiting.   Endocrine: Negative for cold intolerance and heat intolerance.   Genitourinary: Negative for decreased urine volume, dysuria and frequency.   Musculoskeletal: Negative for arthralgias, back pain and myalgias.   Skin: Negative for rash and skin lesions.   Neurological: Negative for dizziness and confusion.   Psychiatric/Behavioral: Negative for agitation, behavioral problems and  depressed mood.           Vitals:    01/07/20 1456   BP: 130/72   Pulse: 66   Temp: 98.7 °F (37.1 °C)   SpO2: 99%      Objective   Physical Exam   Constitutional: She is oriented to person, place, and time. She appears well-developed and well-nourished.   HENT:   Head: Normocephalic and atraumatic.   Eyes: Pupils are equal, round, and reactive to light. Conjunctivae and EOM are normal.   Neck: Neck supple.   Cardiovascular: Normal rate, regular rhythm and normal heart sounds.   No murmur heard.  Pulmonary/Chest: Effort normal and breath sounds normal. No respiratory distress.   Abdominal: Soft. Bowel sounds are normal.   Musculoskeletal: Normal range of motion.   Neurological: She is alert and oriented to person, place, and time.   Skin: Skin is warm and dry.   No area of bruising, redness or hematoma on anterior chest. +mildly ttp R anterior chest   Psychiatric: She has a normal mood and affect.   Nursing note and vitals reviewed.        Assessment/Plan   Problem List Items Addressed This Visit     None      Visit Diagnoses     Anterior chest wall pain    -  Primary    Relevant Orders    XR ribs bilateral 4+ vw w pa chest        Continue tylenol and supportive measure, will obtain Xray     Return if symptoms worsen or fail to improve.      Jaleesa Ferguson MD

## 2020-01-08 ENCOUNTER — TELEPHONE (OUTPATIENT)
Dept: FAMILY MEDICINE CLINIC | Facility: CLINIC | Age: 70
End: 2020-01-08

## 2020-01-08 NOTE — TELEPHONE ENCOUNTER
Called pt with xray results.    Per Dr. Ferguson,  No rib fracture and chest Xray is clear    Pt notified

## 2020-01-14 ENCOUNTER — HOSPITAL ENCOUNTER (OUTPATIENT)
Dept: MRI IMAGING | Facility: HOSPITAL | Age: 70
Discharge: HOME | End: 2020-01-14

## 2020-01-14 DIAGNOSIS — M54.2 CHRONIC NECK PAIN: ICD-10-CM

## 2020-01-14 DIAGNOSIS — G89.29 CHRONIC NECK PAIN: ICD-10-CM

## 2020-01-20 ENCOUNTER — HOSPITAL ENCOUNTER (OUTPATIENT)
Dept: MRI IMAGING | Facility: HOSPITAL | Age: 70
Discharge: HOME | End: 2020-01-20
Admitting: PHYSICIAN ASSISTANT

## 2020-01-20 DIAGNOSIS — M54.2 CHRONIC NECK PAIN: Primary | ICD-10-CM

## 2020-01-20 DIAGNOSIS — G89.29 CHRONIC NECK PAIN: Primary | ICD-10-CM

## 2020-01-20 DIAGNOSIS — R93.7 ABNORMAL MRI, CERVICAL SPINE: ICD-10-CM

## 2020-01-20 PROCEDURE — 72141 MRI NECK SPINE W/O DYE: CPT

## 2020-01-28 ENCOUNTER — HOSPITAL ENCOUNTER (OUTPATIENT)
Dept: MRI IMAGING | Facility: HOSPITAL | Age: 70
Discharge: HOME | End: 2020-01-28
Admitting: PHYSICIAN ASSISTANT

## 2020-01-28 DIAGNOSIS — R93.7 ABNORMAL MRI, CERVICAL SPINE: ICD-10-CM

## 2020-01-28 DIAGNOSIS — G89.29 CHRONIC NECK PAIN: ICD-10-CM

## 2020-01-28 DIAGNOSIS — M54.2 CHRONIC NECK PAIN: ICD-10-CM

## 2020-01-28 LAB — CREAT BLDA-MCNC: 0.8 MG/DL (ref 0.6–1.3)

## 2020-01-28 PROCEDURE — 82565 ASSAY OF CREATININE: CPT

## 2020-01-28 PROCEDURE — 70543 MRI ORBT/FAC/NCK W/O &W/DYE: CPT

## 2020-01-28 PROCEDURE — A9577 INJ MULTIHANCE: HCPCS | Performed by: PHYSICIAN ASSISTANT

## 2020-01-28 PROCEDURE — 0 GADOBENATE DIMEGLUMINE 529 MG/ML SOLUTION: Performed by: PHYSICIAN ASSISTANT

## 2020-01-28 RX ADMIN — GADOBENATE DIMEGLUMINE 13 ML: 529 INJECTION, SOLUTION INTRAVENOUS at 08:36

## 2020-01-30 DIAGNOSIS — E04.1 LEFT THYROID NODULE: Primary | ICD-10-CM

## 2020-02-07 NOTE — PROGRESS NOTES
Date of Office Visit: 2020  Encounter Provider: RENATE Kaur  Place of Service: [facility] CARDIOLOGY  Patient Name: Maddison Turcios  :1950  Primary Cardiologist: Dr. Urbina    CC: 6 month follow up    Dear Kasandra    HPI: Maddison Turcios is a pleasant 69 y.o. female who presents 2020 for cardiac follow up. She is a new to me and I reviewed her past medical records.  She is a patient of Dr. Urbina.    She has a history of episodes of severe fatigue and had a stress study which showed a very mild abnormality. She continued to have the severe fatigue and had a heart catheterization done in 2004 showing severe left main coronary stenosis.  She was transferred emergently for bypass surgery at [facility] which was performed 2004 by Dr. Cade. He has cardiac catheterization done 2004 that showed ostial 60% stenosis of the left main coronary artery. The left anterior descending artery and left circumflex also showed no significant disease. The left circumflex vessel was dominant. The cardiac catheterization was done at [facility] in [place], [place]. Bypass 2004 she received a LIMA to mid LAD and sequential saphenous vein graft to the ramus and intermedius and first obtuse marginal branch done at [facility].      In 2013, she had a stress nuclear perfusion study showing no ischemia. She had a 2D echocardiogram with Doppler showing ejection fraction of 60% to 65% with trace mitral and tricuspid insufficiency. She had a carotid duplex  study done in 2013 which was normal.      She had an ECG done 2016 which shows poor R-wave progression sinus rhythm and otherwise appears normal.      She had a cerebral angiogram with Dr. Hewitt and it showed no cerebral aneurysm.      She was at [facility], admitted there on 2016 through 2016. At that time she was diagnosed with a  TIA. During her hospitalization she had an MRI which was negative for an acute infarct. They felt possible TIA versus complicated migraine. She had had for 2-3 days prior to this a feeling of imbalance and leaned to the left along with some blurred vision. She had a transesophageal echocardiogram whether did not show anything to explain her TIA. There was bilateral atrial enlargement but otherwise looked fine. She was loaded with Plavix 300 mg and then continued with 75 mg of Plavix daily. Neurosurgery was consulted because she had a history of prior pipelined right internal carotid artery aneurysm. Dr. Hewitt had seen her and treated her for his in 08/2015. Neurosurgery said there was no evidence of aneurysm. Her statin medication was increased because of elevated hyperlipidemia. She actually had garbled speech, visual changes and balance issues all with the TIA and they happened over a matter of three days, kind of intermittently.       She had a normal 21 day event recorder 1/2017.      She was tolerating Livalo without muscle complaints.  She is on zetia  And vascepa.      She called 911 on 4/8/2019 because she was having exertional chest pressure with doing mowing the normal household activities.  When EMS arrived, they administered 2 nitroglycerin and it helped her chest pressure.  She was taken to [facility] and there she ruled out for myocardial infarction.  She was sent to [facility] and then return to the hospital for cardiac catheterization which showed atretic LIMA to LAD, patent SVG to ramus intermedius then to OM, 30% left main stenosis proximally but normal LAD, left circumflex and RCA.  Her ejection fraction was normal.  She was dismissed from the hospital.  Since dismissal, she continues to have exertional dyspnea and fatigue.  She does not feel her heart racing or skipping and she has had really no chest pain or pressure.  Her presentation prior to bypass was exertional fatigue.  She had no  nausea, vomiting, fevers or chills.  She is been taking her medications as directed.  She just does not feel quite right.     She had a stress echo performed May 2019 that showed:    · Left ventricular systolic function is normal.  · Left ventricular diastolic dysfunction (grade I) consistent with impaired relaxation.  · Rest EF-56%  · Post EF- 81%.  · Normal stress echo with no significant echocardiographic evidence for myocardial ischemia.     She was started on Isodril 5 mg twice daily.    She states she is felt well.  She just got back from a mission trip from [place] in the mountains and was able to perform all of her duties without any chest pain or shortness of breath.  She will occasionally feel some palpitations with exertion but that is not very often.  She denies any shortness of air, lower extremity edema, chest pain or chest tightness.  She denies any fatigue or lightheadedness.  She does state some fatigue.  She denies any leg pain, numbness or tingling her upper or lower extremities.  She has had no PND, cough, orthopnea.  She is taking her medications as directed.  She has had no unexplained bleeding.  Past Medical History:   Diagnosis Date   • Abnormal blood chemistry    • Acute UTI (urinary tract infection)    • Allergic    • Anemia    • Aneurysm, cerebral    • Bloating    • Brain aneurysm    • CAD (coronary artery disease)    • Chronic headaches    • Coronary artery disease    • Coronary artery stenosis    • Dysuria    • Encounter for screening colonoscopy    • Environmental allergies    • Fall from slip, trip, or stumble    • Gait disturbance    • H/O cardiovascular stress test 09/17/2013    Treadmill   • H/O colonoscopy    • H/O echocardiogram 09/25/2013   • H/O fall    • History of EKG 07/31/2015   • Hyperlipemia    • Hyperlipidemia    • Hypertension    • Hyperthyroidism    • Injury of back    • Insomnia    • Left forearm pain    • Left leg pain    • Left wrist pain    • Lower abdominal  pain    • Need for pneumococcal vaccination    • Onychomycosis of toenail    • AKIRA (obstructive sleep apnea)    • Pelvic pressure in female    • Right foot pain    • TIA (transient ischemic attack) 11/30/2016   • URI (upper respiratory infection)    • Urine frequency        Past Surgical History:   Procedure Laterality Date   • BREAST EXCISIONAL BIOPSY Right 1977    b9   • BREAST EXCISIONAL BIOPSY Right 1979    b9   • BUNIONECTOMY     • CARDIAC CATHETERIZATION N/A 4/8/2019    Procedure: Left Heart Cath;  Surgeon: Jayme Ramirez MD;  Location:  [facility] C[facility]VASIVE [facility];  Service: Cardiovascular   • CARDIAC CATHETERIZATION N/A 4/8/2019    Procedure: Coronary angiography;  Surgeon: Jayme Ramirez MD;  Location:  [facility]VASIVE [facility];  Service: Cardiovascular   • CARDIAC CATHETERIZATION N/A 4/8/2019    Procedure: Left ventriculography;  Surgeon: Jayme Ramirez MD;  Location:  [facility] CATH INVASIVE [facility]ATION;  Service: Cardiovascular   • CEREBRAL ANEURYSM REPAIR     • CHOLECYSTECTOMY     • CORONARY ARTERY BYPASS GRAFT     • ROTATOR CUFF REPAIR Left    • SPLENECTOMY     • TONSILLECTOMY     • TUBAL ABDOMINAL LIGATION         Social History     Socioeconomic History   • Marital status:      Spouse name: Not on file   • Number of children: Not on file   • Years of education: Not on file   • Highest education level: Not on file   Tobacco Use   • Smoking status: Never Smoker   • Smokeless tobacco: Never Used   Substance and Sexual Activity   • Alcohol use: No   • Drug use: No   • Sexual activity: Never     Partners: Male     Birth control/protection: Post-menopausal, Surgical     Comment: BTL       Family History   Problem Relation Age of Onset   • Heart failure Mother    • Hypertension Mother    • Stroke Mother    • Cervical cancer Mother    • Heart failure Father    • Aneurysm Sister    • Breast cancer Sister    • Heart attack Brother    • Cancer Brother    • Lung cancer Brother    • No  Known Problems Son    • No Known Problems Daughter    • No Known Problems Sister    • No Known Problems Sister    • Ovarian cancer Neg Hx    • Colon cancer Neg Hx    • Pulmonary embolism Neg Hx    • Deep vein thrombosis Neg Hx        The following portion of the patient's history were reviewed and updated as appropriate: past medical history, past surgical history, past social history, past family history, allergies, current medications, and problem list.    Review of Systems   Constitution: Positive for malaise/fatigue. Negative for diaphoresis and fever.   HENT: Negative for congestion, hearing loss, hoarse voice, nosebleeds and sore throat.    Eyes: Negative for photophobia, vision loss in left eye, vision loss in right eye and visual disturbance.   Cardiovascular: Positive for palpitations (occ). Negative for chest pain, dyspnea on exertion, irregular heartbeat, leg swelling, near-syncope, orthopnea, paroxysmal nocturnal dyspnea and syncope.   Respiratory: Negative for cough, hemoptysis, shortness of breath, sleep disturbances due to breathing, snoring, sputum production and wheezing.    Endocrine: Negative for cold intolerance, heat intolerance, polydipsia, polyphagia and polyuria.   Hematologic/Lymphatic: Negative for bleeding problem. Does not bruise/bleed easily.   Skin: Negative for color change, dry skin, poor wound healing, rash and suspicious lesions.   Musculoskeletal: Positive for neck pain (known DDD). Negative for arthritis, back pain, falls, gout, joint pain, joint swelling, muscle cramps, muscle weakness and myalgias.   Gastrointestinal: Negative for bloating, abdominal pain, constipation, diarrhea, dysphagia, melena, nausea and vomiting.   Neurological: Negative for excessive daytime sleepiness, dizziness, headaches, light-headedness, loss of balance, numbness, paresthesias, seizures, vertigo and weakness.   Psychiatric/Behavioral: Negative for depression, memory loss and substance abuse. The  "patient is not nervous/anxious.        Allergies   Allergen Reactions   • Adhesive Tape Rash     Redness, bruising and peeling of skin    *Use Paper Tape Only*  Redness, bruising and peeling of skin    *Use Paper Tape Only*   • Beta Adrenergic Blockers Unknown - High Severity     hypotension  bradycardic   • Sulfa Antibiotics Rash   • Sulfur Rash         Current Outpatient Medications:   •  Black Pepper-Turmeric (TURMERIC CURCUMIN) 5-1000 MG capsule, , Disp: , Rfl:   •  Cholecalciferol (VITAMIN D3) 5000 UNITS capsule capsule, Take 5,000 Units by mouth Daily., Disp: , Rfl:   •  clopidogrel (PLAVIX) 75 MG tablet, Take 1 tablet by mouth Daily., Disp: 90 tablet, Rfl: 3  •  ezetimibe (ZETIA) 10 MG tablet, Take 1 tablet by mouth Daily., Disp: 90 tablet, Rfl: 3  •  icosapent ethyl (VASCEPA) 1 g capsule capsule, Take 4 g by mouth Daily., Disp: , Rfl:   •  isosorbide dinitrate (ISORDIL) 5 MG tablet, Take 1 tablet by mouth 2 (Two) Times a Day., Disp: 180 tablet, Rfl: 3  •  losartan (COZAAR) 25 MG tablet, Take 25 mg by mouth Daily., Disp: , Rfl:   •  pitavastatin calcium (LIVALO) 2 MG tablet tablet, Take 2 mg by mouth Every Night., Disp: , Rfl:   •  FIBER PO, Take 1 tablet by mouth Daily., Disp: , Rfl:     Current Facility-Administered Medications:   •  nitroglycerin (NITROSTAT) SL tablet 0.4 mg, 0.4 mg, Sublingual, Q5 Min PRN, John Crabtree MD  •  sodium chloride 0.9 % flush 10 mL, 10 mL, Intravenous, PRN, John Crabtree MD        Objective:     Vitals:    02/11/20 0915   BP: 132/80   BP Location: Left arm   Patient Position: Sitting   Cuff Size: Adult   Pulse: 71   Resp: 16   SpO2: 96%   Weight: 67.6 kg (149 lb)   Height: 162.6 cm (64\")     Body mass index is 25.58 kg/m².      Physical Exam   Constitutional: She is oriented to person, place, and time. She appears well-developed and well-nourished. No distress.   HENT:   Head: Normocephalic and atraumatic.   Right Ear: External ear normal.   Left Ear: External ear " normal.   Nose: Nose normal.   Eyes: Pupils are equal, round, and reactive to light. Conjunctivae are normal. Right eye exhibits no discharge. Left eye exhibits no discharge.   Neck: Normal range of motion. Neck supple. No JVD present. No tracheal deviation present. No thyromegaly present.   Cardiovascular: Normal rate, regular rhythm, normal heart sounds and intact distal pulses. Exam reveals no gallop and no friction rub.   No murmur heard.  Pulmonary/Chest: Effort normal and breath sounds normal. No respiratory distress. She has no wheezes. She has no rales. She exhibits no tenderness.   Abdominal: Soft. Bowel sounds are normal. She exhibits no distension. There is no tenderness.   Musculoskeletal: Normal range of motion. She exhibits no edema, tenderness or deformity.   Lymphadenopathy:     She has no cervical adenopathy.   Neurological: She is alert and oriented to person, place, and time. Coordination normal.   Skin: Skin is warm and dry. No rash noted. No erythema.   Psychiatric: She has a normal mood and affect. Her behavior is normal. Judgment and thought content normal.             ECG 12 Lead  Date/Time: 2/11/2020 9:20 AM  Performed by: Kasandra Jones APRN  Authorized by: Kasandra Jones APRN   Comparison: compared with previous ECG from 8/9/2019  Similar to previous ECG  Rhythm: sinus rhythm  Rate: normal  Conduction: conduction normal  ST Segments: ST segments normal  T Waves: T waves normal  QRS axis: normal (borderline right axis deviation)    Clinical impression: non-specific ECG              Assessment:       Diagnosis Plan   1. Coronary artery disease involving native coronary artery of native heart without angina pectoris     2. Essential hypertension     3. Mixed hyperlipidemia     4. Transient cerebral ischemia, unspecified type     5. Bilateral enlargement of atria            Plan:       1.  CAD-status post CABG.  Denies angina    2.  Essential hypertension-well controlled    3.  Hyperlipidemia-  she is currently on Livalo, Zetia and fish oil.  Followed by PCP    4.  TIA- etiology uncertain.  She has no further instances since being on the Plavix    5.  Bilateral enlargement of atria- stress echo 5/19 with normal left ventricular systolic function, grade 1 left ventricular diastolic dysfunction, resting EF 56%, post EF 81%.  Normal stress echo with no significant evidence of myocardial ischemia     RTO in 1 year with RM    As always, it has been a pleasure to participate in your patient's care. Thank you.       Sincerely,       RENATE Kaur      Current Outpatient Medications:   •  Black Pepper-Turmeric (TURMERIC CURCUMIN) 5-1000 MG capsule, , Disp: , Rfl:   •  Cholecalciferol (VITAMIN D3) 5000 UNITS capsule capsule, Take 5,000 Units by mouth Daily., Disp: , Rfl:   •  clopidogrel (PLAVIX) 75 MG tablet, Take 1 tablet by mouth Daily., Disp: 90 tablet, Rfl: 3  •  ezetimibe (ZETIA) 10 MG tablet, Take 1 tablet by mouth Daily., Disp: 90 tablet, Rfl: 3  •  icosapent ethyl (VASCEPA) 1 g capsule capsule, Take 4 g by mouth Daily., Disp: , Rfl:   •  isosorbide dinitrate (ISORDIL) 5 MG tablet, Take 1 tablet by mouth 2 (Two) Times a Day., Disp: 180 tablet, Rfl: 3  •  losartan (COZAAR) 25 MG tablet, Take 25 mg by mouth Daily., Disp: , Rfl:   •  pitavastatin calcium (LIVALO) 2 MG tablet tablet, Take 2 mg by mouth Every Night., Disp: , Rfl:   •  FIBER PO, Take 1 tablet by mouth Daily., Disp: , Rfl:     Current Facility-Administered Medications:   •  nitroglycerin (NITROSTAT) SL tablet 0.4 mg, 0.4 mg, Sublingual, Q5 Min PRN, John Crabtree MD  •  sodium chloride 0.9 % flush 10 mL, 10 mL, Intravenous, PRN, John Crabtree MD    Dictated utilizing Dragon dictation

## 2020-02-11 ENCOUNTER — OFFICE VISIT (OUTPATIENT)
Dept: CARDIOLOGY | Facility: CLINIC | Age: 70
End: 2020-02-11

## 2020-02-11 VITALS
WEIGHT: 149 LBS | HEIGHT: 64 IN | HEART RATE: 71 BPM | DIASTOLIC BLOOD PRESSURE: 80 MMHG | BODY MASS INDEX: 25.44 KG/M2 | RESPIRATION RATE: 16 BRPM | SYSTOLIC BLOOD PRESSURE: 132 MMHG | OXYGEN SATURATION: 96 %

## 2020-02-11 DIAGNOSIS — I10 ESSENTIAL HYPERTENSION: ICD-10-CM

## 2020-02-11 DIAGNOSIS — E78.2 MIXED HYPERLIPIDEMIA: ICD-10-CM

## 2020-02-11 DIAGNOSIS — G45.9 TRANSIENT CEREBRAL ISCHEMIA, UNSPECIFIED TYPE: ICD-10-CM

## 2020-02-11 DIAGNOSIS — I51.7 BILATERAL ENLARGEMENT OF ATRIA: ICD-10-CM

## 2020-02-11 DIAGNOSIS — I25.10 CORONARY ARTERY DISEASE INVOLVING NATIVE CORONARY ARTERY OF NATIVE HEART WITHOUT ANGINA PECTORIS: Primary | ICD-10-CM

## 2020-02-11 PROCEDURE — 93000 ELECTROCARDIOGRAM COMPLETE: CPT | Performed by: NURSE PRACTITIONER

## 2020-02-11 PROCEDURE — 99214 OFFICE O/P EST MOD 30 MIN: CPT | Performed by: NURSE PRACTITIONER

## 2020-02-11 RX ORDER — LOSARTAN POTASSIUM 25 MG/1
25 TABLET ORAL DAILY
COMMUNITY
End: 2020-07-30 | Stop reason: ALTCHOICE

## 2020-05-28 ENCOUNTER — OFFICE VISIT (OUTPATIENT)
Dept: ORTHOPEDIC SURGERY | Facility: CLINIC | Age: 70
End: 2020-05-28

## 2020-05-28 DIAGNOSIS — S92.354A NONDISPLACED FRACTURE OF FIFTH METATARSAL BONE, RIGHT FOOT, INITIAL ENCOUNTER FOR CLOSED FRACTURE: Primary | ICD-10-CM

## 2020-05-28 PROCEDURE — 99214 OFFICE O/P EST MOD 30 MIN: CPT | Performed by: ORTHOPAEDIC SURGERY

## 2020-05-28 PROCEDURE — 28470 CLTX METATARSAL FX WO MNP EA: CPT | Performed by: ORTHOPAEDIC SURGERY

## 2020-06-03 PROBLEM — S92.354A NONDISPLACED FRACTURE OF FIFTH METATARSAL BONE, RIGHT FOOT, INITIAL ENCOUNTER FOR CLOSED FRACTURE: Status: ACTIVE | Noted: 2020-06-03

## 2020-06-25 ENCOUNTER — OFFICE VISIT (OUTPATIENT)
Dept: ORTHOPEDIC SURGERY | Facility: CLINIC | Age: 70
End: 2020-06-25

## 2020-06-25 DIAGNOSIS — S92.354A NONDISPLACED FRACTURE OF FIFTH METATARSAL BONE, RIGHT FOOT, INITIAL ENCOUNTER FOR CLOSED FRACTURE: Primary | ICD-10-CM

## 2020-06-25 PROCEDURE — 73630 X-RAY EXAM OF FOOT: CPT | Performed by: ORTHOPAEDIC SURGERY

## 2020-06-25 PROCEDURE — 99024 POSTOP FOLLOW-UP VISIT: CPT | Performed by: ORTHOPAEDIC SURGERY

## 2020-06-25 NOTE — PROGRESS NOTES
Subjective:     Patient ID: Maddison Turcios is a 70 y.o. female.    Chief Complaint:    History of Present Illness  Maddison Turcios {presents/returns:16807} to clinic today for evaluation of ***     Social History     Occupational History   • Not on file   Tobacco Use   • Smoking status: Never Smoker   • Smokeless tobacco: Never Used   Substance and Sexual Activity   • Alcohol use: No   • Drug use: No   • Sexual activity: Never     Partners: Male     Birth control/protection: Post-menopausal, Surgical     Comment: BTL      Past Medical History:   Diagnosis Date   • Abnormal blood chemistry    • Acute UTI (urinary tract infection)    • Allergic    • Anemia    • Aneurysm, cerebral    • Bloating    • Brain aneurysm    • CAD (coronary artery disease)    • Chronic headaches    • Coronary artery disease    • Coronary artery stenosis    • Dysuria    • Encounter for screening colonoscopy    • Environmental allergies    • Fall from slip, trip, or stumble    • Gait disturbance    • H/O cardiovascular stress test 09/17/2013    Treadmill   • H/O colonoscopy    • H/O echocardiogram 09/25/2013   • H/O fall    • History of EKG 07/31/2015   • Hyperlipemia    • Hyperlipidemia    • Hypertension    • Hyperthyroidism    • Injury of back    • Insomnia    • Left forearm pain    • Left leg pain    • Left wrist pain    • Lower abdominal pain    • Need for pneumococcal vaccination    • Onychomycosis of toenail    • AKIRA (obstructive sleep apnea)    • Pelvic pressure in female    • Right foot pain    • TIA (transient ischemic attack) 11/30/2016   • URI (upper respiratory infection)    • Urine frequency      Past Surgical History:   Procedure Laterality Date   • BREAST EXCISIONAL BIOPSY Right 1977    b9   • BREAST EXCISIONAL BIOPSY Right 1979    b9   • BUNIONECTOMY     • CARDIAC CATHETERIZATION N/A 4/8/2019    Procedure: Left Heart Cath;  Surgeon: Jayme Ramirez MD;  Location: [facility] CATH INVASIVE LOCATION;  Service: Cardiovascular   • CARDIAC  CATHETERIZATION N/A 4/8/2019    Procedure: Coronary angiography;  Surgeon: Jayme Ramirez MD;  Location:  [facility]ATH INVASIVE LOCATION;  Service: Cardiovascular   • CARDIAC CATHETERIZATION N/A 4/8/2019    Procedure: Left ventriculography;  Surgeon: Jayme Ramirez MD;  Location:  [facility]ATH INVASIVE LOCATION;  Service: Cardiovascular   • CEREBRAL ANEURYSM REPAIR     • CHOLECYSTECTOMY     • CORONARY ARTERY BYPASS GRAFT     • ROTATOR CUFF REPAIR Left    • SPLENECTOMY     • TONSILLECTOMY     • TUBAL ABDOMINAL LIGATION         Family History   Problem Relation Age of Onset   • Heart failure Mother    • Hypertension Mother    • Stroke Mother    • Cervical cancer Mother    • Heart failure Father    • Aneurysm Sister    • Breast cancer Sister    • Heart attack Brother    • Cancer Brother    • Lung cancer Brother    • No Known Problems Son    • No Known Problems Daughter    • No Known Problems Sister    • No Known Problems Sister    • Ovarian cancer Neg Hx    • Colon cancer Neg Hx    • Pulmonary embolism Neg Hx    • Deep vein thrombosis Neg Hx          Review of Systems        Objective:  There were no vitals filed for this visit.  There were no vitals filed for this visit.  There is no height or weight on file to calculate BMI.  ***      Ortho Exam     ***  Imaging:  ***  Assessment:      No diagnosis found.       Plan:          1. Discussed treatment options at length with patient at today's visit. ***      Maddisonmeenakshi Turcios *** was in agreement with plan and had all questions answered.     Orders:  No orders of the defined types were placed in this encounter.      Medications:  No orders of the defined types were placed in this encounter.      Followup:  No follow-ups on file.    There are no diagnoses linked to this encounter.    By signing my name here, I Toni Bolivar, attest that all documentation on 06/25/20 at 07:06 has been prepared under the direction and in the presence of Dr. Theron Arnold MD.    ***      Dictated utilizing Dragon dictation

## 2020-06-25 NOTE — PROGRESS NOTES
CC: Closed treatment nondisplaced fracture of fifth metatarsal bone, DOI:5/26/2020    Interval History: Patient returns to clinic today for 1-month post-operative visit. She complains of some plantar foot pain caused by the walking boot. She states she stopped wearing the boot 2 days ago due to the pain caused by the boot. She rates the pain as a 1-2/10, which she describes as aching in nature. Pain is localized to lateral and plantar aspect of the right foot, does not radiate. Denies associated numbness or tingling.     Exam:  Right Foot-   Dorsiflexion to 10 degrees   Plantarflexion to 45 degrees    4+/5 strength in all planes of motion   4+/5 strength on toe flexion and extension   Mild tenderness over the lateral and plantar base of the 5th metatarsal   No tenderness over plantar fascia       Imaging: Three-view x-rays right foot from today's visit, AP, lateral, oblique views, ordered and reviewed by me, indication closed treatment nondisplaced fracture base of fifth metatarsal, compared to prior x-rays.  Fracture appears to be stable in position with mild evidence of callus formation noted today.  No evidence of interval displacement in comparison to prior outside films.    Impression: s/p closed treatment nondisplaced fracture of fifth metatarsal bone    Plan:  1. Patient had all questions answered today and was in agreement with treatment plan.  2. Patient may wean out of the boot at this time.  3. Follow-up in 4-6 weeks with repeat x-rays.    By signing my name here, I Toni Bolivar, attest that all documentation on 06/25/20 at 10:05 has been prepared under the direction and in the presence of Dr. Theron Arnold MD.    I, Dr. Theron Arnold, personally performed the services described in this documentation, as scribed by Toni Bolivar, in my presence, and it is both accurate and complete.

## 2020-07-06 ENCOUNTER — HOSPITAL ENCOUNTER (OUTPATIENT)
Dept: ULTRASOUND IMAGING | Facility: HOSPITAL | Age: 70
Discharge: HOME | End: 2020-07-06
Admitting: PHYSICIAN ASSISTANT

## 2020-07-06 DIAGNOSIS — E04.1 LEFT THYROID NODULE: ICD-10-CM

## 2020-07-06 PROCEDURE — 76536 US EXAM OF HEAD AND NECK: CPT

## 2020-07-07 DIAGNOSIS — E04.1 LEFT THYROID NODULE: Primary | ICD-10-CM

## 2020-07-07 DIAGNOSIS — E04.1 LEFT THYROID NODULE: ICD-10-CM

## 2020-07-08 LAB
FT4I SERPL CALC-MCNC: 2.1 (ref 1.2–4.9)
T3RU NFR SERPL: 25 % (ref 24–39)
T4 SERPL-MCNC: 8.4 UG/DL (ref 4.5–12)
TSH SERPL DL<=0.005 MIU/L-ACNC: 0.87 UIU/ML (ref 0.45–4.5)

## 2020-07-09 ENCOUNTER — PATIENT MESSAGE (OUTPATIENT)
Dept: CARDIOLOGY | Facility: CLINIC | Age: 70
End: 2020-07-09

## 2020-07-10 ENCOUNTER — TELEPHONE (OUTPATIENT)
Dept: FAMILY MEDICINE CLINIC | Facility: CLINIC | Age: 70
End: 2020-07-10

## 2020-07-10 DIAGNOSIS — I67.1 CEREBRAL ANEURYSM: Primary | ICD-10-CM

## 2020-07-10 NOTE — TELEPHONE ENCOUNTER
From: Maddison Turcios  To: Helen Urbina MD  Sent: 7/9/2020 8:33 PM EDT  Subject: Prescription Question    Can I go off plavix? I do not have a neurologist since 2016 and Dr Hewitt's office said they did know I still needed to be on it? What do you advise? I am asking Eunice also. But since you are my cardiologist I would like your advice  Thanks

## 2020-07-10 NOTE — TELEPHONE ENCOUNTER
PT CALLED TO GET A REFERRAL TO SEE DR JUD TEAGUE WITH [facility] PH# [phone] TO SEE IF SHE CAN STOP TAKING PLAVIX. PLEASE LET PT KNOW WHEN THIS HAS BEEN ORDERED.    CALL BACK # [phone]

## 2020-07-22 RX ORDER — ISOSORBIDE DINITRATE 5 MG/1
TABLET ORAL
Qty: 180 TABLET | Refills: 0 | Status: SHIPPED | OUTPATIENT
Start: 2020-07-22 | End: 2020-10-20

## 2020-07-30 ENCOUNTER — OFFICE VISIT (OUTPATIENT)
Dept: ORTHOPEDIC SURGERY | Facility: CLINIC | Age: 70
End: 2020-07-30

## 2020-07-30 VITALS — BODY MASS INDEX: 25.44 KG/M2 | HEIGHT: 64 IN | WEIGHT: 149 LBS

## 2020-07-30 DIAGNOSIS — S92.354A NONDISPLACED FRACTURE OF FIFTH METATARSAL BONE, RIGHT FOOT, INITIAL ENCOUNTER FOR CLOSED FRACTURE: Primary | ICD-10-CM

## 2020-07-30 PROCEDURE — 73630 X-RAY EXAM OF FOOT: CPT | Performed by: ORTHOPAEDIC SURGERY

## 2020-07-30 PROCEDURE — 99024 POSTOP FOLLOW-UP VISIT: CPT | Performed by: ORTHOPAEDIC SURGERY

## 2020-09-28 DIAGNOSIS — Z00.00 MEDICARE ANNUAL WELLNESS VISIT, SUBSEQUENT: ICD-10-CM

## 2020-09-28 DIAGNOSIS — E05.90 HYPERTHYROIDISM: Primary | ICD-10-CM

## 2020-10-01 LAB
ALBUMIN SERPL-MCNC: 4.8 G/DL (ref 3.5–5.2)
ALBUMIN/GLOB SERPL: 2.5 G/DL
ALP SERPL-CCNC: 71 U/L (ref 39–117)
ALT SERPL-CCNC: 22 U/L (ref 1–33)
AST SERPL-CCNC: 28 U/L (ref 1–32)
BASOPHILS # BLD AUTO: 0.06 10*3/MM3 (ref 0–0.2)
BASOPHILS NFR BLD AUTO: 0.8 % (ref 0–1.5)
BILIRUB SERPL-MCNC: 0.9 MG/DL (ref 0–1.2)
BUN SERPL-MCNC: 16 MG/DL (ref 8–23)
BUN/CREAT SERPL: 21.9 (ref 7–25)
CALCIUM SERPL-MCNC: 9.6 MG/DL (ref 8.6–10.5)
CHLORIDE SERPL-SCNC: 106 MMOL/L (ref 98–107)
CHOLEST SERPL-MCNC: 188 MG/DL (ref 0–200)
CHOLEST/HDLC SERPL: 2.54 {RATIO}
CO2 SERPL-SCNC: 25.9 MMOL/L (ref 22–29)
CREAT SERPL-MCNC: 0.73 MG/DL (ref 0.57–1)
EOSINOPHIL # BLD AUTO: 0.18 10*3/MM3 (ref 0–0.4)
EOSINOPHIL NFR BLD AUTO: 2.4 % (ref 0.3–6.2)
ERYTHROCYTE [DISTWIDTH] IN BLOOD BY AUTOMATED COUNT: 12.2 % (ref 12.3–15.4)
GLOBULIN SER CALC-MCNC: 1.9 GM/DL
GLUCOSE SERPL-MCNC: 103 MG/DL (ref 65–99)
HCT VFR BLD AUTO: 39.9 % (ref 34–46.6)
HDLC SERPL-MCNC: 74 MG/DL (ref 40–60)
HGB BLD-MCNC: 13.7 G/DL (ref 12–15.9)
IMM GRANULOCYTES # BLD AUTO: 0.02 10*3/MM3 (ref 0–0.05)
IMM GRANULOCYTES NFR BLD AUTO: 0.3 % (ref 0–0.5)
LDLC SERPL CALC-MCNC: 95 MG/DL (ref 0–100)
LYMPHOCYTES # BLD AUTO: 3.4 10*3/MM3 (ref 0.7–3.1)
LYMPHOCYTES NFR BLD AUTO: 45.2 % (ref 19.6–45.3)
MCH RBC QN AUTO: 31.4 PG (ref 26.6–33)
MCHC RBC AUTO-ENTMCNC: 34.3 G/DL (ref 31.5–35.7)
MCV RBC AUTO: 91.3 FL (ref 79–97)
MONOCYTES # BLD AUTO: 0.66 10*3/MM3 (ref 0.1–0.9)
MONOCYTES NFR BLD AUTO: 8.8 % (ref 5–12)
NEUTROPHILS # BLD AUTO: 3.21 10*3/MM3 (ref 1.7–7)
NEUTROPHILS NFR BLD AUTO: 42.5 % (ref 42.7–76)
NRBC BLD AUTO-RTO: 0.1 /100 WBC (ref 0–0.2)
PLATELET # BLD AUTO: 325 10*3/MM3 (ref 140–450)
POTASSIUM SERPL-SCNC: 4.8 MMOL/L (ref 3.5–5.2)
PROT SERPL-MCNC: 6.7 G/DL (ref 6–8.5)
RBC # BLD AUTO: 4.37 10*6/MM3 (ref 3.77–5.28)
SODIUM SERPL-SCNC: 141 MMOL/L (ref 136–145)
TRIGL SERPL-MCNC: 97 MG/DL (ref 0–150)
VLDLC SERPL CALC-MCNC: 19.4 MG/DL
WBC # BLD AUTO: 7.53 10*3/MM3 (ref 3.4–10.8)

## 2020-10-19 ENCOUNTER — OFFICE VISIT (OUTPATIENT)
Dept: FAMILY MEDICINE CLINIC | Facility: CLINIC | Age: 70
End: 2020-10-19

## 2020-10-19 VITALS
HEIGHT: 64 IN | SYSTOLIC BLOOD PRESSURE: 122 MMHG | RESPIRATION RATE: 16 BRPM | WEIGHT: 152 LBS | HEART RATE: 79 BPM | TEMPERATURE: 97.9 F | DIASTOLIC BLOOD PRESSURE: 82 MMHG | OXYGEN SATURATION: 98 % | BODY MASS INDEX: 25.95 KG/M2

## 2020-10-19 DIAGNOSIS — Z00.00 MEDICARE ANNUAL WELLNESS VISIT, SUBSEQUENT: Primary | ICD-10-CM

## 2020-10-19 DIAGNOSIS — E66.3 OVERWEIGHT (BMI 25.0-29.9): ICD-10-CM

## 2020-10-19 DIAGNOSIS — E78.2 MIXED HYPERLIPIDEMIA: ICD-10-CM

## 2020-10-19 DIAGNOSIS — I10 ESSENTIAL HYPERTENSION: ICD-10-CM

## 2020-10-19 PROCEDURE — G0439 PPPS, SUBSEQ VISIT: HCPCS | Performed by: PHYSICIAN ASSISTANT

## 2020-10-19 NOTE — PROGRESS NOTES
The ABCs of the Annual Wellness Visit  Subsequent Medicare Wellness Visit    Chief Complaint   Patient presents with   • Medicare Wellness-subsequent   • Hypertension     Management   • Hyperlipidemia     Management       Subjective   History of Present Illness:  Maddison Turcios is a 70 y.o. female who presents for a Subsequent Medicare Wellness Visit.  States overall she is feeling well at office visit today.  Her diet has been slightly healthy.  States she has been eating little bit more candy recently.  Will be seeing Dr. Estrada ENT specialist for possible new hearing aids.  States her hearing has decreased slightly.  Will be having imaging of her auditory canals to rule out any issues there.  Will be having an eye exam on Wednesday, October 21, 2020.  Her dental exam is up-to-date.  Sleep has been normal.  Bowel movements have been improving.  She had a period of constipation issues and took a stool softener.  That did not help therefore she took Charlette LAX for a day.  Since then her bowel movements have been normal without dark black tarry stools.  Maddison denied any fevers, chills, upper respiratory symptoms, chest pain, shortness of air, dizziness, vision changes headaches or swelling of ankles.  Has no complaints today.  Has had updated flu shot at the pharmacy in August 2020.  She has been trying to walk several days weekly for exercise.    HEALTH RISK ASSESSMENT    Recent Hospitalizations:  No hospitalization(s) within the last year.    Current Medical Providers:  Patient Care Team:  Kasandra Mdcowell PA-C as PCP - General (Family Medicine)  Helen Urbina MD as Consulting Physician (Cardiology)  Ruben Valderrama MD (Dermatology)  Bennett Whelan MD as MDS Coordinator (Allergy)  Sara Araiza MD as Consulting Physician (Obstetrics and Gynecology)    Smoking Status:  Social History     Tobacco Use   Smoking Status Never Smoker   Smokeless Tobacco Never Used       Alcohol  Consumption:  Social History     Substance and Sexual Activity   Alcohol Use No       Depression Screen:   PHQ-2/PHQ-9 Depression Screening 10/19/2020   Little interest or pleasure in doing things 0   Feeling down, depressed, or hopeless 0   Trouble falling or staying asleep, or sleeping too much -   Feeling tired or having little energy -   Poor appetite or overeating -   Feeling bad about yourself - or that you are a failure or have let yourself or your family down -   Trouble concentrating on things, such as reading the newspaper or watching television -   Moving or speaking so slowly that other people could have noticed. Or the opposite - being so fidgety or restless that you have been moving around a lot more than usual -   Thoughts that you would be better off dead, or of hurting yourself in some way -   Total Score 0   If you checked off any problems, how difficult have these problems made it for you to do your work, take care of things at home, or get along with other people? -       Fall Risk Screen:  GAGE Fall Risk Assessment was completed, and patient is at LOW risk for falls.Assessment completed on:10/19/2020    Health Habits and Functional and Cognitive Screening:  Functional & Cognitive Status 10/19/2020   Do you have difficulty preparing food and eating? No   Do you have difficulty bathing yourself, getting dressed or grooming yourself? No   Do you have difficulty using the toilet? No   Do you have difficulty moving around from place to place? No   Do you have trouble with steps or getting out of a bed or a chair? No   Current Diet Limited Junk Food   Dental Exam Up to date   Eye Exam Up to date   Exercise (times per week) 5 times per week   Current Exercise Activities Include Walking   Do you need help using the phone?  No   Are you deaf or do you have serious difficulty hearing?  No   Do you need help with transportation? No   Do you need help shopping? No   Do you need help preparing meals?  No    Do you need help with housework?  No   Do you need help with laundry? No   Do you need help taking your medications? No   Do you need help managing money? No   Do you ever drive or ride in a car without wearing a seat belt? No   Have you felt unusual stress, anger or loneliness in the last month? -   Who do you live with? -   If you need help, do you have trouble finding someone available to you? -   Have you been bothered in the last four weeks by sexual problems? -   Do you have difficulty concentrating, remembering or making decisions? -         Does the patient have evidence of cognitive impairment? No    Asprin use counseling:Does not need ASA (and currently is not on it)    Age-appropriate Screening Schedule:  Refer to the list below for future screening recommendations based on patient's age, sex and/or medical conditions. Orders for these recommended tests are listed in the plan section. The patient has been provided with a written plan.    Health Maintenance   Topic Date Due   • DXA SCAN  06/06/2020   • MAMMOGRAM  05/21/2021   • LIPID PANEL  09/30/2021   • COLONOSCOPY  12/29/2025   • TDAP/TD VACCINES (4 - Td) 06/29/2027   • INFLUENZA VACCINE  Completed   • ZOSTER VACCINE  Addressed          The following portions of the patient's history were reviewed and updated as appropriate:   She  has a past medical history of Abnormal blood chemistry, Acute UTI (urinary tract infection), Allergic, Anemia, Aneurysm, cerebral, Bloating, Brain aneurysm, CAD (coronary artery disease), Chronic headaches, Coronary artery disease, Coronary artery stenosis, Dysuria, Encounter for screening colonoscopy, Environmental allergies, Fall from slip, trip, or stumble, Gait disturbance, H/O cardiovascular stress test (09/17/2013), H/O colonoscopy, H/O echocardiogram (09/25/2013), H/O fall, History of EKG (07/31/2015), Hyperlipemia, Hyperlipidemia, Hypertension, Hyperthyroidism, Injury of back, Insomnia, Left forearm pain, Left leg  pain, Left wrist pain, Lower abdominal pain, Need for pneumococcal vaccination, Onychomycosis of toenail, AKIRA (obstructive sleep apnea), Pelvic pressure in female, Right foot pain, TIA (transient ischemic attack) (11/30/2016), URI (upper respiratory infection), and Urine frequency.  She does not have any pertinent problems on file.  She  has a past surgical history that includes Coronary artery bypass graft; Rotator cuff repair (Left); Tonsillectomy; Splenectomy, total; Tubal ligation; Cholecystectomy; Bunionectomy; Cerebral aneurysm repair; Cardiac catheterization (N/A, 4/8/2019); Cardiac catheterization (N/A, 4/8/2019); Cardiac catheterization (N/A, 4/8/2019); Breast excisional biopsy (Right, 1977); and Breast excisional biopsy (Right, 1979).  Her family history includes Aneurysm in her sister; Breast cancer in her sister; Cancer in her brother; Cervical cancer in her mother; Heart attack in her brother; Heart failure in her father and mother; Hypertension in her mother; Lung cancer in her brother; No Known Problems in her daughter, sister, sister, and son; Stroke in her mother.  She  reports that she has never smoked. She has never used smokeless tobacco. She reports that she does not drink alcohol or use drugs.  Current Outpatient Medications   Medication Sig Dispense Refill   • Cholecalciferol (VITAMIN D3) 5000 UNITS capsule capsule Take 5,000 Units by mouth Daily.     • clopidogrel (PLAVIX) 75 MG tablet Take 1 tablet by mouth Daily. 90 tablet 3   • ezetimibe (ZETIA) 10 MG tablet Take 1 tablet by mouth Daily. 90 tablet 3   • icosapent ethyl (VASCEPA) 1 g capsule capsule Take 4 g by mouth Daily.     • isosorbide dinitrate (ISORDIL) 5 MG tablet TAKE 1 TABLET TWICE A  tablet 0   • pitavastatin calcium (LIVALO) 2 MG tablet tablet Take 2 mg by mouth Every Night.       Current Facility-Administered Medications   Medication Dose Route Frequency Provider Last Rate Last Dose   • nitroglycerin (NITROSTAT) SL  tablet 0.4 mg  0.4 mg Sublingual Q5 Min John Barnes MD       • sodium chloride 0.9 % flush 10 mL  10 mL Intravenous PRN John Crabtree MD         Current Outpatient Medications on File Prior to Visit   Medication Sig   • Cholecalciferol (VITAMIN D3) 5000 UNITS capsule capsule Take 5,000 Units by mouth Daily.   • clopidogrel (PLAVIX) 75 MG tablet Take 1 tablet by mouth Daily.   • ezetimibe (ZETIA) 10 MG tablet Take 1 tablet by mouth Daily.   • icosapent ethyl (VASCEPA) 1 g capsule capsule Take 4 g by mouth Daily.   • isosorbide dinitrate (ISORDIL) 5 MG tablet TAKE 1 TABLET TWICE A DAY   • pitavastatin calcium (LIVALO) 2 MG tablet tablet Take 2 mg by mouth Every Night.     Current Facility-Administered Medications on File Prior to Visit   Medication   • nitroglycerin (NITROSTAT) SL tablet 0.4 mg   • sodium chloride 0.9 % flush 10 mL     She is allergic to adhesive tape; beta adrenergic blockers; sulfa antibiotics; and sulfur..    Outpatient Medications Prior to Visit   Medication Sig Dispense Refill   • Cholecalciferol (VITAMIN D3) 5000 UNITS capsule capsule Take 5,000 Units by mouth Daily.     • clopidogrel (PLAVIX) 75 MG tablet Take 1 tablet by mouth Daily. 90 tablet 3   • ezetimibe (ZETIA) 10 MG tablet Take 1 tablet by mouth Daily. 90 tablet 3   • icosapent ethyl (VASCEPA) 1 g capsule capsule Take 4 g by mouth Daily.     • isosorbide dinitrate (ISORDIL) 5 MG tablet TAKE 1 TABLET TWICE A  tablet 0   • pitavastatin calcium (LIVALO) 2 MG tablet tablet Take 2 mg by mouth Every Night.       Facility-Administered Medications Prior to Visit   Medication Dose Route Frequency Provider Last Rate Last Dose   • nitroglycerin (NITROSTAT) SL tablet 0.4 mg  0.4 mg Sublingual Q5 Min John Barnes MD       • sodium chloride 0.9 % flush 10 mL  10 mL Intravenous PRJohn Porter MD           Patient Active Problem List   Diagnosis   • Cerebral aneurysm   • Coronary artery disease   • Mixed  hyperlipidemia   • Essential hypertension   • Hyperthyroidism   • Insomnia   • Onychomycosis of toenail   • Bilateral enlargement of atria   • Sinus bradycardia   • Transient cerebral ischemia   • Skin lesion of right ear   • Fatigue   • Medicare annual wellness visit, subsequent   • Osteopenia   • Necrotic hand wound (CMS/HCC)   • Family history of breast cancer   • S/P CABG (coronary artery bypass graft)   • Chest pain   • Neck strain, initial encounter   • Poison ivy dermatitis   • Need for influenza vaccination   • Greater trochanteric bursitis of right hip   • Prophylactic antibiotic   • Nondisplaced fracture of fifth metatarsal bone, right foot, initial encounter for closed fracture       Advanced Care Planning:  ACP discussion was held with the patient during this visit. Patient has an advance directive in EMR which is still valid.     Review of Systems   Constitutional: Negative.  Negative for chills, fatigue and fever.   HENT: Negative.    Eyes: Negative.    Respiratory: Negative.  Negative for cough, chest tightness, shortness of breath and wheezing.    Cardiovascular: Negative.  Negative for chest pain, palpitations and leg swelling.   Gastrointestinal: Negative.  Negative for abdominal pain, anal bleeding, blood in stool, constipation, diarrhea, nausea, rectal pain and vomiting.   Endocrine: Negative.    Genitourinary: Negative.  Negative for dysuria and frequency.   Musculoskeletal: Negative.    Skin: Negative.    Allergic/Immunologic: Negative.    Neurological: Negative.  Negative for dizziness, light-headedness and headaches.   Hematological: Negative.    Psychiatric/Behavioral: Negative.  Negative for sleep disturbance.   All other systems reviewed and are negative.      Compared to one year ago, the patient feels her physical health is the same.  Compared to one year ago, the patient feels her mental health is the same.    Reviewed chart for potential of high risk medication in the elderly:  "yes  Reviewed chart for potential of harmful drug interactions in the elderly:yes    Objective         Vitals:    10/19/20 1433   BP: 122/82   Pulse: 79   Resp: 16   Temp: 97.9 °F (36.6 °C)   SpO2: 98%   Weight: 68.9 kg (152 lb)   Height: 162.6 cm (64\")       Body mass index is 26.09 kg/m².  Discussed the patient's BMI with her. The BMI is above average; BMI management plan is completed.    Physical Exam  Constitutional:       Appearance: Normal appearance. She is well-developed, well-groomed and overweight.      Interventions: Face mask in place.   HENT:      Head: Normocephalic and atraumatic.   Neck:      Musculoskeletal: No edema.      Thyroid: No thyroid mass, thyromegaly or thyroid tenderness.      Vascular: No carotid bruit.      Trachea: Trachea and phonation normal. No tracheal tenderness.   Cardiovascular:      Rate and Rhythm: Normal rate and regular rhythm.      Heart sounds: Normal heart sounds, S1 normal and S2 normal. No murmur.   Pulmonary:      Effort: Pulmonary effort is normal.      Breath sounds: Normal breath sounds and air entry.   Abdominal:      General: Bowel sounds are normal.      Palpations: Abdomen is soft. There is no hepatomegaly.      Tenderness: There is no abdominal tenderness.   Musculoskeletal:      Right lower leg: No edema.      Left lower leg: No edema.   Skin:     General: Skin is warm and dry.      Capillary Refill: Capillary refill takes less than 2 seconds.   Neurological:      Mental Status: She is alert and oriented to person, place, and time.      Deep Tendon Reflexes:      Reflex Scores:       Patellar reflexes are 2+ on the right side and 2+ on the left side.  Psychiatric:         Attention and Perception: Attention and perception normal.         Mood and Affect: Mood and affect normal.         Speech: Speech normal.         Behavior: Behavior normal. Behavior is cooperative.         Thought Content: Thought content normal.         Cognition and Memory: Cognition and " memory normal.         Judgment: Judgment normal.      I was wearing surgical mask during the entire office visit encounter.      Lab Results   Component Value Date     (H) 09/30/2020    CHLPL 188 09/30/2020    TRIG 97 09/30/2020    HDL 74 (H) 09/30/2020    LDL 95 09/30/2020    VLDL 19.4 09/30/2020        Assessment/Plan   Medicare Risks and Personalized Health Plan  CMS Preventative Services Quick Reference  Advance Directive Discussion  Hearing Problem  Polypharmacy    The above risks/problems have been discussed with the patient.  Pertinent information has been shared with the patient in the After Visit Summary.  Follow up plans and orders are seen below in the Assessment/Plan Section.    Diagnoses and all orders for this visit:    1. Medicare annual wellness visit, subsequent (Primary)    2. Mixed hyperlipidemia    3. Essential hypertension    4. Overweight (BMI 25.0-29.9)        1.  Annual Medicare wellness examination with hyperlipidemia: I reviewed her lab work that was collected on September 30, 2020 with her at office visit today.  Fasting blood sugar was 103, cholesterol 188, triglycerides 97, HDL 74 and LDL was 95.  Plan to follow-up in 6 months for reevaluation of hypercholesteremia with blood work.  2.  Chronic and Stable hypertension: She is doing well with her current medications.  I have rechecked blood pressure at office visit today and got 120/78 in left arm.  Doing well with her blood pressure medication.  No refills are needed at this time.  3.  Chronic overweight: Stressed the importance of increasing her physical activity and to eat healthier to help with weight loss.  Will reevaluate in office visit in 6 months.    Patient Counseling:  --Nutrition: Stressed importance of moderation in sodium/caffeine intake, saturated fat and cholesterol.  Discussed caloric balance, sufficient intake of fresh fruits, vegetables, fiber,   calcium, iron.  --Discussed the new recommendation against daily  use of baby aspirin for primary prevention in low risk patients.  --Exercise: Stressed the importance of regular exercise by incorporating into daily routine.    --Substance Abuse: Discussed cessation/primary prevention of tobacco, alcohol, or other drug use; driving or other dangerous activities under the influence.    --Dental health: Discussed importance of regular tooth brushing, flossing, and dental visits.  -- Suggested having eyes and vision checked if needed or past due.  --Immunizations reviewed.  Immunizations are up-to-date.  --Discussed benefits of screening colonoscopy.  Colonoscopy is up-to-date.        Follow Up:  Return in about 6 months (around 4/19/2021).     An After Visit Summary and PPPS were given to the patient.      No visits with results within 2 Week(s) from this visit.   Latest known visit with results is:   Orders Only on 09/28/2020   Component Date Value Ref Range Status   • Glucose 09/30/2020 103* 65 - 99 mg/dL Final   • BUN 09/30/2020 16  8 - 23 mg/dL Final   • Creatinine 09/30/2020 0.73  0.57 - 1.00 mg/dL Final   • eGFR Non  Am 09/30/2020 79  >60 mL/min/1.73 Final   • eGFR African Am 09/30/2020 96  >60 mL/min/1.73 Final   • BUN/Creatinine Ratio 09/30/2020 21.9  7.0 - 25.0 Final   • Sodium 09/30/2020 141  136 - 145 mmol/L Final   • Potassium 09/30/2020 4.8  3.5 - 5.2 mmol/L Final   • Chloride 09/30/2020 106  98 - 107 mmol/L Final   • Total CO2 09/30/2020 25.9  22.0 - 29.0 mmol/L Final   • Calcium 09/30/2020 9.6  8.6 - 10.5 mg/dL Final   • Total Protein 09/30/2020 6.7  6.0 - 8.5 g/dL Final   • Albumin 09/30/2020 4.80  3.50 - 5.20 g/dL Final   • Globulin 09/30/2020 1.9  gm/dL Final   • A/G Ratio 09/30/2020 2.5  g/dL Final   • Total Bilirubin 09/30/2020 0.9  0.0 - 1.2 mg/dL Final   • Alkaline Phosphatase 09/30/2020 71  39 - 117 U/L Final   • AST (SGOT) 09/30/2020 28  1 - 32 U/L Final   • ALT (SGPT) 09/30/2020 22  1 - 33 U/L Final   • WBC 09/30/2020 7.53  3.40 - 10.80 10*3/mm3 Final    • RBC 09/30/2020 4.37  3.77 - 5.28 10*6/mm3 Final   • Hemoglobin 09/30/2020 13.7  12.0 - 15.9 g/dL Final   • Hematocrit 09/30/2020 39.9  34.0 - 46.6 % Final   • MCV 09/30/2020 91.3  79.0 - 97.0 fL Final   • MCH 09/30/2020 31.4  26.6 - 33.0 pg Final   • MCHC 09/30/2020 34.3  31.5 - 35.7 g/dL Final   • RDW 09/30/2020 12.2* 12.3 - 15.4 % Final   • Platelets 09/30/2020 325  140 - 450 10*3/mm3 Final   • Neutrophil Rel % 09/30/2020 42.5* 42.7 - 76.0 % Final   • Lymphocyte Rel % 09/30/2020 45.2  19.6 - 45.3 % Final   • Monocyte Rel % 09/30/2020 8.8  5.0 - 12.0 % Final   • Eosinophil Rel % 09/30/2020 2.4  0.3 - 6.2 % Final   • Basophil Rel % 09/30/2020 0.8  0.0 - 1.5 % Final   • Neutrophils Absolute 09/30/2020 3.21  1.70 - 7.00 10*3/mm3 Final   • Lymphocytes Absolute 09/30/2020 3.40* 0.70 - 3.10 10*3/mm3 Final   • Monocytes Absolute 09/30/2020 0.66  0.10 - 0.90 10*3/mm3 Final   • Eosinophils Absolute 09/30/2020 0.18  0.00 - 0.40 10*3/mm3 Final   • Basophils Absolute 09/30/2020 0.06  0.00 - 0.20 10*3/mm3 Final   • Immature Granulocyte Rel % 09/30/2020 0.3  0.0 - 0.5 % Final   • Immature Grans Absolute 09/30/2020 0.02  0.00 - 0.05 10*3/mm3 Final   • nRBC 09/30/2020 0.1  0.0 - 0.2 /100 WBC Final   • Total Cholesterol 09/30/2020 188  0 - 200 mg/dL Final   • Triglycerides 09/30/2020 97  0 - 150 mg/dL Final   • HDL Cholesterol 09/30/2020 74* 40 - 60 mg/dL Final   • VLDL Cholesterol Jun 09/30/2020 19.4  mg/dL Final   • LDL Chol Calc ([facility]) 09/30/2020 95  0 - 100 mg/dL Final   • Chol/HDL Ratio 09/30/2020 2.54   Final

## 2020-10-20 RX ORDER — ISOSORBIDE DINITRATE 5 MG/1
TABLET ORAL
Qty: 180 TABLET | Refills: 3 | Status: SHIPPED | OUTPATIENT
Start: 2020-10-20 | End: 2021-11-08

## 2020-10-20 NOTE — TELEPHONE ENCOUNTER
Next appointment 02/15/2021.      Assessment:       Diagnosis Plan   1. Coronary artery disease involving native coronary artery of native heart without angina pectoris      2. Essential hypertension      3. Mixed hyperlipidemia      4. Transient cerebral ischemia, unspecified type      5. Bilateral enlargement of atria              Plan:       1.  CAD-status post CABG.  Denies angina     2.  Essential hypertension-well controlled     3.  Hyperlipidemia- she is currently on Livalo, Zetia and fish oil.  Followed by PCP     4.  TIA- etiology uncertain.  She has no further instances since being on the Plavix     5.  Bilateral enlargement of atria- stress echo 5/19 with normal left ventricular systolic function, grade 1 left ventricular diastolic dysfunction, resting EF 56%, post EF 81%.  Normal stress echo with no significant evidence of myocardial ischemia     RTO in 1 year with AUNG

## 2020-10-22 ENCOUNTER — HOSPITAL ENCOUNTER (OUTPATIENT)
Dept: GENERAL RADIOLOGY | Facility: HOSPITAL | Age: 70
Discharge: HOME | End: 2020-10-22
Admitting: PHYSICIAN ASSISTANT

## 2020-10-22 DIAGNOSIS — K59.00 CONSTIPATION, UNSPECIFIED CONSTIPATION TYPE: Primary | ICD-10-CM

## 2020-10-22 DIAGNOSIS — K59.00 CONSTIPATION, UNSPECIFIED CONSTIPATION TYPE: ICD-10-CM

## 2020-10-22 PROCEDURE — 74019 RADEX ABDOMEN 2 VIEWS: CPT

## 2020-10-23 DIAGNOSIS — R19.4 BOWEL HABIT CHANGES: Primary | ICD-10-CM

## 2020-10-23 DIAGNOSIS — K59.00 CONSTIPATION, UNSPECIFIED CONSTIPATION TYPE: ICD-10-CM

## 2020-11-05 ENCOUNTER — OFFICE VISIT (OUTPATIENT)
Dept: GASTROENTEROLOGY | Facility: CLINIC | Age: 70
End: 2020-11-05

## 2020-11-05 VITALS — WEIGHT: 156.4 LBS | BODY MASS INDEX: 26.7 KG/M2 | TEMPERATURE: 98.6 F | HEIGHT: 64 IN

## 2020-11-05 DIAGNOSIS — K58.2 IRRITABLE BOWEL SYNDROME WITH BOTH CONSTIPATION AND DIARRHEA: ICD-10-CM

## 2020-11-05 DIAGNOSIS — Z12.11 ENCOUNTER FOR SCREENING FOR MALIGNANT NEOPLASM OF COLON: Primary | ICD-10-CM

## 2020-11-05 DIAGNOSIS — Z86.010 PERSONAL HISTORY OF COLONIC POLYPS: ICD-10-CM

## 2020-11-05 PROBLEM — Z86.0100 PERSONAL HISTORY OF COLONIC POLYPS: Status: ACTIVE | Noted: 2020-11-05

## 2020-11-05 PROCEDURE — 99203 OFFICE O/P NEW LOW 30 MIN: CPT | Performed by: NURSE PRACTITIONER

## 2020-11-05 RX ORDER — AMITRIPTYLINE HYDROCHLORIDE 25 MG/1
25 TABLET, FILM COATED ORAL NIGHTLY
Qty: 30 TABLET | Refills: 11 | Status: SHIPPED | OUTPATIENT
Start: 2020-11-05 | End: 2020-12-14 | Stop reason: SDUPTHER

## 2020-11-05 NOTE — PROGRESS NOTES
PATIENT INFORMATION  Maddison Turcios       - 1950    CHIEF COMPLAINT  Chief Complaint   Patient presents with   • Constipation       HISTORY OF PRESENT ILLNESS  About a month ago went for 2 weeks without a bm.  Normally she goes daily.  Took stool softeners for 3-4 days without results , took laxative severe abd pain no results, heating pad eased pain and small result. Started taking Fiberwize from [employer] 15gm fiber in one glass plus 7gm in another supplement, 2 cups decaf coffee in the am. Well hydrated.  Now she goes every morning but just a little bit.  Then when she gets on the road she has to go.      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 No recent abx use.  No night wakening for her bowels.            REVIEWED PERTINENT RESULTS/ LABS  No results found for: CASEREPORT, FINALDX  Lab Results   Component Value Date    HGB 13.7 2020    MCV 91.3 2020     2020    ALT 22 2020    AST 28 2020    TRIG 97 2020      Xr Abdomen Flat & Upright    Result Date: 10/22/2020  Narrative: ABDOMEN, 10/22/2020     HISTORY: 70-year-old female complaining of 2 week history of lower abdomen pain, nausea and constipation.  TECHNIQUE: Flat and upright abdomen series.  FINDINGS: Bowel gas pattern is normal. No visible bowel dilatation to suggest obstruction or adynamic ileus. No free intraperitoneal air. Presumed vascular calcifications within the pelvis. Gallbladder surgical clips. Mild chronic elevation of the right hemidiaphragm. Spinal curvature.  Very little stool is visible within the colon. There is no large volume colonic stool burden.      Impression: 1.  Negative abdomen series. 2.  Normal bowel gas pattern. 3.  Gallbladder surgical clips.  This report was finalized on 10/22/2020 3:37 PM by Dr. Oc Blanco MD.        REVIEW OF SYSTEMS  Review of Systems   Gastrointestinal: Positive for constipation.   All other systems  reviewed and are negative.        ACTIVE PROBLEMS  Patient Active Problem List    Diagnosis   • Irritable bowel syndrome with both constipation and diarrhea [K58.2]   • Personal history of colonic polyps [Z86.010]   • Nondisplaced fracture of fifth metatarsal bone, right foot, initial encounter for closed fracture [S92.354A]   • Prophylactic antibiotic [Z79.2]   • Greater trochanteric bursitis of right hip [M70.61]   • Need for influenza vaccination [Z23]   • Poison ivy dermatitis [L23.7]   • Neck strain, initial encounter [S16.1XXA]   • Chest pain [R07.9]   • S/P CABG (coronary artery bypass graft) [Z95.1]   • Family history of breast cancer [Z80.3]   • Necrotic hand wound (CMS/HCC) [S61.409A, I96]   • Osteopenia [M85.80]   • Medicare annual wellness visit, subsequent [Z00.00]   • Fatigue [R53.83]   • Skin lesion of right ear [H61.91]   • Transient cerebral ischemia [G45.9]   • Bilateral enlargement of atria [I51.7]   • Sinus bradycardia [R00.1]   • Cerebral aneurysm [I67.1]   • Coronary artery disease [I25.10]   • Mixed hyperlipidemia [E78.2]   • Essential hypertension [I10]   • Hyperthyroidism [E05.90]   • Insomnia [G47.00]   • Onychomycosis of toenail [B35.1]         PAST MEDICAL HISTORY  Past Medical History:   Diagnosis Date   • Abnormal blood chemistry    • Acute UTI (urinary tract infection)    • Allergic    • Anemia    • Aneurysm, cerebral    • Bloating    • Brain aneurysm    • CAD (coronary artery disease)    • Chronic headaches    • Coronary artery disease    • Coronary artery stenosis    • Dysuria    • Encounter for screening colonoscopy    • Environmental allergies    • Fall from slip, trip, or stumble    • Gait disturbance    • H/O cardiovascular stress test 09/17/2013    Treadmill   • H/O colonoscopy    • H/O echocardiogram 09/25/2013   • H/O fall    • History of EKG 07/31/2015   • Hyperlipemia    • Hyperlipidemia    • Hypertension    • Hyperthyroidism    • Injury of back    • Insomnia    • Left  forearm pain    • Left leg pain    • Left wrist pain    • Lower abdominal pain    • Need for pneumococcal vaccination    • Onychomycosis of toenail    • AKIRA (obstructive sleep apnea)    • Pelvic pressure in female    • Right foot pain    • TIA (transient ischemic attack) 11/30/2016   • URI (upper respiratory infection)    • Urine frequency          SURGICAL HISTORY  Past Surgical History:   Procedure Laterality Date   • BREAST EXCISIONAL BIOPSY Right 1977    b9   • BREAST EXCISIONAL BIOPSY Right 1979    b9   • BUNIONECTOMY     • CARDIAC CATHETERIZATION N/A 4/8/2019    Procedure: Left Heart Cath;  Surgeon: Jayme Ramirez MD;  Location:  [facility];  Service: Cardiovascular   • CARDIAC CATHETERIZATION N/A 4/8/2019    Procedure: Coronary angiography;  Surgeon: Jayme Ramirez MD;  Location:  [facility];  Service: Cardiovascular   • CARDIAC CATHETERIZATION N/A 4/8/2019    Procedure: Left ventriculography;  Surgeon: Jayme Ramirez MD;  Location:  [facility];  Service: Cardiovascular   • CEREBRAL ANEURYSM REPAIR     • CHOLECYSTECTOMY     • CORONARY ARTERY BYPASS GRAFT     • ROTATOR CUFF REPAIR Left    • SPLENECTOMY     • TONSILLECTOMY     • TUBAL ABDOMINAL LIGATION           FAMILY HISTORY  Family History   Problem Relation Age of Onset   • Heart failure Mother    • Hypertension Mother    • Stroke Mother    • Cervical cancer Mother    • Heart failure Father    • Aneurysm Sister    • Breast cancer Sister    • Heart attack Brother    • Cancer Brother    • Lung cancer Brother    • No Known Problems Son    • No Known Problems Daughter    • No Known Problems Sister    • No Known Problems Sister    • Ovarian cancer Neg Hx    • Colon cancer Neg Hx    • Pulmonary embolism Neg Hx    • Deep vein thrombosis Neg Hx          SOCIAL HISTORY  Social History     Occupational History   • Not on file   Tobacco Use   • Smoking status: Never Smoker   • Smokeless tobacco:  "Never Used   Substance and Sexual Activity   • Alcohol use: No   • Drug use: No   • Sexual activity: Never     Partners: Male     Birth control/protection: Post-menopausal, Surgical     Comment: BTL         CURRENT MEDICATIONS    Current Outpatient Medications:   •  amitriptyline (ELAVIL) 25 MG tablet, Take 1 tablet by mouth Every Night., Disp: 30 tablet, Rfl: 11  •  Cholecalciferol (VITAMIN D3) 5000 UNITS capsule capsule, Take 5,000 Units by mouth Daily., Disp: , Rfl:   •  clopidogrel (PLAVIX) 75 MG tablet, Take 1 tablet by mouth Daily., Disp: 90 tablet, Rfl: 3  •  ezetimibe (ZETIA) 10 MG tablet, Take 1 tablet by mouth Daily., Disp: 90 tablet, Rfl: 3  •  icosapent ethyl (VASCEPA) 1 g capsule capsule, Take 4 g by mouth Daily., Disp: , Rfl:   •  isosorbide dinitrate (ISORDIL) 5 MG tablet, TAKE 1 TABLET TWICE A DAY, Disp: 180 tablet, Rfl: 3  •  pitavastatin calcium (LIVALO) 2 MG tablet tablet, Take 2 mg by mouth Every Night., Disp: , Rfl:     Current Facility-Administered Medications:   •  nitroglycerin (NITROSTAT) SL tablet 0.4 mg, 0.4 mg, Sublingual, Q5 Min PRN, John Crabtree MD  •  sodium chloride 0.9 % flush 10 mL, 10 mL, Intravenous, PRN, John Crabtree MD    ALLERGIES  Adhesive tape, Beta adrenergic blockers, Sulfa antibiotics, and Sulfur    VITALS  Vitals:    11/05/20 1528   Temp: 98.6 °F (37 °C)   TempSrc: Temporal   Weight: 70.9 kg (156 lb 6.4 oz)   Height: 162.6 cm (64.02\")       PHYSICAL EXAM  Debilities/Disabilities Identified: None  Emotional Behavior: Appropriate  Wt Readings from Last 3 Encounters:   11/05/20 70.9 kg (156 lb 6.4 oz)   10/19/20 68.9 kg (152 lb)   07/30/20 67.6 kg (149 lb)     Ht Readings from Last 1 Encounters:   11/05/20 162.6 cm (64.02\")     Body mass index is 26.83 kg/m².  Physical Exam  Vitals signs and nursing note reviewed.   Constitutional:       Appearance: She is well-developed.   HENT:      Head: Normocephalic and atraumatic.   Eyes:      Conjunctiva/sclera: " Conjunctivae normal.      Pupils: Pupils are equal, round, and reactive to light.   Neck:      Musculoskeletal: Normal range of motion and neck supple.   Cardiovascular:      Rate and Rhythm: Normal rate and regular rhythm.   Pulmonary:      Effort: Pulmonary effort is normal.      Breath sounds: Normal breath sounds.   Abdominal:      General: Bowel sounds are normal. There is no distension.      Palpations: Abdomen is soft. There is no mass.      Tenderness: There is no abdominal tenderness. There is no guarding or rebound. Negative signs include Person's sign.   Musculoskeletal: Normal range of motion.   Lymphadenopathy:      Cervical: No cervical adenopathy.   Skin:     General: Skin is warm and dry.   Neurological:      Mental Status: She is alert and oriented to person, place, and time.   Psychiatric:         Behavior: Behavior normal.         CLINICAL DATA REVIEWED   reviewed previous lab results and integrated with today's visit, reviewed notes from other physicians and/or last GI encounter, reviewed previous endoscopy results and available photos, reviewed surgical pathology results from previous biopsies    ASSESSMENT  Diagnoses and all orders for this visit:    Encounter for screening for malignant neoplasm of colon  -     Case Request; Standing  -     Follow Anesthesia Guidelines / Protocol; Future  -     Obtain informed consent; Standing  -     Verify bowel prep was successful; Standing  -     Give tap water enema if bowel prep was insufficient; Standing  -     Case Request    Irritable bowel syndrome with both constipation and diarrhea  -     amitriptyline (ELAVIL) 25 MG tablet; Take 1 tablet by mouth Every Night.    Personal history of colonic polyps  -     Case Request; Standing  -     Follow Anesthesia Guidelines / Protocol; Future  -     Obtain informed consent; Standing  -     Verify bowel prep was successful; Standing  -     Give tap water enema if bowel prep was insufficient; Standing  -      Case Request          PLAN  Return in about 3 months (around 2/5/2021).     Miralax take a dose at bedtime if you have not had a bm that day or gone more than a day without.  Will try amitriptyline 25mg every night at bedtime for the IBS.     Don't eat late, don't eat fried and don't eat too much at one time. Avoid tomato based products like pizza, lasagna, spagetti.  If you want to have these take an over the counter Pepcid immediately before you eat them.  Do not eat within 3-4 hrs of bedtime. Limit caffeine and carbonated beverages, if you must have them do not have later in the day.  If reflux is severe elevate the head of the bed. You may also use TUMS, maalox, or mylanta for breakthrough heartburn.    Take a daily probiotic for your gut as well.  AVOID taking NSAIDS (like ibuprofen, Aleve, Motrin, naproxen, meloxicam, etc) as much as possible and use acetaminophen (Tylenol) instead.    Drink lots of water, eat a high fiber diet with 25-30gm a day(check the fiber content in the foods you eat.  Protein bars with 15gm of fiber in each bar are a great supplement daily), and get regular exercise.     High Fiber Food suggestions:    Desai Protein bars from [employer] = 15gm of fiber in each bar (also gluten free)  Quest Protein bars from grocery stores= 15gm of fiber each (also gluten free)  Fiber One bars from grocery stores = 5-6gm of fiber each  [employer] Bran Buds cereal 1/2 cup = 17gm of fiber    I have discussed the above plan with the patient.  They verbalize understanding and are in agreement with the plan.  They have been advised to contact the office for any questions, concerns, or changes related to their health.

## 2020-11-05 NOTE — PATIENT INSTRUCTIONS
Miralax take a dose at bedtime if you have not had a bm that day or gone more than a day without.  Will try amitriptyline 25mg every night at bedtime for the IBS.     Don't eat late, don't eat fried and don't eat too much at one time. Avoid tomato based products like pizza, lasagna, spagetti.  If you want to have these take an over the counter Pepcid immediately before you eat them.  Do not eat within 3-4 hrs of bedtime. Limit caffeine and carbonated beverages, if you must have them do not have later in the day.  If reflux is severe elevate the head of the bed. You may also use TUMS, maalox, or mylanta for breakthrough heartburn.    Take a daily probiotic for your gut as well.  AVOID taking NSAIDS (like ibuprofen, Aleve, Motrin, naproxen, meloxicam, etc) as much as possible and use acetaminophen (Tylenol) instead.    Drink lots of water, eat a high fiber diet with 25-30gm a day(check the fiber content in the foods you eat.  Protein bars with 15gm of fiber in each bar are a great supplement daily), and get regular exercise.     High Fiber Food suggestions:    Desai Protein bars from [employer] = 15gm of fiber in each bar (also gluten free)  Quest Protein bars from grocery stores= 15gm of fiber each (also gluten free)  Fiber One bars from grocery stores = 5-6gm of fiber each  [employer] Bran Buds cereal 1/2 cup = 17gm of fiber

## 2020-11-06 PROBLEM — Z12.11 ENCOUNTER FOR SCREENING FOR MALIGNANT NEOPLASM OF COLON: Status: ACTIVE | Noted: 2020-11-06

## 2020-11-12 ENCOUNTER — TELEPHONE (OUTPATIENT)
Dept: GASTROENTEROLOGY | Facility: CLINIC | Age: 70
End: 2020-11-12

## 2020-11-19 NOTE — PROGRESS NOTES
I received genetic testing information for Maddison. It shows that she is a slow metabolizer of elavil so discussed her dosage with her.  She is feeling better and having regular bms, the only se is a little bit of a dry mouth.  She will cut her pills in half and try the lower dose for 2 weeks and let me know which dose works best for her to send in a script to her [facility] mail order pharmacy.  She thanked me for the call.

## 2020-12-02 NOTE — TELEPHONE ENCOUNTER
**CORRECTION**   Patient decision to hold for 5 days.    Call from patient.  Saw Dr. Oswald today.  He told her to hold Plavix 5 days prior.  Need to fax over request for his signature.  Will fax over request.

## 2020-12-14 ENCOUNTER — TELEPHONE (OUTPATIENT)
Dept: SURGERY | Facility: CLINIC | Age: 70
End: 2020-12-14

## 2020-12-14 DIAGNOSIS — K58.2 IRRITABLE BOWEL SYNDROME WITH BOTH CONSTIPATION AND DIARRHEA: ICD-10-CM

## 2020-12-14 RX ORDER — AMITRIPTYLINE HYDROCHLORIDE 25 MG/1
25 TABLET, FILM COATED ORAL NIGHTLY
Qty: 90 TABLET | Refills: 3 | Status: SHIPPED | OUTPATIENT
Start: 2020-12-14 | End: 2020-12-14

## 2020-12-14 RX ORDER — AMITRIPTYLINE HYDROCHLORIDE 10 MG/1
10 TABLET, FILM COATED ORAL NIGHTLY
Qty: 90 TABLET | Refills: 3 | Status: SHIPPED | OUTPATIENT
Start: 2020-12-14 | End: 2022-01-12

## 2020-12-17 RX ORDER — ICOSAPENT ETHYL 1000 MG/1
4 CAPSULE ORAL DAILY
Qty: 360 CAPSULE | Refills: 0 | Status: SHIPPED | OUTPATIENT
Start: 2020-12-17 | End: 2021-02-27

## 2020-12-17 RX ORDER — ICOSAPENT ETHYL 1000 MG/1
4 CAPSULE ORAL DAILY
Qty: 120 CAPSULE | Refills: 0 | Status: SHIPPED | OUTPATIENT
Start: 2020-12-17 | End: 2020-12-17 | Stop reason: SDUPTHER

## 2021-01-04 ENCOUNTER — OFFICE VISIT (OUTPATIENT)
Dept: FAMILY MEDICINE CLINIC | Facility: CLINIC | Age: 71
End: 2021-01-04

## 2021-01-04 VITALS
WEIGHT: 151 LBS | SYSTOLIC BLOOD PRESSURE: 122 MMHG | HEART RATE: 74 BPM | HEIGHT: 64 IN | OXYGEN SATURATION: 98 % | BODY MASS INDEX: 25.78 KG/M2 | TEMPERATURE: 97.6 F | DIASTOLIC BLOOD PRESSURE: 82 MMHG

## 2021-01-04 DIAGNOSIS — Z12.31 SCREENING MAMMOGRAM, ENCOUNTER FOR: ICD-10-CM

## 2021-01-04 DIAGNOSIS — H61.21 IMPACTED CERUMEN OF RIGHT EAR: Primary | ICD-10-CM

## 2021-01-04 PROCEDURE — 69209 REMOVE IMPACTED EAR WAX UNI: CPT | Performed by: PHYSICIAN ASSISTANT

## 2021-01-04 PROCEDURE — 99212 OFFICE O/P EST SF 10 MIN: CPT | Performed by: PHYSICIAN ASSISTANT

## 2021-01-04 NOTE — PROGRESS NOTES
"Chief Complaint  Follow up on Urgent Care Visit (Seen 12/24/20 for ear pain)    Subjective    History of Present Illness      Maddison Turcios presents to [facility] for   History of Present Illness     Maddison is a 70 year old female who presents for follow up on .  Maddison states she was seen at [facility] [place] urgent care on December 24, 2020 diagnosed with an ear infection.  She was prescribed Omnicef antibiotic which she has finished.  She has lost 1 pound since her office visit in October 2020.  Maddison has purchased new hearing aids recently.  States she has had difficulty with these off and on.  She has only had them since early December 2020.  Denied any fevers, chills, headache, current ear pain, sore throat, rhinorrhea, cough, wheezing, shortness of air or GI upset.  Appetite and sleep have been normal.  States she needs orders for upcoming mammogram.    Objective   Vital Signs:   /82   Pulse 74   Temp 97.6 °F (36.4 °C)   Ht 162.6 cm (64\")   Wt 68.5 kg (151 lb)   SpO2 98%   BMI 25.92 kg/m²     Physical Exam  Constitutional:       Appearance: Normal appearance. She is well-developed, well-groomed and normal weight.      Interventions: Face mask in place.   HENT:      Head: Normocephalic and atraumatic.      Jaw: There is normal jaw occlusion.      Right Ear: Hearing, tympanic membrane, ear canal and external ear normal. There is impacted cerumen.      Left Ear: Hearing, tympanic membrane, ear canal and external ear normal.      Ears:      Comments: Right ear cerumen impaction noted.  After ear lavage; cerumen impaction was resolved with normal ear exam.  Patient is wearing bilateral hearing aids.     Nose: Nose normal.      Right Sinus: No maxillary sinus tenderness or frontal sinus tenderness.      Left Sinus: No maxillary sinus tenderness or frontal sinus tenderness.      Mouth/Throat:      Lips: Pink.      Mouth: Mucous membranes are moist.      Tongue: No lesions.      " Pharynx: Oropharynx is clear. Uvula midline.      Tonsils: No tonsillar exudate.   Eyes:      General: Lids are normal.      Conjunctiva/sclera: Conjunctivae normal.   Neck:      Musculoskeletal: Neck supple. No edema.      Trachea: Trachea and phonation normal. No tracheal tenderness.   Cardiovascular:      Rate and Rhythm: Normal rate and regular rhythm.      Pulses: Normal pulses.      Heart sounds: Normal heart sounds, S1 normal and S2 normal. No murmur.   Pulmonary:      Effort: Pulmonary effort is normal.      Breath sounds: Normal breath sounds and air entry.   Abdominal:      General: Bowel sounds are normal.      Palpations: Abdomen is soft. There is no hepatomegaly.      Tenderness: There is no abdominal tenderness.   Musculoskeletal:      Right lower leg: No edema.      Left lower leg: No edema.   Lymphadenopathy:      Cervical: No cervical adenopathy.      Right cervical: No superficial, deep or posterior cervical adenopathy.     Left cervical: No superficial, deep or posterior cervical adenopathy.   Skin:     General: Skin is warm and dry.      Capillary Refill: Capillary refill takes less than 2 seconds.   Neurological:      Mental Status: She is alert and oriented to person, place, and time.   Psychiatric:         Attention and Perception: Attention and perception normal.         Mood and Affect: Mood and affect normal.         Speech: Speech normal.         Behavior: Behavior normal. Behavior is cooperative.         Thought Content: Thought content normal.         Cognition and Memory: Cognition and memory normal.         Judgment: Judgment normal.        Result Review :       Data reviewed: Consultant notes Urgent care[facility] on December 24, 2020   Ear Cerumen Removal    Date/Time: 1/4/2021 8:11 AM  Performed by: Kasandra Mcdowell PA-C  Authorized by: Kasandra Mcdowell PA-C   Consent: Verbal consent obtained.  Risks and benefits: risks, benefits and alternatives were discussed  Consent  given by: patient  Patient understanding: patient states understanding of the procedure being performed  Patient consent: the patient's understanding of the procedure matches consent given  Procedure consent: procedure consent matches procedure scheduled  Relevant documents: relevant documents not present or verified  Test results: test results not available  Site marked: the operative site was marked  Imaging studies: imaging studies not available  Patient identity confirmed: verbally with patient    Anesthesia:  Local Anesthetic: none  Location details: right ear  Patient tolerance: patient tolerated the procedure well with no immediate complications  Comments: Lavage was performed by Kev Browning MA.  I have reexamined her ear after ear lavage.  She had total resolution of cerumen with normal ear exam..  Instructed not to use Q-tips.  Procedure type: irrigation   Sedation:  Patient sedated: no                 Ear Cerumen Removal    Date/Time: 1/4/2021 8:11 AM  Performed by: Kasandra Mcdowell PA-C  Authorized by: Kasandra Mcdowell PA-C   Consent: Verbal consent obtained.  Risks and benefits: risks, benefits and alternatives were discussed  Consent given by: patient  Patient understanding: patient states understanding of the procedure being performed  Patient consent: the patient's understanding of the procedure matches consent given  Procedure consent: procedure consent matches procedure scheduled  Relevant documents: relevant documents not present or verified  Test results: test results not available  Site marked: the operative site was marked  Imaging studies: imaging studies not available  Patient identity confirmed: verbally with patient    Anesthesia:  Local Anesthetic: none  Location details: right ear  Patient tolerance: patient tolerated the procedure well with no immediate complications  Comments: Lavage was performed by Kev Browning MA.  I have reexamined her ear after ear lavage.  She had total  resolution of cerumen with normal ear exam..  Instructed not to use Q-tips.  Procedure type: irrigation   Sedation:  Patient sedated: no            Assessment and Plan    Problem List Items Addressed This Visit     None      Visit Diagnoses     Impacted cerumen of right ear    -  Primary    Relevant Orders    Ear Cerumen Removal    Screening mammogram, encounter for        Relevant Orders    Mammo Screening Digital Tomosynthesis Bilateral With CAD      1.  New right ear cerumen impaction: After ear lavage she had total resolution of cerumen impaction.  Instructed not to use Q-tips.  Her left ear was normal.  I have reviewed her urgent care notes from December 24, 2020 with her today.  2.  Need for screening mammogram: I have placed orders for screening mammogram at [facility].  She will be notified of test results when completed.      I spent 10 minutes caring for Maddison on this date of service. This time includes time spent by me in the following activities:preparing for the visit  Follow Up   No follow-ups on file.  Patient was given instructions and counseling regarding her condition or for health maintenance advice. Please see specific information pulled into the AV'S if appropriate.         CELI Corado [facility]  [address] [address]  Dept: [phone]  Dept Fax: [phone]  Loc: [phone]  Loc Fax: [phone]        Answers for HPI/ROS submitted by the patient on 12/30/2020   Ear pain  What is the primary reason for your visit?: Ear Pain

## 2021-01-19 ENCOUNTER — OFFICE VISIT (OUTPATIENT)
Dept: FAMILY MEDICINE CLINIC | Facility: CLINIC | Age: 71
End: 2021-01-19

## 2021-01-19 VITALS
RESPIRATION RATE: 16 BRPM | DIASTOLIC BLOOD PRESSURE: 82 MMHG | SYSTOLIC BLOOD PRESSURE: 140 MMHG | HEIGHT: 64 IN | OXYGEN SATURATION: 96 % | BODY MASS INDEX: 25.92 KG/M2 | TEMPERATURE: 96.7 F | HEART RATE: 76 BPM

## 2021-01-19 DIAGNOSIS — H69.81 EUSTACHIAN TUBE DYSFUNCTION, RIGHT: Primary | ICD-10-CM

## 2021-01-19 PROCEDURE — 99213 OFFICE O/P EST LOW 20 MIN: CPT | Performed by: NURSE PRACTITIONER

## 2021-01-19 RX ORDER — FLUTICASONE PROPIONATE 50 MCG
2 SPRAY, SUSPENSION (ML) NASAL DAILY
COMMUNITY
Start: 2021-01-19 | End: 2021-04-23

## 2021-01-19 NOTE — PROGRESS NOTES
"Subjective      Chief Complaint   Patient presents with   • Earache     Right       Maddison Turcios is a 70 y.o. female who presents with ear pain and possible ear infection. Symptoms include: right ear pain. Onset of symptoms was 2 days ago, and have been gradually worsening since that time. Associated symptoms include: none.  Patient denies: achiness, fever , sinus pressure and sore throat. She is drinking moderate amounts of fluids.  Treated for left ear infection on 12/24/2020.    Seen for follow-up and had to have ear wax removed.  Then started to have ear pain about 2 days ago which started the same as the left ear.      The following portions of the patient's history were reviewed and updated as appropriate: allergies, current medications, past family history, past medical history, past social history, past surgical history and problem list.    Review of Systems  A comprehensive review of systems was negative except for: Ears, nose, mouth, throat, and face: positive for earaches    Objective   /82 (BP Location: Left arm, Patient Position: Sitting, Cuff Size: Adult)   Pulse 76   Temp 96.7 °F (35.9 °C)   Resp 16   Ht 162.6 cm (64\")   SpO2 96%   BMI 25.92 kg/m²   General:  alert, appears stated age and cooperative   Right Ear: Bulging right tm otherwise normal.     Left Ear: normal   Mouth:  lips, mucosa, and tongue normal; teeth and gums normal   Neck: no adenopathy     Assessment/Plan   Right Eustachian Tube Dysfunction   Flonase nasal spray as directed.    Treatment: No antibiotics indicated at this time.  OTC analgesia as needed.  Fluids, rest, avoid carbonated/alcoholic and caffeinated beverages.   Follow up in 1 week if not improving.           "

## 2021-01-19 NOTE — PATIENT INSTRUCTIONS
Eustachian Tube Dysfunction    Eustachian tube dysfunction refers to a condition in which a blockage develops in the narrow passage that connects the middle ear to the back of the nose (eustachian tube). The eustachian tube regulates air pressure in the middle ear by letting air move between the ear and nose. It also helps to drain fluid from the middle ear space.  Eustachian tube dysfunction can affect one or both ears. When the eustachian tube does not function properly, air pressure, fluid, or both can build up in the middle ear.  What are the causes?  This condition occurs when the eustachian tube becomes blocked or cannot open normally. Common causes of this condition include:  · Ear infections.  · Colds and other infections that affect the nose, mouth, and throat (upper respiratory tract).  · Allergies.  · Irritation from cigarette smoke.  · Irritation from stomach acid coming up into the esophagus (gastroesophageal reflux). The esophagus is the tube that carries food from the mouth to the stomach.  · Sudden changes in air pressure, such as from descending in an airplane or scuba diving.  · Abnormal growths in the nose or throat, such as:  ? Growths that line the nose (nasal polyps).  ? Abnormal growth of cells (tumors).  ? Enlarged tissue at the back of the throat (adenoids).  What increases the risk?  You are more likely to develop this condition if:  · You smoke.  · You are overweight.  · You are a child who has:  ? Certain birth defects of the mouth, such as cleft palate.  ? Large tonsils or adenoids.  What are the signs or symptoms?  Common symptoms of this condition include:  · A feeling of fullness in the ear.  · Ear pain.  · Clicking or popping noises in the ear.  · Ringing in the ear.  · Hearing loss.  · Loss of balance.  · Dizziness.  Symptoms may get worse when the air pressure around you changes, such as when you travel to an area of high elevation, fly on an airplane, or go scuba diving.  How is  "this diagnosed?  This condition may be diagnosed based on:  · Your symptoms.  · A physical exam of your ears, nose, and throat.  · Tests, such as those that measure:  ? The movement of your eardrum (tympanogram).  ? Your hearing (audiometry).  How is this treated?  Treatment depends on the cause and severity of your condition.  · In mild cases, you may relieve your symptoms by moving air into your ears. This is called \"popping the ears.\"  · In more severe cases, or if you have symptoms of fluid in your ears, treatment may include:  ? Medicines to relieve congestion (decongestants).  ? Medicines that treat allergies (antihistamines).  ? Nasal sprays or ear drops that contain medicines that reduce swelling (steroids).  ? A procedure to drain the fluid in your eardrum (myringotomy). In this procedure, a small tube is placed in the eardrum to:  § Drain the fluid.  § Restore the air in the middle ear space.  ? A procedure to insert a balloon device through the nose to inflate the opening of the eustachian tube (balloon dilation).  Follow these instructions at home:  Lifestyle  · Do not do any of the following until your health care provider approves:  ? Travel to high altitudes.  ? Fly in airplanes.  ? Work in a pressurized cabin or room.  ? Scuba dive.  · Do not use any products that contain nicotine or tobacco, such as cigarettes and e-cigarettes. If you need help quitting, ask your health care provider.  · Keep your ears dry. Wear fitted earplugs during showering and bathing. Dry your ears completely after.  General instructions  · Take over-the-counter and prescription medicines only as told by your health care provider.  · Use techniques to help pop your ears as recommended by your health care provider. These may include:  ? Chewing gum.  ? Yawning.  ? Frequent, forceful swallowing.  ? Closing your mouth, holding your nose closed, and gently blowing as if you are trying to blow air out of your nose.  · Keep all " follow-up visits as told by your health care provider. This is important.  Contact a health care provider if:  · Your symptoms do not go away after treatment.  · Your symptoms come back after treatment.  · You are unable to pop your ears.  · You have:  ? A fever.  ? Pain in your ear.  ? Pain in your head or neck.  ? Fluid draining from your ear.  · Your hearing suddenly changes.  · You become very dizzy.  · You lose your balance.  Summary  · Eustachian tube dysfunction refers to a condition in which a blockage develops in the eustachian tube.  · It can be caused by ear infections, allergies, inhaled irritants, or abnormal growths in the nose or throat.  · Symptoms include ear pain, hearing loss, or ringing in the ears.  · Mild cases are treated with maneuvers to unblock the ears, such as yawning or ear popping.  · Severe cases are treated with medicines. Surgery may also be done (rare).  This information is not intended to replace advice given to you by your health care provider. Make sure you discuss any questions you have with your health care provider.  Document Revised: 04/09/2019 Document Reviewed: 04/09/2019  Elsevier Patient Education © 2020 [employer]

## 2021-01-25 ENCOUNTER — TRANSCRIBE ORDERS (OUTPATIENT)
Dept: ADMINISTRATIVE | Facility: HOSPITAL | Age: 71
End: 2021-01-25

## 2021-01-25 DIAGNOSIS — U07.1 COVID-19: Primary | ICD-10-CM

## 2021-01-27 ENCOUNTER — LAB (OUTPATIENT)
Dept: LAB | Facility: HOSPITAL | Age: 71
End: 2021-01-27

## 2021-01-27 DIAGNOSIS — U07.1 COVID-19: ICD-10-CM

## 2021-01-27 LAB — SARS-COV-2 RNA PNL SPEC NAA+PROBE: NOT DETECTED

## 2021-01-27 PROCEDURE — C9803 HOPD COVID-19 SPEC COLLECT: HCPCS

## 2021-01-27 PROCEDURE — 87635 SARS-COV-2 COVID-19 AMP PRB: CPT | Performed by: OBSTETRICS & GYNECOLOGY

## 2021-01-28 ENCOUNTER — ANESTHESIA EVENT (OUTPATIENT)
Dept: PERIOP | Facility: HOSPITAL | Age: 71
End: 2021-01-28

## 2021-01-29 ENCOUNTER — ANESTHESIA (OUTPATIENT)
Dept: PERIOP | Facility: HOSPITAL | Age: 71
End: 2021-01-29

## 2021-01-29 ENCOUNTER — HOSPITAL ENCOUNTER (OUTPATIENT)
Facility: HOSPITAL | Age: 71
Setting detail: HOSPITAL OUTPATIENT SURGERY
Discharge: HOME | End: 2021-01-29
Attending: INTERNAL MEDICINE | Admitting: INTERNAL MEDICINE

## 2021-01-29 VITALS
OXYGEN SATURATION: 98 % | BODY MASS INDEX: 26.43 KG/M2 | WEIGHT: 154 LBS | HEART RATE: 62 BPM | RESPIRATION RATE: 16 BRPM | DIASTOLIC BLOOD PRESSURE: 64 MMHG | SYSTOLIC BLOOD PRESSURE: 128 MMHG | TEMPERATURE: 96.9 F

## 2021-01-29 DIAGNOSIS — Z86.010 PERSONAL HISTORY OF COLONIC POLYPS: ICD-10-CM

## 2021-01-29 DIAGNOSIS — Z12.11 ENCOUNTER FOR SCREENING FOR MALIGNANT NEOPLASM OF COLON: ICD-10-CM

## 2021-01-29 PROCEDURE — 45380 COLONOSCOPY AND BIOPSY: CPT | Performed by: INTERNAL MEDICINE

## 2021-01-29 PROCEDURE — 25010000002 PROPOFOL 10 MG/ML EMULSION: Performed by: NURSE ANESTHETIST, CERTIFIED REGISTERED

## 2021-01-29 PROCEDURE — 88305 TISSUE EXAM BY PATHOLOGIST: CPT | Performed by: INTERNAL MEDICINE

## 2021-01-29 RX ORDER — SODIUM CHLORIDE, SODIUM LACTATE, POTASSIUM CHLORIDE, CALCIUM CHLORIDE 600; 310; 30; 20 MG/100ML; MG/100ML; MG/100ML; MG/100ML
9 INJECTION, SOLUTION INTRAVENOUS CONTINUOUS
Status: DISCONTINUED | OUTPATIENT
Start: 2021-01-29 | End: 2021-01-29 | Stop reason: HOSPADM

## 2021-01-29 RX ORDER — SODIUM CHLORIDE 0.9 % (FLUSH) 0.9 %
10 SYRINGE (ML) INJECTION EVERY 12 HOURS SCHEDULED
Status: DISCONTINUED | OUTPATIENT
Start: 2021-01-29 | End: 2021-01-29 | Stop reason: HOSPADM

## 2021-01-29 RX ORDER — PROPOFOL 10 MG/ML
VIAL (ML) INTRAVENOUS AS NEEDED
Status: DISCONTINUED | OUTPATIENT
Start: 2021-01-29 | End: 2021-01-29 | Stop reason: SURG

## 2021-01-29 RX ORDER — SODIUM CHLORIDE 9 MG/ML
40 INJECTION, SOLUTION INTRAVENOUS AS NEEDED
Status: DISCONTINUED | OUTPATIENT
Start: 2021-01-29 | End: 2021-01-29 | Stop reason: HOSPADM

## 2021-01-29 RX ORDER — ONDANSETRON 2 MG/ML
4 INJECTION INTRAMUSCULAR; INTRAVENOUS ONCE AS NEEDED
Status: DISCONTINUED | OUTPATIENT
Start: 2021-01-29 | End: 2021-01-29 | Stop reason: HOSPADM

## 2021-01-29 RX ORDER — LIDOCAINE HYDROCHLORIDE 20 MG/ML
INJECTION, SOLUTION INFILTRATION; PERINEURAL AS NEEDED
Status: DISCONTINUED | OUTPATIENT
Start: 2021-01-29 | End: 2021-01-29 | Stop reason: SURG

## 2021-01-29 RX ORDER — LIDOCAINE HYDROCHLORIDE 10 MG/ML
0.5 INJECTION, SOLUTION EPIDURAL; INFILTRATION; INTRACAUDAL; PERINEURAL ONCE AS NEEDED
Status: DISCONTINUED | OUTPATIENT
Start: 2021-01-29 | End: 2021-01-29 | Stop reason: HOSPADM

## 2021-01-29 RX ORDER — SODIUM CHLORIDE, SODIUM LACTATE, POTASSIUM CHLORIDE, CALCIUM CHLORIDE 600; 310; 30; 20 MG/100ML; MG/100ML; MG/100ML; MG/100ML
100 INJECTION, SOLUTION INTRAVENOUS CONTINUOUS
Status: DISCONTINUED | OUTPATIENT
Start: 2021-01-29 | End: 2021-01-29 | Stop reason: HOSPADM

## 2021-01-29 RX ORDER — SODIUM CHLORIDE 0.9 % (FLUSH) 0.9 %
10 SYRINGE (ML) INJECTION AS NEEDED
Status: DISCONTINUED | OUTPATIENT
Start: 2021-01-29 | End: 2021-01-29 | Stop reason: HOSPADM

## 2021-01-29 RX ADMIN — SODIUM CHLORIDE, POTASSIUM CHLORIDE, SODIUM LACTATE AND CALCIUM CHLORIDE 9 ML/HR: 600; 310; 30; 20 INJECTION, SOLUTION INTRAVENOUS at 14:53

## 2021-01-29 RX ADMIN — LIDOCAINE HYDROCHLORIDE 100 MG: 20 INJECTION, SOLUTION INFILTRATION; PERINEURAL at 15:33

## 2021-01-29 RX ADMIN — PROPOFOL 50 MG: 10 INJECTION, EMULSION INTRAVENOUS at 15:45

## 2021-01-29 RX ADMIN — PROPOFOL 50 MG: 10 INJECTION, EMULSION INTRAVENOUS at 15:42

## 2021-01-29 RX ADMIN — PROPOFOL 50 MG: 10 INJECTION, EMULSION INTRAVENOUS at 15:39

## 2021-01-29 RX ADMIN — PROPOFOL 100 MG: 10 INJECTION, EMULSION INTRAVENOUS at 15:36

## 2021-01-29 NOTE — ANESTHESIA POSTPROCEDURE EVALUATION
Patient: Maddison Turcios    Procedure Summary     Date: 01/29/21 Room / Location: [facility] ENDOSCOPY 1 /  [facility]    Anesthesia Start: 1519 Anesthesia Stop: 1601    Procedure: COLONOSCOPY with polypectomy (N/A ) Diagnosis:       Encounter for screening for malignant neoplasm of colon      Personal history of colonic polyps      Diverticulosis large intestine w/o perforation or abscess w/o bleeding      Colon polyp      (Encounter for screening for malignant neoplasm of colon [Z12.11])      (Personal history of colonic polyps [Z86.010])    Surgeon: Colin Ladd MD Provider: Elsa Payton CRNA    Anesthesia Type: MAC ASA Status: 3          Anesthesia Type: MAC    Vitals  Vitals Value Taken Time   /65 01/29/21 1602   Temp 98.1 °F (36.7 °C) 01/29/21 1602   Pulse 62 01/29/21 1602   Resp 13 01/29/21 1602   SpO2 98 % 01/29/21 1602           Post Anesthesia Care and Evaluation    Patient location during evaluation: PHASE II  Patient participation: complete - patient participated  Level of consciousness: awake and alert  Pain score: 0  Pain management: adequate  Airway patency: patent  Anesthetic complications: No anesthetic complications  PONV Status: none  Cardiovascular status: acceptable  Respiratory status: acceptable  Hydration status: acceptable

## 2021-01-29 NOTE — ANESTHESIA PREPROCEDURE EVALUATION
Anesthesia Evaluation     Patient summary reviewed and Nursing notes reviewed   no history of anesthetic complications:  NPO Solid Status: > 8 hours  NPO Liquid Status: > 8 hours           Airway   Mallampati: II  TM distance: >3 FB  Neck ROM: full  No difficulty expected  Dental - normal exam         Pulmonary - normal exam    breath sounds clear to auscultation  (-) recent URI, sleep apnea  Cardiovascular - normal exam  Exercise tolerance: good (4-7 METS)    ECG reviewed  PT is on anticoagulation therapy  Rhythm: regular  Rate: normal    (+) hypertension well controlled, CAD, CABG (2004), dysrhythmias Bradycardia, hyperlipidemia,   (-) cardiac stents    ROS comment: Follows with [facility] yearly    Neuro/Psych  (+) TIA (2016),       ROS Comment: Cerebral aneurysm, CEREBRAL ANEURYSM REPAIR  GI/Hepatic/Renal/Endo    (+)   thyroid problem hyperthyroidism and thyroid nodules    Musculoskeletal (-) negative ROS    Abdominal  - normal exam   Substance History - negative use     OB/GYN          Other - negative ROS                       Anesthesia Plan    ASA 3     MAC       Anesthetic plan, all risks, benefits, and alternatives have been provided, discussed and informed consent has been obtained with: patient.  Use of blood products discussed with patient  Consented to blood products.

## 2021-02-01 LAB
CYTO UR: NORMAL
LAB AP CASE REPORT: NORMAL
PATH REPORT.FINAL DX SPEC: NORMAL
PATH REPORT.GROSS SPEC: NORMAL

## 2021-02-18 ENCOUNTER — OFFICE VISIT (OUTPATIENT)
Dept: GASTROENTEROLOGY | Facility: CLINIC | Age: 71
End: 2021-02-18

## 2021-02-18 VITALS — HEIGHT: 64 IN | BODY MASS INDEX: 26.87 KG/M2 | TEMPERATURE: 97.6 F | WEIGHT: 157.4 LBS

## 2021-02-18 DIAGNOSIS — K58.2 IRRITABLE BOWEL SYNDROME WITH BOTH CONSTIPATION AND DIARRHEA: Primary | ICD-10-CM

## 2021-02-18 DIAGNOSIS — Z86.010 PERSONAL HISTORY OF COLONIC POLYPS: ICD-10-CM

## 2021-02-18 PROCEDURE — 99213 OFFICE O/P EST LOW 20 MIN: CPT | Performed by: NURSE PRACTITIONER

## 2021-02-18 NOTE — PATIENT INSTRUCTIONS
Reviewed colon results with 5 yr recall.     Will try cutting the amitriptyline 25mg in half for 12.5mg a night and see if this will get her down to 1-3 bms a day without urgency.  Continue with the fiber and is getting at least 30gm a day on her fiber.

## 2021-02-18 NOTE — PROGRESS NOTES
PATIENT INFORMATION  Maddison Turcios       - 1950    CHIEF COMPLAINT  Chief Complaint   Patient presents with   • Follow-up     3 mo follow up on IBS       HISTORY OF PRESENT ILLNESS  Hx:   About a month ago went for 2 weeks without a bm.  Normally she goes daily.  Took stool softeners for 3-4 days without results , took laxative severe abd pain no results, heating pad eased pain and small result. Started taking Fiberwize from [employer] 15gm fiber in one glass plus 7gm in another supplement, 2 cups decaf coffee in the am. Well hydrated.  Now she goes every morning but just a little bit.  Then when she gets on the road she has to go.       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 No recent abx use.  No night wakening for her bowels    21 Colon 1 polyp 5yr recall  12/22/15 Dr. White colon: 1 polyp, sig diverticulosis, internal hemorrhoid  britta     amitriptyline 25 mg slow metabilizer so will try 1/2 dose and see if this works for her send new script to [email or web address] mail order.     TODAY:    On the amitriptyline 10mg q hs she has gone from 5bms a day with urgency to 3 soft formed a day without urgency or incontinence.      She has also increased her fiber.        REVIEWED PERTINENT RESULTS/ LABS  Lab Results   Component Value Date    CASEREPORT  2021     Surgical Pathology Report                         Case: [id number]                                  Authorizing Provider:  Colin Ladd        Collected:           2021 03:44 PM                                 MD Sreekanth                                                                   Ordering Location:     [facility]   Received:            2021 05:11 PM                                 OR                                                                           Pathologist:           Diana Raymundo MD                                                          Specimen:     Large Intestine, Sigmoid Colon                                                             FINALDX  01/29/2021     1. Colon, Sigmoid, Biopsy: Colonic mucosa with   A. Tubular adenoma.    mec/pkm        Lab Results   Component Value Date    HGB 13.7 09/30/2020    MCV 91.3 09/30/2020     09/30/2020    ALT 22 09/30/2020    AST 28 09/30/2020    TRIG 97 09/30/2020      No results found.    REVIEW OF SYSTEMS  Review of Systems   All other systems reviewed and are negative.        ACTIVE PROBLEMS  Patient Active Problem List    Diagnosis   • Encounter for screening for malignant neoplasm of colon [Z12.11]   • Irritable bowel syndrome with both constipation and diarrhea [K58.2]   • Personal history of colonic polyps [Z86.010]   • Nondisplaced fracture of fifth metatarsal bone, right foot, initial encounter for closed fracture [S92.354A]   • Prophylactic antibiotic [Z79.2]   • Greater trochanteric bursitis of right hip [M70.61]   • Need for influenza vaccination [Z23]   • Poison ivy dermatitis [L23.7]   • Neck strain, initial encounter [S16.1XXA]   • Chest pain [R07.9]   • S/P CABG (coronary artery bypass graft) [Z95.1]   • Family history of breast cancer [Z80.3]   • Necrotic hand wound (CMS/HCC) [S61.409A, I96]   • Osteopenia [M85.80]   • Medicare annual wellness visit, subsequent [Z00.00]   • Fatigue [R53.83]   • Skin lesion of right ear [H61.91]   • Transient cerebral ischemia [G45.9]   • Bilateral enlargement of atria [I51.7]   • Sinus bradycardia [R00.1]   • Cerebral aneurysm [I67.1]   • Coronary artery disease [I25.10]   • Mixed hyperlipidemia [E78.2]   • Essential hypertension [I10]   • Hyperthyroidism [E05.90]   • Insomnia [G47.00]   • Onychomycosis of toenail [B35.1]         PAST MEDICAL HISTORY  Past Medical History:   Diagnosis Date   • Abnormal blood chemistry    • Acute UTI (urinary tract infection)    • Allergic    • Anemia    • Aneurysm, cerebral    • Bloating    • Brain aneurysm    • CAD (coronary  artery disease)    • Chronic headaches    • Coronary artery disease    • Coronary artery stenosis    • Dysuria    • Encounter for screening colonoscopy    • Environmental allergies    • Fall from slip, trip, or stumble    • Gait disturbance    • H/O cardiovascular stress test 09/17/2013    Treadmill   • H/O colonoscopy    • H/O echocardiogram 09/25/2013   • H/O fall    • History of EKG 07/31/2015   • Hyperlipemia    • Hyperlipidemia    • Hypertension    • Hyperthyroidism    • Injury of back    • Insomnia    • Left forearm pain    • Left leg pain    • Left wrist pain    • Lower abdominal pain    • Need for pneumococcal vaccination    • Onychomycosis of toenail    • AKIRA (obstructive sleep apnea)    • Pelvic pressure in female    • Right foot pain    • TIA (transient ischemic attack) 11/30/2016   • URI (upper respiratory infection)    • Urine frequency          SURGICAL HISTORY  Past Surgical History:   Procedure Laterality Date   • BREAST EXCISIONAL BIOPSY Right 1977    b9   • BREAST EXCISIONAL BIOPSY Right 1979    b9   • BUNIONECTOMY     • CARDIAC CATHETERIZATION N/A 4/8/2019    Procedure: Left Heart Cath;  Surgeon: Jayme Ramirez MD;  Location: [facility] CATH INVASIVE LOCATION;  Service: Cardiovascular   • CARDIAC CATHETERIZATION N/A 4/8/2019    Procedure: Coronary angiography;  Surgeon: Jayme Ramirez MD;  Location:  [facility] CATH INVASIVE LOCATION;  Service: Cardiovascular   • CARDIAC CATHETERIZATION N/A 4/8/2019    Procedure: Left ventriculography;  Surgeon: Jayme Ramirez MD;  Location:  [facility] CATH INVASIVE LOCATION;  Service: Cardiovascular   • CEREBRAL ANEURYSM REPAIR     • CHOLECYSTECTOMY     • COLONOSCOPY N/A 1/29/2021    Procedure: COLONOSCOPY with polypectomy;  Surgeon: Colin Ladd MD;  Location: [facility] OR;  Service: Gastroenterology;  Laterality: N/A;  Diverticulosis  Sigmoid colon polyp   • CORONARY ARTERY BYPASS GRAFT     • ROTATOR CUFF REPAIR Left    • SPLENECTOMY     •  TONSILLECTOMY     • TUBAL ABDOMINAL LIGATION           FAMILY HISTORY  Family History   Problem Relation Age of Onset   • Heart failure Mother    • Hypertension Mother    • Stroke Mother    • Cervical cancer Mother    • Heart failure Father    • Aneurysm Sister    • Breast cancer Sister    • Heart attack Brother    • Cancer Brother    • Lung cancer Brother    • No Known Problems Son    • No Known Problems Daughter    • No Known Problems Sister    • No Known Problems Sister    • Ovarian cancer Neg Hx    • Colon cancer Neg Hx    • Pulmonary embolism Neg Hx    • Deep vein thrombosis Neg Hx          SOCIAL HISTORY  Social History     Occupational History   • Not on file   Tobacco Use   • Smoking status: Never Smoker   • Smokeless tobacco: Never Used   Substance and Sexual Activity   • Alcohol use: No   • Drug use: No   • Sexual activity: Never     Partners: Male     Birth control/protection: Post-menopausal, Surgical     Comment: BTL         CURRENT MEDICATIONS    Current Outpatient Medications:   •  amitriptyline (ELAVIL) 10 MG tablet, Take 1 tablet by mouth Every Night., Disp: 90 tablet, Rfl: 3  •  Cholecalciferol (VITAMIN D3) 5000 UNITS capsule capsule, Take 5,000 Units by mouth Daily., Disp: , Rfl:   •  clopidogrel (PLAVIX) 75 MG tablet, Take 1 tablet by mouth Daily., Disp: 90 tablet, Rfl: 3  •  ezetimibe (ZETIA) 10 MG tablet, Take 1 tablet by mouth Daily., Disp: 90 tablet, Rfl: 3  •  fluticasone (Flonase) 50 MCG/ACT nasal spray, 2 sprays into the nostril(s) as directed by provider Daily., Disp:  , Rfl:   •  icosapent ethyl (Vascepa) 1 g capsule capsule, Take 4 g by mouth Daily., Disp: 360 capsule, Rfl: 0  •  isosorbide dinitrate (ISORDIL) 5 MG tablet, TAKE 1 TABLET TWICE A DAY, Disp: 180 tablet, Rfl: 3  •  pitavastatin calcium (LIVALO) 2 MG tablet tablet, Take 1 tablet by mouth Every Night., Disp: 90 tablet, Rfl: 3    ALLERGIES  Adhesive tape, Beta adrenergic blockers, Sulfa antibiotics, and  "Sulfur    VITALS  Vitals:    02/18/21 1524   Temp: 97.6 °F (36.4 °C)   TempSrc: Temporal   Weight: 71.4 kg (157 lb 6.4 oz)   Height: 162.6 cm (64.02\")       PHYSICAL EXAM  Debilities/Disabilities Identified: None  Emotional Behavior: Appropriate  Wt Readings from Last 3 Encounters:   02/18/21 71.4 kg (157 lb 6.4 oz)   01/29/21 69.9 kg (154 lb)   01/04/21 68.5 kg (151 lb)     Ht Readings from Last 1 Encounters:   02/18/21 162.6 cm (64.02\")     Body mass index is 27 kg/m².  Physical Exam    CLINICAL DATA REVIEWED   reviewed previous lab results and integrated with today's visit, reviewed notes from other physicians and/or last GI encounter, reviewed previous endoscopy results and available photos, reviewed surgical pathology results from previous biopsies    ASSESSMENT  Diagnoses and all orders for this visit:    Irritable bowel syndrome with both constipation and diarrhea    Personal history of colonic polyps          PLAN  Return in about 3 months (around 5/18/2021) for Recheck ibs-d amitriptyline dose.     Reviewed colon results with 5 yr recall.    Will try cutting the amitriptyline 25mg in half for 12.5mg a night and see if this will get her down to 2-3 bms a day.  Continue with the fiber and is getting at least 30gm a day on her fiber.      I have discussed the above plan with the patient.  They verbalize understanding and are in agreement with the plan.  They have been advised to contact the office for any questions, concerns, or changes related to their health.                      "

## 2021-02-27 RX ORDER — ICOSAPENT ETHYL 1000 MG/1
CAPSULE ORAL
Qty: 360 CAPSULE | Refills: 3 | Status: SHIPPED | OUTPATIENT
Start: 2021-02-27 | End: 2022-02-10 | Stop reason: SDUPTHER

## 2021-03-02 ENCOUNTER — HOSPITAL ENCOUNTER (OUTPATIENT)
Dept: MAMMOGRAPHY | Facility: HOSPITAL | Age: 71
Discharge: HOME | End: 2021-03-02
Admitting: PHYSICIAN ASSISTANT

## 2021-03-02 DIAGNOSIS — Z12.31 SCREENING MAMMOGRAM, ENCOUNTER FOR: ICD-10-CM

## 2021-03-02 PROCEDURE — 77063 BREAST TOMOSYNTHESIS BI: CPT

## 2021-03-02 PROCEDURE — 77067 SCR MAMMO BI INCL CAD: CPT

## 2021-03-22 ENCOUNTER — OFFICE VISIT (OUTPATIENT)
Dept: FAMILY MEDICINE CLINIC | Facility: CLINIC | Age: 71
End: 2021-03-22

## 2021-03-22 VITALS
BODY MASS INDEX: 25.78 KG/M2 | SYSTOLIC BLOOD PRESSURE: 128 MMHG | OXYGEN SATURATION: 95 % | HEIGHT: 64 IN | WEIGHT: 151 LBS | TEMPERATURE: 97.5 F | HEART RATE: 68 BPM | DIASTOLIC BLOOD PRESSURE: 86 MMHG

## 2021-03-22 DIAGNOSIS — Z01.419 ENCOUNTER FOR ROUTINE GYNECOLOGIC EXAMINATION IN MEDICARE PATIENT: Primary | ICD-10-CM

## 2021-03-22 PROCEDURE — G0101 CA SCREEN;PELVIC/BREAST EXAM: HCPCS | Performed by: PHYSICIAN ASSISTANT

## 2021-03-22 NOTE — PROGRESS NOTES
"Chief Complaint  Gynecologic Exam (medicare wiht breast exam)    Subjective          Maddison Turcios presents to [facility]  History of Present Illness    Maddison is a 70 year old female who presents for medicare pap and breast exam.  Appetite has been healthy.  Sleep has been normal.  Has been active by working outside and exercising.  She had a normal mammogram recently.  Had colonoscopy in February with one polyp.  Movements have been daily without dark black tarry stools.  Has been doing breast examinations at home.  Denied any cough, wheezing, shortness of air, chest pain, abdominal pain, urinary symptoms or vaginal discharge.         Objective   Vital Signs:   /86   Pulse 68   Temp 97.5 °F (36.4 °C)   Ht 162.6 cm (64.02\")   Wt 68.5 kg (151 lb)   SpO2 95%   BMI 25.90 kg/m²     Physical Exam  Exam conducted with a chaperone present.   Constitutional:       Appearance: Normal appearance. She is well-developed and normal weight.      Interventions: Face mask in place.   HENT:      Head: Normocephalic and atraumatic.   Cardiovascular:      Rate and Rhythm: Normal rate and regular rhythm.      Chest Wall: PMI is not displaced.      Pulses: Normal pulses.      Heart sounds: Normal heart sounds, S1 normal and S2 normal. No murmur heard.     Pulmonary:      Effort: Pulmonary effort is normal. No retractions.      Breath sounds: Normal breath sounds and air entry.   Chest:      Chest wall: No mass, lacerations, deformity, swelling, tenderness, crepitus or edema. There is no dullness to percussion.      Breasts: Breasts are symmetrical.         Right: Normal. No swelling, bleeding, inverted nipple, mass, nipple discharge, skin change or tenderness.         Left: Normal. No swelling, bleeding, inverted nipple, mass, nipple discharge, skin change or tenderness.   Abdominal:      General: Bowel sounds are normal.      Palpations: Abdomen is soft. There is no hepatomegaly.      " Tenderness: There is no abdominal tenderness.      Hernia: There is no hernia in the left inguinal area or right inguinal area.   Genitourinary:     General: Normal vulva.      Exam position: Supine.      Labia:         Right: No rash, tenderness, lesion or injury.         Left: No rash, tenderness, lesion or injury.       Vagina: Normal. No signs of injury and foreign body. No vaginal discharge, erythema, tenderness, bleeding, lesions or prolapsed vaginal walls.      Cervix: Normal.      Uterus: Normal.       Adnexa: Right adnexa normal and left adnexa normal.        Right: No mass, tenderness or fullness.          Left: No mass, tenderness or fullness.        Comments: Exam chaperoned by Nallely Alcaraz MA  Rectal exam was deferred due to recent colonoscopy  Musculoskeletal:      Right lower leg: No edema.      Left lower leg: No edema.   Lymphadenopathy:      Upper Body:      Right upper body: No axillary adenopathy.      Left upper body: No axillary adenopathy.      Lower Body: No right inguinal adenopathy. No left inguinal adenopathy.   Skin:     General: Skin is warm and dry.      Capillary Refill: Capillary refill takes less than 2 seconds.   Neurological:      Mental Status: She is alert and oriented to person, place, and time.   Psychiatric:         Attention and Perception: Attention and perception normal.         Mood and Affect: Mood and affect normal.         Speech: Speech normal.         Behavior: Behavior normal. Behavior is cooperative.         Thought Content: Thought content normal.         Cognition and Memory: Cognition and memory normal.         Judgment: Judgment normal.     I was wearing a surgical mask and face shield during the entire office visit/encounter.     Result Review :                 Assessment and Plan    Diagnoses and all orders for this visit:    1. Encounter for routine gynecologic examination in Medicare patient (Primary)  -     IGP,rfx Aptima HPV All Pth; Future  -      Cancel: POC Occult Blood Stool  -     IGP,rfx Aptima HPV All Pth    Ms. Maddison Turcios was seen in office today for Medicare Pap smear and breast examination.  Pap smear was collected will be sent to the laboratory for further evaluation.  She will be notified of test results when completed.  Will return to office in October for Medicare wellness examination.        Follow Up   Return in about 31 weeks (around 10/25/2021), or labs prior, for Medicare Wellness.  Patient was given instructions and counseling regarding her condition or for health maintenance advice. Please see specific information pulled into the AVS if appropriate.     [facility]  [address] [place] [id number]  Dept: [phone]  Dept Fax: [phone]  Loc: [phone]  Loc Fax: [phone]        Answers for HPI/ROS submitted by the patient on 3/16/2021  What is the primary reason for your visit?: Physical

## 2021-03-25 LAB
CONV .: NORMAL
CYTOLOGIST CVX/VAG CYTO: NORMAL
CYTOLOGY CVX/VAG DOC CYTO: NORMAL
CYTOLOGY CVX/VAG DOC THIN PREP: NORMAL
DX ICD CODE: NORMAL
HIV 1 & 2 AB SER-IMP: NORMAL
Lab: NORMAL
OTHER STN SPEC: NORMAL
STAT OF ADQ CVX/VAG CYTO-IMP: NORMAL

## 2021-04-23 ENCOUNTER — OFFICE VISIT (OUTPATIENT)
Dept: CARDIOLOGY | Facility: CLINIC | Age: 71
End: 2021-04-23

## 2021-04-23 VITALS
WEIGHT: 149.8 LBS | BODY MASS INDEX: 25.57 KG/M2 | HEART RATE: 69 BPM | DIASTOLIC BLOOD PRESSURE: 80 MMHG | HEIGHT: 64 IN | SYSTOLIC BLOOD PRESSURE: 144 MMHG

## 2021-04-23 DIAGNOSIS — Z95.1 S/P CABG (CORONARY ARTERY BYPASS GRAFT): ICD-10-CM

## 2021-04-23 DIAGNOSIS — I25.10 CORONARY ARTERY DISEASE INVOLVING NATIVE CORONARY ARTERY OF NATIVE HEART WITHOUT ANGINA PECTORIS: Primary | ICD-10-CM

## 2021-04-23 DIAGNOSIS — I51.7 BILATERAL ENLARGEMENT OF ATRIA: ICD-10-CM

## 2021-04-23 DIAGNOSIS — E78.2 MIXED HYPERLIPIDEMIA: ICD-10-CM

## 2021-04-23 PROCEDURE — 99214 OFFICE O/P EST MOD 30 MIN: CPT | Performed by: INTERNAL MEDICINE

## 2021-04-23 PROCEDURE — 93000 ELECTROCARDIOGRAM COMPLETE: CPT | Performed by: INTERNAL MEDICINE

## 2021-04-23 RX ORDER — CHLORHEXIDINE GLUCONATE 4 %
12 LIQUID (ML) TOPICAL NIGHTLY
COMMUNITY

## 2021-04-23 RX ORDER — RAMIPRIL 2.5 MG/1
2.5 CAPSULE ORAL NIGHTLY
Qty: 90 CAPSULE | Refills: 3 | Status: SHIPPED | OUTPATIENT
Start: 2021-04-23 | End: 2021-04-23 | Stop reason: SDUPTHER

## 2021-04-23 RX ORDER — RAMIPRIL 2.5 MG/1
2.5 CAPSULE ORAL NIGHTLY
Qty: 30 CAPSULE | Refills: 0 | Status: SHIPPED | OUTPATIENT
Start: 2021-04-23 | End: 2022-05-23

## 2021-04-23 NOTE — PROGRESS NOTES
Date of Office Visit: 2021  Encounter Provider: Helen Urbina MD  Place of Service: [facility] CARDIOLOGY  Patient Name: Maddison Turcios  :1950      Patient ID:  Maddison Turcios is a 71 y.o. female is here for  followup for CAD, s/p CABG.         History of Present Illness    She has a history of episodes of severe fatigue and had a stress study  which showed a very mild abnormality. She continued to have the severe fatigue and had a  heart catheterization done in 2004 showing severe left main coronary stenosis.   She was transferred emergently for bypass surgery at [facility] which was performed  2004 by Dr. Cade. He has cardiac catheterization done 2004 that showed  ostial 60% stenosis of the left main coronary artery. The left anterior descending artery  and left circumflex also showed no significant disease. The left circumflex vessel was  dominant. The cardiac catheterization was done at [facility] in  [place], [place]. Bypass 2004 she received a LIMA to mid LAD and sequential  saphenous vein graft to the ramus and intermedius and first obtuse marginal branch done at  [facility].      In 2013, she had a stress nuclear perfusion study  showing no ischemia. She had a 2D echocardiogram with Doppler showing ejection fraction  of 60% to 65% with trace mitral and tricuspid insufficiency. She had a carotid duplex  study done in 2013 which was normal.      She had an ECG done 2016 which shows poor R-wave progression sinus rhythm and otherwise appears normal.      She had a cerebral angiogram with Dr. Hewitt and it showed no cerebral aneurysm.      She was at [facility], admitted there on 2016 through 2016. At that time she was diagnosed with a TIA. During her hospitalization she had an MRI which was negative for an acute infarct. They felt possible TIA versus  complicated migraine. She had had for 2-3 days prior to this a feeling of imbalance and leaned to the left along with some blurred vision. She had a transesophageal echocardiogram whether did not show anything to explain her TIA. There was bilateral atrial enlargement but otherwise looked fine. She was loaded with Plavix 300 mg and then continued with 75 mg of Plavix daily. Neurosurgery was consulted because she had a history of prior pipelined right internal carotid artery aneurysm. Dr. Hewitt had seen her and treated her for his in 08/2015. Neurosurgery said there was no evidence of aneurysm. Her statin medication was increased because of elevated hyperlipidemia. She actually had garbled speech, visual changes and balance issues all with the TIA and they happened over a matter of three days, kind of intermittently.         She had a normal 21 day event recorder 1/2017.      She was tolerating Livalo without muscle complaints.  She is on zetia  And vascepa.      She called 911 on 4/8/2019 because she was having exertional chest pressure with doing mowing the normal household activities.  When EMS arrived, they administered 2 nitroglycerin and it helped her chest pressure.  She was taken to [facility] and there she ruled out for myocardial infarction.  She was sent to [facility] and then return to the hospital for cardiac catheterization which showed atretic LIMA to LAD, patent SVG to ramus intermedius then to OM, 30% left main stenosis proximally but normal LAD, left circumflex and RCA.  Her ejection fraction was normal.  She was dismissed from the hospital. She had a nonischemic stress echocardiogram done 5/14/19 with baseline ejection fraction 56%, grade 1 diastolic dysfunction and a post exercise ejection fraction of 81%.    Labs done 9/30/20 showed glucose 103, otherwise normal CMP, total cholesterol 188, HDL 74, LDL 95, triglycerides 97, normal CBC.  She has some mild fatigue.  She is not exercising.  She has  a history of hypotension on losartan.  She is unable to tolerate beta-blockers.  She does not feel her heart racing or skipping.  She is had no dizziness or syncope.  She has no orthopnea or PND.  She has exertional chest tightness or pressure.  She has some sinus issues and was wondering if the antihistamines that she is using her primary for blood pressure but I reassured her that is generally not the case.    Past Medical History:   Diagnosis Date   • Abnormal blood chemistry    • Acute UTI (urinary tract infection)    • Allergic    • Anemia    • Aneurysm, cerebral    • Bloating    • Brain aneurysm    • CAD (coronary artery disease)    • Chronic headaches    • Coronary artery disease    • Coronary artery stenosis    • Dysuria    • Encounter for screening colonoscopy    • Environmental allergies    • Fall from slip, trip, or stumble    • Gait disturbance    • H/O cardiovascular stress test 09/17/2013    Treadmill   • H/O colonoscopy    • H/O echocardiogram 09/25/2013   • H/O fall    • History of EKG 07/31/2015   • Hyperlipemia    • Hyperlipidemia    • Hypertension    • Hyperthyroidism    • Injury of back    • Insomnia    • Left forearm pain    • Left leg pain    • Left wrist pain    • Lower abdominal pain    • Need for pneumococcal vaccination    • Onychomycosis of toenail    • AKIRA (obstructive sleep apnea)    • Pelvic pressure in female    • Right foot pain    • TIA (transient ischemic attack) 11/30/2016   • URI (upper respiratory infection)    • Urine frequency          Past Surgical History:   Procedure Laterality Date   • BREAST EXCISIONAL BIOPSY Right 1977    b9   • BREAST EXCISIONAL BIOPSY Right 1979    b9   • BUNIONECTOMY     • CARDIAC CATHETERIZATION N/A 4/8/2019    Procedure: Left Heart Cath;  Surgeon: Jayme Ramirez MD;  Location: [facility] INVAS[facility];  Service: Cardiovascular   • CARDIAC CATHETERIZATION N/A 4/8/2019    Procedure: Coronary angiography;  Surgeon: Jayme Ramirez MD;   Location:  [facility] INVASIVE LOCATION;  Service: Cardiovascular   • CARDIAC CATHETERIZATION N/A 4/8/2019    Procedure: Left ventriculography;  Surgeon: Jayme Ramirez MD;  Location:  [facility] INVASIVE LOCATION;  Service: Cardiovascular   • CEREBRAL ANEURYSM REPAIR     • CHOLECYSTECTOMY     • COLONOSCOPY N/A 1/29/2021    Procedure: COLONOSCOPY with polypectomy;  Surgeon: Colin Ladd MD;  Location:  [facility] OR;  Service: Gastroenterology;  Laterality: N/A;  Diverticulosis  Sigmoid colon polyp   • CORONARY ARTERY BYPASS GRAFT     • ROTATOR CUFF REPAIR Left    • SPLENECTOMY     • TONSILLECTOMY     • TUBAL ABDOMINAL LIGATION         Current Outpatient Medications on File Prior to Visit   Medication Sig Dispense Refill   • amitriptyline (ELAVIL) 10 MG tablet Take 1 tablet by mouth Every Night. (Patient taking differently: Take 12.5 mg by mouth Every Night.) 90 tablet 3   • Cholecalciferol (VITAMIN D3) 5000 UNITS capsule capsule Take 5,000 Units by mouth Daily.     • clopidogrel (PLAVIX) 75 MG tablet Take 1 tablet by mouth Daily. 90 tablet 3   • ezetimibe (ZETIA) 10 MG tablet Take 1 tablet by mouth Daily. 90 tablet 3   • isosorbide dinitrate (ISORDIL) 5 MG tablet TAKE 1 TABLET TWICE A  tablet 3   • Melatonin 12 MG tablet Take 12 mg by mouth Every Night.     • pitavastatin calcium (LIVALO) 2 MG tablet tablet Take 1 tablet by mouth Every Night. 90 tablet 3   • Probiotic Product (PROBIOTIC PO) Take 4 capsules by mouth Daily. gutbio-align     • Vascepa 1 g capsule capsule TAKE 4 CAPSULES (4 GRAMS) DAILY 360 capsule 3   • [DISCONTINUED] fluticasone (Flonase) 50 MCG/ACT nasal spray 2 sprays into the nostril(s) as directed by provider Daily.       No current facility-administered medications on file prior to visit.       Social History     Socioeconomic History   • Marital status:      Spouse name: Not on file   • Number of children: Not on file   • Years of education: Not on file   • Highest  "education level: Not on file   Tobacco Use   • Smoking status: Never Smoker   • Smokeless tobacco: Never Used   Vaping Use   • Vaping Use: Never used   Substance and Sexual Activity   • Alcohol use: Yes     Comment: Rare//Caffeine use: Decaf   • Drug use: No   • Sexual activity: Never     Partners: Male     Birth control/protection: Post-menopausal, Surgical     Comment: BTL           ROS    Procedures    ECG 12 Lead    Date/Time: 4/23/2021 11:12 AM  Performed by: Helen Urbina MD  Authorized by: Helen Urbina MD   Comparison: compared with previous ECG   Similar to previous ECG  Rhythm: sinus rhythm    Clinical impression: normal ECG                Objective:      Vitals:    04/23/21 1103   BP: 144/80   BP Location: Right arm   Pulse: 69   Weight: 67.9 kg (149 lb 12.8 oz)   Height: 162.6 cm (64\")     Body mass index is 25.71 kg/m².    Vitals reviewed.   Constitutional:       General: Not in acute distress.     Appearance: Well-developed. Not diaphoretic.   Eyes:      General: No scleral icterus.     Conjunctiva/sclera: Conjunctivae normal.   HENT:      Head: Normocephalic and atraumatic.   Neck:      Thyroid: No thyromegaly.      Vascular: No carotid bruit or JVD.      Lymphadenopathy: No cervical adenopathy.   Pulmonary:      Effort: Pulmonary effort is normal. No respiratory distress.      Breath sounds: Normal breath sounds. No wheezing. No rhonchi. No rales.   Chest:      Chest wall: Not tender to palpatation.   Cardiovascular:      Normal rate. Regular rhythm.      Murmurs: There is no murmur.      No gallop.   Pulses:     Intact distal pulses.   Edema:     Peripheral edema absent.   Abdominal:      General: Bowel sounds are normal. There is no distension or abdominal bruit.      Palpations: Abdomen is soft. There is no abdominal mass.      Tenderness: There is no abdominal tenderness.   Musculoskeletal:         General: No deformity.      Extremities: No clubbing present.     Cervical back: " Neck supple. Skin:     General: Skin is warm and dry. There is no cyanosis.      Coloration: Skin is not pale.      Findings: No rash.   Neurological:      Mental Status: Alert and oriented to person, place, and time.      Cranial Nerves: No cranial nerve deficit.   Psychiatric:         Judgment: Judgment normal.         Lab Review:       Assessment:      Diagnosis Plan   1. Coronary artery disease involving native coronary artery of native heart without angina pectoris     2. S/P CABG (coronary artery bypass graft)     3. Bilateral enlargement of atria     4. Mixed hyperlipidemia          1. TIA. The etiology for this is uncertain.  She has had no further instances since being on Plavix.   2. Hyperlipidemia, on Livalo and continue zetia.   3. Essential hypertension, goal less than 120/80.  Start ramipril 2.5 mg daily.  4. History of coronary artery disease, s/p CABG. She has angina, well treated with isordil. .   5. Mild biatrial enlargement on her transesophageal echocardiogram but otherwise, the DENY was normal.   6. Possible obstructive sleep apnea.      Plan:       See Leslye in 1 year, watch for hypotension.  No other testing at this time.

## 2021-04-26 ENCOUNTER — TELEPHONE (OUTPATIENT)
Dept: CARDIOLOGY | Facility: CLINIC | Age: 71
End: 2021-04-26

## 2021-04-26 DIAGNOSIS — J30.89 NON-SEASONAL ALLERGIC RHINITIS, UNSPECIFIED TRIGGER: Primary | ICD-10-CM

## 2021-04-26 RX ORDER — MONTELUKAST SODIUM 10 MG/1
10 TABLET ORAL NIGHTLY
Qty: 30 TABLET | Refills: 3 | Status: SHIPPED | OUTPATIENT
Start: 2021-04-26 | End: 2022-02-10 | Stop reason: SDUPTHER

## 2021-04-26 NOTE — TELEPHONE ENCOUNTER
She can take with her current meds and she may also have the fruits and vegs as long as not excessive - pls call and let her know.     rm    ----- Message from Ashanti Salomon MA sent at 4/26/2021  6:54 AM EDT -----  Regarding: FW: Prescription Question  Contact: [phone]    ----- Message -----  From: Maddison Turcios  Sent: 4/24/2021   6:22 PM EDT  To: Marnie Saldana [facility]  Subject: Prescription Question                            Ramipril says not to take with two of my meds:  amitriptyline (IBS) and isosorbide. Also I should not eat any food high in potassium   Bananas,oranges,green leafy vegetables,tomatoes,avocados and garlic.  Help. Do I still need this?  I can not cut in half because it is a capsule

## 2021-04-26 NOTE — TELEPHONE ENCOUNTER
Pt notified of recommendations and verbalized understanding  Thanks  Roseann Gaytan RN  Triage nurse

## 2021-05-10 ENCOUNTER — PATIENT MESSAGE (OUTPATIENT)
Dept: CARDIOLOGY | Facility: CLINIC | Age: 71
End: 2021-05-10

## 2021-05-10 NOTE — TELEPHONE ENCOUNTER
Spoke with patient. She will occasionally get a little light headed, but otherwise she is feeling fine with her BPs in the 110's. I advised her to keep meds as they are. Goal less than 120/80.

## 2021-05-19 ENCOUNTER — OFFICE VISIT (OUTPATIENT)
Dept: GASTROENTEROLOGY | Facility: CLINIC | Age: 71
End: 2021-05-19

## 2021-05-19 VITALS — WEIGHT: 151.8 LBS | HEIGHT: 64 IN | BODY MASS INDEX: 25.92 KG/M2

## 2021-05-19 DIAGNOSIS — K58.2 IRRITABLE BOWEL SYNDROME WITH BOTH CONSTIPATION AND DIARRHEA: Primary | ICD-10-CM

## 2021-05-19 PROCEDURE — 99212 OFFICE O/P EST SF 10 MIN: CPT | Performed by: NURSE PRACTITIONER

## 2021-05-19 NOTE — PROGRESS NOTES
PATIENT INFORMATION  Maddison Turcios     - 1950    CHIEF COMPLAINT  Chief Complaint   Patient presents with   • Irritable Bowel Syndrome       HISTORY OF PRESENT ILLNESS  Is doing well today with mowing has lost 10lbs!   On the amitriptyline 10mg q hs she has gone from 5bms a day with urgency to 3 soft formed a day without urgency or incontinence.       She has also increased her fiber.  1-2 bms a day.  12.5mg a night and see if this will get her down to 2-3 bms a day.  Continue with the fiber and is getting at least 30gm a day on her fiber.  denies acid reflux, belching, burping or hb.     21 Colon 1 polyp 5yr recall  12/22/15 Dr. White colon: 1 polyp, sig diverticulosis, internal hemorrhoid  britta 2009    amitriptyline 25 mg slow metabilizer so will try 1/2 dose and see if this works for her send new script to [facility] mail order. Will continue amitriptyline 10mg daily for ibs.         REVIEWED PERTINENT RESULTS/ LABS  Lab Results   Component Value Date    CASEREPORT  2021     Surgical Pathology Report                         Case: [id number]                                  Authorizing Provider:  Colin Ladd        Collected:           2021 03:44 PM                                 MD Sreekanth                                                                   Ordering Location:     [facility]   Received:            2021 05:11 PM                                 OR                                                                           Pathologist:           Diana Raymundo MD                                                          Specimen:    Large Intestine, Sigmoid Colon                                                             FINALDX  2021     1. Colon, Sigmoid, Biopsy: Colonic mucosa with   A. Tubular adenoma.    [facility]        Lab Results   Component Value Date    HGB 13.7 2020    MCV 91.3 2020     2020    ALT 22 2020     AST 28 09/30/2020    TRIG 97 09/30/2020      No results found.    CURRENT MEDICATIONS    Current Outpatient Medications:   •  amitriptyline (ELAVIL) 10 MG tablet, Take 1 tablet by mouth Every Night. (Patient taking differently: Take 12.5 mg by mouth Every Night.), Disp: 90 tablet, Rfl: 3  •  Cholecalciferol (VITAMIN D3) 5000 UNITS capsule capsule, Take 5,000 Units by mouth Daily., Disp: , Rfl:   •  clopidogrel (PLAVIX) 75 MG tablet, Take 1 tablet by mouth Daily., Disp: 90 tablet, Rfl: 3  •  ezetimibe (ZETIA) 10 MG tablet, Take 1 tablet by mouth Daily., Disp: 90 tablet, Rfl: 3  •  isosorbide dinitrate (ISORDIL) 5 MG tablet, TAKE 1 TABLET TWICE A DAY, Disp: 180 tablet, Rfl: 3  •  Melatonin 12 MG tablet, Take 12 mg by mouth Every Night., Disp: , Rfl:   •  montelukast (Singulair) 10 MG tablet, Take 1 tablet by mouth Every Night., Disp: 30 tablet, Rfl: 3  •  pitavastatin calcium (LIVALO) 2 MG tablet tablet, Take 1 tablet by mouth Every Night., Disp: 90 tablet, Rfl: 3  •  Probiotic Product (PROBIOTIC PO), Take 4 capsules by mouth Daily. gutbio-align, Disp: , Rfl:   •  ramipril (Altace) 2.5 MG capsule, Take 1 capsule by mouth Every Night., Disp: 30 capsule, Rfl: 0  •  Vascepa 1 g capsule capsule, TAKE 4 CAPSULES (4 GRAMS) DAILY, Disp: 360 capsule, Rfl: 3    ALLERGIES  Adhesive tape, Beta adrenergic blockers, Sulfa antibiotics, and Sulfur    REVIEW OF SYSTEMS  ROS: 14 point review of systems was performed and all other systems were reviewed and are negative except for documented findings in HPI and today's encounter.   Review of Systems      History     Last Reviewed by Ro Delgado APRN on 5/19/2021 at  4:18 PM    Sections Reviewed    Tobacco, Family, Medical, Surgical      Problem list reviewed by Ro Delgado APRN on 5/19/2021 at  4:18 PM  Medicines reviewed by Ro Delgado APRN on 5/19/2021 at  4:18 PM  Allergies reviewed by Ro Delgado APRN on 5/19/2021 at  4:18 PM      ACTIVE PROBLEMS  Patient Active  Problem List    Diagnosis    • Encounter for screening for malignant neoplasm of colon [Z12.11]    • Irritable bowel syndrome with both constipation and diarrhea [K58.2]    • Personal history of colonic polyps [Z86.010]    • Nondisplaced fracture of fifth metatarsal bone, right foot, initial encounter for closed fracture [S92.354A]    • Prophylactic antibiotic [Z79.2]    • Greater trochanteric bursitis of right hip [M70.61]    • Need for influenza vaccination [Z23]    • Poison ivy dermatitis [L23.7]    • Neck strain, initial encounter [S16.1XXA]    • Chest pain [R07.9]    • S/P CABG (coronary artery bypass graft) [Z95.1]    • Family history of breast cancer [Z80.3]    • Necrotic hand wound (CMS/HCC) [S61.409A, I96]    • Osteopenia [M85.80]    • Medicare annual wellness visit, subsequent [Z00.00]    • Fatigue [R53.83]    • Skin lesion of right ear [H61.91]    • Transient cerebral ischemia [G45.9]    • Bilateral enlargement of atria [I51.7]    • Sinus bradycardia [R00.1]    • Cerebral aneurysm [I67.1]    • Coronary artery disease [I25.10]    • Mixed hyperlipidemia [E78.2]    • Essential hypertension [I10]    • Hyperthyroidism [E05.90]    • Insomnia [G47.00]    • Onychomycosis of toenail [B35.1]          PAST MEDICAL HISTORY  Past Medical History:   Diagnosis Date   • Abnormal blood chemistry    • Acute UTI (urinary tract infection)    • Allergic    • Anemia    • Aneurysm, cerebral    • Bloating    • Brain aneurysm    • CAD (coronary artery disease)    • Chronic headaches    • Coronary artery disease    • Coronary artery stenosis    • Dysuria    • Encounter for screening colonoscopy    • Environmental allergies    • Fall from slip, trip, or stumble    • Gait disturbance    • H/O cardiovascular stress test 09/17/2013    Treadmill   • H/O colonoscopy    • H/O echocardiogram 09/25/2013   • H/O fall    • History of EKG 07/31/2015   • Hyperlipemia    • Hyperlipidemia    • Hypertension    • Hyperthyroidism    • Injury of  back    • Insomnia    • Left forearm pain    • Left leg pain    • Left wrist pain    • Lower abdominal pain    • Need for pneumococcal vaccination    • Onychomycosis of toenail    • AKIRA (obstructive sleep apnea)    • Pelvic pressure in female    • Right foot pain    • TIA (transient ischemic attack) 11/30/2016   • URI (upper respiratory infection)    • Urine frequency          SURGICAL HISTORY  Past Surgical History:   Procedure Laterality Date   • BREAST EXCISIONAL BIOPSY Right 1977    b9   • BREAST EXCISIONAL BIOPSY Right 1979    b9   • BUNIONECTOMY     • CARDIAC CATHETERIZATION N/A 4/8/2019    Procedure: Left Heart Cath;  Surgeon: Jayme Ramirez MD;  Location:  [facility];  Service: Cardiovascular   • CARDIAC CATHETERIZATION N/A 4/8/2019    Procedure: Coronary angiography;  Surgeon: Jayme Ramirez MD;  Location:  [facility];  Service: Cardiovascular   • CARDIAC CATHETERIZATION N/A 4/8/2019    Procedure: Left ventriculography;  Surgeon: Jayme Ramirez MD;  Location:  [facility];  Service: Cardiovascular   • CEREBRAL ANEURYSM REPAIR     • CHOLECYSTECTOMY     • COLONOSCOPY N/A 1/29/2021    Procedure: COLONOSCOPY with polypectomy;  Surgeon: Colin Ladd MD;  Location[facility];  Service: Gastroenterology;  Laterality: N/A;  Diverticulosis  Sigmoid colon polyp   • CORONARY ARTERY BYPASS GRAFT     • ROTATOR CUFF REPAIR Left    • SPLENECTOMY     • TONSILLECTOMY     • TUBAL ABDOMINAL LIGATION           FAMILY HISTORY  Family History   Problem Relation Age of Onset   • Heart failure Mother    • Hypertension Mother    • Stroke Mother    • Cervical cancer Mother    • Heart failure Father    • Aneurysm Sister    • Breast cancer Sister    • Heart attack Brother    • Cancer Brother    • Lung cancer Brother    • No Known Problems Son    • No Known Problems Daughter    • No Known Problems Sister    • No Known Problems Sister    • Ovarian  cancer Neg Hx    • Colon cancer Neg Hx    • Pulmonary embolism Neg Hx    • Deep vein thrombosis Neg Hx          SOCIAL HISTORY  Social History     Occupational History   • Not on file   Tobacco Use   • Smoking status: Never Smoker   • Smokeless tobacco: Never Used   Vaping Use   • Vaping Use: Never used   Substance and Sexual Activity   • Alcohol use: Yes     Comment: Rare//Caffeine use: Decaf   • Drug use: No   • Sexual activity: Never     Partners: Male     Birth control/protection: Post-menopausal, Surgical     Comment: BTL         LAST RESULTS  See also labs reviewed above.   Office Visit on 03/22/2021   Component Date Value Ref Range Status   • Diagnosis 03/22/2021 Comment   Final    Comment: NEGATIVE FOR INTRAEPITHELIAL LESION OR MALIGNANCY.  CELLULAR CHANGES ASSOCIATED WITH ATROPHY ARE PRESENT.  THIS SPECIMEN WAS RESCREENED AS PART OF OUR  PROGRAM.     • Specimen adequacy: 03/22/2021 Comment   Final    Comment: Satisfactory for evaluation.  Endocervical component may not be  distinguished in cases of atrophy.  Areas of partially obscuring inflammatory exudate are present.  SCANT CELLULARITY     • Clinician Provided ICD-10: 03/22/2021 Comment   Final    Z01.419   • Performed by: 03/22/2021 Comment   Final    Augustina Villanueva, Cytotechnologist (ASCP)   • QC reviewed by: 03/22/2021 Comment   Final    Tran Pham, Supervisory Cytotechnologist (ASCP)   • . 03/22/2021 .   Final   • Note: 03/22/2021 Comment   Final    Comment: The Pap smear is a screening test designed to aid in the detection of  premalignant and malignant conditions of the uterine cervix.  It is not a  diagnostic procedure and should not be used as the sole means of detecting  cervical cancer.  Both false-positive and false-negative reports do occur.     • Method: 03/22/2021 Comment   Final    Comment: This liquid based ThinPrep(R) pap test was screened with the  use of an image guided system.     • Conv .conv 03/22/2021 Comment    "Final    Comment: The HPV DNA reflex criteria were not met with this specimen result  therefore, no HPV testing was performed.       No results found.    PHYSICAL EXAM  Debilities/Disabilities Identified: None  Emotional Behavior: Appropriate  71 y.o. female  Wt Readings from Last 3 Encounters:   05/19/21 68.9 kg (151 lb 12.8 oz)   04/23/21 67.9 kg (149 lb 12.8 oz)   03/22/21 68.5 kg (151 lb)     Ht Readings from Last 1 Encounters:   05/19/21 162.6 cm (64\")     Body mass index is 26.06 kg/m².  Vitals:    05/19/21 1547   Weight: 68.9 kg (151 lb 12.8 oz)   Height: 162.6 cm (64\")     Vital signs reviewed.          Physical Exam  Vitals and nursing note reviewed.   Constitutional:       Appearance: She is well-developed.   HENT:      Head: Normocephalic and atraumatic.   Eyes:      Conjunctiva/sclera: Conjunctivae normal.      Pupils: Pupils are equal, round, and reactive to light.   Cardiovascular:      Rate and Rhythm: Normal rate and regular rhythm.   Pulmonary:      Effort: Pulmonary effort is normal.      Breath sounds: Normal breath sounds.   Abdominal:      General: Bowel sounds are normal. There is no distension.      Palpations: Abdomen is soft. There is no hepatomegaly, splenomegaly or mass.      Tenderness: There is no abdominal tenderness.   Musculoskeletal:         General: Normal range of motion.      Cervical back: Normal range of motion and neck supple.   Lymphadenopathy:      Cervical: No cervical adenopathy.   Skin:     General: Skin is warm and dry.   Neurological:      Mental Status: She is alert and oriented to person, place, and time.   Psychiatric:         Behavior: Behavior normal.           CLINICAL DATA REVIEWED   reviewed previous lab results and integrated with today's visit, reviewed notes from other physicians and/or last GI encounter, reviewed previous endoscopy results and available photos, reviewed surgical pathology results from previous biopsies      ASSESSMENT  Diagnoses and all " orders for this visit:    Irritable bowel syndrome with both constipation and diarrhea          PLAN  Return if symptoms worsen or fail to improve.     Colon f/u in 1/2026 will send you a notice then.     I have discussed the above plan with the patient.  They verbalize understanding and are in agreement with the plan.  They have been advised to contact the office for any questions, concerns, or changes related to their health.    15 min spent with patient today >50% spent counseling about natural history and expected course of assessed complaint and reviewed treatment options that have been tried and not tried and those currently available. Questions answered.

## 2021-07-06 DIAGNOSIS — E78.2 MIXED HYPERLIPIDEMIA: ICD-10-CM

## 2021-07-07 RX ORDER — EZETIMIBE 10 MG/1
10 TABLET ORAL DAILY
Qty: 90 TABLET | Refills: 3 | Status: SHIPPED | OUTPATIENT
Start: 2021-07-07 | End: 2022-02-10 | Stop reason: SDUPTHER

## 2021-09-08 ENCOUNTER — TELEPHONE (OUTPATIENT)
Dept: CARDIOLOGY | Facility: CLINIC | Age: 71
End: 2021-09-08

## 2021-09-08 NOTE — TELEPHONE ENCOUNTER
Ok thx      ----- Message from Jaleesa Martinez CMA sent at 9/8/2021  3:08 PM EDT -----  Regarding: FW: Non-Urgent Medical Question  Contact: [phone]    ----- Message -----  From: Maddison Turcios  Sent: 9/8/2021   3:04 PM EDT  To: Marnie keenan [facility]  Subject: Non-Urgent Medical Question                      My right groin started hurting last night and all today. It is difficult to raise right leg up. This happened in the past from taking a statin drug for my cholesterol. I am going to go off of the Cholestrol med for a few days to see if the pain subsides. If so I will let you know and hopefully we can change meds

## 2021-09-20 ENCOUNTER — OFFICE VISIT (OUTPATIENT)
Dept: FAMILY MEDICINE CLINIC | Facility: CLINIC | Age: 71
End: 2021-09-20

## 2021-09-20 VITALS
DIASTOLIC BLOOD PRESSURE: 80 MMHG | WEIGHT: 149 LBS | OXYGEN SATURATION: 95 % | TEMPERATURE: 97.8 F | HEIGHT: 64 IN | HEART RATE: 71 BPM | BODY MASS INDEX: 25.44 KG/M2 | SYSTOLIC BLOOD PRESSURE: 130 MMHG

## 2021-09-20 DIAGNOSIS — M79.604 PAIN OF RIGHT LOWER EXTREMITY: ICD-10-CM

## 2021-09-20 DIAGNOSIS — S89.91XA LEG INJURY, RIGHT, INITIAL ENCOUNTER: ICD-10-CM

## 2021-09-20 DIAGNOSIS — T14.8XXA HEMATOMA: Primary | ICD-10-CM

## 2021-09-20 PROCEDURE — 99213 OFFICE O/P EST LOW 20 MIN: CPT | Performed by: PHYSICIAN ASSISTANT

## 2021-09-20 RX ORDER — ALUMINUM CHLORIDE 20 %
SOLUTION, NON-ORAL TOPICAL
COMMUNITY
Start: 2021-08-24 | End: 2022-04-06

## 2021-09-20 NOTE — PROGRESS NOTES
"Chief Complaint  adriane behind knee (right knee appeared after bruising)    Subjective          Maddison Turcios presents to [facility]  History of Present Illness  Maddison is a 71 year old female presents with bruising and right medial knee pain.  States she was walking over a new bed rails lying on the floor about 2 weeks ago when she hit her medial aspect of the knee against something.  Since this time she has had a couple of \"knots\" in the area.  The area was extremely bruise which has resolved.  She has had some discomfort and pain in the area.  Has been applying cool compresses which has helped.  Denied any shortness of breath, chest pain, or dizziness.  Denied any difficulty belching appetite and sleep have been normal.     Objective   Vital Signs:   /80   Pulse 71   Temp 97.8 °F (36.6 °C)   Ht 162.6 cm (64\")   Wt 67.6 kg (149 lb)   SpO2 95%   BMI 25.58 kg/m²     Physical Exam  Vitals and nursing note reviewed.   Constitutional:       Appearance: Normal appearance. She is well-developed, well-groomed and overweight.   Musculoskeletal:      Right knee: Swelling present. No effusion, erythema, ecchymosis or bony tenderness. No tenderness.        Legs:    Skin:     General: Skin is warm.      Capillary Refill: Capillary refill takes less than 2 seconds.   Neurological:      Mental Status: She is alert and oriented to person, place, and time.   Psychiatric:         Attention and Perception: Attention and perception normal.         Mood and Affect: Mood and affect normal.         Speech: Speech normal.         Behavior: Behavior normal. Behavior is cooperative.         Thought Content: Thought content normal.         Cognition and Memory: Cognition and memory normal.         Judgment: Judgment normal.     I was wearing a surgical mask and face shield during the entire office visit/encounter.      Result Review :                 Assessment and Plan    Diagnoses and all orders for " this visit:    1. Hematoma (Primary)  -     US Venous Doppler Lower Extremity Right (duplex); Future    2. Leg injury, right, initial encounter  -     US Venous Doppler Lower Extremity Right (duplex); Future    3. Pain of right lower extremity  -     US Venous Doppler Lower Extremity Right (duplex); Future    Maddison was seen  with new right lower leg extremity pain and injury.  I will proceed with Doppler ultrasound for further evaluation.  I suspect she has a hematoma.  Was instructed to use cool compresses to the area.  Will be notified of test results when completed.  Given reassurance.    Follow Up   No follow-ups on file.  Patient was given instructions and counseling regarding her condition or for health maintenance advice. Please see specific information pulled into the AVS if appropriate.     CELI Corado [facility]  [address] [place] [id number]  Dept: [phone]  Dept Fax: [phone]  Loc: [phone]  Loc Fax: [phone]        Answers for HPI/ROS submitted by the patient on 9/15/2021  Please describe your symptoms.: Deep bruising with two lumps on inside if right leg by knee area  Have you had these symptoms before?: No  How long have you been having these symptoms?: Greater than 2 weeks  Please list any medications you are currently taking for this condition.: Nothing  Please describe any probable cause for these symptoms. : I was setting up a twin bed and tripped over the metal side rails and scrapped my right leg on the metal rail  What is the primary reason for your visit?: Other

## 2021-09-29 ENCOUNTER — HOSPITAL ENCOUNTER (OUTPATIENT)
Dept: ULTRASOUND IMAGING | Facility: HOSPITAL | Age: 71
Discharge: HOME | End: 2021-09-29
Admitting: PHYSICIAN ASSISTANT

## 2021-09-29 DIAGNOSIS — T14.8XXA HEMATOMA: ICD-10-CM

## 2021-09-29 DIAGNOSIS — M79.604 PAIN OF RIGHT LOWER EXTREMITY: ICD-10-CM

## 2021-09-29 DIAGNOSIS — S89.91XA LEG INJURY, RIGHT, INITIAL ENCOUNTER: ICD-10-CM

## 2021-09-29 PROCEDURE — 93971 EXTREMITY STUDY: CPT

## 2021-10-18 ENCOUNTER — TELEPHONE (OUTPATIENT)
Dept: FAMILY MEDICINE CLINIC | Facility: CLINIC | Age: 71
End: 2021-10-18

## 2021-10-18 NOTE — TELEPHONE ENCOUNTER
Caller: JALEN    Relationship: Other [employer]     Best call back number: [phone]    What medication are you requesting: GENERIC OF THE LOVAZA     If a prescription is needed, what is your preferred pharmacy and phone number:  [phone]    Additional notes: KRISTIN FROM [facility] CALLING STATING THE Vascepa 1 g capsule capsule WILL NOT BE COVERED UNDER THE PATIENTS INSURANCE NEXT YEAR. PATIENT WOULD LIKE TO GO AHEAD AND CHANGE TO THIS MEDICATION DUE TO THE PRICING IT WILL BE CHEAPER TO GET THE GENERIC.

## 2021-10-27 NOTE — TELEPHONE ENCOUNTER
JALEN CALLED REQUESTING STATUS ON THIS MEDICATION CHANGE   THEY ARE REQUESTING TO CHANGE THE MEDICATION TO LOVAZA    CALLBACK [phone]

## 2021-10-31 RX ORDER — PITAVASTATIN CALCIUM 2.09 MG/1
TABLET, FILM COATED ORAL
Qty: 90 TABLET | Refills: 3 | Status: SHIPPED | OUTPATIENT
Start: 2021-10-31 | End: 2022-01-27

## 2021-11-08 RX ORDER — ISOSORBIDE DINITRATE 5 MG/1
TABLET ORAL
Qty: 180 TABLET | Refills: 3 | Status: SHIPPED | OUTPATIENT
Start: 2021-11-08 | End: 2022-02-11 | Stop reason: SDUPTHER

## 2022-01-12 RX ORDER — AMITRIPTYLINE HYDROCHLORIDE 10 MG/1
TABLET, FILM COATED ORAL
Qty: 90 TABLET | Refills: 3 | Status: SHIPPED | OUTPATIENT
Start: 2022-01-12 | End: 2022-02-14 | Stop reason: SDUPTHER

## 2022-01-14 DIAGNOSIS — I10 ESSENTIAL HYPERTENSION: ICD-10-CM

## 2022-01-14 DIAGNOSIS — E78.2 MIXED HYPERLIPIDEMIA: ICD-10-CM

## 2022-01-14 DIAGNOSIS — Z00.00 MEDICARE ANNUAL WELLNESS VISIT, SUBSEQUENT: Primary | ICD-10-CM

## 2022-01-18 LAB
ALBUMIN SERPL-MCNC: 4.6 G/DL (ref 3.8–4.8)
ALBUMIN/GLOB SERPL: 1.8 {RATIO} (ref 1.2–2.2)
ALP SERPL-CCNC: 61 IU/L (ref 44–121)
ALT SERPL-CCNC: 16 IU/L (ref 0–32)
AST SERPL-CCNC: 21 IU/L (ref 0–40)
BASOPHILS # BLD AUTO: 0.1 X10E3/UL (ref 0–0.2)
BASOPHILS NFR BLD AUTO: 1 %
BILIRUB SERPL-MCNC: 0.9 MG/DL (ref 0–1.2)
BUN SERPL-MCNC: 14 MG/DL (ref 8–27)
BUN/CREAT SERPL: 21 (ref 12–28)
CALCIUM SERPL-MCNC: 9.7 MG/DL (ref 8.7–10.3)
CHLORIDE SERPL-SCNC: 104 MMOL/L (ref 96–106)
CHOLEST SERPL-MCNC: 166 MG/DL (ref 100–199)
CO2 SERPL-SCNC: 23 MMOL/L (ref 20–29)
CREAT SERPL-MCNC: 0.68 MG/DL (ref 0.57–1)
EOSINOPHIL # BLD AUTO: 0.1 X10E3/UL (ref 0–0.4)
EOSINOPHIL NFR BLD AUTO: 2 %
ERYTHROCYTE [DISTWIDTH] IN BLOOD BY AUTOMATED COUNT: 12.4 % (ref 11.7–15.4)
GLOBULIN SER CALC-MCNC: 2.5 G/DL (ref 1.5–4.5)
GLUCOSE SERPL-MCNC: 95 MG/DL (ref 65–99)
HCT VFR BLD AUTO: 36.4 % (ref 34–46.6)
HDLC SERPL-MCNC: 78 MG/DL
HGB BLD-MCNC: 12.9 G/DL (ref 11.1–15.9)
IMM GRANULOCYTES # BLD AUTO: 0 X10E3/UL (ref 0–0.1)
IMM GRANULOCYTES NFR BLD AUTO: 0 %
LDLC SERPL CALC-MCNC: 74 MG/DL (ref 0–99)
LDLC/HDLC SERPL: 0.9 RATIO (ref 0–3.2)
LYMPHOCYTES # BLD AUTO: 2.4 X10E3/UL (ref 0.7–3.1)
LYMPHOCYTES NFR BLD AUTO: 38 %
MCH RBC QN AUTO: 31.7 PG (ref 26.6–33)
MCHC RBC AUTO-ENTMCNC: 35.4 G/DL (ref 31.5–35.7)
MCV RBC AUTO: 89 FL (ref 79–97)
MONOCYTES # BLD AUTO: 0.6 X10E3/UL (ref 0.1–0.9)
MONOCYTES NFR BLD AUTO: 9 %
NEUTROPHILS # BLD AUTO: 3.1 X10E3/UL (ref 1.4–7)
NEUTROPHILS NFR BLD AUTO: 50 %
PLATELET # BLD AUTO: 335 X10E3/UL (ref 150–450)
POTASSIUM SERPL-SCNC: 4.9 MMOL/L (ref 3.5–5.2)
PROT SERPL-MCNC: 7.1 G/DL (ref 6–8.5)
RBC # BLD AUTO: 4.07 X10E6/UL (ref 3.77–5.28)
SODIUM SERPL-SCNC: 140 MMOL/L (ref 134–144)
TRIGL SERPL-MCNC: 76 MG/DL (ref 0–149)
VLDLC SERPL CALC-MCNC: 14 MG/DL (ref 5–40)
WBC # BLD AUTO: 6.2 X10E3/UL (ref 3.4–10.8)

## 2022-01-27 ENCOUNTER — OFFICE VISIT (OUTPATIENT)
Dept: FAMILY MEDICINE CLINIC | Facility: CLINIC | Age: 72
End: 2022-01-27

## 2022-01-27 VITALS
OXYGEN SATURATION: 96 % | HEIGHT: 64 IN | DIASTOLIC BLOOD PRESSURE: 84 MMHG | BODY MASS INDEX: 24.92 KG/M2 | WEIGHT: 146 LBS | TEMPERATURE: 97.7 F | HEART RATE: 78 BPM | SYSTOLIC BLOOD PRESSURE: 120 MMHG

## 2022-01-27 DIAGNOSIS — Z00.00 MEDICARE ANNUAL WELLNESS VISIT, SUBSEQUENT: Primary | ICD-10-CM

## 2022-01-27 DIAGNOSIS — E78.2 MIXED HYPERLIPIDEMIA: ICD-10-CM

## 2022-01-27 DIAGNOSIS — I10 ESSENTIAL HYPERTENSION: ICD-10-CM

## 2022-01-27 PROBLEM — Z79.02 ANTIPLATELET OR ANTITHROMBOTIC LONG-TERM USE: Status: ACTIVE | Noted: 2021-10-20

## 2022-01-27 PROBLEM — Z98.890 S/P COIL EMBOLIZATION OF CEREBRAL ANEURYSM: Status: ACTIVE | Noted: 2021-10-20

## 2022-01-27 PROCEDURE — 1160F RVW MEDS BY RX/DR IN RCRD: CPT | Performed by: PHYSICIAN ASSISTANT

## 2022-01-27 PROCEDURE — 1170F FXNL STATUS ASSESSED: CPT | Performed by: PHYSICIAN ASSISTANT

## 2022-01-27 PROCEDURE — G0439 PPPS, SUBSEQ VISIT: HCPCS | Performed by: PHYSICIAN ASSISTANT

## 2022-01-27 PROCEDURE — 1126F AMNT PAIN NOTED NONE PRSNT: CPT | Performed by: PHYSICIAN ASSISTANT

## 2022-01-27 NOTE — PROGRESS NOTES
The ABCs of the Annual Wellness Visit  Subsequent Medicare Wellness Visit    Chief Complaint   Patient presents with   • Medicare Wellness-subsequent   • Hyperlipidemia     Management   • Hypertension     Management      Subjective    History of Present Illness:  Maddison Turcios is a 71 y.o. female who presents for a Subsequent Medicare Wellness Visit, hypertension and hyperlipidemia management.  Overall Maddison states she is feeling well.  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  States she is enjoying working.  Her diet has been fairly healthy.  States she does not eat enough fruits and vegetables.  Sleep has been normal.  She tries to walk daily for exercise.  Currently sees Dr. Urbina, cardiologist for her cardiac needs.  Has appointment coming up.  Denied any fevers, chills, upper respiratory symptoms, chest pain, shortness of air, cough, wheezing, abdominal pain, or swelling of ankles.  Bowel movements have been daily without dark black tarry stools.  Has no complaints today.  States she did receive a letter from insurance about her level of medication.  States it will no longer be covered.  Unfortunately she cannot take statin medication.  She states this caused increased muscle aches in her legs.    The following portions of the patient's history were reviewed and   updated as appropriate:   She  has a past medical history of Abnormal blood chemistry, Acute UTI (urinary tract infection), Allergic, Anemia, Aneurysm, cerebral, Bloating, Brain aneurysm, CAD (coronary artery disease), Chronic headaches, Coronary artery disease, Coronary artery stenosis, Dysuria, Encounter for screening colonoscopy, Environmental allergies, Fall from slip, trip, or stumble, Gait disturbance, H/O cardiovascular stress test (09/17/2013), H/O colonoscopy, H/O echocardiogram (09/25/2013), H/O fall, History of EKG (07/31/2015), Hyperlipemia, Hyperlipidemia, Hypertension, Hyperthyroidism, Injury  of back, Insomnia, Left forearm pain, Left leg pain, Left wrist pain, Lower abdominal pain, Need for pneumococcal vaccination, Onychomycosis of toenail, AKIRA (obstructive sleep apnea), Pelvic pressure in female, Right foot pain, TIA (transient ischemic attack) (11/30/2016), URI (upper respiratory infection), and Urine frequency.  She does not have any pertinent problems on file.  She  has a past surgical history that includes Coronary artery bypass graft; Rotator cuff repair (Left); Tonsillectomy; Splenectomy, total; Tubal ligation; Cholecystectomy; Bunionectomy; Cerebral aneurysm repair; Cardiac catheterization (N/A, 4/8/2019); Cardiac catheterization (N/A, 4/8/2019); Cardiac catheterization (N/A, 4/8/2019); Colonoscopy (N/A, 1/29/2021); Breast excisional biopsy (Right, 1977); and Breast excisional biopsy (Right, 1979).  Her family history includes Aneurysm in her sister; Breast cancer in her sister; Cancer in her brother; Cervical cancer in her mother; Heart attack in her brother; Heart failure in her father and mother; Hypertension in her mother; Lung cancer in her brother; No Known Problems in her daughter, sister, sister, and son; Stroke in her mother.  She  reports that she has never smoked. She has never used smokeless tobacco. She reports current alcohol use. She reports that she does not use drugs.  Current Outpatient Medications   Medication Sig Dispense Refill   • amitriptyline (ELAVIL) 10 MG tablet TAKE 1 TABLET EVERY NIGHT 90 tablet 3   • Cholecalciferol (VITAMIN D3) 5000 UNITS capsule capsule Take 5,000 Units by mouth Daily.     • clopidogrel (PLAVIX) 75 MG tablet Take 1 tablet by mouth Daily. 90 tablet 3   • Drysol 20 % external solution      • ezetimibe (ZETIA) 10 MG tablet Take 1 tablet by mouth Daily. 90 tablet 3   • isosorbide dinitrate (ISORDIL) 5 MG tablet TAKE 1 TABLET TWICE A  tablet 3   • Melatonin 12 MG tablet Take 12 mg by mouth Every Night.     • pitavastatin calcium (Livalo) 2 MG  tablet tablet Take 1 tablet by mouth Every Night. 90 tablet 3   • Probiotic Product (PROBIOTIC PO) Take 4 capsules by mouth Daily. gutbio-align     • ramipril (Altace) 2.5 MG capsule Take 1 capsule by mouth Every Night. 30 capsule 0   • Vascepa 1 g capsule capsule TAKE 4 CAPSULES (4 GRAMS) DAILY 360 capsule 3   • montelukast (Singulair) 10 MG tablet Take 1 tablet by mouth Every Night. 30 tablet 3     No current facility-administered medications for this visit.     Current Outpatient Medications on File Prior to Visit   Medication Sig   • amitriptyline (ELAVIL) 10 MG tablet TAKE 1 TABLET EVERY NIGHT   • Cholecalciferol (VITAMIN D3) 5000 UNITS capsule capsule Take 5,000 Units by mouth Daily.   • clopidogrel (PLAVIX) 75 MG tablet Take 1 tablet by mouth Daily.   • Drysol 20 % external solution    • ezetimibe (ZETIA) 10 MG tablet Take 1 tablet by mouth Daily.   • isosorbide dinitrate (ISORDIL) 5 MG tablet TAKE 1 TABLET TWICE A DAY   • Melatonin 12 MG tablet Take 12 mg by mouth Every Night.   • Probiotic Product (PROBIOTIC PO) Take 4 capsules by mouth Daily. gutbio-align   • ramipril (Altace) 2.5 MG capsule Take 1 capsule by mouth Every Night.   • Vascepa 1 g capsule capsule TAKE 4 CAPSULES (4 GRAMS) DAILY   • [DISCONTINUED] Livalo 2 MG tablet tablet TAKE 1 TABLET EVERY NIGHT   • montelukast (Singulair) 10 MG tablet Take 1 tablet by mouth Every Night.     No current facility-administered medications on file prior to visit.     She is allergic to adhesive tape, beta adrenergic blockers, statins, elemental sulfur, and sulfa antibiotics..    Compared to one year ago, the patient feels her physical   health is the same.    Compared to one year ago, the patient feels her mental   health is the same.    Recent Hospitalizations:  She was not admitted to the hospital during the last year.       Current Medical Providers:  Patient Care Team:  Kasandra Mcdowell PA-C as PCP - General (Family Medicine)  Helen Urbina MD as  Consulting Physician (Cardiology)  Ruben Valderrama MD (Dermatology)  Bennett Whelan MD as MDS Coordinator (Allergy)  Sara Araiza MD as Consulting Physician (Obstetrics and Gynecology)    Outpatient Medications Prior to Visit   Medication Sig Dispense Refill   • amitriptyline (ELAVIL) 10 MG tablet TAKE 1 TABLET EVERY NIGHT 90 tablet 3   • Cholecalciferol (VITAMIN D3) 5000 UNITS capsule capsule Take 5,000 Units by mouth Daily.     • clopidogrel (PLAVIX) 75 MG tablet Take 1 tablet by mouth Daily. 90 tablet 3   • Drysol 20 % external solution      • ezetimibe (ZETIA) 10 MG tablet Take 1 tablet by mouth Daily. 90 tablet 3   • isosorbide dinitrate (ISORDIL) 5 MG tablet TAKE 1 TABLET TWICE A  tablet 3   • Melatonin 12 MG tablet Take 12 mg by mouth Every Night.     • Probiotic Product (PROBIOTIC PO) Take 4 capsules by mouth Daily. gutbio-align     • ramipril (Altace) 2.5 MG capsule Take 1 capsule by mouth Every Night. 30 capsule 0   • Vascepa 1 g capsule capsule TAKE 4 CAPSULES (4 GRAMS) DAILY 360 capsule 3   • Livalo 2 MG tablet tablet TAKE 1 TABLET EVERY NIGHT 90 tablet 3   • montelukast (Singulair) 10 MG tablet Take 1 tablet by mouth Every Night. 30 tablet 3     No facility-administered medications prior to visit.       No opioid medication identified on active medication list. I have reviewed chart for other potential  high risk medication/s and harmful drug interactions in the elderly.          Aspirin is not on active medication list.  Aspirin use is not indicated based on review of current medical condition/s. Risk of harm outweighs potential benefits.  .    Patient Active Problem List   Diagnosis   • Cerebral aneurysm   • Coronary artery disease   • Mixed hyperlipidemia   • Essential hypertension   • Hyperthyroidism   • Insomnia   • Onychomycosis of toenail   • Bilateral enlargement of atria   • Sinus bradycardia   • Transient cerebral ischemia   • Skin lesion of right ear   • Fatigue  "  • Medicare annual wellness visit, subsequent   • Osteopenia   • Necrotic hand wound (HCC)   • Family history of breast cancer   • S/P CABG (coronary artery bypass graft)   • Chest pain   • Neck strain, initial encounter   • Poison ivy dermatitis   • Need for influenza vaccination   • Greater trochanteric bursitis of right hip   • Prophylactic antibiotic   • Nondisplaced fracture of fifth metatarsal bone, right foot, initial encounter for closed fracture   • Irritable bowel syndrome with both constipation and diarrhea   • Personal history of colonic polyps   • Encounter for screening for malignant neoplasm of colon   • Antiplatelet or antithrombotic long-term use   • S/P coil embolization of cerebral aneurysm     Advance Care Planning  Advance Directive is not on file.  ACP discussion was held with the patient during this visit. Patient has an advance directive (not in EMR), copy requested.    Review of Systems   All other systems reviewed and are negative.       Objective    Vitals:    01/27/22 1453   BP: 120/84   Pulse: 78   Temp: 97.7 °F (36.5 °C)   SpO2: 96%   Weight: 66.2 kg (146 lb)   Height: 162.6 cm (64\")   PainSc: 0-No pain     BMI Readings from Last 1 Encounters:   01/27/22 25.06 kg/m²   BMI is above normal parameters. Recommendations include: nutrition counseling    Does the patient have evidence of cognitive impairment? No    Physical Exam  Vitals and nursing note reviewed.   Constitutional:       Appearance: Normal appearance. She is well-developed, well-groomed and normal weight.      Interventions: Face mask in place.   HENT:      Head: Normocephalic and atraumatic.      Jaw: There is normal jaw occlusion.      Right Ear: Hearing, tympanic membrane, ear canal and external ear normal.      Left Ear: Hearing, tympanic membrane, ear canal and external ear normal.      Nose: Nose normal.      Right Sinus: No maxillary sinus tenderness or frontal sinus tenderness.      Left Sinus: No maxillary sinus " tenderness or frontal sinus tenderness.      Mouth/Throat:      Lips: Pink.      Mouth: Mucous membranes are moist.      Dentition: Normal dentition.      Tongue: No lesions.      Pharynx: Oropharynx is clear. Uvula midline.      Tonsils: No tonsillar exudate.   Eyes:      General: Lids are normal.      Conjunctiva/sclera: Conjunctivae normal.      Pupils: Pupils are equal, round, and reactive to light.   Neck:      Thyroid: No thyroid mass, thyromegaly or thyroid tenderness.      Vascular: No carotid bruit.      Trachea: Trachea and phonation normal. No tracheal tenderness.   Cardiovascular:      Rate and Rhythm: Normal rate and regular rhythm.      Pulses: Normal pulses.      Heart sounds: Normal heart sounds, S1 normal and S2 normal. No murmur heard.      Pulmonary:      Effort: Pulmonary effort is normal.      Breath sounds: Normal breath sounds and air entry.   Abdominal:      General: Bowel sounds are normal.      Palpations: Abdomen is soft.      Tenderness: There is no abdominal tenderness. There is no right CVA tenderness, left CVA tenderness, guarding or rebound. Negative signs include Person's sign, Rovsing's sign, McBurney's sign, psoas sign and obturator sign.   Musculoskeletal:      Cervical back: Neck supple.      Right lower leg: No edema.      Left lower leg: No edema.   Lymphadenopathy:      Cervical: No cervical adenopathy.      Right cervical: No superficial, deep or posterior cervical adenopathy.     Left cervical: No superficial, deep or posterior cervical adenopathy.   Skin:     General: Skin is warm and dry.      Capillary Refill: Capillary refill takes less than 2 seconds.   Neurological:      Mental Status: She is alert and oriented to person, place, and time.      Deep Tendon Reflexes:      Reflex Scores:       Patellar reflexes are 2+ on the right side and 2+ on the left side.  Psychiatric:         Attention and Perception: Attention and perception normal.         Mood and Affect: Mood  and affect normal.         Speech: Speech normal.         Behavior: Behavior is cooperative.         Thought Content: Thought content normal.         Cognition and Memory: Cognition and memory normal.         Judgment: Judgment normal.     I wore a facial mask, face shield, and gloves during this patient encounter.  Patient also wearing a surgical mask.  Hand hygiene performed before and after seeing the patient.    Lab Results   Component Value Date    CHLPL 166 01/17/2022    TRIG 76 01/17/2022    HDL 78 01/17/2022    LDL 74 01/17/2022    VLDL 14 01/17/2022            HEALTH RISK ASSESSMENT    Smoking Status:  Social History     Tobacco Use   Smoking Status Never Smoker   Smokeless Tobacco Never Used     Alcohol Consumption:  Social History     Substance and Sexual Activity   Alcohol Use Yes    Comment: Rare//Caffeine use: Decaf     Fall Risk Screen:    STEADI Fall Risk Assessment was completed, and patient is at LOW risk for falls.Assessment completed on:1/27/2022    Depression Screening:  PHQ-2/PHQ-9 Depression Screening 1/27/2022   Little interest or pleasure in doing things 0   Feeling down, depressed, or hopeless 0   Trouble falling or staying asleep, or sleeping too much 0   Feeling tired or having little energy 0   Poor appetite or overeating 0   Feeling bad about yourself - or that you are a failure or have let yourself or your family down 0   Trouble concentrating on things, such as reading the newspaper or watching television 0   Moving or speaking so slowly that other people could have noticed. Or the opposite - being so fidgety or restless that you have been moving around a lot more than usual 0   Thoughts that you would be better off dead, or of hurting yourself in some way 0   Total Score 0   If you checked off any problems, how difficult have these problems made it for you to do your work, take care of things at home, or get along with other people? Not difficult at all       Health Habits and  Functional and Cognitive Screening:  Functional & Cognitive Status 1/27/2022   Do you have difficulty preparing food and eating? No   Do you have difficulty bathing yourself, getting dressed or grooming yourself? No   Do you have difficulty using the toilet? No   Do you have difficulty moving around from place to place? No   Do you have trouble with steps or getting out of a bed or a chair? No   Current Diet Unhealthy Diet   Dental Exam Up to date   Eye Exam Up to date   Exercise (times per week) 7 times per week   Current Exercises Include Walking   Current Exercise Activities Include -   Do you need help using the phone?  No   Are you deaf or do you have serious difficulty hearing?  No   Do you need help with transportation? No   Do you need help shopping? No   Do you need help preparing meals?  No   Do you need help with housework?  No   Do you need help with laundry? No   Do you need help taking your medications? No   Do you need help managing money? No   Do you ever drive or ride in a car without wearing a seat belt? No   Have you felt unusual stress, anger or loneliness in the last month? No   Who do you live with? Alone   If you need help, do you have trouble finding someone available to you? No   Have you been bothered in the last four weeks by sexual problems? No   Do you have difficulty concentrating, remembering or making decisions? No       Age-appropriate Screening Schedule:  Refer to the list below for future screening recommendations based on patient's age, sex and/or medical conditions. Orders for these recommended tests are listed in the plan section. The patient has been provided with a written plan.    Health Maintenance   Topic Date Due   • LIPID PANEL  01/17/2023   • MAMMOGRAM  03/02/2023   • PAP SMEAR  03/22/2024   • TDAP/TD VACCINES (4 - Td or Tdap) 06/29/2027   • INFLUENZA VACCINE  Completed   • ZOSTER VACCINE  Addressed   • DXA SCAN  Discontinued              Assessment/Plan   CMS  Preventative Services Quick Reference  Risk Factors Identified During Encounter  Polypharmacy  The above risks/problems have been discussed with the patient.  Follow up actions/plans if indicated are seen below in the Assessment/Plan Section.  Pertinent information has been shared with the patient in the After Visit Summary.    Diagnoses and all orders for this visit:    1. Medicare annual wellness visit, subsequent (Primary)    2. Mixed hyperlipidemia  -     pitavastatin calcium (Livalo) 2 MG tablet tablet; Take 1 tablet by mouth Every Night.  Dispense: 90 tablet; Refill: 3    3. Essential hypertension    1.  Annual Medicare wellness examination with hyperlipidemia: I have reviewed above blood work with her at office visit today.  Will send Livalo prescription to pharmacy.  Will have to try for PA.  Has myalgias/muscle aches with statin medication.  She will be notified of outcome of prior authorization.  We will continue eating healthy and increasing physical activity.  Plan to follow-up in 6 months with repeat blood work.  2.  Chronic stable hypertension: Blood pressure was in therapeutic range.  We will keep her follow-up appointments with cardiology.    Follow Up:   Return in about 6 months (around 7/27/2022), or Cholesterol labs prior.     An After Visit Summary and PPPS were made available to the patient.               Patient Counseling:  --Nutrition: Stressed importance of moderation in sodium/caffeine intake, saturated fat and cholesterol.  Discussed caloric balance, sufficient intake of fresh fruits, vegetables, fiber,   calcium, iron.  --Discussed the new recommendation against daily use of baby aspirin for primary prevention in low risk patients.  --Exercise: Stressed the importance of regular exercise by incorporating into daily routine.    --Substance Abuse: Discussed cessation/primary prevention of tobacco, alcohol, or other drug use; driving or other dangerous activities under the influence.     --Dental health: Discussed importance of regular tooth brushing, flossing, and dental visits.  -- Suggested having eyes and vision checked if needed or past due.  --Immunizations reviewed and up to date.  --Discussed benefits of screening colonoscopy.    [facility]  [address] [place] [id number]  Dept: [phone]  Dept Fax: [phone]  Loc: [phone]  Loc Fax: [phone]

## 2022-02-01 ENCOUNTER — TELEPHONE (OUTPATIENT)
Dept: FAMILY MEDICINE CLINIC | Facility: CLINIC | Age: 72
End: 2022-02-01

## 2022-02-01 DIAGNOSIS — E78.2 MIXED HYPERLIPIDEMIA: ICD-10-CM

## 2022-02-01 NOTE — TELEPHONE ENCOUNTER
Pharmacy representative name: RAQUEL    Pharmacy representative phone number: [phone]    What medication are you calling in regards to:   pitavastatin calcium (Livalo) 2 MG tablet tablet     What question does the pharmacy have: PHARMACY REACHED OUT TO INSURANCE AND INSURANCE STATED THEY NEEDED MORE INFO FROM THE DR    Who is the provider that prescribed the medication: LADY     Additional notes:   [phone] INSURANCE NUMBER SO DR CAN GIVE EXTRA INFO INSURANCE COMPANY IS REQUESTING.

## 2022-02-01 NOTE — TELEPHONE ENCOUNTER
Spoke with insurance PA is approved from 1/2/22 for a lifetime. Pt has been informed that she will only pay $68 and can get a 3 month supply through mail order.

## 2022-02-03 ENCOUNTER — TRANSCRIBE ORDERS (OUTPATIENT)
Dept: ADMINISTRATIVE | Facility: HOSPITAL | Age: 72
End: 2022-02-03

## 2022-02-03 DIAGNOSIS — Z12.31 SCREENING MAMMOGRAM FOR HIGH-RISK PATIENT: Primary | ICD-10-CM

## 2022-02-08 ENCOUNTER — TELEPHONE (OUTPATIENT)
Dept: FAMILY MEDICINE CLINIC | Facility: CLINIC | Age: 72
End: 2022-02-08

## 2022-02-08 NOTE — TELEPHONE ENCOUNTER
I called pt as requested. She stated that she is still having issues with getting her Livalo. She said now insurance is telling her that she has to pay the $400+ in order to get a 90 day supply and then she can submit paperwork to be reimbursed. She said they told her she would have to do this every time.   They are going to send her a 90 day supply as a courtesy. Pt stated she does not want to have to go through the hassle of doing this every 3 months and wanted to know if you could come up with an alternative for her.

## 2022-02-09 DIAGNOSIS — E78.2 MIXED HYPERLIPIDEMIA: Primary | ICD-10-CM

## 2022-02-10 DIAGNOSIS — E78.2 MIXED HYPERLIPIDEMIA: ICD-10-CM

## 2022-02-10 DIAGNOSIS — J30.89 NON-SEASONAL ALLERGIC RHINITIS, UNSPECIFIED TRIGGER: ICD-10-CM

## 2022-02-10 RX ORDER — ICOSAPENT ETHYL 1000 MG/1
2 CAPSULE ORAL 2 TIMES DAILY WITH MEALS
Qty: 360 CAPSULE | Refills: 3 | Status: SHIPPED | OUTPATIENT
Start: 2022-02-10 | End: 2023-01-12 | Stop reason: SDUPTHER

## 2022-02-10 RX ORDER — MONTELUKAST SODIUM 10 MG/1
10 TABLET ORAL NIGHTLY
Qty: 90 TABLET | Refills: 3 | Status: SHIPPED | OUTPATIENT
Start: 2022-02-10 | End: 2023-01-12 | Stop reason: SDUPTHER

## 2022-02-10 RX ORDER — EZETIMIBE 10 MG/1
10 TABLET ORAL DAILY
Qty: 90 TABLET | Refills: 3 | Status: SHIPPED | OUTPATIENT
Start: 2022-02-10 | End: 2023-01-12 | Stop reason: SDUPTHER

## 2022-02-11 DIAGNOSIS — I67.1 CEREBRAL ANEURYSM: ICD-10-CM

## 2022-02-11 RX ORDER — CLOPIDOGREL BISULFATE 75 MG/1
75 TABLET ORAL DAILY
Qty: 90 TABLET | Refills: 3 | Status: SHIPPED | OUTPATIENT
Start: 2022-02-11 | End: 2023-01-12 | Stop reason: SDUPTHER

## 2022-02-11 RX ORDER — ISOSORBIDE DINITRATE 5 MG/1
5 TABLET ORAL 2 TIMES DAILY
Qty: 180 TABLET | Refills: 3 | Status: SHIPPED | OUTPATIENT
Start: 2022-02-11 | End: 2023-03-27 | Stop reason: SDUPTHER

## 2022-02-14 RX ORDER — AMITRIPTYLINE HYDROCHLORIDE 10 MG/1
10 TABLET, FILM COATED ORAL NIGHTLY
Qty: 90 TABLET | Refills: 3 | Status: SHIPPED | OUTPATIENT
Start: 2022-02-14 | End: 2023-01-12 | Stop reason: SDUPTHER

## 2022-02-28 ENCOUNTER — OFFICE VISIT (OUTPATIENT)
Dept: FAMILY MEDICINE CLINIC | Facility: CLINIC | Age: 72
End: 2022-02-28

## 2022-02-28 VITALS
TEMPERATURE: 97.8 F | WEIGHT: 147 LBS | DIASTOLIC BLOOD PRESSURE: 82 MMHG | BODY MASS INDEX: 25.1 KG/M2 | HEART RATE: 76 BPM | HEIGHT: 64 IN | OXYGEN SATURATION: 99 % | SYSTOLIC BLOOD PRESSURE: 142 MMHG

## 2022-02-28 DIAGNOSIS — R39.15 URINARY URGENCY: Primary | ICD-10-CM

## 2022-02-28 DIAGNOSIS — R82.90 ABNORMAL URINE ODOR: ICD-10-CM

## 2022-02-28 DIAGNOSIS — N39.0 URINARY TRACT INFECTION WITH HEMATURIA, SITE UNSPECIFIED: ICD-10-CM

## 2022-02-28 DIAGNOSIS — Z29.9 PROPHYLACTIC MEASURE: ICD-10-CM

## 2022-02-28 DIAGNOSIS — R31.9 URINARY TRACT INFECTION WITH HEMATURIA, SITE UNSPECIFIED: ICD-10-CM

## 2022-02-28 LAB
BILIRUB BLD-MCNC: NEGATIVE MG/DL
CLARITY, POC: ABNORMAL
COLOR UR: YELLOW
GLUCOSE UR STRIP-MCNC: NEGATIVE MG/DL
KETONES UR QL: NEGATIVE
LEUKOCYTE EST, POC: ABNORMAL
NITRITE UR-MCNC: NEGATIVE MG/ML
PH UR: 5 [PH] (ref 5–8)
PROT UR STRIP-MCNC: NEGATIVE MG/DL
RBC # UR STRIP: ABNORMAL /UL
SP GR UR: 1.01 (ref 1–1.03)
UROBILINOGEN UR QL: NORMAL

## 2022-02-28 PROCEDURE — 99213 OFFICE O/P EST LOW 20 MIN: CPT | Performed by: PHYSICIAN ASSISTANT

## 2022-02-28 PROCEDURE — 81002 URINALYSIS NONAUTO W/O SCOPE: CPT | Performed by: PHYSICIAN ASSISTANT

## 2022-02-28 RX ORDER — CEFUROXIME AXETIL 500 MG/1
500 TABLET ORAL 2 TIMES DAILY
Qty: 14 TABLET | Refills: 0 | Status: SHIPPED | OUTPATIENT
Start: 2022-02-28 | End: 2022-04-06

## 2022-02-28 RX ORDER — FLUCONAZOLE 150 MG/1
150 TABLET ORAL ONCE
Qty: 1 TABLET | Refills: 0 | Status: SHIPPED | OUTPATIENT
Start: 2022-02-28 | End: 2022-02-28

## 2022-02-28 NOTE — PROGRESS NOTES
"Chief Complaint  Urinary Urgency (urine hesitancy with pressure)    Subjective          Maddison Turcios presents to [facility]  History of Present Illness  Maddison is a 71-year-old female who presents with new urine urgency and urinary odor for the past 3-4 days.  Maddison has noticed urinary pressure with odor, urinary frequency and hesitancy.  States she did take a bath on Saturday.  Denied any fevers, chills, vaginal discharge, or abdominal pain.  Appetite and sleep have been normal.  Bowel movements have been daily without dark black tarry stools.  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  Will take her blood pressure medication tonight.  Review of Systems   Genitourinary: Positive for frequency. Negative for dysuria.        Urinary odor with hesitancy/pressure   All other systems reviewed and are negative.    Objective   Vital Signs:   /82 (BP Location: Left arm, Patient Position: Sitting, Cuff Size: Adult)   Pulse 76   Temp 97.8 °F (36.6 °C)   Ht 162.6 cm (64\")   Wt 66.7 kg (147 lb)   SpO2 99%   BMI 25.23 kg/m²     Physical Exam  Vitals and nursing note reviewed.   Constitutional:       Appearance: Normal appearance. She is well-developed, well-groomed and normal weight.      Interventions: Face mask in place.   HENT:      Head: Normocephalic and atraumatic.   Neck:      Vascular: No carotid bruit.      Trachea: Trachea and phonation normal. No tracheal tenderness.   Cardiovascular:      Rate and Rhythm: Normal rate and regular rhythm.      Pulses: Normal pulses.      Heart sounds: Normal heart sounds, S1 normal and S2 normal. No murmur heard.      Pulmonary:      Effort: Pulmonary effort is normal.      Breath sounds: Normal breath sounds and air entry.   Abdominal:      General: Bowel sounds are normal.      Palpations: Abdomen is soft. There is no hepatomegaly.      Tenderness: There is no abdominal tenderness. There is no right CVA " tenderness, left CVA tenderness, guarding or rebound. Negative signs include Person's sign, Rovsing's sign, McBurney's sign, psoas sign and obturator sign.   Musculoskeletal:      Cervical back: Neck supple.      Right lower leg: No edema.      Left lower leg: No edema.   Skin:     General: Skin is warm and dry.      Capillary Refill: Capillary refill takes less than 2 seconds.   Neurological:      Mental Status: She is alert and oriented to person, place, and time.   Psychiatric:         Attention and Perception: Attention and perception normal.         Mood and Affect: Mood and affect normal.         Speech: Speech normal.         Behavior: Behavior normal. Behavior is cooperative.         Thought Content: Thought content normal.         Cognition and Memory: Cognition and memory normal.         Judgment: Judgment normal.     I wore a facial mask, face shield, and gloves during this patient encounter.  Patient also wearing a surgical mask.  Hand hygiene performed before and after seeing the patient.     Result Review :        Results for orders placed or performed in visit on 02/28/22   POC Urinalysis Dipstick    Specimen: Urine   Result Value Ref Range    Color Yellow Yellow, Straw, Dark Yellow, Matilde    Clarity, UA Hazy (A) Clear    Glucose, UA Negative Negative, 1000 mg/dL (3+) mg/dL    Bilirubin Negative Negative    Ketones, UA Negative Negative    Specific Gravity  1.015 1.005 - 1.030    Blood, UA Trace (A) Negative    pH, Urine 5.0 5.0 - 8.0    Protein, POC Negative Negative mg/dL    Urobilinogen, UA Normal Normal    Leukocytes Moderate (2+) (A) Negative    Nitrite, UA Negative Negative                 Assessment and Plan    Diagnoses and all orders for this visit:    1. Urinary urgency (Primary)  -     POC Urinalysis Dipstick    2. Abnormal urine odor  -     POC Urinalysis Dipstick    3. Urinary tract infection with hematuria, site unspecified  -     Urine Culture - Urine, Urine, Clean Catch  -      cefuroxime (CEFTIN) 500 MG tablet; Take 1 tablet by mouth 2 (Two) Times a Day.  Dispense: 14 tablet; Refill: 0    4. Prophylactic measure  -     fluconazole (Diflucan) 150 MG tablet; Take 1 tablet by mouth 1 (One) Time for 1 dose.  Dispense: 1 tablet; Refill: 0    Maddison is seen in office today with urine urgency with abnormal urine odor.  In office urinalysis was positive for red cells and white cells.  Diagnosed with a UTI.  Have sent urine culture to the laboratory for further evaluation.  She will be notified of test results when completed.  I have sent Ceftin antibiotic to pharmacy to take as directed.  Have sent Diflucan for prophylactic measure of yeast infection.  She will return to office if no improvement.  Plan to recheck urine culture in 10 days.    Follow Up   Return if symptoms worsen or fail to improve.  Patient was given instructions and counseling regarding her condition or for health maintenance advice. Please see specific information pulled into the AVS if appropriate.     [facility]  [address] [place] [id number]  Dept: [phone]  Dept Fax: [phone]  Loc: [phone]  Loc Fax: [phone]

## 2022-03-03 ENCOUNTER — HOSPITAL ENCOUNTER (OUTPATIENT)
Dept: MAMMOGRAPHY | Facility: HOSPITAL | Age: 72
Discharge: HOME | End: 2022-03-03
Admitting: PHYSICIAN ASSISTANT

## 2022-03-03 DIAGNOSIS — Z12.31 SCREENING MAMMOGRAM FOR HIGH-RISK PATIENT: ICD-10-CM

## 2022-03-03 PROCEDURE — 77067 SCR MAMMO BI INCL CAD: CPT

## 2022-03-03 PROCEDURE — 77063 BREAST TOMOSYNTHESIS BI: CPT

## 2022-03-04 LAB
BACTERIA UR CULT: ABNORMAL
BACTERIA UR CULT: ABNORMAL
OTHER ANTIBIOTIC SUSC ISLT: ABNORMAL

## 2022-03-10 DIAGNOSIS — R31.9 URINARY TRACT INFECTION WITH HEMATURIA, SITE UNSPECIFIED: Primary | ICD-10-CM

## 2022-03-10 DIAGNOSIS — N39.0 URINARY TRACT INFECTION WITH HEMATURIA, SITE UNSPECIFIED: Primary | ICD-10-CM

## 2022-03-12 LAB
APPEARANCE UR: CLEAR
BACTERIA #/AREA URNS HPF: ABNORMAL /[HPF]
BACTERIA UR CULT: NO GROWTH
BACTERIA UR CULT: NORMAL
BILIRUB UR QL STRIP: NEGATIVE
CASTS URNS QL MICRO: ABNORMAL /LPF
COLOR UR: YELLOW
CRYSTALS URNS MICRO: ABNORMAL
EPI CELLS #/AREA URNS HPF: ABNORMAL /HPF (ref 0–10)
GLUCOSE UR QL STRIP: NEGATIVE
HGB UR QL STRIP: NEGATIVE
KETONES UR QL STRIP: NEGATIVE
LEUKOCYTE ESTERASE UR QL STRIP: NEGATIVE
MICRO URNS: NORMAL
MICRO URNS: NORMAL
NITRITE UR QL STRIP: NEGATIVE
PH UR STRIP: 6.5 [PH] (ref 5–7.5)
PROT UR QL STRIP: NEGATIVE
RBC #/AREA URNS HPF: ABNORMAL /HPF (ref 0–2)
SP GR UR STRIP: 1.01 (ref 1–1.03)
UNIDENT CRYS URNS QL MICRO: PRESENT
UROBILINOGEN UR STRIP-MCNC: 0.2 MG/DL (ref 0.2–1)
WBC #/AREA URNS HPF: ABNORMAL /HPF (ref 0–5)

## 2022-04-06 ENCOUNTER — OFFICE VISIT (OUTPATIENT)
Dept: CARDIOLOGY | Facility: CLINIC | Age: 72
End: 2022-04-06

## 2022-04-06 VITALS
WEIGHT: 144 LBS | BODY MASS INDEX: 24.59 KG/M2 | HEIGHT: 64 IN | RESPIRATION RATE: 16 BRPM | SYSTOLIC BLOOD PRESSURE: 126 MMHG | HEART RATE: 65 BPM | OXYGEN SATURATION: 97 % | DIASTOLIC BLOOD PRESSURE: 80 MMHG

## 2022-04-06 DIAGNOSIS — R06.09 DYSPNEA ON EXERTION: ICD-10-CM

## 2022-04-06 DIAGNOSIS — I10 ESSENTIAL HYPERTENSION: ICD-10-CM

## 2022-04-06 DIAGNOSIS — I25.10 CORONARY ARTERY DISEASE INVOLVING NATIVE CORONARY ARTERY OF NATIVE HEART WITHOUT ANGINA PECTORIS: Primary | ICD-10-CM

## 2022-04-06 DIAGNOSIS — E78.2 MIXED HYPERLIPIDEMIA: ICD-10-CM

## 2022-04-06 DIAGNOSIS — Z95.1 S/P CABG (CORONARY ARTERY BYPASS GRAFT): ICD-10-CM

## 2022-04-06 DIAGNOSIS — R00.1 SINUS BRADYCARDIA: ICD-10-CM

## 2022-04-06 PROCEDURE — 99214 OFFICE O/P EST MOD 30 MIN: CPT | Performed by: NURSE PRACTITIONER

## 2022-04-06 PROCEDURE — 93000 ELECTROCARDIOGRAM COMPLETE: CPT | Performed by: NURSE PRACTITIONER

## 2022-04-06 NOTE — PROGRESS NOTES
Date of Office Visit: 2022  Encounter Provider: RENATE Kaur  Place of Service: [facility] CARDIOLOGY  Patient Name: Maddison Turcios  :1950  Primary Cardiologist: Dr. Urbina    CC:   Annual cardiac evaluation    Dear Kasandra    HPI: Maddison Turcios is a pleasant 72 y.o. female who presents 2022 for cardiac follow up.  I have reviewed her past medical records including notes, labs and testing in preparation for today's visit.    She has a history of episodes of severe fatigue and had a stress study which showed a very mild abnormality. She continued to have the severe fatigue and had a heart catheterization done in 2004 showing severe left main coronary stenosis.  She was transferred emergently for bypass surgery at [facility] which was performed 2004 by Dr. Cade. He has cardiac catheterization done 2004 that showed ostial 60% stenosis of the left main coronary artery. The left anterior descending artery and left circumflex also showed no significant disease. The left circumflex vessel was dominant. The cardiac catheterization was done at [facility] in [place], [place]. Bypass 2004 she received a LIMA to mid LAD and sequential saphenous vein graft to the ramus and intermedius and first obtuse marginal branch done at [facility].      In 2013, she had a stress nuclear perfusion study showing no ischemia. She had a 2D echocardiogram with Doppler showing ejection fraction of 60% to 65% with trace mitral and tricuspid insufficiency. She had a carotid duplex  study done in 2013 which was normal.      She had an ECG done 2016 which shows poor R-wave progression sinus rhythm and otherwise appears normal.      She had a cerebral angiogram with Dr. Hewitt and it showed no cerebral aneurysm.      She was at [facility], admitted there on 2016 through 2016. At that time she  was diagnosed with a TIA. During her hospitalization she had an MRI which was negative for an acute infarct. They felt possible TIA versus complicated migraine. She had had for 2-3 days prior to this a feeling of imbalance and leaned to the left along with some blurred vision. She had a transesophageal echocardiogram whether did not show anything to explain her TIA. There was bilateral atrial enlargement but otherwise looked fine. She was loaded with Plavix 300 mg and then continued with 75 mg of Plavix daily. Neurosurgery was consulted because she had a history of prior pipelined right internal carotid artery aneurysm. Dr. Hewitt had seen her and treated her for his in 08/2015. Neurosurgery said there was no evidence of aneurysm. Her statin medication was increased because of elevated hyperlipidemia. She actually had garbled speech, visual changes and balance issues all with the TIA and they happened over a matter of three days, kind of intermittently.      She had a normal 21 day event recorder 1/2017.      She was tolerating Livalo without muscle complaints.  She is on zetia  And vascepa.      She called 911 on 4/8/2019 because she was having exertional chest pressure with doing mowing the normal household activities.  When EMS arrived, they administered 2 nitroglycerin and it helped her chest pressure.  She was taken to [facility] and there she ruled out for myocardial infarction.  She was sent [facility] and then return to the hospital for cardiac catheterization which showed atretic LIMA to LAD, patent SVG to ramus intermedius then to OM, 30% left main stenosis proximally but normal LAD, left circumflex and RCA.  Her ejection fraction was normal.  She was dismissed from the hospital. She had a nonischemic stress echocardiogram done 5/14/19 with baseline ejection fraction 56%, grade 1 diastolic dysfunction and a post exercise ejection fraction of 81%.       She has a history of hypotension on losartan.  She  is unable to tolerate beta-blockers.      She had labs performed January 2022.  She had unremarkable CBC all within normal limits.  Her CMP was within normal limits with creatinine 0.68.  Lipid panel revealed total cholesterol 166, HDL 78, LDL 74 and triglycerides 76.    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  She states she was full-time retired and was thinking about substituting but was not planning on substituting full-time.  She states she now realizes that she enjoyed full-time intermediate.  She states it has been a little stressful.  The biggest thing she has noticed that she is getting shortness of breath when walking especially going up the stairs at school.  She states this was the first time that she had prior to having her open heart surgery.  This is concerning.  She also states that she feels like her heart beats rapidly with exertion and then will settle down with rest.  She does wear compression stockings for lower extremity edema.  She denies any dizziness, lightheadedness, syncope or presyncopal episodes.  She has not really had any chest pain, just the dyspnea and palpitations.  She does have some daytime sleepiness.  She is taking her medications as directed.  I reviewed the above labs.  Her blood pressure is well controlled.       Past Medical History:   Diagnosis Date   • Abnormal blood chemistry    • Acute UTI (urinary tract infection)    • Allergic    • Anemia    • Aneurysm, cerebral    • Bloating    • Brain aneurysm    • CAD (coronary artery disease)    • Chronic headaches    • Coronary artery disease    • Coronary artery stenosis    • Dysuria    • Encounter for screening colonoscopy    • Environmental allergies    • Fall from slip, trip, or stumble    • Gait disturbance    • H/O cardiovascular stress test 09/17/2013    Treadmill   • H/O colonoscopy    • H/O  echocardiogram 09/25/2013   • H/O fall    • History of EKG 07/31/2015   • Hyperlipemia    • Hyperlipidemia    • Hypertension    • Hyperthyroidism    • Injury of back    • Insomnia    • Left forearm pain    • Left leg pain    • Left wrist pain    • Lower abdominal pain    • Need for pneumococcal vaccination    • Onychomycosis of toenail    • AKIRA (obstructive sleep apnea)    • Pelvic pressure in female    • Right foot pain    • TIA (transient ischemic attack) 11/30/2016   • URI (upper respiratory infection)    • Urine frequency        Past Surgical History:   Procedure Laterality Date   • BREAST EXCISIONAL BIOPSY Right 1977    b9   • BREAST EXCISIONAL BIOPSY Right 1979    b9   • BUNIONECTOMY     • CARDIAC CATHETERIZATION N/A 4/8/2019    Procedure: Left Heart Cath;  Surgeon: Jayme Ramirez MD;  Location:  [facility];  Service: Cardiovascular   • CARDIAC CATHETERIZATION N/A 4/8/2019    Procedure: Coronary angiography;  Surgeon: Jayme Ramirez MD;  Location:  [facility];  Service: Cardiovascular   • CARDIAC CATHETERIZATION N/A 4/8/2019    Procedure: Left ventriculography;  Surgeon: Jayme Ramirez MD;  Location:  [facility];  Service: Cardiovascular   • CEREBRAL ANEURYSM REPAIR     • CHOLECYSTECTOMY     • COLONOSCOPY N/A 1/29/2021    Procedure: COLONOSCOPY with polypectomy;  Surgeon: Colin Ladd MD;  Location: [facility];  Service: Gastroenterology;  Laterality: N/A;  Diverticulosis  Sigmoid colon polyp   • CORONARY ARTERY BYPASS GRAFT     • ROTATOR CUFF REPAIR Left    • SPLENECTOMY     • TONSILLECTOMY     • TUBAL ABDOMINAL LIGATION         Social History     Socioeconomic History   • Marital status:    Tobacco Use   • Smoking status: Never Smoker   • Smokeless tobacco: Never Used   Vaping Use   • Vaping Use: Never used   Substance and Sexual Activity   • Alcohol use: Yes     Comment: Rare//Caffeine use: Decaf   • Drug use: No    • Sexual activity: Never     Partners: Male     Birth control/protection: Post-menopausal, Surgical     Comment: BTL       Family History   Problem Relation Age of Onset   • Heart failure Mother    • Hypertension Mother    • Stroke Mother    • Cervical cancer Mother    • Heart failure Father    • Aneurysm Sister    • Breast cancer Sister    • Heart attack Brother    • Cancer Brother    • Lung cancer Brother    • No Known Problems Son    • No Known Problems Daughter    • No Known Problems Sister    • No Known Problems Sister    • Ovarian cancer Neg Hx    • Colon cancer Neg Hx    • Pulmonary embolism Neg Hx    • Deep vein thrombosis Neg Hx        The following portion of the patient's history were reviewed and updated as appropriate: past medical history, past surgical history, past social history, past family history, allergies, current medications, and problem list.    Review of Systems   Constitutional: Negative for diaphoresis, fever and malaise/fatigue.   HENT: Negative for congestion, hearing loss, hoarse voice, nosebleeds and sore throat.    Eyes: Negative for photophobia, vision loss in left eye, vision loss in right eye and visual disturbance.   Cardiovascular: Positive for dyspnea on exertion and palpitations (with exertion and SOA). Negative for chest pain, irregular heartbeat, leg swelling, near-syncope, orthopnea, paroxysmal nocturnal dyspnea and syncope.   Respiratory: Positive for shortness of breath (with exertion). Negative for cough, hemoptysis, sleep disturbances due to breathing, snoring, sputum production and wheezing.    Endocrine: Negative for cold intolerance, heat intolerance, polydipsia, polyphagia and polyuria.   Hematologic/Lymphatic: Negative for bleeding problem. Does not bruise/bleed easily.   Skin: Negative for color change, dry skin, poor wound healing, rash and suspicious lesions.   Musculoskeletal: Negative for arthritis, back pain, falls, gout, joint pain, joint swelling, muscle  cramps, muscle weakness and myalgias.   Gastrointestinal: Negative for bloating, abdominal pain, constipation, diarrhea, dysphagia, melena, nausea and vomiting.   Neurological: Positive for excessive daytime sleepiness. Negative for dizziness, headaches, light-headedness, loss of balance, numbness, paresthesias, seizures, vertigo and weakness.   Psychiatric/Behavioral: Negative for depression, memory loss and substance abuse. The patient is not nervous/anxious.        Allergies   Allergen Reactions   • Adhesive Tape Rash     Redness, bruising and peeling of skin    *Use Paper Tape Only*  Redness, bruising and peeling of skin    *Use Paper Tape Only*   • Beta Adrenergic Blockers Unknown - High Severity     hypotension  bradycardic   • Statins Myalgia   • Elemental Sulfur Rash   • Sulfa Antibiotics Rash         Current Outpatient Medications:   •  amitriptyline (ELAVIL) 10 MG tablet, Take 1 tablet by mouth Every Night., Disp: 90 tablet, Rfl: 3  •  cefuroxime (CEFTIN) 500 MG tablet, Take 1 tablet by mouth 2 (Two) Times a Day., Disp: 14 tablet, Rfl: 0  •  Cholecalciferol (VITAMIN D3) 5000 UNITS capsule capsule, Take 5,000 Units by mouth Daily., Disp: , Rfl:   •  clopidogrel (PLAVIX) 75 MG tablet, Take 1 tablet by mouth Daily., Disp: 90 tablet, Rfl: 3  •  Drysol 20 % external solution, , Disp: , Rfl:   •  ezetimibe (ZETIA) 10 MG tablet, Take 1 tablet by mouth Daily., Disp: 90 tablet, Rfl: 3  •  icosapent ethyl (Vascepa) 1 g capsule capsule, Take 2 g by mouth 2 (Two) Times a Day With Meals., Disp: 360 capsule, Rfl: 3  •  isosorbide dinitrate (ISORDIL) 5 MG tablet, Take 1 tablet by mouth 2 (Two) Times a Day., Disp: 180 tablet, Rfl: 3  •  Melatonin 12 MG tablet, Take 12 mg by mouth Every Night., Disp: , Rfl:   •  Menaquinone-7 (VITAMIN K2 PO), Take  by mouth., Disp: , Rfl:   •  montelukast (Singulair) 10 MG tablet, Take 1 tablet by mouth Every Night., Disp: 90 tablet, Rfl: 3  •  pitavastatin calcium (Livalo) 2 MG tablet  tablet, Take 1 tablet by mouth Every Night., Disp: 90 tablet, Rfl: 3  •  Probiotic Product (PROBIOTIC PO), Take 4 capsules by mouth Daily. gutbio-align, Disp: , Rfl:   •  ramipril (Altace) 2.5 MG capsule, Take 1 capsule by mouth Every Night., Disp: 30 capsule, Rfl: 0        Objective:     There were no vitals filed for this visit.  There is no height or weight on file to calculate BMI.      Vitals reviewed.   Constitutional:       General: Not in acute distress.     Appearance: Normal and healthy appearance. Well-developed.   Eyes:      General:         Right eye: No discharge.         Left eye: No discharge.      Conjunctiva/sclera: Conjunctivae normal.   HENT:      Head: Normocephalic and atraumatic.      Right Ear: External ear normal.      Left Ear: External ear normal.      Nose: Nose normal.   Neck:      Thyroid: No thyromegaly.      Vascular: No JVD.      Trachea: No tracheal deviation.      Lymphadenopathy: No cervical adenopathy.   Pulmonary:      Effort: Pulmonary effort is normal. No respiratory distress.      Breath sounds: Normal breath sounds. No wheezing. No rales.   Chest:      Chest wall: Not tender to palpatation.   Cardiovascular:      Normal rate. Regular rhythm.      No gallop.   Pulses:     Intact distal pulses.   Edema:     Peripheral edema absent.   Abdominal:      General: There is no distension.      Palpations: Abdomen is soft.      Tenderness: There is no abdominal tenderness.   Musculoskeletal: Normal range of motion.         General: No tenderness or deformity.      Cervical back: Normal range of motion and neck supple. Skin:     General: Skin is warm and dry.      Findings: No erythema or rash.   Neurological:      Mental Status: Alert and oriented to person, place, and time.      Coordination: Coordination normal.   Psychiatric:         Attention and Perception: Attention normal.         Mood and Affect: Mood normal.         Speech: Speech normal.         Behavior: Behavior normal.  Behavior is cooperative.         Thought Content: Thought content normal.         Cognition and Memory: Cognition normal.         Judgment: Judgment normal.               ECG 12 Lead    Date/Time: 4/6/2022 9:16 AM  Performed by: Kasandra Jones APRN  Authorized by: Kasandra Jones APRN   Comparison: compared with previous ECG from 4/23/2021  Similar to previous ECG  Rhythm: sinus rhythm  Rate: normal  Conduction: conduction normal  ST Segments: ST segments normal  T flattening: aVL  QRS axis: normal    Clinical impression: non-specific ECG              Assessment:       Diagnosis Plan   1. Coronary artery disease involving native coronary artery of native heart without angina pectoris     2. S/P CABG (coronary artery bypass graft)     3. Essential hypertension     4. Mixed hyperlipidemia     5. Sinus bradycardia            Plan:          1. TIA. The etiology for this is uncertain.  She has had no further instances since being on Plavix.   2. Hyperlipidemia, on Livalo and continue zetia. Labs as above with good control, per PCP  3. Essential hypertension, goal less than 120/80. - Good control with addition of ramipril  4. History of coronary artery disease, s/p CABG. She has angina, well treated with isordil. She is experiencing dyspnea with exertion which was her risk equivalent for her heart disease.  We will check a stress and an echo.  5. Mild biatrial enlargement on her transesophageal echocardiogram but otherwise, the DENY was normal.-Recheck an echo  6. Possible obstructive sleep apnea.-She does complain of daytime sleepiness.  Would probably benefit from a sleep study.  Will defer to PCP.    Echo stress and echo for dyspnea as this was her only complaint prior to having open heart surgery.    As always, it has been a pleasure to participate in your patient's care. Thank you.       Sincerely,       RENATE Kaur      Current Outpatient Medications:   •  amitriptyline (ELAVIL) 10 MG tablet, Take 1 tablet by  mouth Every Night., Disp: 90 tablet, Rfl: 3  •  Cholecalciferol (VITAMIN D3) 5000 UNITS capsule capsule, Take 5,000 Units by mouth Daily., Disp: , Rfl:   •  clopidogrel (PLAVIX) 75 MG tablet, Take 1 tablet by mouth Daily., Disp: 90 tablet, Rfl: 3  •  ezetimibe (ZETIA) 10 MG tablet, Take 1 tablet by mouth Daily., Disp: 90 tablet, Rfl: 3  •  icosapent ethyl (Vascepa) 1 g capsule capsule, Take 2 g by mouth 2 (Two) Times a Day With Meals., Disp: 360 capsule, Rfl: 3  •  isosorbide dinitrate (ISORDIL) 5 MG tablet, Take 1 tablet by mouth 2 (Two) Times a Day., Disp: 180 tablet, Rfl: 3  •  Melatonin 12 MG tablet, Take 12 mg by mouth Every Night., Disp: , Rfl:   •  Menaquinone-7 (VITAMIN K2 PO), Take  by mouth., Disp: , Rfl:   •  montelukast (Singulair) 10 MG tablet, Take 1 tablet by mouth Every Night., Disp: 90 tablet, Rfl: 3  •  pitavastatin calcium (Livalo) 2 MG tablet tablet, Take 1 tablet by mouth Every Night., Disp: 90 tablet, Rfl: 3  •  Probiotic Product (PROBIOTIC PO), Take 4 capsules by mouth Daily. gutbio-align, Disp: , Rfl:   •  ramipril (Altace) 2.5 MG capsule, Take 1 capsule by mouth Every Night., Disp: 30 capsule, Rfl: 0      Dictated utilizing Dragon dictation

## 2022-04-20 ENCOUNTER — HOSPITAL ENCOUNTER (OUTPATIENT)
Dept: CARDIOLOGY | Facility: HOSPITAL | Age: 72
Discharge: HOME | End: 2022-04-20

## 2022-04-20 ENCOUNTER — HOSPITAL ENCOUNTER (OUTPATIENT)
Dept: NUCLEAR MEDICINE | Facility: HOSPITAL | Age: 72
Discharge: HOME | End: 2022-04-20

## 2022-04-20 VITALS
WEIGHT: 144 LBS | SYSTOLIC BLOOD PRESSURE: 126 MMHG | HEIGHT: 64 IN | DIASTOLIC BLOOD PRESSURE: 80 MMHG | BODY MASS INDEX: 24.59 KG/M2

## 2022-04-20 DIAGNOSIS — I25.10 CORONARY ARTERY DISEASE INVOLVING NATIVE CORONARY ARTERY OF NATIVE HEART WITHOUT ANGINA PECTORIS: ICD-10-CM

## 2022-04-20 DIAGNOSIS — Z95.1 S/P CABG (CORONARY ARTERY BYPASS GRAFT): ICD-10-CM

## 2022-04-20 DIAGNOSIS — R06.09 DYSPNEA ON EXERTION: ICD-10-CM

## 2022-04-20 LAB
AORTIC DIMENSIONLESS INDEX: 0.7 (DI)
BH CV ECHO MEAS - ACS: 1.97 CM
BH CV ECHO MEAS - AO MAX PG: 6 MMHG
BH CV ECHO MEAS - AO MEAN PG: 3.4 MMHG
BH CV ECHO MEAS - AO ROOT DIAM: 2.8 CM
BH CV ECHO MEAS - AO V2 MAX: 123 CM/SEC
BH CV ECHO MEAS - AO V2 VTI: 28 CM
BH CV ECHO MEAS - AVA(I,D): 2.35 CM2
BH CV ECHO MEAS - EDV(CUBED): 51.7 ML
BH CV ECHO MEAS - EDV(MOD-SP2): 71.4 ML
BH CV ECHO MEAS - EDV(MOD-SP4): 91.4 ML
BH CV ECHO MEAS - EF(MOD-BP): 66.1 %
BH CV ECHO MEAS - EF(MOD-SP2): 56 %
BH CV ECHO MEAS - EF(MOD-SP4): 72.9 %
BH CV ECHO MEAS - ESV(CUBED): 12.1 ML
BH CV ECHO MEAS - ESV(MOD-SP2): 31.4 ML
BH CV ECHO MEAS - ESV(MOD-SP4): 24.8 ML
BH CV ECHO MEAS - FS: 38.3 %
BH CV ECHO MEAS - IVS/LVPW: 0.9 CM
BH CV ECHO MEAS - IVSD: 1.16 CM
BH CV ECHO MEAS - LAT PEAK E' VEL: 7.9 CM/SEC
BH CV ECHO MEAS - LV MASS(C)D: 153.7 GRAMS
BH CV ECHO MEAS - LV MAX PG: 2.5 MMHG
BH CV ECHO MEAS - LV MEAN PG: 1.24 MMHG
BH CV ECHO MEAS - LV V1 MAX: 80 CM/SEC
BH CV ECHO MEAS - LV V1 VTI: 19.6 CM
BH CV ECHO MEAS - LVIDD: 3.7 CM
BH CV ECHO MEAS - LVIDS: 2.3 CM
BH CV ECHO MEAS - LVOT AREA: 3.3 CM2
BH CV ECHO MEAS - LVOT DIAM: 2.06 CM
BH CV ECHO MEAS - LVPWD: 1.29 CM
BH CV ECHO MEAS - MED PEAK E' VEL: 6.5 CM/SEC
BH CV ECHO MEAS - MR MAX PG: 116.3 MMHG
BH CV ECHO MEAS - MR MAX VEL: 527.7 CM/SEC
BH CV ECHO MEAS - MV A MAX VEL: 81.4 CM/SEC
BH CV ECHO MEAS - MV DEC SLOPE: 301.9 CM/SEC2
BH CV ECHO MEAS - MV DEC TIME: 0.23 MSEC
BH CV ECHO MEAS - MV E MAX VEL: 84 CM/SEC
BH CV ECHO MEAS - MV E/A: 1.03
BH CV ECHO MEAS - MV MEAN PG: 1.4 MMHG
BH CV ECHO MEAS - MV V2 VTI: 22.5 CM
BH CV ECHO MEAS - MVA(VTI): 2.9 CM2
BH CV ECHO MEAS - PA V2 MAX: 111.3 CM/SEC
BH CV ECHO MEAS - RAP SYSTOLE: 3 MMHG
BH CV ECHO MEAS - RV V1 VTI: 14.2 CM
BH CV ECHO MEAS - RVOT DIAM: 1.98 CM
BH CV ECHO MEAS - RVSP: 26.4 MMHG
BH CV ECHO MEAS - SV(LVOT): 65.6 ML
BH CV ECHO MEAS - SV(MOD-SP2): 40 ML
BH CV ECHO MEAS - SV(MOD-SP4): 66.6 ML
BH CV ECHO MEAS - SV(RVOT): 43.6 ML
BH CV ECHO MEAS - TAPSE (>1.6): 2.22 CM
BH CV ECHO MEAS - TR MAX PG: 23.4 MMHG
BH CV ECHO MEAS - TR MAX VEL: 241.6 CM/SEC
BH CV ECHO MEASUREMENTS AVERAGE E/E' RATIO: 11.67
BH CV REST NUCLEAR ISOTOPE DOSE: 11.2 MCI
BH CV STRESS BP STAGE 1: NORMAL
BH CV STRESS BP STAGE 2: NORMAL
BH CV STRESS DURATION MIN STAGE 1: 3
BH CV STRESS DURATION MIN STAGE 2: 3
BH CV STRESS DURATION SEC STAGE 1: 0
BH CV STRESS DURATION SEC STAGE 2: 44
BH CV STRESS GRADE STAGE 1: 10
BH CV STRESS GRADE STAGE 2: 12
BH CV STRESS HR STAGE 1: 112
BH CV STRESS HR STAGE 2: 137
BH CV STRESS METS STAGE 1: 4.6
BH CV STRESS METS STAGE 2: 7.1
BH CV STRESS NUCLEAR ISOTOPE DOSE: 34.7 MCI
BH CV STRESS PROTOCOL 1: NORMAL
BH CV STRESS RECOVERY BP: NORMAL MMHG
BH CV STRESS RECOVERY HR: 78 BPM
BH CV STRESS SPEED STAGE 1: 1.7
BH CV STRESS SPEED STAGE 2: 2.5
BH CV STRESS STAGE 1: 1
BH CV STRESS STAGE 2: 2
BH CV XLRA - RV BASE: 3.4 CM
BH CV XLRA - TDI S': 12.2 CM/SEC
LEFT ATRIUM VOLUME INDEX: 27.6 ML/M2
LV EF 2D ECHO EST: 65 %
LV EF NUC BP: 77 %
MAXIMAL PREDICTED HEART RATE: 149 BPM
MAXIMAL PREDICTED HEART RATE: 149 BPM
PERCENT MAX PREDICTED HR: 89.26 %
SINUS: 3.1 CM
STRESS BASELINE BP: NORMAL MMHG
STRESS BASELINE HR: 67 BPM
STRESS O2 SAT REST: 97 %
STRESS PERCENT HR: 105 %
STRESS POST ESTIMATED WORKLOAD: 7.1 METS
STRESS POST EXERCISE DUR MIN: 6 MIN
STRESS POST EXERCISE DUR SEC: 45 SEC
STRESS POST PEAK BP: NORMAL MMHG
STRESS POST PEAK HR: 133 BPM
STRESS TARGET HR: 127 BPM
STRESS TARGET HR: 127 BPM

## 2022-04-20 PROCEDURE — 93306 TTE W/DOPPLER COMPLETE: CPT | Performed by: INTERNAL MEDICINE

## 2022-04-20 PROCEDURE — A9500 TC99M SESTAMIBI: HCPCS | Performed by: NURSE PRACTITIONER

## 2022-04-20 PROCEDURE — 78452 HT MUSCLE IMAGE SPECT MULT: CPT

## 2022-04-20 PROCEDURE — 93306 TTE W/DOPPLER COMPLETE: CPT

## 2022-04-20 PROCEDURE — 93018 CV STRESS TEST I&R ONLY: CPT | Performed by: INTERNAL MEDICINE

## 2022-04-20 PROCEDURE — 0 TECHNETIUM SESTAMIBI: Performed by: NURSE PRACTITIONER

## 2022-04-20 PROCEDURE — 93016 CV STRESS TEST SUPVJ ONLY: CPT | Performed by: INTERNAL MEDICINE

## 2022-04-20 PROCEDURE — 78452 HT MUSCLE IMAGE SPECT MULT: CPT | Performed by: INTERNAL MEDICINE

## 2022-04-20 PROCEDURE — 93017 CV STRESS TEST TRACING ONLY: CPT

## 2022-04-20 RX ADMIN — TECHNETIUM TC 99M SESTAMIBI 1 DOSE: 1 INJECTION INTRAVENOUS at 07:04

## 2022-04-20 RX ADMIN — TECHNETIUM TC 99M SESTAMIBI 1 DOSE: 1 INJECTION INTRAVENOUS at 09:06

## 2022-04-22 ENCOUNTER — TELEPHONE (OUTPATIENT)
Dept: CARDIOLOGY | Facility: CLINIC | Age: 72
End: 2022-04-22

## 2022-04-22 NOTE — TELEPHONE ENCOUNTER
Patient called this morning and stated that she would like to know the results to her stress test and echo.  She stated that she had some questions.  Please advise.    CB: [phone]    Jose

## 2022-04-26 ENCOUNTER — TELEPHONE (OUTPATIENT)
Dept: CARDIOLOGY | Facility: CLINIC | Age: 72
End: 2022-04-26

## 2022-04-26 NOTE — TELEPHONE ENCOUNTER
"I called her and we talked about her stress results.  I do not think she is a cardiac catheterization at this time and she agrees.  She will continue to monitor symptoms and keep us posted.    ----- Message from RENATE Price sent at 4/22/2022  3:47 PM EDT -----  I saw patient in the office and she was describing similar symptoms that she had prior to her open heart surgery in 2004.  Her stress test and echo appear quite normal.  However.  She got the results and was looking at the EKG portion and stated that there were \"squiggles\".  She did not like these and wanted those explained.  I attempted to try to find where she was talking and I simply could not.    The reason why she is concerned is because prior to her open heart surgery in 2004 she had a normal stress test except \"at the peak of exercise there was a glitch\".  She wound up going for cardiac cath and that is where they found the issues and then she proceeded to open heart surgery.  She just does not want to have that happen again and would like to talk to you about the results of her test.      "

## 2022-05-23 RX ORDER — RAMIPRIL 2.5 MG/1
CAPSULE ORAL
Qty: 30 CAPSULE | Refills: 9 | Status: SHIPPED | OUTPATIENT
Start: 2022-05-23 | End: 2023-01-12 | Stop reason: SDUPTHER

## 2022-05-23 NOTE — TELEPHONE ENCOUNTER
Plan:          1. TIA. The etiology for this is uncertain.  She has had no further instances since being on Plavix.   2. Hyperlipidemia, on Livalo and continue zetia. Labs as above with good control, per PCP  3. Essential hypertension, goal less than 120/80. - Good control with addition of ramipril  4. History of coronary artery disease, s/p CABG. She has angina, well treated with isordil. She is experiencing dyspnea with exertion which was her risk equivalent for her heart disease.  We will check a stress and an echo.  5. Mild biatrial enlargement on her transesophageal echocardiogram but otherwise, the DENY was normal.-Recheck an echo  6. Possible obstructive sleep apnea.-She does complain of daytime sleepiness.  Would probably benefit from a sleep study.  Will defer to PCP.     Echo stress and echo for dyspnea as this was her only complaint prior to having open heart surgery.     As always, it has been a pleasure to participate in your patient's care. Thank you.         Sincerely,         RENATE Kaur        Current Outpatient Medications:     amitriptyline (ELAVIL) 10 MG tablet, Take 1 tablet by mouth Every Night., Disp: 90 tablet, Rfl: 3    Cholecalciferol (VITAMIN D3) 5000 UNITS capsule capsule, Take 5,000 Units by mouth Daily., Disp: , Rfl:     clopidogrel (PLAVIX) 75 MG tablet, Take 1 tablet by mouth Daily., Disp: 90 tablet, Rfl: 3    ezetimibe (ZETIA) 10 MG tablet, Take 1 tablet by mouth Daily., Disp: 90 tablet, Rfl: 3    icosapent ethyl (Vascepa) 1 g capsule capsule, Take 2 g by mouth 2 (Two) Times a Day With Meals., Disp: 360 capsule, Rfl: 3    isosorbide dinitrate (ISORDIL) 5 MG tablet, Take 1 tablet by mouth 2 (Two) Times a Day., Disp: 180 tablet, Rfl: 3    Melatonin 12 MG tablet, Take 12 mg by mouth Every Night., Disp: , Rfl:     Menaquinone-7 (VITAMIN K2 PO), Take  by mouth., Disp: , Rfl:     montelukast (Singulair) 10 MG tablet, Take 1 tablet by mouth Every Night., Disp: 90 tablet, Rfl: 3     pitavastatin calcium (Livalo) 2 MG tablet tablet, Take 1 tablet by mouth Every Night., Disp: 90 tablet, Rfl: 3    Probiotic Product (PROBIOTIC PO), Take 4 capsules by mouth Daily. gutbio-align, Disp: , Rfl:     ramipril (Altace) 2.5 MG capsule, Take 1 capsule by mouth Every Night., Disp: 30 capsule, Rfl: 0        Dictated utilizing Dragon dictation

## 2022-07-07 ENCOUNTER — OFFICE VISIT (OUTPATIENT)
Dept: FAMILY MEDICINE CLINIC | Facility: CLINIC | Age: 72
End: 2022-07-07

## 2022-07-07 VITALS
SYSTOLIC BLOOD PRESSURE: 120 MMHG | HEIGHT: 64 IN | WEIGHT: 138 LBS | DIASTOLIC BLOOD PRESSURE: 70 MMHG | BODY MASS INDEX: 23.56 KG/M2 | RESPIRATION RATE: 15 BRPM | TEMPERATURE: 98 F | HEART RATE: 89 BPM | OXYGEN SATURATION: 99 %

## 2022-07-07 DIAGNOSIS — R10.2 PELVIC PAIN: ICD-10-CM

## 2022-07-07 DIAGNOSIS — R35.0 URINARY FREQUENCY: Primary | ICD-10-CM

## 2022-07-07 DIAGNOSIS — N30.00 ACUTE CYSTITIS WITHOUT HEMATURIA: Primary | ICD-10-CM

## 2022-07-07 DIAGNOSIS — N30.00 ACUTE CYSTITIS WITHOUT HEMATURIA: ICD-10-CM

## 2022-07-07 LAB
BILIRUB BLD-MCNC: NEGATIVE MG/DL
CLARITY, POC: CLEAR
COLOR UR: ABNORMAL
GLUCOSE UR STRIP-MCNC: NEGATIVE MG/DL
KETONES UR QL: NEGATIVE
LEUKOCYTE EST, POC: ABNORMAL
NITRITE UR-MCNC: NEGATIVE MG/ML
PH UR: 6 [PH] (ref 5–8)
PROT UR STRIP-MCNC: ABNORMAL MG/DL
RBC # UR STRIP: ABNORMAL /UL
SP GR UR: 1.02 (ref 1–1.03)
UROBILINOGEN UR QL: NORMAL

## 2022-07-07 PROCEDURE — 81002 URINALYSIS NONAUTO W/O SCOPE: CPT | Performed by: PHYSICIAN ASSISTANT

## 2022-07-07 PROCEDURE — 99213 OFFICE O/P EST LOW 20 MIN: CPT | Performed by: PHYSICIAN ASSISTANT

## 2022-07-07 RX ORDER — PHENAZOPYRIDINE HYDROCHLORIDE 200 MG/1
200 TABLET, FILM COATED ORAL 3 TIMES DAILY PRN
Qty: 6 TABLET | Refills: 0 | Status: SHIPPED | OUTPATIENT
Start: 2022-07-07 | End: 2022-08-24

## 2022-07-07 RX ORDER — CEFUROXIME AXETIL 500 MG/1
500 TABLET ORAL 2 TIMES DAILY
Qty: 14 TABLET | Refills: 0 | Status: SHIPPED | OUTPATIENT
Start: 2022-07-07 | End: 2022-08-24

## 2022-07-07 NOTE — PROGRESS NOTES
"Chief Complaint  Pelvic Pain (pressure) and Urinary Frequency    Subjective          Maddison Turcios presents to [facility]  History of Present Illness  Maddison is 72-year-old female who presents with urinary frequency with urinary pressure for the past day.  Maddison states she has been having urinary pressure with urinary frequency and urgency.  Has had some loose stools but denied any fevers, chills, low back pain, nausea or vomiting.  She has used over-the-counter Tylenol and drinking more water.  This has helped slightly.  No change in detergents, lotions, or soap.  She takes showers.  Maddison has lost 9 pounds since February 2022 by making healthier choices in her diet.    Review of Systems   Gastrointestinal: Positive for diarrhea. Negative for abdominal pain, blood in stool, constipation, nausea, rectal pain and vomiting.   Genitourinary: Positive for frequency and urgency.   All other systems reviewed and are negative.    Objective   Vital Signs:   /70   Pulse 89   Temp 98 °F (36.7 °C)   Resp 15   Ht 162.6 cm (64\")   Wt 62.6 kg (138 lb)   SpO2 99%   BMI 23.69 kg/m²     Physical Exam  Vitals and nursing note reviewed.   Constitutional:       Appearance: Normal appearance. She is well-developed, well-groomed and normal weight.      Interventions: Face mask in place.   HENT:      Head: Normocephalic and atraumatic.   Neck:      Trachea: Trachea and phonation normal. No tracheal tenderness.   Cardiovascular:      Rate and Rhythm: Normal rate and regular rhythm.      Pulses: Normal pulses.      Heart sounds: Normal heart sounds, S1 normal and S2 normal. No murmur heard.  Pulmonary:      Effort: Pulmonary effort is normal.      Breath sounds: Normal breath sounds and air entry.   Abdominal:      General: Bowel sounds are normal.      Palpations: Abdomen is soft. There is no hepatomegaly.      Tenderness: There is no abdominal tenderness. There is no right CVA tenderness, left CVA " tenderness, guarding or rebound. Negative signs include Person's sign, Rovsing's sign, McBurney's sign, psoas sign and obturator sign.   Musculoskeletal:      Cervical back: Neck supple.      Right lower leg: No edema.      Left lower leg: No edema.   Skin:     General: Skin is warm and dry.      Capillary Refill: Capillary refill takes less than 2 seconds.   Neurological:      Mental Status: She is alert and oriented to person, place, and time.   Psychiatric:         Attention and Perception: Attention and perception normal.         Mood and Affect: Mood and affect normal.         Speech: Speech normal.         Behavior: Behavior normal. Behavior is cooperative.         Thought Content: Thought content normal.         Cognition and Memory: Cognition and memory normal.         Judgment: Judgment normal.     I wore a facial mask, face shield, and gloves during this patient encounter.  Patient also wearing a surgical mask.  Hand hygiene performed before and after seeing the patient.     Result Review :        Results for orders placed or performed in visit on 07/07/22   POCT urinalysis dipstick, manual    Specimen: Urine   Result Value Ref Range    Color Dark Yellow Yellow, Straw, Dark Yellow, Matilde    Clarity, UA Clear Clear    Glucose, UA Negative Negative mg/dL    Bilirubin Negative Negative    Ketones, UA Negative Negative    Specific Gravity  1.020 1.005 - 1.030    Blood, UA Trace (A) Negative    pH, Urine 6.0 5.0 - 8.0    Protein, POC Trace (A) Negative mg/dL    Urobilinogen, UA Normal Normal    Leukocytes Trace (A) Negative    Nitrite, UA Negative Negative                   Assessment and Plan    Diagnoses and all orders for this visit:    1. Urinary frequency (Primary)  -     POCT urinalysis dipstick, manual    2. Pelvic pain  -     POCT urinalysis dipstick, manual    3. Acute cystitis without hematuria  -     Urine Culture - Urine, Urine, Clean Catch  -     cefuroxime (CEFTIN) 500 MG tablet; Take 1 tablet by  mouth 2 (Two) Times a Day.  Dispense: 14 tablet; Refill: 0  -     phenazopyridine (Pyridium) 200 MG tablet; Take 1 tablet by mouth 3 (Three) Times a Day As Needed for Bladder Spasms.  Dispense: 6 tablet; Refill: 0     Maddison was seen in office today with new urine frequency with pelvic pressure.  In office urinalysis was positive for leukocytes.  She was diagnosed with a UTI and placed on Pyridium and Ceftin antibiotic.  Urine culture was sent to the laboratory for further evaluation.  Maddison will be notified of test results and any medication changes.  We will recheck urine culture in approximately 10 days to verify resolution of UTI.      Follow Up   Return in about 2 weeks (around 7/21/2022), or Urine C&S only.  Patient was given instructions and counseling regarding her condition or for health maintenance advice. Please see specific information pulled into the AVS if appropriate.     CELI Corado [facility]  [address] [address]  Dept: [phone]  Dept Fax: [phone]  Loc: [phone]  Loc Fax: [phone]

## 2022-07-13 LAB
BACTERIA UR CULT: ABNORMAL
BACTERIA UR CULT: ABNORMAL
OTHER ANTIBIOTIC SUSC ISLT: ABNORMAL

## 2022-07-17 DIAGNOSIS — N30.00 ACUTE CYSTITIS WITHOUT HEMATURIA: Primary | ICD-10-CM

## 2022-07-17 RX ORDER — CIPROFLOXACIN 500 MG/1
500 TABLET, FILM COATED ORAL 2 TIMES DAILY
Qty: 14 TABLET | Refills: 0 | Status: SHIPPED | OUTPATIENT
Start: 2022-07-17 | End: 2022-08-24

## 2022-07-29 DIAGNOSIS — E78.2 MIXED HYPERLIPIDEMIA: Primary | ICD-10-CM

## 2022-07-31 DIAGNOSIS — E04.1 THYROID NODULE: Primary | ICD-10-CM

## 2022-08-01 DIAGNOSIS — E04.1 THYROID NODULE: Primary | ICD-10-CM

## 2022-08-17 ENCOUNTER — HOSPITAL ENCOUNTER (OUTPATIENT)
Dept: ULTRASOUND IMAGING | Facility: HOSPITAL | Age: 72
Discharge: HOME | End: 2022-08-17
Admitting: PHYSICIAN ASSISTANT

## 2022-08-17 DIAGNOSIS — E04.1 THYROID NODULE: ICD-10-CM

## 2022-08-17 PROCEDURE — 76536 US EXAM OF HEAD AND NECK: CPT

## 2022-08-24 ENCOUNTER — OFFICE VISIT (OUTPATIENT)
Dept: FAMILY MEDICINE CLINIC | Facility: CLINIC | Age: 72
End: 2022-08-24

## 2022-08-24 VITALS
HEART RATE: 75 BPM | DIASTOLIC BLOOD PRESSURE: 78 MMHG | HEIGHT: 64 IN | WEIGHT: 138.5 LBS | TEMPERATURE: 98.7 F | RESPIRATION RATE: 15 BRPM | OXYGEN SATURATION: 97 % | BODY MASS INDEX: 23.64 KG/M2 | SYSTOLIC BLOOD PRESSURE: 122 MMHG

## 2022-08-24 DIAGNOSIS — E78.2 MIXED HYPERLIPIDEMIA: ICD-10-CM

## 2022-08-24 DIAGNOSIS — E04.1 THYROID NODULE: ICD-10-CM

## 2022-08-24 DIAGNOSIS — Z00.00 MEDICARE ANNUAL WELLNESS VISIT, SUBSEQUENT: Primary | ICD-10-CM

## 2022-08-24 DIAGNOSIS — I10 ESSENTIAL HYPERTENSION: Primary | ICD-10-CM

## 2022-08-24 DIAGNOSIS — I10 ESSENTIAL HYPERTENSION: ICD-10-CM

## 2022-08-24 PROCEDURE — 99213 OFFICE O/P EST LOW 20 MIN: CPT | Performed by: PHYSICIAN ASSISTANT

## 2022-08-24 NOTE — PROGRESS NOTES
"Chief Complaint  Hyperlipidemia (management) and Hypertension (management)    Subjective     {Problem List  Visit Diagnosis   Encounters  Notes  Medications  Labs  Result Review Imaging  Media :23}     Maddison Turcios presents to [facility]  History of Present Illness  Maddison is a 72-year-old female who presents for hypertension and hyperlipidemia management.  She has lost 6 pounds since April 6, 2022.  Overall she is doing well.  Diet has been fairly healthy except for eating more pineapple.  States she read the pineapple helps with bruising while taking Plavix.  Sleep has been normal.  Exercise by mowing the grass.  Denied any chest pain, shortness of air, dizziness, vision changes, headache, abdominal pain or swelling of ankles.  Bowel movements have been regular without dark black tarry stools.  Has no complaints today.  Review of Systems   All other systems reviewed and are negative.    Objective   Vital Signs:   /78 (BP Location: Left arm, Patient Position: Sitting, Cuff Size: Adult)   Pulse 75   Temp 98.7 °F (37.1 °C) (Infrared)   Resp 15   Ht 162.6 cm (64\")   Wt 62.8 kg (138 lb 8 oz)   SpO2 97%   BMI 23.77 kg/m²     Physical Exam  Vitals and nursing note reviewed.   Constitutional:       Appearance: Normal appearance. She is well-developed, well-groomed and normal weight.      Interventions: Face mask in place.   HENT:      Head: Normocephalic and atraumatic.   Neck:      Thyroid: No thyroid mass, thyromegaly or thyroid tenderness.      Vascular: No carotid bruit.      Trachea: Trachea and phonation normal. No tracheal tenderness.   Cardiovascular:      Rate and Rhythm: Normal rate and regular rhythm.      Pulses: Normal pulses.      Heart sounds: Normal heart sounds, S1 normal and S2 normal. No murmur heard.  Pulmonary:      Effort: Pulmonary effort is normal.      Breath sounds: Normal breath sounds and air entry.   Abdominal:      General: Bowel sounds are " normal.      Palpations: Abdomen is soft. There is no hepatomegaly.      Tenderness: There is no abdominal tenderness. There is no right CVA tenderness, left CVA tenderness, guarding or rebound. Negative signs include Person's sign, Rovsing's sign, McBurney's sign, psoas sign and obturator sign.   Musculoskeletal:      Cervical back: Neck supple.      Right lower leg: No edema.      Left lower leg: No edema.   Skin:     General: Skin is warm and dry.      Capillary Refill: Capillary refill takes less than 2 seconds.   Neurological:      Mental Status: She is alert and oriented to person, place, and time.   Psychiatric:         Attention and Perception: Attention and perception normal.         Mood and Affect: Mood and affect normal.         Speech: Speech normal.         Behavior: Behavior normal. Behavior is cooperative.         Thought Content: Thought content normal.         Cognition and Memory: Cognition and memory normal.         Judgment: Judgment normal.     I wore a facial mask during this patient encounter.  Patient also wearing a surgical mask.  Hand hygiene performed before and after seeing the patient.     Result Review :     CMP    CMP 1/17/22 8/17/22   Glucose 95 90   BUN 14 11   Creatinine 0.68 0.67   eGFR Non  Am 89    eGFR  Am 102    Sodium 140 137   Potassium 4.9 4.1   Chloride 104 100   Calcium 9.7 9.4   Total Protein 7.1 6.7   Albumin 4.6 4.4   Globulin 2.5 2.3   Total Bilirubin 0.9 1.1   Alkaline Phosphatase 61 67   AST (SGOT) 21 25   ALT (SGPT) 16 17      Comments are available for some flowsheets but are not being displayed.           CBC w/diff    CBC w/Diff 1/17/22 8/17/22   WBC 6.2 5.7   RBC 4.07 4.18   Hemoglobin 12.9 13.0   Hematocrit 36.4 38.0   MCV 89 91   MCH 31.7 31.1   MCHC 35.4 34.2   RDW 12.4 12.6   Platelets 335 318   Neutrophil Rel % 50 36   Lymphocyte Rel % 38 49   Monocyte Rel % 9 11   Eosinophil Rel % 2 2   Basophil Rel % 1 1           Lipid Panel    Lipid Panel  1/17/22 8/17/22   Total Cholesterol 166 153   Triglycerides 76 126   HDL Cholesterol 78 59   VLDL Cholesterol 14 22   LDL Cholesterol  74 72   LDL/HDL Ratio 0.9 1.2      Comments are available for some flowsheets but are not being displayed.                         Assessment and Plan    Diagnoses and all orders for this visit:    1. Essential hypertension (Primary)    2. Mixed hyperlipidemia    1.  Chronic and stable hypertension: Blood pressure was in therapeutic range.  No refills are needed at this time.  We will keep follow-up appointments with cardiology.  Follow-up in 6 months with wellness exam.  2.  Chronic and stable hyperlipidemia: Have reviewed above blood work with her today.  We will continue her current medications at home as directed.  Will reevaluate with fasting labs with Medicare wellness in January 2023.    Follow Up   Return in about 6 months (around 2/24/2023), or labs prior, for Medicare Wellness.  Patient was given instructions and counseling regarding her condition or for health maintenance advice. Please see specific information pulled into the AVS if appropriate.     CELI Corado [facility]  [address] [place] [id number]  Dept: [phone]  Dept Fax: [phone]  Loc: [phone]  Loc Fax: [phone]        Answers for HPI/ROS submitted by the patient on 8/18/2022  What is the primary reason for your visit?: Physical

## 2022-09-27 ENCOUNTER — OFFICE VISIT (OUTPATIENT)
Dept: FAMILY MEDICINE CLINIC | Facility: CLINIC | Age: 72
End: 2022-09-27

## 2022-09-27 VITALS
TEMPERATURE: 98.6 F | OXYGEN SATURATION: 99 % | RESPIRATION RATE: 16 BRPM | HEART RATE: 72 BPM | WEIGHT: 141 LBS | BODY MASS INDEX: 24.07 KG/M2 | DIASTOLIC BLOOD PRESSURE: 82 MMHG | HEIGHT: 64 IN | SYSTOLIC BLOOD PRESSURE: 140 MMHG

## 2022-09-27 DIAGNOSIS — R30.0 DYSURIA: Primary | ICD-10-CM

## 2022-09-27 DIAGNOSIS — N30.01 ACUTE CYSTITIS WITH HEMATURIA: ICD-10-CM

## 2022-09-27 LAB
BILIRUB BLD-MCNC: NEGATIVE MG/DL
CLARITY, POC: CLEAR
COLOR UR: YELLOW
GLUCOSE UR STRIP-MCNC: NEGATIVE MG/DL
KETONES UR QL: NEGATIVE
LEUKOCYTE EST, POC: ABNORMAL
NITRITE UR-MCNC: NEGATIVE MG/ML
PH UR: 6.5 [PH] (ref 5–8)
PROT UR STRIP-MCNC: NEGATIVE MG/DL
RBC # UR STRIP: ABNORMAL /UL
SP GR UR: 1.01 (ref 1–1.03)
UROBILINOGEN UR QL: NORMAL

## 2022-09-27 PROCEDURE — 81002 URINALYSIS NONAUTO W/O SCOPE: CPT | Performed by: NURSE PRACTITIONER

## 2022-09-27 PROCEDURE — 99213 OFFICE O/P EST LOW 20 MIN: CPT | Performed by: NURSE PRACTITIONER

## 2022-09-27 RX ORDER — CIPROFLOXACIN 500 MG/1
500 TABLET, FILM COATED ORAL 2 TIMES DAILY
Qty: 20 TABLET | Refills: 0 | Status: SHIPPED | OUTPATIENT
Start: 2022-09-27 | End: 2022-10-07

## 2022-09-27 NOTE — PROGRESS NOTES
"Chief Complaint   Patient presents with   • Urinary Frequency       Urinary Tract Infection: Patient complains of frequency, nocturia and suprapubic pressure She has had symptoms for 3 days. Patient also complains of none. Patient denies fever. Patient does not have a history of recurrent UTI.  Patient does not have a history of pyelonephritis.   Last UTI in July.      The following portions of the patient's history were reviewed and updated as appropriate: allergies, current medications, past family history, past medical history, past social history, past surgical history and problem list.      Vitals:    09/27/22 1630   BP: 140/82   BP Location: Left arm   Patient Position: Sitting   Cuff Size: Adult   Pulse: 72   Resp: 16   Temp: 98.6 °F (37 °C)   SpO2: 99%   Weight: 64 kg (141 lb)   Height: 162.6 cm (64\")     Gen: Well appearing, alert  HEENT: WNL  Lung: Regular RR, no audible wheeze  Heart: RR without murmur  Skin: No rash, W/D  Abd: Suprapubic tenderness, non distended, positive bowel sounds  Flank: No CVA, no rash    In office urine dipstick results:  Brief Urine Lab Results  (Last result in the past 365 days)      Color   Clarity   Blood   Leuk Est   Nitrite   Protein   CREAT   Urine HCG        09/27/22 1636 Yellow   Clear   Trace   Trace   Negative   Negative               Urine Culture - , (07/08/2022 07:56)         Assessment & Plan   Diagnoses and all orders for this visit:    1. Dysuria (Primary)  -     POCT urinalysis dipstick, manual    2. Acute cystitis with hematuria  -     ciprofloxacin (Cipro) 500 MG tablet; Take 1 tablet by mouth 2 (Two) Times a Day for 10 days.  Dispense: 20 tablet; Refill: 0  -     Urine Culture - Urine, Urine, Clean Catch             Tylenol or Advil as needed for pain, fever  Plenty of fluids  OTC Azo ok  Off work or school note given if needed.  Pros and cons of antibiotic use discussed      Patient was wearing face mask when I entered the room and throughout our encounter. " Protective equipment was worn throughout this patient encounter including a face mask.  Hand hygiene was performed before donning protective equipment and after removal when leaving the room.     RENATE Crane  Family Practice  [facility] [place]

## 2022-09-29 LAB
BACTERIA UR CULT: NO GROWTH
BACTERIA UR CULT: NORMAL
Lab: NORMAL
ONE SPECIMEN IDENTIFIER: NORMAL

## 2022-12-07 ENCOUNTER — HOSPITAL ENCOUNTER (OUTPATIENT)
Dept: GENERAL RADIOLOGY | Facility: HOSPITAL | Age: 72
Discharge: HOME | End: 2022-12-07
Admitting: PHYSICIAN ASSISTANT

## 2022-12-07 ENCOUNTER — OFFICE VISIT (OUTPATIENT)
Dept: FAMILY MEDICINE CLINIC | Facility: CLINIC | Age: 72
End: 2022-12-07

## 2022-12-07 VITALS
TEMPERATURE: 97.7 F | HEART RATE: 72 BPM | HEIGHT: 64 IN | RESPIRATION RATE: 15 BRPM | DIASTOLIC BLOOD PRESSURE: 80 MMHG | OXYGEN SATURATION: 97 % | WEIGHT: 137 LBS | SYSTOLIC BLOOD PRESSURE: 120 MMHG | BODY MASS INDEX: 23.39 KG/M2

## 2022-12-07 DIAGNOSIS — M79.645 THUMB PAIN, LEFT: Primary | ICD-10-CM

## 2022-12-07 DIAGNOSIS — M79.645 THUMB PAIN, LEFT: ICD-10-CM

## 2022-12-07 DIAGNOSIS — M79.5 FOREIGN BODY (FB) IN SOFT TISSUE: ICD-10-CM

## 2022-12-07 DIAGNOSIS — M79.89 SWELLING OF LEFT THUMB: ICD-10-CM

## 2022-12-07 PROCEDURE — 99213 OFFICE O/P EST LOW 20 MIN: CPT | Performed by: PHYSICIAN ASSISTANT

## 2022-12-07 PROCEDURE — 73140 X-RAY EXAM OF FINGER(S): CPT

## 2022-12-07 RX ORDER — DIPHENOXYLATE HYDROCHLORIDE AND ATROPINE SULFATE 2.5; .025 MG/1; MG/1
TABLET ORAL DAILY
COMMUNITY

## 2022-12-07 NOTE — PROGRESS NOTES
"Chief Complaint  Hand Pain (swelling)    Subjective          Maddison Turcios presents to [facility]  History of Present Illness  Maddison is a 72 year old female who presents new left thumb pain and swelling.  States 4 weeks ago she was taking labels off of tin cans for her daughter.  States she is unsure if she had a sliver of the metal in her left thumb.  She has been having pain and swelling along her medial aspect of her thumb area.  Her tetanus shot is up-to-date.  She has been using a \"drawing salve\" to thumb which seems to help somewhat.  Denied any redness of the area.  Review of Systems   Skin: Positive for wound.   All other systems reviewed and are negative.    Objective   Vital Signs:   /80 (BP Location: Left arm, Patient Position: Sitting, Cuff Size: Adult)   Pulse 72   Temp 97.7 °F (36.5 °C)   Resp 15   Ht 162.6 cm (64\")   Wt 62.1 kg (137 lb)   SpO2 97%   BMI 23.52 kg/m²     Physical Exam  Vitals and nursing note reviewed.   Constitutional:       Appearance: Normal appearance. She is well-developed, well-groomed and normal weight.      Interventions: Face mask in place.   Musculoskeletal:      Left hand: Swelling and tenderness present. No bony tenderness. There is no disruption of two-point discrimination. Normal capillary refill. Normal pulse.        Arms:    Skin:     General: Skin is warm.      Capillary Refill: Capillary refill takes less than 2 seconds.      Findings: No erythema.   Neurological:      Mental Status: She is alert and oriented to person, place, and time.   Psychiatric:         Attention and Perception: Attention and perception normal.         Mood and Affect: Mood and affect normal.         Speech: Speech normal.         Behavior: Behavior normal. Behavior is cooperative.         Thought Content: Thought content normal.         Cognition and Memory: Cognition and memory normal.         Judgment: Judgment normal.     I wore a facial mask during " this patient encounter.  Patient also wearing a surgical mask.  Hand hygiene performed before and after seeing the patient.     Result Review :                 Assessment and Plan    Diagnoses and all orders for this visit:    1. Thumb pain, left (Primary)  -     XR finger 2+ vw left; Future  -     Ambulatory Referral to Hand Surgery    2. Swelling of left thumb  -     Ambulatory Referral to Hand Surgery    3. Foreign body (FB) in soft tissue  -     Ambulatory Referral to Hand Surgery    Maddison was seen in office today with new left thumb pain and swelling with possible foreign body.  She will have a left thumb x-ray at [facility] today.  I will send to hand specialist for further evaluation.  She will be notified of imaging results when completed.    Follow Up {Instructions Charge Capture  Follow-up Communications :23}  Return if symptoms worsen or fail to improve.  Patient was given instructions and counseling regarding her condition or for health maintenance advice. Please see specific information pulled into the AVS if appropriate.     CELI Corado [facility]  [address] [place] [id number]  Dept: [phone]  Dept Fax: [phone]  Loc: [phone]  Loc Fax: [phone]        Answers for HPI/ROS submitted by the patient on 12/5/2022  Please describe your symptoms.: Left thumb injury  Have you had these symptoms before?: No  How long have you been having these symptoms?: Greater than 2 weeks  Please list any medications you are currently taking for this condition.: [id number]  Please describe any probable cause for these symptoms. : Cleaning metal cans  What is the primary reason for your visit?: Other

## 2022-12-08 ENCOUNTER — TELEPHONE (OUTPATIENT)
Dept: FAMILY MEDICINE CLINIC | Facility: CLINIC | Age: 72
End: 2022-12-08

## 2022-12-08 NOTE — TELEPHONE ENCOUNTER
I spoke with Maddison on December 8, 2022 at 9:39 AM.  Have discussed imaging findings.  She would like to keep her appointment with the hand specialist since she has pain and swelling.

## 2023-01-12 DIAGNOSIS — I67.1 CEREBRAL ANEURYSM: ICD-10-CM

## 2023-01-12 DIAGNOSIS — J30.89 NON-SEASONAL ALLERGIC RHINITIS, UNSPECIFIED TRIGGER: ICD-10-CM

## 2023-01-12 DIAGNOSIS — E78.2 MIXED HYPERLIPIDEMIA: ICD-10-CM

## 2023-01-12 RX ORDER — CLOPIDOGREL BISULFATE 75 MG/1
75 TABLET ORAL DAILY
Qty: 90 TABLET | Refills: 3 | Status: SHIPPED | OUTPATIENT
Start: 2023-01-12

## 2023-01-12 RX ORDER — RAMIPRIL 2.5 MG/1
2.5 CAPSULE ORAL NIGHTLY
Qty: 90 CAPSULE | Refills: 2 | Status: SHIPPED | OUTPATIENT
Start: 2023-01-12

## 2023-01-12 RX ORDER — AMITRIPTYLINE HYDROCHLORIDE 10 MG/1
10 TABLET, FILM COATED ORAL NIGHTLY
Qty: 90 TABLET | Refills: 3 | Status: SHIPPED | OUTPATIENT
Start: 2023-01-12

## 2023-01-12 RX ORDER — EZETIMIBE 10 MG/1
10 TABLET ORAL DAILY
Qty: 90 TABLET | Refills: 3 | Status: SHIPPED | OUTPATIENT
Start: 2023-01-12

## 2023-01-12 RX ORDER — ICOSAPENT ETHYL 1000 MG/1
2 CAPSULE ORAL 2 TIMES DAILY WITH MEALS
Qty: 360 CAPSULE | Refills: 3 | Status: SHIPPED | OUTPATIENT
Start: 2023-01-12

## 2023-01-12 RX ORDER — MONTELUKAST SODIUM 10 MG/1
10 TABLET ORAL NIGHTLY
Qty: 90 TABLET | Refills: 3 | Status: SHIPPED | OUTPATIENT
Start: 2023-01-12

## 2023-01-24 DIAGNOSIS — E04.1 THYROID NODULE: ICD-10-CM

## 2023-01-24 DIAGNOSIS — I10 ESSENTIAL HYPERTENSION: ICD-10-CM

## 2023-01-24 DIAGNOSIS — E78.2 MIXED HYPERLIPIDEMIA: ICD-10-CM

## 2023-01-24 DIAGNOSIS — Z00.00 MEDICARE ANNUAL WELLNESS VISIT, SUBSEQUENT: ICD-10-CM

## 2023-01-26 LAB
ALBUMIN SERPL-MCNC: 4.7 G/DL (ref 3.5–5.2)
ALBUMIN/GLOB SERPL: 2.4 G/DL
ALP SERPL-CCNC: 66 U/L (ref 39–117)
ALT SERPL-CCNC: 13 U/L (ref 1–33)
AST SERPL-CCNC: 18 U/L (ref 1–32)
BASOPHILS # BLD AUTO: 0.05 10*3/MM3 (ref 0–0.2)
BASOPHILS NFR BLD AUTO: 0.8 % (ref 0–1.5)
BILIRUB SERPL-MCNC: 1.1 MG/DL (ref 0–1.2)
BUN SERPL-MCNC: 13 MG/DL (ref 8–23)
BUN/CREAT SERPL: 18.3 (ref 7–25)
CALCIUM SERPL-MCNC: 9.5 MG/DL (ref 8.6–10.5)
CHLORIDE SERPL-SCNC: 102 MMOL/L (ref 98–107)
CHOLEST SERPL-MCNC: 178 MG/DL (ref 0–200)
CO2 SERPL-SCNC: 26.3 MMOL/L (ref 22–29)
CREAT SERPL-MCNC: 0.71 MG/DL (ref 0.57–1)
CRP SERPL-MCNC: 0.77 MG/DL (ref 0–0.5)
EGFRCR SERPLBLD CKD-EPI 2021: 91 ML/MIN/1.73
EOSINOPHIL # BLD AUTO: 0.15 10*3/MM3 (ref 0–0.4)
EOSINOPHIL NFR BLD AUTO: 2.4 % (ref 0.3–6.2)
ERYTHROCYTE [DISTWIDTH] IN BLOOD BY AUTOMATED COUNT: 12.7 % (ref 12.3–15.4)
FT4I SERPL CALC-MCNC: 2 (ref 1.2–4.9)
GLOBULIN SER CALC-MCNC: 2 GM/DL
GLUCOSE SERPL-MCNC: 87 MG/DL (ref 65–99)
HCT VFR BLD AUTO: 36.9 % (ref 34–46.6)
HDLC SERPL-MCNC: 90 MG/DL (ref 40–60)
HGB BLD-MCNC: 12.7 G/DL (ref 12–15.9)
IMM GRANULOCYTES # BLD AUTO: 0.03 10*3/MM3 (ref 0–0.05)
IMM GRANULOCYTES NFR BLD AUTO: 0.5 % (ref 0–0.5)
LDLC SERPL CALC-MCNC: 76 MG/DL (ref 0–100)
LDLC/HDLC SERPL: 0.84 {RATIO}
LYMPHOCYTES # BLD AUTO: 2.7 10*3/MM3 (ref 0.7–3.1)
LYMPHOCYTES NFR BLD AUTO: 43.1 % (ref 19.6–45.3)
MCH RBC QN AUTO: 31.8 PG (ref 26.6–33)
MCHC RBC AUTO-ENTMCNC: 34.4 G/DL (ref 31.5–35.7)
MCV RBC AUTO: 92.3 FL (ref 79–97)
MONOCYTES # BLD AUTO: 0.63 10*3/MM3 (ref 0.1–0.9)
MONOCYTES NFR BLD AUTO: 10 % (ref 5–12)
NEUTROPHILS # BLD AUTO: 2.71 10*3/MM3 (ref 1.7–7)
NEUTROPHILS NFR BLD AUTO: 43.2 % (ref 42.7–76)
NRBC BLD AUTO-RTO: 0 /100 WBC (ref 0–0.2)
PLATELET # BLD AUTO: 333 10*3/MM3 (ref 140–450)
POTASSIUM SERPL-SCNC: 4.4 MMOL/L (ref 3.5–5.2)
PROT SERPL-MCNC: 6.7 G/DL (ref 6–8.5)
RBC # BLD AUTO: 4 10*6/MM3 (ref 3.77–5.28)
SODIUM SERPL-SCNC: 139 MMOL/L (ref 136–145)
T3RU NFR SERPL: 26 % (ref 24–39)
T4 SERPL-MCNC: 7.5 UG/DL (ref 4.5–12)
TRIGL SERPL-MCNC: 63 MG/DL (ref 0–150)
TSH SERPL DL<=0.005 MIU/L-ACNC: 2.64 UIU/ML (ref 0.45–4.5)
VLDLC SERPL CALC-MCNC: 12 MG/DL (ref 5–40)
WBC # BLD AUTO: 6.27 10*3/MM3 (ref 3.4–10.8)

## 2023-02-01 ENCOUNTER — OFFICE VISIT (OUTPATIENT)
Dept: FAMILY MEDICINE CLINIC | Facility: CLINIC | Age: 73
End: 2023-02-01
Payer: MEDICARE

## 2023-02-01 ENCOUNTER — HOSPITAL ENCOUNTER (OUTPATIENT)
Dept: GENERAL RADIOLOGY | Facility: HOSPITAL | Age: 73
Discharge: HOME | End: 2023-02-01
Admitting: PHYSICIAN ASSISTANT
Payer: MEDICARE

## 2023-02-01 VITALS
RESPIRATION RATE: 15 BRPM | DIASTOLIC BLOOD PRESSURE: 84 MMHG | BODY MASS INDEX: 23.56 KG/M2 | OXYGEN SATURATION: 98 % | TEMPERATURE: 97.1 F | WEIGHT: 138 LBS | SYSTOLIC BLOOD PRESSURE: 130 MMHG | HEART RATE: 69 BPM | HEIGHT: 64 IN

## 2023-02-01 DIAGNOSIS — I10 ESSENTIAL HYPERTENSION: ICD-10-CM

## 2023-02-01 DIAGNOSIS — R07.89 CHEST PRESSURE: ICD-10-CM

## 2023-02-01 DIAGNOSIS — E78.2 MIXED HYPERLIPIDEMIA: ICD-10-CM

## 2023-02-01 DIAGNOSIS — Z00.00 MEDICARE ANNUAL WELLNESS VISIT, SUBSEQUENT: Primary | ICD-10-CM

## 2023-02-01 PROCEDURE — 1170F FXNL STATUS ASSESSED: CPT | Performed by: PHYSICIAN ASSISTANT

## 2023-02-01 PROCEDURE — 1126F AMNT PAIN NOTED NONE PRSNT: CPT | Performed by: PHYSICIAN ASSISTANT

## 2023-02-01 PROCEDURE — 71046 X-RAY EXAM CHEST 2 VIEWS: CPT

## 2023-02-01 PROCEDURE — 1160F RVW MEDS BY RX/DR IN RCRD: CPT | Performed by: PHYSICIAN ASSISTANT

## 2023-02-01 PROCEDURE — 99213 OFFICE O/P EST LOW 20 MIN: CPT | Performed by: PHYSICIAN ASSISTANT

## 2023-02-01 PROCEDURE — G0439 PPPS, SUBSEQ VISIT: HCPCS | Performed by: PHYSICIAN ASSISTANT

## 2023-02-01 PROCEDURE — 93000 ELECTROCARDIOGRAM COMPLETE: CPT | Performed by: PHYSICIAN ASSISTANT

## 2023-02-01 NOTE — PROGRESS NOTES
The ABCs of the Annual Wellness Visit  Subsequent Medicare Wellness Visit    Chief Complaint   Patient presents with   • Medicare Wellness-subsequent   • Hypertension     management   • Hyperlipidemia     management       Subjective    {Wrapup  Review (Popup)  Advance Care Planning  Labs  CC  Problem List  Visit Diagnosis  Medications  Result Review  Imaging  [facility]  [facility]  SnapShot  Encounters  Notes  Media  Procedures :23}  Maddison Turcios is a 72 y.o. female who presents for a Subsequent Medicare Wellness Visit, hypertension and hyperlipidemia management.  She has lost 3 pounds since September 27, 2022 office visit.  Maddison states she has been having chest pressure across her chest off and on for the past 6 months.  States that waxes and wanes.  She notices it more when she is laying down at night.  Denied any heartburn, shortness of breath, wheezing, dizziness, lightheadedness, nausea or vomiting with the episodes.  Had extensive cardiac work-up in April 2022 with Dr. Urbina.  States she has upcoming appointment with them in April 2023.  Had three-vessel CABG performed in 2004.  Denied any radiation of pressure to neck or down arms.  Denied any active chest pain today.  Her sleep had been normal until she read a book that her sister recommended.  States the block caused nightmares.  For the past week her sleep has improved.  States she has been putting everything in God's hands.  States she has been a little anxious about things going on at [employer] and in the world.  She realizes that she cannot control other people.  Denied any suicidal or homicidal ideation.  Her diet has been slightly healthy.  Bowel movements have been daily without dark black tarry stools.  Denied any current fevers, chills, upper respiratory symptoms, GI upset, or swelling of ankles.    The following portions of the patient's history were reviewed and   updated as appropriate:   She   has a past medical history of Abnormal blood chemistry, Acute UTI (urinary tract infection), Allergic, Anemia, Aneurysm, cerebral, Bloating, Brain aneurysm, CAD (coronary artery disease), Chronic headaches, Coronary artery disease, Coronary artery stenosis, Dysuria, Encounter for screening colonoscopy, Environmental allergies, Fall from slip, trip, or stumble, Gait disturbance, H/O cardiovascular stress test (09/17/2013), H/O colonoscopy, H/O echocardiogram (09/25/2013), H/O fall, Heart murmur, History of EKG (07/31/2015), Hyperlipemia, Hyperlipidemia, Hypertension, Hyperthyroidism, Injury of back, Insomnia, Left forearm pain, Left leg pain, Left wrist pain, Lower abdominal pain, Need for pneumococcal vaccination, Onychomycosis of toenail, AKIRA (obstructive sleep apnea), Pelvic pressure in female, Right foot pain, Stroke (HCC), TIA (transient ischemic attack) (11/30/2016), URI (upper respiratory infection), and Urine frequency.  She does not have any pertinent problems on file.  She  has a past surgical history that includes Coronary artery bypass graft; Rotator cuff repair (Left); Tonsillectomy; Splenectomy, total; Tubal ligation; Cholecystectomy; Bunionectomy; Cerebral aneurysm repair; Cardiac catheterization (N/A, 4/8/2019); Cardiac catheterization (N/A, 4/8/2019); Cardiac catheterization (N/A, 4/8/2019); Colonoscopy (N/A, 1/29/2021); Breast excisional biopsy (Right, 1977); and Breast excisional biopsy (Right, 1979).  Her family history includes Aneurysm in her sister; Anxiety disorder in her sister; Breast cancer in her sister; Cancer in her brother; Cervical cancer in her mother; Heart attack in her brother; Heart disease in her father; Heart failure in her father and mother; Hypertension in her mother; Lung cancer in her brother; No Known Problems in her daughter, sister, and son; Stroke in her mother.  She  reports that she has never smoked. She has never used smokeless tobacco. She reports current alcohol  use. She reports that she does not use drugs.  Current Outpatient Medications   Medication Sig Dispense Refill   • amitriptyline (ELAVIL) 10 MG tablet Take 1 tablet by mouth Every Night. 90 tablet 3   • Cholecalciferol (VITAMIN D3) 5000 UNITS capsule capsule Take 5,000 Units by mouth 1 (One) Time Per Week.     • clopidogrel (PLAVIX) 75 MG tablet Take 1 tablet by mouth Daily. 90 tablet 3   • ezetimibe (ZETIA) 10 MG tablet Take 1 tablet by mouth Daily. 90 tablet 3   • icosapent ethyl (Vascepa) 1 g capsule capsule Take 2 g by mouth 2 (Two) Times a Day With Meals. 360 capsule 3   • isosorbide dinitrate (ISORDIL) 5 MG tablet Take 1 tablet by mouth 2 (Two) Times a Day. 180 tablet 3   • Melatonin 12 MG tablet Take 12 mg by mouth Every Night.     • Menaquinone-7 (VITAMIN K2 PO) Take  by mouth.     • montelukast (Singulair) 10 MG tablet Take 1 tablet by mouth Every Night. 90 tablet 3   • multivitamin (THERAGRAN) tablet tablet Take  by mouth Daily.     • pitavastatin calcium (Livalo) 2 MG tablet tablet Take 1 tablet by mouth Every Night. 90 tablet 2   • Probiotic Product (PROBIOTIC PO) Take 4 capsules by mouth Daily. gutbio-align     • ramipril (ALTACE) 2.5 MG capsule Take 1 capsule by mouth Every Night. 90 capsule 2     No current facility-administered medications for this visit.     Current Outpatient Medications on File Prior to Visit   Medication Sig   • amitriptyline (ELAVIL) 10 MG tablet Take 1 tablet by mouth Every Night.   • Cholecalciferol (VITAMIN D3) 5000 UNITS capsule capsule Take 5,000 Units by mouth 1 (One) Time Per Week.   • clopidogrel (PLAVIX) 75 MG tablet Take 1 tablet by mouth Daily.   • ezetimibe (ZETIA) 10 MG tablet Take 1 tablet by mouth Daily.   • icosapent ethyl (Vascepa) 1 g capsule capsule Take 2 g by mouth 2 (Two) Times a Day With Meals.   • isosorbide dinitrate (ISORDIL) 5 MG tablet Take 1 tablet by mouth 2 (Two) Times a Day.   • Melatonin 12 MG tablet Take 12 mg by mouth Every Night.   •  Menaquinone-7 (VITAMIN K2 PO) Take  by mouth.   • montelukast (Singulair) 10 MG tablet Take 1 tablet by mouth Every Night.   • multivitamin (THERAGRAN) tablet tablet Take  by mouth Daily.   • pitavastatin calcium (Livalo) 2 MG tablet tablet Take 1 tablet by mouth Every Night.   • Probiotic Product (PROBIOTIC PO) Take 4 capsules by mouth Daily. gutbio-align   • ramipril (ALTACE) 2.5 MG capsule Take 1 capsule by mouth Every Night.     No current facility-administered medications on file prior to visit.     She is allergic to adhesive tape, beta adrenergic blockers, statins, elemental sulfur, and sulfa antibiotics..    Compared to one year ago, the patient feels her physical   health is worse.    Compared to one year ago, the patient feels her mental   health is worse.    Recent Hospitalizations:  She was not admitted to the hospital during the last year.     Adult Transthoracic Echo Complete W/ Cont if Necessary Per Protocol (04/20/2022 09:57)   Stress Test With Myocardial Perfusion One Day (04/20/2022 09:06)   ECG 12 Lead (04/06/2022 09:16)     Current Medical Providers:  Patient Care Team:  Kasandra Mcdowell PA-C as PCP - General (Family Medicine)  Helen Urbina MD as Consulting Physician (Cardiology)  Ruben Valderrama MD (Dermatology)  Bennett Whelan MD as MDS Coordinator (Allergy)  Sara Araiza MD as Consulting Physician (Obstetrics and Gynecology)    Outpatient Medications Prior to Visit   Medication Sig Dispense Refill   • amitriptyline (ELAVIL) 10 MG tablet Take 1 tablet by mouth Every Night. 90 tablet 3   • Cholecalciferol (VITAMIN D3) 5000 UNITS capsule capsule Take 5,000 Units by mouth 1 (One) Time Per Week.     • clopidogrel (PLAVIX) 75 MG tablet Take 1 tablet by mouth Daily. 90 tablet 3   • ezetimibe (ZETIA) 10 MG tablet Take 1 tablet by mouth Daily. 90 tablet 3   • icosapent ethyl (Vascepa) 1 g capsule capsule Take 2 g by mouth 2 (Two) Times a Day With Meals. 360 capsule 3    • isosorbide dinitrate (ISORDIL) 5 MG tablet Take 1 tablet by mouth 2 (Two) Times a Day. 180 tablet 3   • Melatonin 12 MG tablet Take 12 mg by mouth Every Night.     • Menaquinone-7 (VITAMIN K2 PO) Take  by mouth.     • montelukast (Singulair) 10 MG tablet Take 1 tablet by mouth Every Night. 90 tablet 3   • multivitamin (THERAGRAN) tablet tablet Take  by mouth Daily.     • pitavastatin calcium (Livalo) 2 MG tablet tablet Take 1 tablet by mouth Every Night. 90 tablet 2   • Probiotic Product (PROBIOTIC PO) Take 4 capsules by mouth Daily. gutbio-align     • ramipril (ALTACE) 2.5 MG capsule Take 1 capsule by mouth Every Night. 90 capsule 2     No facility-administered medications prior to visit.       No opioid medication identified on active medication list. I have reviewed chart for other potential  high risk medication/s and harmful drug interactions in the elderly.          Aspirin is not on active medication list.  Aspirin use is not indicated based on review of current medical condition/s. Risk of harm outweighs potential benefits.  .    Patient Active Problem List   Diagnosis   • Cerebral aneurysm   • Coronary artery disease   • Mixed hyperlipidemia   • Essential hypertension   • Hyperthyroidism   • Insomnia   • Onychomycosis of toenail   • Bilateral enlargement of atria   • Sinus bradycardia   • Transient cerebral ischemia   • Skin lesion of right ear   • Fatigue   • Medicare annual wellness visit, subsequent   • Osteopenia   • Necrotic hand wound (HCC)   • Family history of breast cancer   • S/P CABG (coronary artery bypass graft)   • Chest pain   • Neck strain, initial encounter   • Poison ivy dermatitis   • Need for influenza vaccination   • Greater trochanteric bursitis of right hip   • Prophylactic antibiotic   • Nondisplaced fracture of fifth metatarsal bone, right foot, initial encounter for closed fracture   • Irritable bowel syndrome with both constipation and diarrhea   • Personal history of colonic  "polyps   • Encounter for screening for malignant neoplasm of colon   • Antiplatelet or antithrombotic long-term use   • S/P coil embolization of cerebral aneurysm     Advance Care Planning  Advance Directive is on file.  ACP discussion was held with the patient during this visit. Patient has an advance directive in EMR which is still valid.      Objective    Vitals:    02/01/23 0937   BP: 130/84   Pulse: 69   Resp: 15   Temp: 97.1 °F (36.2 °C)   SpO2: 98%   Weight: 62.6 kg (138 lb)   Height: 162.6 cm (64\")   PainSc: 0-No pain     Estimated body mass index is 23.69 kg/m² as calculated from the following:    Height as of this encounter: 162.6 cm (64\").    Weight as of this encounter: 62.6 kg (138 lb).    BMI is within normal parameters. No other follow-up for BMI required.      Does the patient have evidence of cognitive impairment?   No    Lab Results   Component Value Date    CHLPL 178 01/25/2023    TRIG 63 01/25/2023    HDL 90 (H) 01/25/2023    LDL 76 01/25/2023    VLDL 12 01/25/2023          ECG 12 Lead    Date/Time: 2/1/2023 10:31 AM  Performed by: Kasandra Mcdowell PA-C  Authorized by: Kasandra Mcdowell PA-C   Comparison: compared with previous ECG from 4/23/2021  Similar to previous ECG  Rhythm: sinus rhythm  Rate: normal  BPM: 63  Conduction: conduction normal  ST Segments: ST segments normal  T Waves: T waves normal  QRS axis: normal    Clinical impression: normal ECG  Comments: Indication: New chest pressure  TN int:  186 ms  QRS dur:  106 ms  QT/QTc:  409/417 ms                HEALTH RISK ASSESSMENT    Smoking Status:  Social History     Tobacco Use   Smoking Status Never   Smokeless Tobacco Never     Alcohol Consumption:  Social History     Substance and Sexual Activity   Alcohol Use Yes    Comment: Rare//Caffeine use: Decaf     Fall Risk Screen:    STEADI Fall Risk Assessment was completed, and patient is at LOW risk for falls.Assessment completed on:2/1/2023    Depression Screening:  PHQ-2/PHQ-9 " Depression Screening 2/1/2023   Little Interest or Pleasure in Doing Things 0-->not at all   Feeling Down, Depressed or Hopeless 0-->not at all   PHQ-9: Brief Depression Severity Measure Score 0       Health Habits and Functional and Cognitive Screening:  Functional & Cognitive Status 2/1/2023   Do you have difficulty preparing food and eating? No   Do you have difficulty bathing yourself, getting dressed or grooming yourself? No   Do you have difficulty using the toilet? No   Do you have difficulty moving around from place to place? No   Do you have trouble with steps or getting out of a bed or a chair? No   Current Diet Unhealthy Diet   Dental Exam Up to date   Eye Exam Up to date   Exercise (times per week) 7 times per week   Current Exercises Include Walking;Weightlifting   Current Exercise Activities Include -   Do you need help using the phone?  No   Are you deaf or do you have serious difficulty hearing?  No   Do you need help with transportation? No   Do you need help shopping? No   Do you need help preparing meals?  No   Do you need help with housework?  No   Do you need help with laundry? No   Do you need help taking your medications? No   Do you need help managing money? No   Do you ever drive or ride in a car without wearing a seat belt? No   Have you felt unusual stress, anger or loneliness in the last month? Yes   Who do you live with? Alone   If you need help, do you have trouble finding someone available to you? No   Have you been bothered in the last four weeks by sexual problems? No   Do you have difficulty concentrating, remembering or making decisions? No       Age-appropriate Screening Schedule:  Refer to the list below for future screening recommendations based on patient's age, sex and/or medical conditions. Orders for these recommended tests are listed in the plan section. The patient has been provided with a written plan.    Health Maintenance   Topic Date Due   • LIPID PANEL  01/25/2024   •  MAMMOGRAM  03/03/2024   • PAP SMEAR  03/22/2024   • TDAP/TD VACCINES (5 - Td or Tdap) 06/29/2027   • INFLUENZA VACCINE  Completed   • ZOSTER VACCINE  Addressed   • DXA SCAN  Discontinued              Physical Exam  Vitals and nursing note reviewed.   Constitutional:       Appearance: Normal appearance. She is well-developed, well-groomed and normal weight.      Interventions: Face mask in place.   HENT:      Head: Normocephalic and atraumatic.      Jaw: There is normal jaw occlusion.      Right Ear: Hearing, tympanic membrane, ear canal and external ear normal.      Left Ear: Hearing, tympanic membrane, ear canal and external ear normal.      Nose: Nose normal.      Right Sinus: No maxillary sinus tenderness or frontal sinus tenderness.      Left Sinus: No maxillary sinus tenderness or frontal sinus tenderness.      Mouth/Throat:      Lips: Pink.      Mouth: Mucous membranes are moist.      Dentition: Normal dentition.      Tongue: No lesions.      Pharynx: Oropharynx is clear. Uvula midline.      Tonsils: No tonsillar exudate.   Eyes:      General: Lids are normal.      Conjunctiva/sclera: Conjunctivae normal.      Pupils: Pupils are equal, round, and reactive to light.   Neck:      Thyroid: No thyroid mass, thyromegaly or thyroid tenderness.      Vascular: No carotid bruit.      Trachea: Trachea and phonation normal. No tracheal tenderness.   Cardiovascular:      Rate and Rhythm: Normal rate and regular rhythm.      Pulses: Normal pulses.      Heart sounds: Normal heart sounds, S1 normal and S2 normal. No murmur heard.  Pulmonary:      Effort: Pulmonary effort is normal.      Breath sounds: Normal breath sounds and air entry.   Abdominal:      General: Bowel sounds are normal.      Palpations: Abdomen is soft.      Tenderness: There is no abdominal tenderness. There is no right CVA tenderness, left CVA tenderness, guarding or rebound. Negative signs include Person's sign, Rovsing's sign, McBurney's sign, psoas  sign and obturator sign.   Musculoskeletal:      Cervical back: Neck supple.      Right lower leg: No edema.      Left lower leg: No edema.   Lymphadenopathy:      Cervical: No cervical adenopathy.      Right cervical: No superficial, deep or posterior cervical adenopathy.     Left cervical: No superficial, deep or posterior cervical adenopathy.   Skin:     General: Skin is warm and dry.      Capillary Refill: Capillary refill takes less than 2 seconds.   Neurological:      Mental Status: She is alert and oriented to person, place, and time.      Deep Tendon Reflexes:      Reflex Scores:       Patellar reflexes are 2+ on the right side and 2+ on the left side.  Psychiatric:         Attention and Perception: Attention and perception normal.         Mood and Affect: Mood and affect normal.         Speech: Speech normal.         Behavior: Behavior is cooperative.         Thought Content: Thought content normal.         Cognition and Memory: Cognition and memory normal.         Judgment: Judgment normal.     I wore a facial mask during this patient encounter.  Patient also wearing a surgical mask.  Hand hygeine performed before and after seeing the patient.      CMS Preventative Services Quick Reference  Risk Factors Identified During Encounter:    None Identified    The above risks/problems have been discussed with the patient.  Pertinent information has been shared with the patient in the After Visit Summary.    Diagnoses and all orders for this visit:    1. Medicare annual wellness visit, subsequent (Primary)    2. Mixed hyperlipidemia    3. Essential hypertension  -     ECG 12 Lead    4. Chest pressure  -     XR Chest PA & Lateral; Future  -     ECG 12 Lead    1.  Annual sequential Medicare wellness exam with hyperlipidemia: I have reviewed above blood work with her.  Cholesterol readings have improved.  Continue current medication at home as directed.  Follow-up in 6 months for repeat lab work.  2.  New chest  pressure with hypertension: I have rechecked blood pressure and got 130/80 in left arm.  In office EKG today showed normal sinus rhythm.  I have reviewed above EKG, echocardiogram and stress test from April 20, 2022 with her.  We will proceed with chest x-[facility] at [facility] today for further evaluation of chest pressure.  She has upcoming appointment with cardiologist in April 2023.  She will discuss at that time as well.  If symptoms worsen, she will go through the nearest emergency room for further evaluation and treatment.  Continue current medication at home as directed.  I suspect that this may be anxiety in nature.  She does not want a medication at this time.    Follow Up:   Next Medicare Wellness visit to be scheduled in 1 year.      An After Visit Summary and PPPS were made available to the patient.    Patient Counseling:  --Nutrition: Stressed importance of moderation in sodium/caffeine intake, saturated fat and cholesterol.  Discussed caloric balance, sufficient intake of fresh fruits, vegetables, fiber,   calcium, iron.  --Discussed the new recommendation against daily use of baby aspirin for primary prevention in low risk patients.  --Exercise: Stressed the importance of regular exercise by incorporating into daily routine.    --Substance Abuse: Discussed cessation/primary prevention of tobacco, alcohol, or other drug use; driving or other dangerous activities under the influence.    --Dental health: Discussed importance of regular tooth brushing, flossing, and dental visits.  -- Suggested having eyes and vision checked if needed or past due.  --Immunizations reviewed.  --Discussed benefits of screening colonoscopy.    CELI Corado [facility]  [address] [place] [id number]  Dept: [phone]  Dept Fax: [phone]  Loc: [phone]  Loc Fax: [phone]

## 2023-02-01 NOTE — PROGRESS NOTES
The ABCs of the Annual Wellness Visit  Subsequent Medicare Wellness Visit  Chief Complaint   Patient presents with   • Medicare Wellness-subsequent   • Hypertension     management   • Hyperlipidemia     management       Subjective    Maddison Turcios is a 72 y.o. female who presents for a Subsequent Medicare Wellness Visit, hypertension and hyperlipidemia management.  She has lost 3 pounds since September 27, 2022 office visit.   Her sleep had been normal until she read a book that her sister recommended.  States the block caused nightmares.  For the past week her sleep has improved.  States she has been putting everything in God's hands.  States she has been a little anxious about things going on at [employer] and in the world.  She realizes that she cannot control other people.  Denied any suicidal or homicidal ideation.  Her diet has been slightly healthy.  Bowel movements have been daily without dark black tarry stools.  Denied any current fevers, chills, upper respiratory symptoms, GI upset, or swelling of ankles.      The following portions of the patient's history were reviewed and   updated as appropriate:   She  has a past medical history of Abnormal blood chemistry, Acute UTI (urinary tract infection), Allergic, Anemia, Aneurysm, cerebral, Bloating, Brain aneurysm, CAD (coronary artery disease), Chronic headaches, Coronary artery disease, Coronary artery stenosis, Dysuria, Encounter for screening colonoscopy, Environmental allergies, Fall from slip, trip, or stumble, Gait disturbance, H/O cardiovascular stress test (09/17/2013), H/O colonoscopy, H/O echocardiogram (09/25/2013), H/O fall, Heart murmur, History of EKG (07/31/2015), Hyperlipemia, Hyperlipidemia, Hypertension, Hyperthyroidism, Injury of back, Insomnia, Left forearm pain, Left leg pain, Left wrist pain, Lower abdominal pain, Need for pneumococcal vaccination, Onychomycosis of toenail, AKIRA (obstructive sleep apnea), Pelvic pressure in female, Right foot  pain, Stroke (HCC), TIA (transient ischemic attack) (11/30/2016), URI (upper respiratory infection), and Urine frequency.  She does not have any pertinent problems on file.  She  has a past surgical history that includes Coronary artery bypass graft; Rotator cuff repair (Left); Tonsillectomy; Splenectomy, total; Tubal ligation; Cholecystectomy; Bunionectomy; Cerebral aneurysm repair; Cardiac catheterization (N/A, 4/8/2019); Cardiac catheterization (N/A, 4/8/2019); Cardiac catheterization (N/A, 4/8/2019); Colonoscopy (N/A, 1/29/2021); Breast excisional biopsy (Right, 1977); and Breast excisional biopsy (Right, 1979).  Her family history includes Aneurysm in her sister; Anxiety disorder in her sister; Breast cancer in her sister; Cancer in her brother; Cervical cancer in her mother; Heart attack in her brother; Heart disease in her father; Heart failure in her father and mother; Hypertension in her mother; Lung cancer in her brother; No Known Problems in her daughter, sister, and son; Stroke in her mother.  She  reports that she has never smoked. She has never used smokeless tobacco. She reports current alcohol use. She reports that she does not use drugs.  Current Outpatient Medications   Medication Sig Dispense Refill   • amitriptyline (ELAVIL) 10 MG tablet Take 1 tablet by mouth Every Night. 90 tablet 3   • Cholecalciferol (VITAMIN D3) 5000 UNITS capsule capsule Take 5,000 Units by mouth 1 (One) Time Per Week.     • clopidogrel (PLAVIX) 75 MG tablet Take 1 tablet by mouth Daily. 90 tablet 3   • ezetimibe (ZETIA) 10 MG tablet Take 1 tablet by mouth Daily. 90 tablet 3   • icosapent ethyl (Vascepa) 1 g capsule capsule Take 2 g by mouth 2 (Two) Times a Day With Meals. 360 capsule 3   • isosorbide dinitrate (ISORDIL) 5 MG tablet Take 1 tablet by mouth 2 (Two) Times a Day. 180 tablet 3   • Melatonin 12 MG tablet Take 12 mg by mouth Every Night.     • Menaquinone-7 (VITAMIN K2 PO) Take  by mouth.     • montelukast  (Singulair) 10 MG tablet Take 1 tablet by mouth Every Night. 90 tablet 3   • multivitamin (THERAGRAN) tablet tablet Take  by mouth Daily.     • pitavastatin calcium (Livalo) 2 MG tablet tablet Take 1 tablet by mouth Every Night. 90 tablet 2   • Probiotic Product (PROBIOTIC PO) Take 4 capsules by mouth Daily. gutbio-align     • ramipril (ALTACE) 2.5 MG capsule Take 1 capsule by mouth Every Night. 90 capsule 2     No current facility-administered medications for this visit.     Current Outpatient Medications on File Prior to Visit   Medication Sig   • amitriptyline (ELAVIL) 10 MG tablet Take 1 tablet by mouth Every Night.   • Cholecalciferol (VITAMIN D3) 5000 UNITS capsule capsule Take 5,000 Units by mouth 1 (One) Time Per Week.   • clopidogrel (PLAVIX) 75 MG tablet Take 1 tablet by mouth Daily.   • ezetimibe (ZETIA) 10 MG tablet Take 1 tablet by mouth Daily.   • icosapent ethyl (Vascepa) 1 g capsule capsule Take 2 g by mouth 2 (Two) Times a Day With Meals.   • isosorbide dinitrate (ISORDIL) 5 MG tablet Take 1 tablet by mouth 2 (Two) Times a Day.   • Melatonin 12 MG tablet Take 12 mg by mouth Every Night.   • Menaquinone-7 (VITAMIN K2 PO) Take  by mouth.   • montelukast (Singulair) 10 MG tablet Take 1 tablet by mouth Every Night.   • multivitamin (THERAGRAN) tablet tablet Take  by mouth Daily.   • pitavastatin calcium (Livalo) 2 MG tablet tablet Take 1 tablet by mouth Every Night.   • Probiotic Product (PROBIOTIC PO) Take 4 capsules by mouth Daily. gutbio-align   • ramipril (ALTACE) 2.5 MG capsule Take 1 capsule by mouth Every Night.     No current facility-administered medications on file prior to visit.     She is allergic to adhesive tape, beta adrenergic blockers, statins, elemental sulfur, and sulfa antibiotics..    Compared to one year ago, the patient feels her physical   health is worse.    Compared to one year ago, the patient feels her mental   health is worse.    Recent Hospitalizations:  She was not  admitted to the hospital during the last year.       Current Medical Providers:  Patient Care Team:  Kasandra Mcdowell PA-C as PCP - General (Family Medicine)  Helen Urbina MD as Consulting Physician (Cardiology)  Ruben Valderrama MD (Dermatology)  Bennett Whelan MD as MDS Coordinator (Allergy)  Sara Araiza MD as Consulting Physician (Obstetrics and Gynecology)    Outpatient Medications Prior to Visit   Medication Sig Dispense Refill   • amitriptyline (ELAVIL) 10 MG tablet Take 1 tablet by mouth Every Night. 90 tablet 3   • Cholecalciferol (VITAMIN D3) 5000 UNITS capsule capsule Take 5,000 Units by mouth 1 (One) Time Per Week.     • clopidogrel (PLAVIX) 75 MG tablet Take 1 tablet by mouth Daily. 90 tablet 3   • ezetimibe (ZETIA) 10 MG tablet Take 1 tablet by mouth Daily. 90 tablet 3   • icosapent ethyl (Vascepa) 1 g capsule capsule Take 2 g by mouth 2 (Two) Times a Day With Meals. 360 capsule 3   • isosorbide dinitrate (ISORDIL) 5 MG tablet Take 1 tablet by mouth 2 (Two) Times a Day. 180 tablet 3   • Melatonin 12 MG tablet Take 12 mg by mouth Every Night.     • Menaquinone-7 (VITAMIN K2 PO) Take  by mouth.     • montelukast (Singulair) 10 MG tablet Take 1 tablet by mouth Every Night. 90 tablet 3   • multivitamin (THERAGRAN) tablet tablet Take  by mouth Daily.     • pitavastatin calcium (Livalo) 2 MG tablet tablet Take 1 tablet by mouth Every Night. 90 tablet 2   • Probiotic Product (PROBIOTIC PO) Take 4 capsules by mouth Daily. gutbio-align     • ramipril (ALTACE) 2.5 MG capsule Take 1 capsule by mouth Every Night. 90 capsule 2     No facility-administered medications prior to visit.       No opioid medication identified on active medication list. I have reviewed chart for other potential  high risk medication/s and harmful drug interactions in the elderly.          Aspirin is not on active medication list.  Aspirin use is not indicated based on review of current medical condition/s.  "Risk of harm outweighs potential benefits.  .    Patient Active Problem List   Diagnosis   • Cerebral aneurysm   • Coronary artery disease   • Mixed hyperlipidemia   • Essential hypertension   • Hyperthyroidism   • Insomnia   • Onychomycosis of toenail   • Bilateral enlargement of atria   • Sinus bradycardia   • Transient cerebral ischemia   • Skin lesion of right ear   • Fatigue   • Medicare annual wellness visit, subsequent   • Osteopenia   • Necrotic hand wound (HCC)   • Family history of breast cancer   • S/P CABG (coronary artery bypass graft)   • Chest pain   • Neck strain, initial encounter   • Poison ivy dermatitis   • Need for influenza vaccination   • Greater trochanteric bursitis of right hip   • Prophylactic antibiotic   • Nondisplaced fracture of fifth metatarsal bone, right foot, initial encounter for closed fracture   • Irritable bowel syndrome with both constipation and diarrhea   • Personal history of colonic polyps   • Encounter for screening for malignant neoplasm of colon   • Antiplatelet or antithrombotic long-term use   • S/P coil embolization of cerebral aneurysm     Advance Care Planning  Advance Directive is on file.  ACP discussion was held with the patient during this visit. Patient has an advance directive in EMR which is still valid.      Objective    Vitals:    02/01/23 0937   BP: 130/84   Pulse: 69   Resp: 15   Temp: 97.1 °F (36.2 °C)   SpO2: 98%   Weight: 62.6 kg (138 lb)   Height: 162.6 cm (64\")   PainSc: 0-No pain     Estimated body mass index is 23.69 kg/m² as calculated from the following:    Height as of this encounter: 162.6 cm (64\").    Weight as of this encounter: 62.6 kg (138 lb).    BMI is within normal parameters. No other follow-up for BMI required.      Does the patient have evidence of cognitive impairment? No    Lab Results   Component Value Date    CHLPL 178 01/25/2023    TRIG 63 01/25/2023    HDL 90 (H) 01/25/2023    LDL 76 01/25/2023    VLDL 12 01/25/2023      "   HEALTH RISK ASSESSMENT    Smoking Status:  Social History     Tobacco Use   Smoking Status Never   Smokeless Tobacco Never     Alcohol Consumption:  Social History     Substance and Sexual Activity   Alcohol Use Yes    Comment: Rare//Caffeine use: Decaf     Fall Risk Screen:    STEADI Fall Risk Assessment was completed, and patient is at LOW risk for falls.Assessment completed on:2/1/2023    Depression Screening:  PHQ-2/PHQ-9 Depression Screening 2/1/2023   Little Interest or Pleasure in Doing Things 0-->not at all   Feeling Down, Depressed or Hopeless 0-->not at all   PHQ-9: Brief Depression Severity Measure Score 0       Health Habits and Functional and Cognitive Screening:  Functional & Cognitive Status 2/1/2023   Do you have difficulty preparing food and eating? No   Do you have difficulty bathing yourself, getting dressed or grooming yourself? No   Do you have difficulty using the toilet? No   Do you have difficulty moving around from place to place? No   Do you have trouble with steps or getting out of a bed or a chair? No   Current Diet Unhealthy Diet   Dental Exam Up to date   Eye Exam Up to date   Exercise (times per week) 7 times per week   Current Exercises Include Walking;Weightlifting   Current Exercise Activities Include -   Do you need help using the phone?  No   Are you deaf or do you have serious difficulty hearing?  No   Do you need help with transportation? No   Do you need help shopping? No   Do you need help preparing meals?  No   Do you need help with housework?  No   Do you need help with laundry? No   Do you need help taking your medications? No   Do you need help managing money? No   Do you ever drive or ride in a car without wearing a seat belt? No   Have you felt unusual stress, anger or loneliness in the last month? Yes   Who do you live with? Alone   If you need help, do you have trouble finding someone available to you? No   Have you been bothered in the last four weeks by sexual  problems? No   Do you have difficulty concentrating, remembering or making decisions? No       Age-appropriate Screening Schedule:  Refer to the list below for future screening recommendations based on patient's age, sex and/or medical conditions. Orders for these recommended tests are listed in the plan section. The patient has been provided with a written plan.    Health Maintenance   Topic Date Due   • LIPID PANEL  01/25/2024   • MAMMOGRAM  03/03/2024   • PAP SMEAR  03/22/2024   • TDAP/TD VACCINES (5 - Td or Tdap) 06/29/2027   • INFLUENZA VACCINE  Completed   • ZOSTER VACCINE  Addressed   • DXA SCAN  Discontinued                CMS Preventative Services Quick Reference  Risk Factors Identified During Encounter  None Identified  The above risks/problems have been discussed with the patient.  Pertinent information has been shared with the patient in the After Visit Summary.  An After Visit Summary and PPPS were made available to the patient.    Follow Up:   Next Medicare Wellness visit to be scheduled in 1 year.       Additional E&M Note during same encounter follows:  Patient has multiple medical problems which are significant and separately identifiable that require additional work above and beyond the Medicare Wellness Visit.      Chief Complaint  Medicare Wellness-subsequent, Hypertension (management), and Hyperlipidemia (management)    Subjective        HPI  Maddison Turcios is also being seen today for having chest pressure across her chest off and on for the past 6 months.  States that waxes and wanes.  She notices it more when she is laying down at night.  Denied any heartburn, shortness of breath, wheezing, dizziness, lightheadedness, nausea or vomiting with the episodes.  Had extensive cardiac work-up in April 2022 with Dr. Urbina.  States she has upcoming appointment with them in April 2023.  Had three-vessel CABG performed in 2004.  Denied any radiation of pressure to neck or down arms.  Denied any  "active chest pain today.      Review of Systems   Constitutional: Negative.    HENT: Negative.    Eyes: Negative.    Respiratory: Positive for chest tightness.    Cardiovascular: Negative.    Gastrointestinal: Negative.    Endocrine: Negative.    Genitourinary: Negative.    Musculoskeletal: Negative.    Skin: Negative.    Allergic/Immunologic: Negative.    Neurological: Negative.    Hematological: Negative.    Psychiatric/Behavioral: Negative.    All other systems reviewed and are negative.      Objective   Vital Signs:  /84   Pulse 69   Temp 97.1 °F (36.2 °C)   Resp 15   Ht 162.6 cm (64\")   Wt 62.6 kg (138 lb)   SpO2 98%   BMI 23.69 kg/m²     Physical Exam  Vitals and nursing note reviewed.   Constitutional:       Appearance: Normal appearance. She is well-developed and well-groomed.      Interventions: Face mask in place.   HENT:      Head: Normocephalic and atraumatic.      Jaw: There is normal jaw occlusion.      Right Ear: Hearing, tympanic membrane, ear canal and external ear normal.      Left Ear: Hearing, tympanic membrane, ear canal and external ear normal.      Nose: Nose normal.      Right Sinus: No maxillary sinus tenderness or frontal sinus tenderness.      Left Sinus: No maxillary sinus tenderness or frontal sinus tenderness.      Mouth/Throat:      Lips: Pink.      Mouth: Mucous membranes are moist.      Dentition: Normal dentition.      Tongue: No lesions.      Pharynx: Oropharynx is clear.      Tonsils: No tonsillar exudate.   Eyes:      General: Lids are normal.      Conjunctiva/sclera: Conjunctivae normal.      Pupils: Pupils are equal, round, and reactive to light.   Neck:      Thyroid: No thyroid mass, thyromegaly or thyroid tenderness.      Vascular: No carotid bruit.      Trachea: Trachea and phonation normal. No tracheal tenderness.   Cardiovascular:      Rate and Rhythm: Normal rate and regular rhythm.      Pulses: Normal pulses.      Heart sounds: Normal heart sounds, S1 normal " and S2 normal. No murmur heard.  Pulmonary:      Effort: Pulmonary effort is normal.      Breath sounds: Normal breath sounds and air entry.   Abdominal:      General: Bowel sounds are normal.      Palpations: Abdomen is soft.      Tenderness: There is no abdominal tenderness. There is no right CVA tenderness, left CVA tenderness, guarding or rebound. Negative signs include Person's sign, Rovsing's sign, McBurney's sign, psoas sign and obturator sign.   Musculoskeletal:      Cervical back: Neck supple.      Right lower leg: No edema.      Left lower leg: No edema.   Lymphadenopathy:      Cervical: No cervical adenopathy.      Right cervical: No superficial, deep or posterior cervical adenopathy.     Left cervical: No superficial, deep or posterior cervical adenopathy.   Skin:     General: Skin is warm and dry.      Capillary Refill: Capillary refill takes less than 2 seconds.   Neurological:      Mental Status: She is alert and oriented to person, place, and time.      Deep Tendon Reflexes:      Reflex Scores:       Patellar reflexes are 2+ on the right side and 2+ on the left side.  Psychiatric:         Attention and Perception: Attention and perception normal.         Mood and Affect: Mood and affect normal.         Speech: Speech normal.         Behavior: Behavior is cooperative.         Thought Content: Thought content normal.         Cognition and Memory: Cognition and memory normal.         Judgment: Judgment normal.     I wore a facial mask during this patient encounter.  Patient also wearing a surgical mask.  Hand hygeine performed before and after seeing the patient.               ECG 12 Lead    Date/Time: 2/1/2023 10:49 AM  Performed by: Kasandra Mcdowell PA-C  Authorized by: Kasandra Mcdowell PA-C   Comparison: compared with previous ECG from 4/23/2021  Similar to previous ECG  Rhythm: sinus rhythm  Rate: normal  BPM: 63  Conduction: conduction normal  ST Segments: ST segments normal  T Waves: T waves  normal  QRS axis: normal    Clinical impression: normal ECG  Comments: Indication:  New chest pressure with Hypertension  SD int:  186 ms  QRS dur:  106 ms  QT/QTc:  409/417 ms                  Assessment and Plan   Diagnoses and all orders for this visit:    1. Medicare annual wellness visit, subsequent (Primary)    2. Mixed hyperlipidemia  -     CBC & Differential; Future  -     Comprehensive Metabolic Panel; Future  -     Lipid Panel With LDL / HDL Ratio; Future  -     C-reactive Protein; Future    3. Essential hypertension  -     ECG 12 Lead  -     CBC & Differential; Future  -     Comprehensive Metabolic Panel; Future  -     Lipid Panel With LDL / HDL Ratio; Future  -     C-reactive Protein; Future  -     ECG 12 Lead    4. Chest pressure  -     XR Chest PA & Lateral; Future  -     ECG 12 Lead  -     ECG 12 Lead      1.  Annual sequential Medicare wellness exam with hyperlipidemia: I have reviewed above blood work with her.  Cholesterol readings have improved.  Continue current medication at home as directed.  Follow-up in 6 months for repeat lab work.  2.  New chest pressure with hypertension: I have rechecked blood pressure and got 130/80 in left arm.  In office EKG today showed normal sinus rhythm.  I have reviewed above EKG, echocardiogram and stress test from April 20, 2022 with her.  We will proceed with chest x-[facility] today for further evaluation of chest pressure.  She has upcoming appointment with cardiologist in April 2023.  She will discuss at that time as well.  If symptoms worsen, she will go through the nearest emergency room for further evaluation and treatment.  Continue current medication at home as directed.  I suspect that this may be anxiety in nature.  She does not want a medication at this time.       Follow Up   Return in about 6 months (around 8/1/2023), or HTN cholesterol labs prior.  Patient was given instructions and counseling regarding her condition or for health  maintenance advice. Please see specific information pulled into the AVS if appropriate.

## 2023-02-07 ENCOUNTER — TRANSCRIBE ORDERS (OUTPATIENT)
Dept: ADMINISTRATIVE | Facility: HOSPITAL | Age: 73
End: 2023-02-07
Payer: MEDICARE

## 2023-02-07 DIAGNOSIS — Z12.31 VISIT FOR SCREENING MAMMOGRAM: Primary | ICD-10-CM

## 2023-03-08 ENCOUNTER — HOSPITAL ENCOUNTER (OUTPATIENT)
Dept: MAMMOGRAPHY | Facility: HOSPITAL | Age: 73
Discharge: HOME | End: 2023-03-08
Admitting: PHYSICIAN ASSISTANT
Payer: MEDICARE

## 2023-03-08 DIAGNOSIS — Z12.31 VISIT FOR SCREENING MAMMOGRAM: ICD-10-CM

## 2023-03-08 DIAGNOSIS — R92.8 ABNORMAL MAMMOGRAM OF LEFT BREAST: Primary | ICD-10-CM

## 2023-03-08 PROCEDURE — 77063 BREAST TOMOSYNTHESIS BI: CPT

## 2023-03-08 PROCEDURE — 77067 SCR MAMMO BI INCL CAD: CPT

## 2023-03-25 RX ORDER — ISOSORBIDE DINITRATE 5 MG/1
5 TABLET ORAL 2 TIMES DAILY
Qty: 180 TABLET | Refills: 3 | Status: CANCELLED | OUTPATIENT
Start: 2023-03-25

## 2023-03-27 RX ORDER — ISOSORBIDE DINITRATE 5 MG/1
5 TABLET ORAL 2 TIMES DAILY
Qty: 180 TABLET | Refills: 3 | Status: SHIPPED | OUTPATIENT
Start: 2023-03-27

## 2023-03-27 NOTE — TELEPHONE ENCOUNTER
----- Message from Maddison Turcios sent at 3/25/2023  4:47 PM EDT -----  Regarding: Isosorbide dinitrate  Contact: [phone]  This needs to be refilled at [facility] in [place] please. I will be out before my appointment on April 11

## 2023-03-27 NOTE — TELEPHONE ENCOUNTER
No protocol    NOV-04/10/23-RM  LOV-04/06/22-JF    Plan:          1. TIA. The etiology for this is uncertain.  She has had no further instances since being on Plavix.   2. Hyperlipidemia, on Livalo and continue zetia. Labs as above with good control, per PCP  3. Essential hypertension, goal less than 120/80. - Good control with addition of ramipril  4. History of coronary artery disease, s/p CABG. She has angina, well treated with isordil. She is experiencing dyspnea with exertion which was her risk equivalent for her heart disease.  We will check a stress and an echo.  5. Mild biatrial enlargement on her transesophageal echocardiogram but otherwise, the DENY was normal.-Recheck an echo  6. Possible obstructive sleep apnea.-She does complain of daytime sleepiness.  Would probably benefit from a sleep study.  Will defer to PCP.     Echo stress and echo for dyspnea as this was her only complaint prior to having open heart surgery.

## 2023-04-10 ENCOUNTER — TELEPHONE (OUTPATIENT)
Dept: CARDIOLOGY | Facility: CLINIC | Age: 73
End: 2023-04-10
Payer: MEDICARE

## 2023-04-10 ENCOUNTER — TELEMEDICINE (OUTPATIENT)
Dept: FAMILY MEDICINE CLINIC | Facility: CLINIC | Age: 73
End: 2023-04-10
Payer: MEDICARE

## 2023-04-10 VITALS — TEMPERATURE: 96.8 F

## 2023-04-10 DIAGNOSIS — R51.9 GENERALIZED HEADACHE: ICD-10-CM

## 2023-04-10 DIAGNOSIS — R05.1 ACUTE COUGH: ICD-10-CM

## 2023-04-10 DIAGNOSIS — U07.1 POSITIVE SELF-ADMINISTERED ANTIGEN TEST FOR COVID-19: Primary | ICD-10-CM

## 2023-04-10 NOTE — TELEPHONE ENCOUNTER
Caller: Maddison Turcios    Relationship to patient: Self    Best call back number: [phone]     Chief complaint: PATIENT WOULD LIKE TO SEE DR SPARKS ON 4/18/23, OR 4/25/23 OR 4/26/23 SINCE DR SPARKS HAD TO CANCEL WITH PATIENT ON 4/10/23.  THESE DATES WOULD BE MORE CONVENIENT FOR PATIENT.  PATIENT REQUESTED TO SEE DR SPARKS THIS VISIT INSTEAD OF RAPHAEL SEAMAN.    Type of visit: 1 YR FOLLOW UP    Requested date: 4/18/23, 4/25/23 OR 4/26/23     If rescheduling, when is the original appointment: 6/2/23 11AM

## 2023-04-10 NOTE — PROGRESS NOTES
Chief Complaint  Cough and Headache (Positive COVID home test on 4/9/23.)    Subjective          You have chosen to receive care through a telehealth visit.  Do you consent to use a video/audio connection for your medical care today? Yes    Maddison is currently at her home address in [facility] in a secure location.    I am currently at my private office at [facility] in a secure location    Maddison Turcios presents to [facility]  History of Present Illness  Maddison is a 73-year-old female who presents using [employer] video today with home positive COVID test.  States she was visiting her daughter in [place] last week.  Daughter had gone to a baseball game earlier that week.  States she started feeling bad on April 7, 2023.  She took a home COVID test on Saturday, April 8, 2023 and was positive.  States her daughter started quarantining at that time.  Maddison states she wore a mask to and from [place] on the airplane.  She started having symptoms on Saturday and did a home test in [place] which was negative for COVID.  By the time she got home on Sunday, April 9, 2023 her test was positive for COVID.  States she has noticed some black stools,dry cough and a slight headache.  States she had been taking Pepto-Bismol and Tylenol for her symptoms.  States her appetite has been decreased and she is trying to drink more fluids.  Sleep was restless last night.  Denied any current wheezing, shortness of breath, fevers, chills, nausea or vomiting.  States she has had a scratchy throat at times.     Objective   Vital Signs: Only vital sign obtained was temperature which was collected at her home by patient.  This was limited due to video visit  Temp 96.8 °F (36 °C)     Physical Exam  Constitutional:       Appearance: Normal appearance. She is well-developed and well-groomed.   HENT:      Head: Normocephalic and atraumatic.      Right Ear: Hearing normal.      Left Ear: Hearing  normal.   Neurological:      Mental Status: She is alert and oriented to person, place, and time.   Psychiatric:         Attention and Perception: Attention and perception normal.         Mood and Affect: Mood and affect normal.         Speech: Speech normal.         Behavior: Behavior normal. Behavior is cooperative.         Thought Content: Thought content normal.         Cognition and Memory: Cognition normal.         Judgment: Judgment normal.     Physical exam was limited due to video visit.    Result Review :                 Assessment and Plan    Diagnoses and all orders for this visit:    1. Positive self-administered antigen test for COVID-19 (Primary)  -     Nirmatrelvir&Ritonavir 300/100 (PAXLOVID) 20 x 150 MG & 10 x 100MG tablet therapy pack tablet; Take 3 tablets by mouth 2 (Two) Times a Day for 5 days.  Dispense: 30 each; Refill: 0  -     QUESTIONNAIRE SERIES    2. Acute cough  -     Nirmatrelvir&Ritonavir 300/100 (PAXLOVID) 20 x 150 MG & 10 x 100MG tablet therapy pack tablet; Take 3 tablets by mouth 2 (Two) Times a Day for 5 days.  Dispense: 30 each; Refill: 0  -     QUESTIONNAIRE SERIES    3. Generalized headache  -     Nirmatrelvir&Ritonavir 300/100 (PAXLOVID) 20 x 150 MG & 10 x 100MG tablet therapy pack tablet; Take 3 tablets by mouth 2 (Two) Times a Day for 5 days.  Dispense: 30 each; Refill: 0  -     QUESTIONNAIRE SERIES    Maddison was seen with new cough, with generalized headache and home positive COVID test: Have discussed that she would need to quarantine at home for the next 5 days.  She will increase fluid intake and try to be active.  We have discussed Paxlovid opiate medication.  We have discussed how to take the medication and possible side effects.  Maddison is agreeable to start the Paxlovid.  Prescription was sent to pharmacy.  She will notify office if symptoms do not continue to improve.    I spent 19 minutes caring for Maddison on this date of service. This time includes time spent by me  in the following activities:preparing for the visit, obtaining and/or reviewing a separately obtained history, performing a medically appropriate examination and/or evaluation , counseling and educating the patient/family/caregiver, ordering medications, tests, or procedures and documenting information in the medical record  Follow Up   Return if symptoms worsen or fail to improve.  Patient was given instructions and counseling regarding her condition or for health maintenance advice. Please see specific information pulled into the AVS if appropriate.     CELI Corado [facility]  [address] [place] [id number]  Dept: [phone]  Dept Fax: [phone]  Loc: [phone]  Loc Fax: [phone]

## 2023-04-11 ENCOUNTER — HOSPITAL ENCOUNTER (OUTPATIENT)
Dept: MAMMOGRAPHY | Facility: HOSPITAL | Age: 73
Discharge: HOME | End: 2023-04-11
Payer: MEDICARE

## 2023-04-11 ENCOUNTER — HOSPITAL ENCOUNTER (OUTPATIENT)
Dept: ULTRASOUND IMAGING | Facility: HOSPITAL | Age: 73
Discharge: HOME | End: 2023-04-11
Payer: MEDICARE

## 2023-04-11 DIAGNOSIS — R92.8 ABNORMAL MAMMOGRAM OF LEFT BREAST: ICD-10-CM

## 2023-04-11 DIAGNOSIS — D48.62 NEOPLASM OF UNCERTAIN BEHAVIOR OF LEFT BREAST: ICD-10-CM

## 2023-04-11 DIAGNOSIS — N63.20 MASS OF LEFT BREAST, UNSPECIFIED QUADRANT: Primary | ICD-10-CM

## 2023-04-11 PROCEDURE — 77065 DX MAMMO INCL CAD UNI: CPT

## 2023-04-11 PROCEDURE — G0279 TOMOSYNTHESIS, MAMMO: HCPCS

## 2023-04-11 PROCEDURE — 76642 ULTRASOUND BREAST LIMITED: CPT

## 2023-04-12 NOTE — TELEPHONE ENCOUNTER
Called and spoke with patient and she is ok with the June appointment.  She stated that some things have changed and she needs to keep her schedule open.  I advised her to call and let us know if she develops any symptoms.    Jose

## 2023-04-28 ENCOUNTER — HOSPITAL ENCOUNTER (OUTPATIENT)
Dept: MAMMOGRAPHY | Facility: HOSPITAL | Age: 73
Discharge: HOME | End: 2023-04-28
Payer: MEDICARE

## 2023-04-28 DIAGNOSIS — N63.20 MASS OF LEFT BREAST, UNSPECIFIED QUADRANT: ICD-10-CM

## 2023-04-28 PROCEDURE — 0 LIDOCAINE 1 % SOLUTION: Performed by: NURSE PRACTITIONER

## 2023-04-28 PROCEDURE — A4648 IMPLANTABLE TISSUE MARKER: HCPCS

## 2023-04-28 RX ORDER — LIDOCAINE HYDROCHLORIDE AND EPINEPHRINE 20; 5 MG/ML; UG/ML
10 INJECTION, SOLUTION EPIDURAL; INFILTRATION; INTRACAUDAL; PERINEURAL ONCE
Status: COMPLETED | OUTPATIENT
Start: 2023-04-28 | End: 2023-04-28

## 2023-04-28 RX ORDER — LIDOCAINE HYDROCHLORIDE 10 MG/ML
10 INJECTION, SOLUTION INFILTRATION; PERINEURAL ONCE
Status: COMPLETED | OUTPATIENT
Start: 2023-04-28 | End: 2023-04-28

## 2023-04-28 RX ADMIN — LIDOCAINE HYDROCHLORIDE,EPINEPHRINE BITARTRATE 10 ML: 20; .005 INJECTION, SOLUTION EPIDURAL; INFILTRATION; INTRACAUDAL; PERINEURAL at 12:55

## 2023-04-28 RX ADMIN — LIDOCAINE HYDROCHLORIDE 10 ML: 10 INJECTION, SOLUTION INFILTRATION; PERINEURAL at 12:52

## 2023-05-01 DIAGNOSIS — C50.912 INVASIVE DUCTAL CARCINOMA OF BREAST, LEFT: Primary | ICD-10-CM

## 2023-05-04 ENCOUNTER — TELEPHONE (OUTPATIENT)
Dept: SURGERY | Facility: CLINIC | Age: 73
End: 2023-05-04
Payer: MEDICARE

## 2023-05-04 LAB
LAB AP CASE REPORT: NORMAL
LAB AP CLINICAL INFORMATION: NORMAL
LAB AP DIAGNOSIS COMMENT: NORMAL
LAB AP SYNOPTIC CHECKLIST: NORMAL
PATH REPORT.ADDENDUM SPEC: NORMAL
PATH REPORT.FINAL DX SPEC: NORMAL
PATH REPORT.GROSS SPEC: NORMAL

## 2023-05-04 NOTE — TELEPHONE ENCOUNTER
Scheduled new patient apt with Dr. Hsieh 5/18/23 arrival 10:30 am.     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    lbs    New patient packet mailed.

## 2023-05-08 ENCOUNTER — TELEPHONE (OUTPATIENT)
Dept: SURGERY | Facility: CLINIC | Age: 73
End: 2023-05-08
Payer: MEDICARE

## 2023-05-08 ENCOUNTER — HOSPITAL ENCOUNTER (OUTPATIENT)
Dept: GENERAL RADIOLOGY | Facility: HOSPITAL | Age: 73
Discharge: HOME | End: 2023-05-08
Admitting: NURSE PRACTITIONER
Payer: MEDICARE

## 2023-05-08 DIAGNOSIS — M79.642 LEFT HAND PAIN: ICD-10-CM

## 2023-05-08 PROCEDURE — 73130 X-RAY EXAM OF HAND: CPT

## 2023-05-08 NOTE — TELEPHONE ENCOUNTER
Patient scheduled for breast MRI on 5/15/23 at 4:45 pm arrival 4:15 pm     Patient gave a verbal understanding of date/ time/ location

## 2023-05-15 ENCOUNTER — HOSPITAL ENCOUNTER (OUTPATIENT)
Dept: MRI IMAGING | Facility: HOSPITAL | Age: 73
Discharge: HOME | End: 2023-05-15
Admitting: SURGERY
Payer: MEDICARE

## 2023-05-15 DIAGNOSIS — Z17.0 MALIGNANT NEOPLASM OF UPPER-INNER QUADRANT OF LEFT BREAST IN FEMALE, ESTROGEN RECEPTOR POSITIVE: ICD-10-CM

## 2023-05-15 DIAGNOSIS — C50.212 MALIGNANT NEOPLASM OF UPPER-INNER QUADRANT OF LEFT BREAST IN FEMALE, ESTROGEN RECEPTOR POSITIVE: ICD-10-CM

## 2023-05-15 PROCEDURE — C8908 MRI W/O FOL W/CONT, BREAST,: HCPCS

## 2023-05-15 PROCEDURE — C8937 CAD BREAST MRI: HCPCS

## 2023-05-15 PROCEDURE — A9577 INJ MULTIHANCE: HCPCS | Performed by: SURGERY

## 2023-05-15 PROCEDURE — 0 GADOBENATE DIMEGLUMINE 529 MG/ML SOLUTION: Performed by: SURGERY

## 2023-05-15 PROCEDURE — 82565 ASSAY OF CREATININE: CPT

## 2023-05-15 RX ADMIN — GADOBENATE DIMEGLUMINE 12 ML: 529 INJECTION, SOLUTION INTRAVENOUS at 16:37

## 2023-05-16 LAB — CREAT BLDA-MCNC: 0.7 MG/DL (ref 0.6–1.3)

## 2023-05-18 ENCOUNTER — HOSPITAL ENCOUNTER (OUTPATIENT)
Dept: SURGERY | Facility: HOSPITAL | Age: 73
Discharge: HOME | End: 2023-05-18
Payer: MEDICARE

## 2023-05-18 ENCOUNTER — OFFICE VISIT (OUTPATIENT)
Dept: SURGERY | Facility: CLINIC | Age: 73
End: 2023-05-18
Payer: MEDICARE

## 2023-05-18 VITALS
WEIGHT: 137 LBS | OXYGEN SATURATION: 98 % | HEIGHT: 64 IN | DIASTOLIC BLOOD PRESSURE: 78 MMHG | SYSTOLIC BLOOD PRESSURE: 144 MMHG | HEART RATE: 65 BPM | RESPIRATION RATE: 17 BRPM | BODY MASS INDEX: 23.39 KG/M2

## 2023-05-18 DIAGNOSIS — C50.212 MALIGNANT NEOPLASM OF UPPER-INNER QUADRANT OF LEFT BREAST IN FEMALE, ESTROGEN RECEPTOR POSITIVE: ICD-10-CM

## 2023-05-18 DIAGNOSIS — Z17.0 MALIGNANT NEOPLASM OF UPPER-INNER QUADRANT OF LEFT BREAST IN FEMALE, ESTROGEN RECEPTOR POSITIVE: ICD-10-CM

## 2023-05-18 DIAGNOSIS — N64.89 HEMATOMA OF LEFT BREAST: Primary | ICD-10-CM

## 2023-05-18 DIAGNOSIS — N64.89 HEMATOMA OF LEFT BREAST: ICD-10-CM

## 2023-05-18 PROCEDURE — 76942 ECHO GUIDE FOR BIOPSY: CPT

## 2023-05-18 NOTE — PROGRESS NOTES
[facility]     Referring Provider: Kasandra Mcdowell PA-C     Chief complaint: Newly diagnosed breast cancer    Subjective    HPI: Ms. Maddison Turcios is a 72 yo woman, seen at the request of Kasandra Mcdowell PA-C, for a new diagnosis of left breast cancer. This was initially detected as an imaging abnormality on routine screening. Her work-up is detailed in the oncologic history below.  Prior to the biopsy, she denies any breast lumps, pain, skin changes, or nipple discharge.  However after the biopsy, she developed a very large hematoma in her upper left breast.  She has a past history of 2 benign right breast surgical biopsies in the 1970s.  Her past history is significant for CAD sp CABG in 2004, as well as a TIA of unclear etiology in 2016 for which she is on Plavix.  She had a normal echo and stress test in 2022.  She has a family history of breast cancer in a sister (diagnosed in at age 66).  She was joined today in clinic by her friend.      Oncology/Hematology History   Malignant neoplasm of upper-inner quadrant of left breast in female, estrogen receptor positive   3/3/2022 Imaging    [facility]  FINDINGS: The breasts are heterogeneously dense, which may obscure small masses. There are no masses or suspicious calcifications.  BI-RADS 2: Benign Findings     3/7/2023 Initial Diagnosis    Malignant neoplasm of upper-inner quadrant of left breast in female, estrogen receptor positive     3/8/2023 Imaging    Screening MMG with Jason ( [place])  Heterogeneously dense  There is a subtle small focus of ill-defined stellate sub centimeter asymmetric opacity in the deep upper left breast just medial to midline, 8 to 9 cm from the nipple, not visible on prior studies. Recall for supplemental diagnostic mammogram images is recommended. Breast ultrasound may be added at that time if necessary.  The remainder the examination is negative and unchanged.  BI-RADS 0: Needs additional  evaluation.     4/11/2023 Imaging    Left Diagnostic MMG with Jason & Left Breast Limited US ([facility])  MMG:   Tiny nodular opacity measuring about 4 mm with the surrounding stellate architectural distortion is confirmed in the upper inner left breast along the 11:00 axis, 8 to 9 cm from the nipple. One or two tiny punctate calcifications within the nodule. The mammographic appearance is suspicious.  US:   The lesion is very difficult to visualize by ultrasound. There is a subtle isoechoic focus with inconsistent acoustic shadowing positioned along the 11:00 axis about 8 cm from the nipple measuring about 4 mm achieving accurate ultrasound-guided core biopsy of this focus would not be reliable.  BI-RADS 4: Suspicious     4/28/2023 Biopsy    Left Breast, Stereotactic Biopsy ( [facility]):    1. Left Breast at 11:00 o'clock, 8-9 cm from Nipple (for calcifications), Stereotactic Core Biopsy:                 A. Invasive ductal carcinoma:                              1. Overall [place] grade II (tubular score=3, nuclear score=2, mitotic score=1).                            2. Invasive carcinoma measures at least 4 mm.                            3. No lymphovascular space invasion identified.               B. 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 Low-grade cribriform DCIS.    ER positive (%, strong)  MO positive (51-60%, moderate)  HER2 negative (IHC 2+; FISH copy #3.2, ratio 1.4)  Ki-67 8%     5/15/2023 Imaging    Bilateral Breast MRI ( [facility]):  RIGHT BREAST:    Benign-appearing postsurgical changes are identified in the right breast. No suspicious enhancing mass or area of non-mass enhancement is identified. There is intrinsic T1 hyperintense signal within multiple ducts in the anterior and middle retroareolar right breast, consistent with proteinaceous and/or hemorrhagic contents.  The visualized axilla is within normal limits.    LEFT BREAST:    At 11:00 in the posterior left  breast, centered 8.5 cm posterior to the nipple, there is a 3.5 cm AP dimension, 3.5 cm transverse dimension, 4.0 cm craniocaudal dimension predominantly T1 hyperintense mass/collection consistent with a postbiopsy hematoma. A focus of susceptibility from a biopsy clip along the anterior, superior, lateral margin of the hematoma marks the site of biopsy-proven malignancy. No suspicious enhancing mass or area of non-mass enhancement is identified at the biopsy site or ulcer within the left breast. No suspicious enhancement is identified in the left nipple or chest wall.  The visualized axilla is within normal limits.    EXTRAMAMMARY FINDINGS:   There are no pathologically enlarged internal mammary chain lymph nodes on either side.     There is susceptibility from sternotomy wires.  BI-RADS 6: Known malignancy.         Review of Systems   Constitutional: Negative for appetite change, chills, diaphoresis, fatigue, fever and unexpected weight change.   HENT:   Negative for hearing loss, lump/mass, mouth sores, nosebleeds, sore throat, tinnitus, trouble swallowing and voice change.    Eyes: Negative for eye problems and icterus.   Respiratory: Negative for chest tightness, cough, hemoptysis, shortness of breath and wheezing.    Cardiovascular: Negative for chest pain, leg swelling and palpitations.   Gastrointestinal: Negative for abdominal distention, abdominal pain, blood in stool, constipation, diarrhea, nausea, rectal pain and vomiting.   Endocrine: Negative for hot flashes.   Genitourinary: Negative for bladder incontinence, difficulty urinating, dyspareunia, dysuria, frequency, hematuria, menstrual problem, nocturia, pelvic pain, vaginal bleeding and vaginal discharge.    Musculoskeletal: Negative for arthralgias, back pain, flank pain, gait problem, myalgias, neck pain and neck stiffness.   Skin: Negative for itching, rash and wound.   Neurological: Negative for dizziness, extremity weakness, gait problem,  headaches, light-headedness, numbness, seizures and speech difficulty.   Hematological: Negative for adenopathy. Does not bruise/bleed easily.   Psychiatric/Behavioral: Negative for confusion, decreased concentration, depression, sleep disturbance and suicidal ideas. The patient is not nervous/anxious.    I personally reviewed and updated the ROS.      Medications:    Current Outpatient Medications:   •  amitriptyline (ELAVIL) 10 MG tablet, Take 1 tablet by mouth Every Night., Disp: 90 tablet, Rfl: 3  •  Cholecalciferol (VITAMIN D3) 5000 UNITS capsule capsule, Take 1 capsule by mouth 1 (One) Time Per Week., Disp: , Rfl:   •  clopidogrel (PLAVIX) 75 MG tablet, Take 1 tablet by mouth Daily., Disp: 90 tablet, Rfl: 3  •  ezetimibe (ZETIA) 10 MG tablet, Take 1 tablet by mouth Daily., Disp: 90 tablet, Rfl: 3  •  icosapent ethyl (Vascepa) 1 g capsule capsule, Take 2 g by mouth 2 (Two) Times a Day With Meals., Disp: 360 capsule, Rfl: 3  •  isosorbide dinitrate (ISORDIL) 5 MG tablet, Take 1 tablet by mouth 2 (Two) Times a Day., Disp: 180 tablet, Rfl: 3  •  Melatonin 12 MG tablet, Take 12 mg by mouth Every Night., Disp: , Rfl:   •  Menaquinone-7 (VITAMIN K2 PO), Take  by mouth., Disp: , Rfl:   •  montelukast (Singulair) 10 MG tablet, Take 1 tablet by mouth Every Night., Disp: 90 tablet, Rfl: 3  •  multivitamin (THERAGRAN) tablet tablet, Take  by mouth Daily., Disp: , Rfl:   •  pitavastatin calcium (Livalo) 2 MG tablet tablet, Take 1 tablet by mouth Every Night., Disp: 90 tablet, Rfl: 2  •  Probiotic Product (PROBIOTIC PO), Take 4 capsules by mouth Daily. gutbio-align, Disp: , Rfl:   •  ramipril (ALTACE) 2.5 MG capsule, Take 1 capsule by mouth Every Night., Disp: 90 capsule, Rfl: 2      Allergies   Allergen Reactions   • Adhesive Tape Rash     Redness, bruising and peeling of skin    *Use Paper Tape Only*  Redness, bruising and peeling of skin    *Use Paper Tape Only*   • Beta Adrenergic Blockers Unknown - High Severity      hypotension  bradycardic   • Statins Myalgia   • Elemental Sulfur Rash   • Sulfa Antibiotics Rash       Past Medical History:   Diagnosis Date   • Abnormal blood chemistry    • Acute UTI (urinary tract infection)    • Allergic    • Anemia    • Aneurysm, cerebral    • Bloating    • Brain aneurysm    • CAD (coronary artery disease)    • Chronic headaches    • Coronary artery disease    • Coronary artery stenosis    • Dysuria    • Encounter for screening colonoscopy    • Environmental allergies    • Fall from slip, trip, or stumble    • Gait disturbance    • H/O cardiovascular stress test 09/17/2013    Treadmill   • H/O colonoscopy    • H/O echocardiogram 09/25/2013   • H/O fall    • Heart murmur    • Hereditary spherocytosis    • History of EKG 07/31/2015   • Hyperlipemia    • Hyperlipidemia    • Hypertension    • Hyperthyroidism    • Injury of back    • Insomnia    • Left forearm pain    • Left leg pain    • Left wrist pain    • Lower abdominal pain    • Malignant neoplasm of upper-inner quadrant of left breast in female, estrogen receptor positive 05/18/2023   • Need for pneumococcal vaccination    • Onychomycosis of toenail    • AKIRA (obstructive sleep apnea)    • Pelvic pressure in female    • Right foot pain    • Stroke    • TIA (transient ischemic attack) 11/30/2016   • URI (upper respiratory infection)    • Urine frequency        Past Surgical History:   Procedure Laterality Date   • BREAST EXCISIONAL BIOPSY Right 1977    b9   • BREAST EXCISIONAL BIOPSY Right 1979    b9   • BUNIONECTOMY     • CARDIAC CATHETERIZATION N/A 04/08/2019    Procedure: Left Heart Cath;  Surgeon: Jayme Ramirez MD;  Location:  [facility];  Service: Cardiovascular   • CARDIAC CATHETERIZATION N/A 04/08/2019    Procedure: Coronary angiography;  Surgeon: Jayme Ramirez MD;  Location:  [facility];  Service: Cardiovascular   • CARDIAC CATHETERIZATION N/A 04/08/2019    Procedure: Left  ventriculography;  Surgeon: Jayme Ramirez MD;  Location:  [facility];  Service: Cardiovascular   • CEREBRAL ANEURYSM REPAIR     • CHOLECYSTECTOMY     • COLONOSCOPY N/A 2021    Procedure: COLONOSCOPY with polypectomy;  Surgeon: Colin Ladd MD;  Location:  [facility];  Service: Gastroenterology;  Laterality: N/A;  Diverticulosis  Sigmoid colon polyp   • CORONARY ARTERY BYPASS GRAFT      Triple   • FOOT SURGERY Right 2016   • ROTATOR CUFF REPAIR Left    • SPLENECTOMY     • TONSILLECTOMY     • TUBAL ABDOMINAL LIGATION     • US GUIDED CYST ASPIRATION BREAST N/A 2023       Family History   Problem Relation Age of Onset   • Heart attack Mother    • Heart failure Mother    • Hypertension Mother    • Stroke Mother    • Cervical cancer Mother    • Hypertension Father    • Heart failure Father    • Heart disease Father    • Aneurysm Sister    • Breast cancer Sister 66   • Anxiety disorder Sister    • No Known Problems Sister    • Heart attack Brother    • Cancer Brother    • Lung cancer Brother    • No Known Problems Daughter    • No Known Problems Son    • Ovarian cancer Neg Hx    • Colon cancer Neg Hx    • Pulmonary embolism Neg Hx    • Deep vein thrombosis Neg Hx        Social History     Socioeconomic History   • Marital status:    • Number of children: 2   Tobacco Use   • Smoking status: Never   • Smokeless tobacco: Never   Vaping Use   • Vaping Use: Never used   Substance and Sexual Activity   • Alcohol use: Yes     Comment: Rare/ occ.   • Drug use: No   • Sexual activity: Not Currently     Partners: Male     Birth control/protection: Abstinence     Comment: BTL     Patient does not drink caffeine.     GYNECOLOGIC HISTORY:   . P: 2. AB: 0.  Last menstrual period: Postmenopausal  Age at menarche: 13  Age at first childbirth: 28  Lactation/How lon-6 months   Age at menopause: 55  Total years of oral contraceptive use: 6-7 years  Total  years of hormone replacement therapy: 0      Objective   PHYSICAL EXAMINATION:   Vitals:    05/18/23 1135   BP: 144/78   Pulse: 65   Resp: 17   SpO2: 98%     ECOG 0 - Asymptomatic  General: NAD, well appearing  Psych: a&o x 3, normal mood and affect  Eyes: EOMI, no scleral icterus  ENMT: neck supple without masses or thyromegaly, mucus membranes moist  Resp: normal effort, CTAB  CV: RRR, no murmurs, no edema  GI: soft, NT, ND  MSK: normal gait, normal ROM in bilateral shoulders  Lymph nodes: no cervical, supraclavicular or axillary lymphadenopathy  Breast: symmetric, moderate size, grade 3 ptosis, sternal scar  Right: Superior curvilinear scar, otherwise no visible abnormalities on inspection while seated, with arms raised or hands on hips. No masses or nipple abnormalities.  Left: On inspection, there is an obvious bulge in the superior left breast with overlying ecchymoses.  At 11:00, 9 cm FN, there is a 5 x 5 cm mass (postbiopsy hematoma).  No nipple abnormalities.       Procedure: Ultrasound-guided hematoma aspiration  Indication:  Postbiopsy hematoma that is delaying surgical management  Location: Left breast, 11:00, 9 cm FN  Consent: The risks, benefits, and alternatives to the procedure were discussed with the patient, who understood and wished to proceed. The risks described included, but were not limited to, bleeding, infection, insufficient drainage due to hematoma already clotting.  Description of limited left breast ultrasound:  Using a 10MHz linear array transducer, I scanned the left breast over the targeted area.  At 11:00, 9 cm FN, there is an anechoic fluid collection with internal echoes and posterior enhancement.  This measures 32 x 32 by 30 mm on ultrasound.  Description of Procedure: After the patient was positioned supine on the procedure table, I located the hematoma using ultrasound as described above. I prepped and draped the breast skin in sterile fashion. I anesthetized the breast skin and  underlying subcutaneous tissue with 1% lidocaine with epinephrine under ultrasound visualization and guidance. I then inserted an 18 G needle (attached to a syringe) into the hematoma under ultrasound guidance and and evacuated as much fluid as possible. In total 20 mL of dark bloody fluid was aspirated. The fluid was discarded.  Post aspiration, the collection measured 31 x 11 x 29 mm.  Marker placed: none  Tolerance: The patient tolerated the procedure well.  Disposition: See plan below.       I have independently reviewed her imaging and here are my findings:   In the left upper inner breast, there initially was a 4 mm nodule seen on mammogram with associated architectural distortion and calcifications.  There is not an easily identifiable ultrasound correlate.  MRI showed a 4 cm postbiopsy hematoma.      Assessment & Plan   Assessment:  73 y.o. F with a new diagnosis of left breast cancer: Intermediate grade, invasive ductal carcinoma, ER/IA positive, Her2 negative, with associated DCIS; clinical T1aN0, anatomic stage IA, prognostic stage IA.  She developed a large postbiopsy hematoma.    Discussion:  I had an extensive discussion with the patient and her family about the nature of her breast cancer diagnosis. We reviewed the components of breast tissue including ducts and lobules. We reviewed her pathology report in detail. We reviewed breast cancer histology, including stage, grade, ER/IA receptors, HER2 receptors and how this applies to her diagnosis. We reviewed the basics of systemic and local/regional management of breast cancer.      We discussed that in her case, systemic treatment would likely involve endocrine therapy. She will be referred to medical oncology to discuss this further. We reviewed potential surgical treatments to include partial mastectomy and mastectomy and discussed the rationale associated with each approach. Regarding radiation therapy, we discussed that radiation is indicated in  most cases of breast conservation and in only limited circumstances following mastectomy. We discussed that the primary goal of adjuvant radiation is to decrease the likelihood of local recurrence. She will be referred to radiation oncology postoperatively to discuss the risks and benefits of treatment and whether it is indicated.     In her case, she is a good candidate for lumpectomy and she would like to proceed with breast conservation. We discussed that lumpectomy would require pre-operative localization. We also discussed the risk of positive margins and that she must have negative margins for lumpectomy to be an appropriate oncologic procedure. I will make every effort to obtain negative margins at her initial operation, but there is a 10-15% chance that she will require a second operation for re-excision, or possibly a total mastectomy. We will not know the margin status until after her final pathology has returned.      We discussed axillary staging. I reviewed with her the data (CALGB 9343) that suggests in women over 70 with small ER+ tumors who will receive adjuvant endocrine therapy, the risks of axillary staging may outweigh the benefits, given that there will be no change in adjuvant therapy or outcome regardless of axillary status. This was explained in detail to the patient and she declined axillary staging.      She would like to undergo genetic testing, mainly to help inform her third, unaffected sister. This was performed today.    The postbiopsy hematoma limits my ability to accurately localize the residual cancer.  There is a good chance that the biopsy clip is not located at the exact cancer site.  If I were to operate with this large hematoma in place, I would need to remove more tissue than necessary.  Prior to aspirating the hematoma, I had discussed starting neoadjuvant endocrine therapy while waiting for the hematoma to resolve.  I did not anticipate that aspiration was going to be very  successful due to the already clotted blood.  However I was surprisingly able to evacuate 20 mL of fluid.  This should hopefully speed up the hematoma resolution, and I think she can safely avoid neoadjuvant endocrine therapy.  She would still like to discuss this with medical oncology and we will set up a referral.    Plan:  A multidisciplinary plan has been formulated for the patient:      (1) Breast Surgical Oncology:  -F/u genetic testing. I will call her with results.  -Preoperative cardiology evaluation.  She is already scheduled for her annual follow-up in 2 weeks.  We will need to hold her Plavix perioperatively.  -Follow-up in 1 month with repeat left mammogram.    (2) Medical Oncology:  -Preoperative referral.    (3) Radiation Oncology:  -Will refer postoperatively.    Jaleesa Hsieh MD    I spent 100 minutes caring for Maddison on this date of service. This time includes time spent by me in the following activities: preparing for the visit, performing a medically appropriate examination and/or evaluation , counseling and educating the patient/family/caregiver, ordering medications, tests, or procedures, referring and communicating with other health care professionals , documenting information in the medical record and independently interpreting results and communicating that information with the patient/family/caregiver.  I spent 10 minutes on the separately reported service of left breast hematoma aspiration. This time is not included in the time used to support the E/M service also reported today.      CC:  CELI Forrest MD

## 2023-05-18 NOTE — LETTER
May 18, 2023       No Recipients    Patient: Maddison Turcios   YOB: 1950   Date of Visit: 5/18/2023       Dear Dr. Molina Recipients:    Thank you for referring Maddison Turcios to me for evaluation. Below are the relevant portions of my assessment and plan of care.    If you have questions, please do not hesitate to call me. I look forward to following Maddison along with you.         Sincerely,        Jaleesa Hsieh MD        CC:   No Recipients    Jaleesa Hsieh MD  05/18/23 1521  Signed  [facility]     Referring Provider: Kasandra Mcdowell PA-C     Chief complaint: Newly diagnosed breast cancer    Subjective     HPI: Ms. Maddison Turcios is a 72 yo woman, seen at the request of Kasandra Mcdowell PA-C, for a new diagnosis of left breast cancer. This was initially detected as an imaging abnormality on routine screening. Her work-up is detailed in the oncologic history below.  Prior to the biopsy, she denies any breast lumps, pain, skin changes, or nipple discharge.  However after the biopsy, she developed a very large hematoma in her upper left breast.  She has a past history of 2 benign right breast surgical biopsies in the 1970s.  Her past history is significant for CAD sp CABG in 2004, as well as a TIA of unclear etiology in 2016 for which she is on Plavix.  She had a normal echo and stress test in 2022.  She has a family history of breast cancer in a sister (diagnosed in at age 66).  She was joined today in clinic by her friend.      Oncology/Hematology History   Malignant neoplasm of upper-inner quadrant of left breast in female, estrogen receptor positive   3/3/2022 Imaging    Screening MMG with Jason ( [facility])  FINDINGS: The breasts are heterogeneously dense, which may obscure small masses. There are no masses or suspicious calcifications.  BI-RADS 2: Benign Findings     3/7/2023 Initial Diagnosis    Malignant neoplasm of upper-inner quadrant of left breast in female, estrogen receptor  positive     3/8/2023 Imaging    Screening MMG with Jason ( [facility])  Heterogeneously dense  There is a subtle small focus of ill-defined stellate sub centimeter asymmetric opacity in the deep upper left breast just medial to midline, 8 to 9 cm from the nipple, not visible on prior studies. Recall for supplemental diagnostic mammogram images is recommended. Breast ultrasound may be added at that time if necessary.  The remainder the examination is negative and unchanged.  BI-RADS 0: Needs additional evaluation.     4/11/2023 Imaging    Left Diagnostic MMG with Jason & Left Breast Limited US ([facility])  MMG:   Tiny nodular opacity measuring about 4 mm with the surrounding stellate architectural distortion is confirmed in the upper inner left breast along the 11:00 axis, 8 to 9 cm from the nipple. One or two tiny punctate calcifications within the nodule. The mammographic appearance is suspicious.  US:   The lesion is very difficult to visualize by ultrasound. There is a subtle isoechoic focus with inconsistent acoustic shadowing positioned along the 11:00 axis about 8 cm from the nipple measuring about 4 mm achieving accurate ultrasound-guided core biopsy of this focus would not be reliable.  BI-RADS 4: Suspicious     4/28/2023 Biopsy    Left Breast, Stereotactic Biopsy ( [facility]):    1. Left Breast at 11:00 o'clock, 8-9 cm from Nipple (for calcifications), Stereotactic Core Biopsy:                 A. Invasive ductal carcinoma:                              1. Overall [place] grade II (tubular score=3, nuclear score=2, mitotic score=1).                            2. Invasive carcinoma measures at least 4 mm.                            3. No lymphovascular space invasion identified.               B. 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 Low-grade cribriform DCIS.    ER positive (%, strong)  WA positive (51-60%, moderate)  HER2 negative (IHC 2+; FISH copy #3.2, ratio  1.4)  Ki-67 8%     5/15/2023 Imaging    [facility]  RIGHT BREAST:    Benign-appearing postsurgical changes are identified in the right breast. No suspicious enhancing mass or area of non-mass enhancement is identified. There is intrinsic T1 hyperintense signal within multiple ducts in the anterior and middle retroareolar right breast, consistent with proteinaceous and/or hemorrhagic contents.  The visualized axilla is within normal limits.    LEFT BREAST:    At 11:00 in the posterior left breast, centered 8.5 cm posterior to the nipple, there is a 3.5 cm AP dimension, 3.5 cm transverse dimension, 4.0 cm craniocaudal dimension predominantly T1 hyperintense mass/collection consistent with a postbiopsy hematoma. A focus of susceptibility from a biopsy clip along the anterior, superior, lateral margin of the hematoma marks the site of biopsy-proven malignancy. No suspicious enhancing mass or area of non-mass enhancement is identified at the biopsy site or ulcer within the left breast. No suspicious enhancement is identified in the left nipple or chest wall.  The visualized axilla is within normal limits.    EXTRAMAMMARY FINDINGS:   There are no pathologically enlarged internal mammary chain lymph nodes on either side.     There is susceptibility from sternotomy wires.  BI-RADS 6: Known malignancy.         Review of Systems   Constitutional: Negative for appetite change, chills, diaphoresis, fatigue, fever and unexpected weight change.   HENT:   Negative for hearing loss, lump/mass, mouth sores, nosebleeds, sore throat, tinnitus, trouble swallowing and voice change.    Eyes: Negative for eye problems and icterus.   Respiratory: Negative for chest tightness, cough, hemoptysis, shortness of breath and wheezing.    Cardiovascular: Negative for chest pain, leg swelling and palpitations.   Gastrointestinal: Negative for abdominal distention, abdominal pain, blood in stool, constipation, diarrhea, nausea,  rectal pain and vomiting.   Endocrine: Negative for hot flashes.   Genitourinary: Negative for bladder incontinence, difficulty urinating, dyspareunia, dysuria, frequency, hematuria, menstrual problem, nocturia, pelvic pain, vaginal bleeding and vaginal discharge.    Musculoskeletal: Negative for arthralgias, back pain, flank pain, gait problem, myalgias, neck pain and neck stiffness.   Skin: Negative for itching, rash and wound.   Neurological: Negative for dizziness, extremity weakness, gait problem, headaches, light-headedness, numbness, seizures and speech difficulty.   Hematological: Negative for adenopathy. Does not bruise/bleed easily.   Psychiatric/Behavioral: Negative for confusion, decreased concentration, depression, sleep disturbance and suicidal ideas. The patient is not nervous/anxious.    I personally reviewed and updated the ROS.      Medications:    Current Outpatient Medications:   •  amitriptyline (ELAVIL) 10 MG tablet, Take 1 tablet by mouth Every Night., Disp: 90 tablet, Rfl: 3  •  Cholecalciferol (VITAMIN D3) 5000 UNITS capsule capsule, Take 1 capsule by mouth 1 (One) Time Per Week., Disp: , Rfl:   •  clopidogrel (PLAVIX) 75 MG tablet, Take 1 tablet by mouth Daily., Disp: 90 tablet, Rfl: 3  •  ezetimibe (ZETIA) 10 MG tablet, Take 1 tablet by mouth Daily., Disp: 90 tablet, Rfl: 3  •  icosapent ethyl (Vascepa) 1 g capsule capsule, Take 2 g by mouth 2 (Two) Times a Day With Meals., Disp: 360 capsule, Rfl: 3  •  isosorbide dinitrate (ISORDIL) 5 MG tablet, Take 1 tablet by mouth 2 (Two) Times a Day., Disp: 180 tablet, Rfl: 3  •  Melatonin 12 MG tablet, Take 12 mg by mouth Every Night., Disp: , Rfl:   •  Menaquinone-7 (VITAMIN K2 PO), Take  by mouth., Disp: , Rfl:   •  montelukast (Singulair) 10 MG tablet, Take 1 tablet by mouth Every Night., Disp: 90 tablet, Rfl: 3  •  multivitamin (THERAGRAN) tablet tablet, Take  by mouth Daily., Disp: , Rfl:   •  pitavastatin calcium (Livalo) 2 MG tablet tablet,  Take 1 tablet by mouth Every Night., Disp: 90 tablet, Rfl: 2  •  Probiotic Product (PROBIOTIC PO), Take 4 capsules by mouth Daily. gutbio-align, Disp: , Rfl:   •  ramipril (ALTACE) 2.5 MG capsule, Take 1 capsule by mouth Every Night., Disp: 90 capsule, Rfl: 2      Allergies   Allergen Reactions   • Adhesive Tape Rash     Redness, bruising and peeling of skin    *Use Paper Tape Only*  Redness, bruising and peeling of skin    *Use Paper Tape Only*   • Beta Adrenergic Blockers Unknown - High Severity     hypotension  bradycardic   • Statins Myalgia   • Elemental Sulfur Rash   • Sulfa Antibiotics Rash       Past Medical History:   Diagnosis Date   • Abnormal blood chemistry    • Acute UTI (urinary tract infection)    • Allergic    • Anemia    • Aneurysm, cerebral    • Bloating    • Brain aneurysm    • CAD (coronary artery disease)    • Chronic headaches    • Coronary artery disease    • Coronary artery stenosis    • Dysuria    • Encounter for screening colonoscopy    • Environmental allergies    • Fall from slip, trip, or stumble    • Gait disturbance    • H/O cardiovascular stress test 09/17/2013    Treadmill   • H/O colonoscopy    • H/O echocardiogram 09/25/2013   • H/O fall    • Heart murmur    • Hereditary spherocytosis    • History of EKG 07/31/2015   • Hyperlipemia    • Hyperlipidemia    • Hypertension    • Hyperthyroidism    • Injury of back    • Insomnia    • Left forearm pain    • Left leg pain    • Left wrist pain    • Lower abdominal pain    • Malignant neoplasm of upper-inner quadrant of left breast in female, estrogen receptor positive 05/18/2023   • Need for pneumococcal vaccination    • Onychomycosis of toenail    • AKIRA (obstructive sleep apnea)    • Pelvic pressure in female    • Right foot pain    • Stroke    • TIA (transient ischemic attack) 11/30/2016   • URI (upper respiratory infection)    • Urine frequency        Past Surgical History:   Procedure Laterality Date   • BREAST EXCISIONAL BIOPSY Right  1977    b9   • BREAST EXCISIONAL BIOPSY Right 1979    b9   • BUNIONECTOMY     • CARDIAC CATHETERIZATION N/A 04/08/2019    Procedure: Left Heart Cath;  Surgeon: Jayme Ramirez MD;  Location:  [facility];  Service: Cardiovascular   • CARDIAC CATHETERIZATION N/A 04/08/2019    Procedure: Coronary angiography;  Surgeon: Jayme Ramirez MD;  Location:  [facility];  Service: Cardiovascular   • CARDIAC CATHETERIZATION N/A 04/08/2019    Procedure: Left ventriculography;  Surgeon: Jayme Ramirez MD;  Location:  [facility];  Service: Cardiovascular   • CEREBRAL ANEURYSM REPAIR  2015   • CHOLECYSTECTOMY     • COLONOSCOPY N/A 01/29/2021    Procedure: COLONOSCOPY with polypectomy;  Surgeon: Colin Ladd MD;  Location: [facility] OR;  Service: Gastroenterology;  Laterality: N/A;  Diverticulosis  Sigmoid colon polyp   • CORONARY ARTERY BYPASS GRAFT  2004    Triple   • FOOT SURGERY Right 2016   • ROTATOR CUFF REPAIR Left    • SPLENECTOMY  1953   • TONSILLECTOMY  1984   • TUBAL ABDOMINAL LIGATION  1979   • US GUIDED CYST ASPIRATION BREAST N/A 5/18/2023       Family History   Problem Relation Age of Onset   • Heart attack Mother    • Heart failure Mother    • Hypertension Mother    • Stroke Mother    • Cervical cancer Mother    • Hypertension Father    • Heart failure Father    • Heart disease Father    • Aneurysm Sister    • Breast cancer Sister 66   • Anxiety disorder Sister    • No Known Problems Sister    • Heart attack Brother    • Cancer Brother    • Lung cancer Brother    • No Known Problems Daughter    • No Known Problems Son    • Ovarian cancer Neg Hx    • Colon cancer Neg Hx    • Pulmonary embolism Neg Hx    • Deep vein thrombosis Neg Hx        Social History     Socioeconomic History   • Marital status:    • Number of children: 2   Tobacco Use   • Smoking status: Never   • Smokeless tobacco: Never   Vaping Use   • Vaping Use: Never used    Substance and Sexual Activity   • Alcohol use: Yes     Comment: Rare/ occ.   • Drug use: No   • Sexual activity: Not Currently     Partners: Male     Birth control/protection: Abstinence     Comment: BTL     Patient does not drink caffeine.     GYNECOLOGIC HISTORY:   . P: 2. AB: 0.  Last menstrual period: Postmenopausal  Age at menarche: 13  Age at first childbirth: 28  Lactation/How lon-6 months   Age at menopause: 55  Total years of oral contraceptive use: 6-7 years  Total years of hormone replacement therapy: 0      Objective    PHYSICAL EXAMINATION:   Vitals:    23 1135   BP: 144/78   Pulse: 65   Resp: 17   SpO2: 98%     ECOG 0 - Asymptomatic  General: NAD, well appearing  Psych: a&o x 3, normal mood and affect  Eyes: EOMI, no scleral icterus  ENMT: neck supple without masses or thyromegaly, mucus membranes moist  Resp: normal effort, CTAB  CV: RRR, no murmurs, no edema  GI: soft, NT, ND  MSK: normal gait, normal ROM in bilateral shoulders  Lymph nodes: no cervical, supraclavicular or axillary lymphadenopathy  Breast: symmetric, moderate size, grade 3 ptosis, sternal scar  Right: Superior curvilinear scar, otherwise no visible abnormalities on inspection while seated, with arms raised or hands on hips. No masses or nipple abnormalities.  Left: On inspection, there is an obvious bulge in the superior left breast with overlying ecchymoses.  At 11:00, 9 cm FN, there is a 5 x 5 cm mass (postbiopsy hematoma).  No nipple abnormalities.       Procedure: Ultrasound-guided hematoma aspiration  Indication:  Postbiopsy hematoma that is delaying surgical management  Location: Left breast, 11:00, 9 cm FN  Consent: The risks, benefits, and alternatives to the procedure were discussed with the patient, who understood and wished to proceed. The risks described included, but were not limited to, bleeding, infection, insufficient drainage due to hematoma already clotting.  Description of limited left breast  ultrasound:  Using a 10MHz linear array transducer, I scanned the left breast over the targeted area.  At 11:00, 9 cm FN, there is an anechoic fluid collection with internal echoes and posterior enhancement.  This measures 32 x 32 by 30 mm on ultrasound.  Description of Procedure: After the patient was positioned supine on the procedure table, I located the hematoma using ultrasound as described above. I prepped and draped the breast skin in sterile fashion. I anesthetized the breast skin and underlying subcutaneous tissue with 1% lidocaine with epinephrine under ultrasound visualization and guidance. I then inserted an 18 G needle (attached to a syringe) into the hematoma under ultrasound guidance and and evacuated as much fluid as possible. In total 20 mL of dark bloody fluid was aspirated. The fluid was discarded.  Post aspiration, the collection measured 31 x 11 x 29 mm.  Marker placed: none  Tolerance: The patient tolerated the procedure well.  Disposition: See plan below.       I have independently reviewed her imaging and here are my findings:   In the left upper inner breast, there initially was a 4 mm nodule seen on mammogram with associated architectural distortion and calcifications.  There is not an easily identifiable ultrasound correlate.  MRI showed a 4 cm postbiopsy hematoma.      Assessment & Plan   Assessment:  73 y.o. F with a new diagnosis of left breast cancer: Intermediate grade, invasive ductal carcinoma, ER/VA positive, Her2 negative, with associated DCIS; clinical T1aN0, anatomic stage IA, prognostic stage IA.  She developed a large postbiopsy hematoma.    Discussion:  I had an extensive discussion with the patient and her family about the nature of her breast cancer diagnosis. We reviewed the components of breast tissue including ducts and lobules. We reviewed her pathology report in detail. We reviewed breast cancer histology, including stage, grade, ER/VA receptors, HER2 receptors and how  this applies to her diagnosis. We reviewed the basics of systemic and local/regional management of breast cancer.      We discussed that in her case, systemic treatment would likely involve endocrine therapy. She will be referred to medical oncology to discuss this further. We reviewed potential surgical treatments to include partial mastectomy and mastectomy and discussed the rationale associated with each approach. Regarding radiation therapy, we discussed that radiation is indicated in most cases of breast conservation and in only limited circumstances following mastectomy. We discussed that the primary goal of adjuvant radiation is to decrease the likelihood of local recurrence. She will be referred to radiation oncology postoperatively to discuss the risks and benefits of treatment and whether it is indicated.     In her case, she is a good candidate for lumpectomy and she would like to proceed with breast conservation. We discussed that lumpectomy would require pre-operative localization. We also discussed the risk of positive margins and that she must have negative margins for lumpectomy to be an appropriate oncologic procedure. I will make every effort to obtain negative margins at her initial operation, but there is a 10-15% chance that she will require a second operation for re-excision, or possibly a total mastectomy. We will not know the margin status until after her final pathology has returned.      We discussed axillary staging. I reviewed with her the data (CALGB 9343) that suggests in women over 70 with small ER+ tumors who will receive adjuvant endocrine therapy, the risks of axillary staging may outweigh the benefits, given that there will be no change in adjuvant therapy or outcome regardless of axillary status. This was explained in detail to the patient and she declined axillary staging.      She would like to undergo genetic testing, mainly to help inform her third, unaffected sister. This was  performed today.    The postbiopsy hematoma limits my ability to accurately localize the residual cancer.  There is a good chance that the biopsy clip is not located at the exact cancer site.  If I were to operate with this large hematoma in place, I would need to remove more tissue than necessary.  Prior to aspirating the hematoma, I had discussed starting neoadjuvant endocrine therapy while waiting for the hematoma to resolve.  I did not anticipate that aspiration was going to be very successful due to the already clotted blood.  However I was surprisingly able to evacuate 20 mL of fluid.  This should hopefully speed up the hematoma resolution, and I think she can safely avoid neoadjuvant endocrine therapy.  She would still like to discuss this with medical oncology and we will set up a referral.    Plan:  A multidisciplinary plan has been formulated for the patient:      (1) Breast Surgical Oncology:  -F/u genetic testing. I will call her with results.  -Preoperative cardiology evaluation.  She is already scheduled for her annual follow-up in 2 weeks.  We will need to hold her Plavix perioperatively.  -Follow-up in 1 month with repeat left mammogram.    (2) Medical Oncology:  -Preoperative referral.    (3) Radiation Oncology:  -Will refer postoperatively.    Jaleesa Hsieh MD    I spent 100 minutes caring for Maddison on this date of service. This time includes time spent by me in the following activities: preparing for the visit, performing a medically appropriate examination and/or evaluation , counseling and educating the patient/family/caregiver, ordering medications, tests, or procedures, referring and communicating with other health care professionals , documenting information in the medical record and independently interpreting results and communicating that information with the patient/family/caregiver.  I spent 10 minutes on the separately reported service of left breast hematoma aspiration. This time  is not included in the time used to support the E/M service also reported today.      CC:  CELI Forrest MD

## 2023-05-19 ENCOUNTER — TRANSCRIBE ORDERS (OUTPATIENT)
Dept: SURGERY | Facility: CLINIC | Age: 73
End: 2023-05-19
Payer: MEDICARE

## 2023-05-19 DIAGNOSIS — Z17.0 MALIGNANT NEOPLASM OF UPPER-INNER QUADRANT OF LEFT BREAST IN FEMALE, ESTROGEN RECEPTOR POSITIVE: Primary | ICD-10-CM

## 2023-05-19 DIAGNOSIS — C50.212 MALIGNANT NEOPLASM OF UPPER-INNER QUADRANT OF LEFT BREAST IN FEMALE, ESTROGEN RECEPTOR POSITIVE: Primary | ICD-10-CM

## 2023-05-23 ENCOUNTER — PATIENT OUTREACH (OUTPATIENT)
Dept: OTHER | Facility: HOSPITAL | Age: 73
End: 2023-05-23
Payer: MEDICARE

## 2023-05-23 NOTE — PROGRESS NOTES
Referral received from Dr. Hsieh's office. Called Ms. Turcios and left a message introducing myself and navigational services. Asked her to call me back at her convenience and left my contact information.

## 2023-05-23 NOTE — PROGRESS NOTES
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    HISTORY OF PRESENT ILLNESS:  The patient is a 73 y.o. year old female who is here for an opinion about the above issue.    History of Present Illness patient is a 73-year-old female with hereditary spherocytosis and coronary artery disease cardiovascular disease who had a routine screening mammogram which was abnormal in the left breast and this led to diagnostic imaging and ultrasound the findings of a 4 mm abnormality in the 12 o'clock position of the left breast.  A biopsy was done showing grade 2 invasive ductal carcinoma measuring 4 mm ER/NM positive HER2 2+ FISH negative.    An MRI was done but there was a large 4 cm postbiopsy hematoma because clip to move to the periphery of the hematoma and for this reason surgery was delayed until the biopsy could improve and adequate localization of the clip could be performed    She is  2 para 2 menarche was at age 13 menopause at 56 she is taking no hormone replacement.  First childbirth was age 28 she breast-fed for 6 months    Family history is positive for sister with breast cancer at age 66 brother with lung cancer at age 57 her genetic testing was drawn and results are pending    She is not a smoker or drinker  She has never had a DVT or MI but did have a TIA in 2016 after she stopped her Plavix and Plavix was reinstituted.  She has had a cerebral aneurysm treated by interventional radiology in     She is not a free bleeder and was on Plavix and this was  held for the biopsy but she was still on fish oil supplements which may have  contributed to the post biopsy hematoma.    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 age and is up-to-date with vaccinations for splenectomy    She has had bypass surgery for coronary artery disease in the past also        Past Medical History:   Diagnosis Date   • Abnormal blood chemistry    • Acute UTI (urinary tract infection)    • Allergic    • Anemia    • Aneurysm, cerebral    • Bloating    • Brain aneurysm    • CAD (coronary artery disease)    • Chronic headaches    • Coronary artery disease    • Coronary artery stenosis    • Dysuria    • Encounter for screening colonoscopy    • Environmental allergies    • Fall from slip, trip, or stumble    • Gait disturbance    • H/O cardiovascular stress test 09/17/2013    Treadmill   • H/O colonoscopy    • H/O echocardiogram 09/25/2013   • H/O fall    • Heart murmur    • Hereditary spherocytosis    • History of EKG 07/31/2015   • Hyperlipemia    • Hyperlipidemia    • Hypertension    • Hyperthyroidism    • Injury of back    • Insomnia    • Left forearm pain    • Left leg pain    • Left wrist pain    • Lower abdominal pain    • Malignant neoplasm of upper-inner quadrant of left breast in female, estrogen receptor positive 05/18/2023   • Need for pneumococcal vaccination    • Onychomycosis of toenail    • AKIRA (obstructive sleep apnea)    • Pelvic pressure in female    • Right foot pain    • Stroke    • TIA (transient ischemic attack) 11/30/2016   • URI (upper respiratory infection)    • Urine frequency         Past Surgical History:   Procedure Laterality Date   • BREAST EXCISIONAL BIOPSY Right 1977    b9   • BREAST EXCISIONAL BIOPSY Right 1979    b9   • BUNIONECTOMY     • CARDIAC CATHETERIZATION N/A 04/08/2019    Procedure: Left Heart Cath;  Surgeon: Jayme Ramirez MD;  Location: [facility] INVASIVE LOCATION;  Service: Cardiovascular   • CARDIAC CATHETERIZATION N/A 04/08/2019    Procedure:  Coronary angiography;  Surgeon: Jayme Ramirez MD;  Location:  [facility] INVASIVE LOCATION;  Service: Cardiovascular   • CARDIAC CATHETERIZATION N/A 04/08/2019    Procedure: Left ventriculography;  Surgeon: Jayme Ramirez MD;  Location:  [facility] INVASIVE LOCATION;  Service: Cardiovascular   • CEREBRAL ANEURYSM REPAIR  2015   • CHOLECYSTECTOMY     • COLONOSCOPY N/A 01/29/2021    Procedure: COLONOSCOPY with polypectomy;  Surgeon: Colin Ladd MD;  Location:  [facility] OR;  Service: Gastroenterology;  Laterality: N/A;  Diverticulosis  Sigmoid colon polyp   • CORONARY ARTERY BYPASS GRAFT  2004    Triple   • FOOT SURGERY Right 2016   • ROTATOR CUFF REPAIR Left    • SPLENECTOMY  1953   • TONSILLECTOMY  1984   • TUBAL ABDOMINAL LIGATION  1979   • US GUIDED CYST ASPIRATION BREAST N/A 5/18/2023        Current Outpatient Medications on File Prior to Visit   Medication Sig Dispense Refill   • amitriptyline (ELAVIL) 10 MG tablet Take 1 tablet by mouth Every Night. 90 tablet 3   • Cholecalciferol (VITAMIN D3) 5000 UNITS capsule capsule Take 1 capsule by mouth 1 (One) Time Per Week.     • clopidogrel (PLAVIX) 75 MG tablet Take 1 tablet by mouth Daily. 90 tablet 3   • ezetimibe (ZETIA) 10 MG tablet Take 1 tablet by mouth Daily. 90 tablet 3   • icosapent ethyl (Vascepa) 1 g capsule capsule Take 2 g by mouth 2 (Two) Times a Day With Meals. 360 capsule 3   • isosorbide dinitrate (ISORDIL) 5 MG tablet Take 1 tablet by mouth 2 (Two) Times a Day. 180 tablet 3   • Melatonin 12 MG tablet Take 12 mg by mouth Every Night.     • Menaquinone-7 (VITAMIN K2 PO) Take  by mouth.     • montelukast (Singulair) 10 MG tablet Take 1 tablet by mouth Every Night. 90 tablet 3   • multivitamin (THERAGRAN) tablet tablet Take  by mouth Daily.     • pitavastatin calcium (Livalo) 2 MG tablet tablet Take 1 tablet by mouth Every Night. 90 tablet 2   • Probiotic Product (PROBIOTIC PO) Take 4 capsules by mouth Daily. gutbio-align     •  "ramipril (ALTACE) 2.5 MG capsule Take 1 capsule by mouth Every Night. 90 capsule 2     No current facility-administered medications on file prior to visit.        ALLERGIES:    Allergies   Allergen Reactions   • Adhesive Tape Rash     Redness, bruising and peeling of skin    *Use Paper Tape Only*  Redness, bruising and peeling of skin    *Use Paper Tape Only*   • Beta Adrenergic Blockers Unknown - High Severity     hypotension  bradycardic   • Statins Myalgia   • Elemental Sulfur Rash   • Sulfa Antibiotics Rash        Social History     Socioeconomic History   • Marital status:    • Number of children: 2   Tobacco Use   • Smoking status: Never   • Smokeless tobacco: Never   Vaping Use   • Vaping Use: Never used   Substance and Sexual Activity   • Alcohol use: Yes     Comment: Rare/ occ.   • Drug use: No   • Sexual activity: Not Currently     Partners: Male     Birth control/protection: Abstinence     Comment: BTL        Family History   Problem Relation Age of Onset   • Heart attack Mother    • Heart failure Mother    • Hypertension Mother    • Stroke Mother    • Cervical cancer Mother    • Hypertension Father    • Heart failure Father    • Heart disease Father    • Aneurysm Sister    • Breast cancer Sister 66   • Anxiety disorder Sister    • No Known Problems Sister    • Heart attack Brother    • Cancer Brother    • Lung cancer Brother    • No Known Problems Daughter    • No Known Problems Son    • Ovarian cancer Neg Hx    • Colon cancer Neg Hx    • Pulmonary embolism Neg Hx    • Deep vein thrombosis Neg Hx         Review of Systems   Musculoskeletal: Positive for arthralgias.   Psychiatric/Behavioral: The patient is nervous/anxious.         Objective     Vitals:    05/24/23 1508   BP: 159/95   Pulse: 65   Resp: 16   Temp: 98.2 °F (36.8 °C)   TempSrc: Temporal   SpO2: 97%   Weight: 63.5 kg (139 lb 14.4 oz)   Height: 162.6 cm (64.02\")   PainSc: 0-No pain         5/24/2023     3:04 PM   Current Status   ECOG " score 0       Physical Exam    CONSTITUTIONAL:  Vital signs reviewed.  No distress, looks comfortable.  EYES:  Conjunctivae and lids unremarkable.  PERRLA  EARS,NOSE,MOUTH,THROAT:  Ears and nose appear unremarkable.  Lips, teeth, gums appear unremarkable.  RESPIRATORY:  Normal respiratory effort.  Lungs clear to auscultation bilaterally.  CARDIOVASCULAR:  Normal S1, S2.  No murmurs rubs or gallops.  No significant lower extremity edema.  GASTROINTESTINAL: Abdomen appears unremarkable.  Nontender.  No hepatomegaly.  No splenomegaly.  LYMPHATIC:  No cervical, supraclavicular, axillary lymphadenopathy.  SKIN:  Warm.  No rashes.  PSYCHIATRIC:  Normal judgment and insight.  Normal mood and affect.      RECENT LABS:  Hematology WBC   Date Value Ref Range Status   05/24/2023 8.81 3.40 - 10.80 10*3/mm3 Final   01/25/2023 6.27 3.40 - 10.80 10*3/mm3 Final     RBC   Date Value Ref Range Status   05/24/2023 4.04 3.77 - 5.28 10*6/mm3 Final   01/25/2023 4.00 3.77 - 5.28 10*6/mm3 Final     Hemoglobin   Date Value Ref Range Status   05/24/2023 13.1 12.0 - 15.9 g/dL Final     Hematocrit   Date Value Ref Range Status   05/24/2023 36.3 34.0 - 46.6 % Final     Platelets   Date Value Ref Range Status   05/24/2023 272 140 - 450 10*3/mm3 Final        Final Diagnosis   1. Left Breast at 11:00 o'clock, 8-9 cm from Nipple (for calcifications), Stereotactic Core Biopsy:                 A. Invasive ductal carcinoma:                              1. Overall Debbie grade II (tubular score=3, nuclear score=2, mitotic score=1).                            2. Invasive carcinoma measures at least 4 mm.                            3. No lymphovascular space invasion identified.               B. 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 Low-grade cribriform DCIS.     Jat./kds    Electronically signed by Christopher Lara MD on 5/1/2023 at 1311   Synoptic Checklist   Breast Biomarker Reporting Template  Protocol  posted: 6/22/2022  BREAST: BIOMARKER REPORTING TEMPLATE - All Specimens  Test(s) Performed     Estrogen Receptor (ER) Status  Positive (greater than 10% of cells demonstrate nuclear positivity)    Percentage of Cells with Nuclear Positivity  %    Average Intensity of Staining  Strong    Test Type  Food and Drug Administration (FDA) cleared (test / vendor): [facility]    Primary Antibody  SP1    Scoring System  Sugar    Proportion Score  5    Intensity Score  3    Total Sugar Score  8    Test(s) Performed     Progesterone Receptor (PgR) Status  Positive    Percentage of Cells with Nuclear Positivity  51-60%    Average Intensity of Staining  Moderate    Test Type  Food and Drug Administration (FDA) cleared (test / vendor): [facility]    Primary Antibody  1E2    Scoring System  Sugar    Proportion Score  4    Intensity Score  2    Total Sugar Score  6    Test(s) Performed     HER2 by Immunohistochemistry  Equivocal (Score 2+)    Percentage of Cells with Uniform Intense Complete Membrane Staining  0 %   Test Type  Food and Drug Administration (FDA) cleared (test / vendor): [facility]    Primary Antibody  4B5    Test(s) Performed  Ki-67    Ki-67 Percentage of Positive Nuclei  8 %   Primary Antibody  30-9    Cold Ischemia and Fixation Times  Meet requirements specified in latest version of the ASCO / CAP Guidelines    Cold Ischemia Time (minutes)  16 min   Fixation Time (hours)  8 hours   Testing Performed on Block Number(s)  1D    METHODS   Fixative  Formalin    Image Analysis  Not performed    Comment(s)  Her 2 FISH NEG    .            Assessment & Plan   1.cT1aN0 IDC Grade 2  ER/IL + her 2 -2+ neg FISH post biopsy with a large postbiopsy hematoma - resolving    2.  Hereditary spherocytosis postsplenectomy age 3  · Up-to-date on post splenectomy vaccines    3.  Cerebral aneurysm hereditary post ablation in 2015    4.  Coronary artery disease post coronary artery bypass in 2004    5.  TIA 2016 currently on Plavix    6.   Osteopenia    7.  Large postbiopsy hematoma possibly aggravated by fish oil which she will hold before her surgery    Plan  Discussed with Dr. Hsieh who states that she was able to aspirate much of the hematoma which is significantly smaller and she will reevaluate her at the end of June and decide on surgery and we will see her after surgery to get the final results and make a decision about adjuvant treatment.    Based on her TIA I have recommended an aromatase inhibitor rather than tamoxifen and because of this we will do a but DEXA scan to see where we stand and use Prolia to prevent worsening of her bone density is already bad    I have prescribed Arimidex but I told her not to pick it up until after I see her again on the off chance that this is a sub-5 mm tumor that does not need treatment    Explained the rationale for adjuvant hormonal blockade and possibly addition of Prolia to prevent worsening of her bone density and she is agreeable and we will discuss this further at her next visit    I spent 50 total minutes, face-to-face, caring for Maddison today. Greater than 50% of this time involved counseling and/or coordination of care as documented within this note.

## 2023-05-24 ENCOUNTER — CONSULT (OUTPATIENT)
Dept: ONCOLOGY | Facility: CLINIC | Age: 73
End: 2023-05-24
Payer: MEDICARE

## 2023-05-24 ENCOUNTER — PATIENT OUTREACH (OUTPATIENT)
Dept: OTHER | Facility: HOSPITAL | Age: 73
End: 2023-05-24
Payer: MEDICARE

## 2023-05-24 ENCOUNTER — LAB (OUTPATIENT)
Dept: LAB | Facility: HOSPITAL | Age: 73
End: 2023-05-24
Payer: MEDICARE

## 2023-05-24 VITALS
SYSTOLIC BLOOD PRESSURE: 159 MMHG | DIASTOLIC BLOOD PRESSURE: 95 MMHG | BODY MASS INDEX: 23.88 KG/M2 | HEART RATE: 65 BPM | OXYGEN SATURATION: 97 % | RESPIRATION RATE: 16 BRPM | TEMPERATURE: 98.2 F | WEIGHT: 139.9 LBS | HEIGHT: 64 IN

## 2023-05-24 DIAGNOSIS — Z17.0 MALIGNANT NEOPLASM OF UPPER-INNER QUADRANT OF LEFT BREAST IN FEMALE, ESTROGEN RECEPTOR POSITIVE: Primary | ICD-10-CM

## 2023-05-24 DIAGNOSIS — Z78.0 POST-MENOPAUSAL: ICD-10-CM

## 2023-05-24 DIAGNOSIS — C50.212 MALIGNANT NEOPLASM OF UPPER-INNER QUADRANT OF LEFT BREAST IN FEMALE, ESTROGEN RECEPTOR POSITIVE: Primary | ICD-10-CM

## 2023-05-24 DIAGNOSIS — C50.919 MALIGNANT NEOPLASM OF FEMALE BREAST, UNSPECIFIED ESTROGEN RECEPTOR STATUS, UNSPECIFIED LATERALITY, UNSPECIFIED SITE OF BREAST: Primary | ICD-10-CM

## 2023-05-24 LAB
BASOPHILS # BLD AUTO: 0.07 10*3/MM3 (ref 0–0.2)
BASOPHILS NFR BLD AUTO: 0.8 % (ref 0–1.5)
DEPRECATED RDW RBC AUTO: 38.6 FL (ref 37–54)
EOSINOPHIL # BLD AUTO: 0.16 10*3/MM3 (ref 0–0.4)
EOSINOPHIL NFR BLD AUTO: 1.8 % (ref 0.3–6.2)
ERYTHROCYTE [DISTWIDTH] IN BLOOD BY AUTOMATED COUNT: 11.9 % (ref 12.3–15.4)
HCT VFR BLD AUTO: 36.3 % (ref 34–46.6)
HGB BLD-MCNC: 13.1 G/DL (ref 12–15.9)
HGB RETIC QN AUTO: 32.7 PG (ref 29.8–36.1)
IMM GRANULOCYTES # BLD AUTO: 0.08 10*3/MM3 (ref 0–0.05)
IMM GRANULOCYTES NFR BLD AUTO: 0.9 % (ref 0–0.5)
IMM RETICS NFR: 3.5 % (ref 3–15.8)
LYMPHOCYTES # BLD AUTO: 3.36 10*3/MM3 (ref 0.7–3.1)
LYMPHOCYTES NFR BLD AUTO: 38.1 % (ref 19.6–45.3)
MCH RBC QN AUTO: 32.4 PG (ref 26.6–33)
MCHC RBC AUTO-ENTMCNC: 36.1 G/DL (ref 31.5–35.7)
MCV RBC AUTO: 89.9 FL (ref 79–97)
MONOCYTES # BLD AUTO: 0.72 10*3/MM3 (ref 0.1–0.9)
MONOCYTES NFR BLD AUTO: 8.2 % (ref 5–12)
NEUTROPHILS NFR BLD AUTO: 4.42 10*3/MM3 (ref 1.7–7)
NEUTROPHILS NFR BLD AUTO: 50.2 % (ref 42.7–76)
NRBC BLD AUTO-RTO: 0 /100 WBC (ref 0–0.2)
PLATELET # BLD AUTO: 272 10*3/MM3 (ref 140–450)
PMV BLD AUTO: 8.9 FL (ref 6–12)
RBC # BLD AUTO: 4.04 10*6/MM3 (ref 3.77–5.28)
RETICS # AUTO: 0.11 10*6/MM3 (ref 0.02–0.13)
RETICS/RBC NFR AUTO: 2.68 % (ref 0.7–1.9)
WBC NRBC COR # BLD: 8.81 10*3/MM3 (ref 3.4–10.8)

## 2023-05-24 PROCEDURE — 36415 COLL VENOUS BLD VENIPUNCTURE: CPT

## 2023-05-24 PROCEDURE — 85025 COMPLETE CBC W/AUTO DIFF WBC: CPT

## 2023-05-24 PROCEDURE — 85046 RETICYTE/HGB CONCENTRATE: CPT

## 2023-05-24 RX ORDER — ANASTROZOLE 1 MG/1
1 TABLET ORAL DAILY
Qty: 30 TABLET | Refills: 6 | Status: SHIPPED | OUTPATIENT
Start: 2023-05-24 | End: 2023-06-23

## 2023-05-24 NOTE — PROGRESS NOTES
Referral received from Dr. Hsieh's office. I called Ms. Turcios and introduced myself and navigational services. She stated the consult with Dr. Hsieh went well and she is leaning toward a lumpectomy but will wait until her hematoma resolves. She will see Dr. White today to talk about endocrine therapy while she waits. She has a good understanding of her pathology and treatment options. She was able to verbalize teach back on her plan of care.      She stated she has a wonderful support system with friends, family, and [employer] members. She stated she feels comfortable talking to them about needs or issues.      She stated she has no financial or transportation concerns at this time. She has no resource needs or ongoing concerns at this time.      She stated she is doing well with her diagnosis at this time. We discussed we have support options if the need arises. She was thankful for the information.      We discussed integrative therapies and other [facility]. She will received a navigation folder with the following information in the mail:     [employer] [facility], Cancer and Restorative Exercise (CARE), [employer] Exercise program, Guide for the Newly Diagnosed, [employer], [facility], Massage Therapy, Reiki Therapy, [employer] [place], Cancer Nutrition, and Survivorship Clinic.     She verbalized appreciation for navigational services and she has my contact information and will call with any questions that arise.

## 2023-06-02 ENCOUNTER — OFFICE VISIT (OUTPATIENT)
Dept: CARDIOLOGY | Facility: CLINIC | Age: 73
End: 2023-06-02

## 2023-06-02 ENCOUNTER — TELEPHONE (OUTPATIENT)
Dept: SURGERY | Facility: CLINIC | Age: 73
End: 2023-06-02

## 2023-06-02 ENCOUNTER — DOCUMENTATION (OUTPATIENT)
Dept: OTHER | Facility: HOSPITAL | Age: 73
End: 2023-06-02

## 2023-06-02 ENCOUNTER — TELEPHONE (OUTPATIENT)
Dept: CARDIOLOGY | Facility: CLINIC | Age: 73
End: 2023-06-02

## 2023-06-02 VITALS
HEIGHT: 64 IN | BODY MASS INDEX: 23.39 KG/M2 | DIASTOLIC BLOOD PRESSURE: 88 MMHG | SYSTOLIC BLOOD PRESSURE: 142 MMHG | HEART RATE: 64 BPM | WEIGHT: 137 LBS

## 2023-06-02 DIAGNOSIS — Z95.1 S/P CABG (CORONARY ARTERY BYPASS GRAFT): ICD-10-CM

## 2023-06-02 DIAGNOSIS — I10 ESSENTIAL HYPERTENSION: ICD-10-CM

## 2023-06-02 DIAGNOSIS — I25.10 CORONARY ARTERY DISEASE INVOLVING NATIVE CORONARY ARTERY OF NATIVE HEART WITHOUT ANGINA PECTORIS: Primary | ICD-10-CM

## 2023-06-02 DIAGNOSIS — E78.2 MIXED HYPERLIPIDEMIA: ICD-10-CM

## 2023-06-02 PROCEDURE — 3077F SYST BP >= 140 MM HG: CPT | Performed by: INTERNAL MEDICINE

## 2023-06-02 PROCEDURE — 1160F RVW MEDS BY RX/DR IN RCRD: CPT | Performed by: INTERNAL MEDICINE

## 2023-06-02 PROCEDURE — 99214 OFFICE O/P EST MOD 30 MIN: CPT | Performed by: INTERNAL MEDICINE

## 2023-06-02 PROCEDURE — 93000 ELECTROCARDIOGRAM COMPLETE: CPT | Performed by: INTERNAL MEDICINE

## 2023-06-02 PROCEDURE — 1159F MED LIST DOCD IN RCRD: CPT | Performed by: INTERNAL MEDICINE

## 2023-06-02 PROCEDURE — 3079F DIAST BP 80-89 MM HG: CPT | Performed by: INTERNAL MEDICINE

## 2023-06-02 NOTE — TELEPHONE ENCOUNTER
I dictated grade 2 not stage 2 - pls call and let her know.  cancer questions about grade vs stage - pls  address to dr. White        ----- Message from Ashanti Salomon MA sent at 6/2/2023  1:29 PM EDT -----  Regarding: FW: Stage of breast cancer  Contact: [phone]        ----- Message -----  From: Maddison Turcios  Sent: 6/2/2023  12:25 PM EDT  To: Marnie Saldana [facility]  Subject: Stage of breast cancer                           In your follow-up notes concerned my breast cancer you said it was stage 2.   I was told it was stage 1. Am I correct?

## 2023-06-02 NOTE — TELEPHONE ENCOUNTER
I called and left  for patient letting her know that her genetic testing was negative for a mutation.     Dr. Hsieh will give her a copy of the results at her next appointment.

## 2023-06-02 NOTE — PROGRESS NOTES
Oncology Social Work  ..Distress Screening Follow-up    Diagnosis: Left Breast Cancer     Location of Distress Screening: Medical Oncology    Distress Level: 4 (5/24/2023  4:00 PM)    Physical Concerns:       Practical Problems:       Emotional Concerns:       Family Concerns:       Spiritual Concerns:      Comments: patient seen by Shellie MINER, Breast RN navigator. Per her note no practical, home or emotional needs voiced.  OSW will remain available if they arrive.  Shellie did mail patient information on Emotional support provided through [employer] and [employer].    Anum Quintero, TEDDYW, LCSW

## 2023-06-02 NOTE — TELEPHONE ENCOUNTER
Notified patient of results/recommendations. Patient verbalized understanding.    Marilyn Farr RN  [place] [facility] [place]

## 2023-06-02 NOTE — PROGRESS NOTES
Date of Office Visit: 2023  Encounter Provider: Helen Urbina MD  Place of Service: [facility] CARDIOLOGY  Patient Name: Maddison Turcios  :1950      Patient ID:  Maddison Turcios is a 73 y.o. female is here for  followup for CAD.         History of Present Illness    She has a history of CAD- s/p CABG with LIMA to mid LAD and sequential saphenous vein graft to ramus intermedius and first obtuse marginal branch, history of splenectomy for hereditary spherocytosis, hyperlipidemia with statin intolerance except Livalo, hypertension, history of TIA, history of cerebral aneurysm status post coil embolization in 2015 ([facility]).  She now has a new diagnosis of breast cancer.    She has a history of episodes of severe fatigue and had a stress study  which showed a very mild abnormality.  Her has cardiac catheterization done 2004 that showed  ostial 60% stenosis of the left main coronary artery done at [facility] in [facility].CABG done 2004 she received a LIMA to mid LAD and sequential saphenous vein graft to the ramus intermedius and first obtuse marginal branch done at  [facility].       She was at [facility], admitted there on 2016 through 2016. At that time she was diagnosed with a TIA. During her hospitalization she had an MRI which was negative for an acute infarct. They felt possible TIA versus complicated migraine. She had had for 2-3 days prior to this a feeling of imbalance and leaned to the left along with some blurred vision. She had a transesophageal echocardiogram whether did not show anything to explain her TIA. There was bilateral atrial enlargement but otherwise looked fine. She was loaded with Plavix 300 mg and then continued with 75 mg of Plavix daily. Neurosurgery was consulted because she had a history of prior pipelined right internal carotid artery aneurysm. Dr. Hewitt had seen her and  treated her for his in 08/2015. Neurosurgery said there was no evidence of aneurysm. Her statin medication was increased because of elevated hyperlipidemia. She actually had garbled speech, visual changes and balance issues all with the TIA and they happened over a matter of three days, kind of intermittently.      She had a normal 21 day event recorder 1/2017.      She was tolerating Livalo without muscle complaints, Zetia and vascepa.      On 4/8/2019, she was having exertional chest pressure with doing mowing the normal household activities.  When EMS arrived, they administered 2 nitroglycerin and it helped her chest pressure.  She was taken to [facility] and there she ruled out for myocardial infarction.  Cardiac catheterization showed atretic LIMA to LAD, patent SVG to ramus intermedius then to OM, 30% left main stenosis proximally but normal LAD, left circumflex and RCA.  Her ejection fraction was normal.  She was dismissed from the hospital. She had a nonischemic stress echocardiogram done 5/14/19 with baseline ejection fraction 56%, grade 1 diastolic dysfunction and a post exercise ejection fraction of 81%.     She now has a new diagnosis of breast cancer.  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  She developed hematoma requiring aspiration after the biopsy-likely due to being on Vascepa still.  Dr. Jaleesa Hsieh did the surgery.  She is now scheduled for a left lumpectomy to be done at the end of June. She then saw Dr. White 5/24/2023- Arimidex will be started after the lumpectomy is completed.    She had a normal CBC done 5/24/2023.  Full laboratory values in 1/25/2023 show normal CMP, normal thyroid panel, total cholesterol 178, HDL 90, LDL 76, triglycerides 63, normal CBC.  Echocardiogram done 4/20/2022 showed ejection fraction of 65% with mild concentric left ventricular hypertrophy, normal diastolic function and no significant valvular  abnormalities.  Stress nuclear perfusion study done 4/20/2022 showed no evidence of ischemia.    She has no chest pain or pressure.  She has no difficulty breathing.  She is lost about 13 pounds from cutting out sugar.  She does not feel her heart racing or skipping.  She has had no dizziness syncope or falls.  She is taking her medications as directed without difficulty.  She has no orthopnea or PND.  She has no lower extremity edema.    Past Medical History:   Diagnosis Date   • Abnormal blood chemistry    • Acute UTI (urinary tract infection)    • Allergic    • Anemia    • Aneurysm, cerebral    • Bloating    • Brain aneurysm    • CAD (coronary artery disease)    • Chronic headaches    • Coronary artery disease    • Coronary artery stenosis    • Dysuria    • Encounter for screening colonoscopy    • Environmental allergies    • Fall from slip, trip, or stumble    • Gait disturbance    • H/O cardiovascular stress test 09/17/2013    Treadmill   • H/O colonoscopy    • H/O echocardiogram 09/25/2013   • H/O fall    • Heart murmur    • Hereditary spherocytosis    • History of EKG 07/31/2015   • Hyperlipemia    • Hyperlipidemia    • Hypertension    • Hyperthyroidism    • Injury of back    • Insomnia    • Left forearm pain    • Left leg pain    • Left wrist pain    • Lower abdominal pain    • Malignant neoplasm of upper-inner quadrant of left breast in female, estrogen receptor positive 05/18/2023   • Need for pneumococcal vaccination    • Onychomycosis of toenail    • AKIRA (obstructive sleep apnea)    • Pelvic pressure in female    • Right foot pain    • Stroke    • TIA (transient ischemic attack) 11/30/2016   • URI (upper respiratory infection)    • Urine frequency          Past Surgical History:   Procedure Laterality Date   • BREAST EXCISIONAL BIOPSY Right 1977    b9   • BREAST EXCISIONAL BIOPSY Right 1979    b9   • BUNIONECTOMY     • CARDIAC CATHETERIZATION N/A 04/08/2019    Procedure: Left Heart Cath;  Surgeon:  Jayme Ramirez MD;  Location:  [facility];  Service: Cardiovascular   • CARDIAC CATHETERIZATION N/A 04/08/2019    Procedure: Coronary angiography;  Surgeon: Jayme Ramirez MD;  Location:  [facility];  Service: Cardiovascular   • CARDIAC CATHETERIZATION N/A 04/08/2019    Procedure: Left ventriculography;  Surgeon: Jayme Ramirez MD;  Location:  [facility];  Service: Cardiovascular   • CEREBRAL ANEURYSM REPAIR  2015   • CHOLECYSTECTOMY     • COLONOSCOPY N/A 01/29/2021    Procedure: COLONOSCOPY with polypectomy;  Surgeon: Colin Ladd MD;  Location:  [facility];  Service: Gastroenterology;  Laterality: N/A;  Diverticulosis  Sigmoid colon polyp   • CORONARY ARTERY BYPASS GRAFT  2004    Triple   • FOOT SURGERY Right 2016   • ROTATOR CUFF REPAIR Left    • SPLENECTOMY  1953   • TONSILLECTOMY  1984   • TUBAL ABDOMINAL LIGATION  1979   • US GUIDED CYST ASPIRATION BREAST N/A 5/18/2023       Current Outpatient Medications on File Prior to Visit   Medication Sig Dispense Refill   • amitriptyline (ELAVIL) 10 MG tablet Take 1 tablet by mouth Every Night. 90 tablet 3   • Cholecalciferol (VITAMIN D3) 5000 UNITS capsule capsule Take 1 capsule by mouth 1 (One) Time Per Week.     • clopidogrel (PLAVIX) 75 MG tablet Take 1 tablet by mouth Daily. 90 tablet 3   • ezetimibe (ZETIA) 10 MG tablet Take 1 tablet by mouth Daily. 90 tablet 3   • icosapent ethyl (Vascepa) 1 g capsule capsule Take 2 g by mouth 2 (Two) Times a Day With Meals. 360 capsule 3   • isosorbide dinitrate (ISORDIL) 5 MG tablet Take 1 tablet by mouth 2 (Two) Times a Day. 180 tablet 3   • Melatonin 12 MG tablet Take 12 mg by mouth Every Night.     • Menaquinone-7 (VITAMIN K2 PO) Take  by mouth.     • montelukast (Singulair) 10 MG tablet Take 1 tablet by mouth Every Night. 90 tablet 3   • multivitamin (THERAGRAN) tablet tablet Take  by mouth Daily.     • pitavastatin calcium (Livalo) 2 MG  "tablet tablet Take 1 tablet by mouth Every Night. 90 tablet 2   • Probiotic Product (PROBIOTIC PO) Take 4 capsules by mouth Daily. gutbio-align     • ramipril (ALTACE) 2.5 MG capsule Take 1 capsule by mouth Every Night. 90 capsule 2   • anastrozole (Arimidex) 1 MG tablet Take 1 tablet by mouth Daily for 30 days. (Patient not taking: Reported on 6/2/2023) 30 tablet 6     No current facility-administered medications on file prior to visit.       Social History     Socioeconomic History   • Marital status:    • Number of children: 2   Tobacco Use   • Smoking status: Never     Passive exposure: Never   • Smokeless tobacco: Never   Vaping Use   • Vaping Use: Never used   Substance and Sexual Activity   • Alcohol use: Yes     Comment: Rare/ occ.   • Drug use: No   • Sexual activity: Not Currently     Partners: Male     Birth control/protection: Abstinence     Comment: BTL           ROS    Procedures    ECG 12 Lead    Date/Time: 6/2/2023 11:20 AM  Performed by: Helen Urbina MD  Authorized by: Helen Urbina MD   Comparison: compared with previous ECG   Similar to previous ECG  Rhythm: sinus rhythm    Clinical impression: normal ECG                Objective:      Vitals:    06/02/23 1107   BP: 142/88   Pulse: 64   Weight: 62.1 kg (137 lb)   Height: 162.6 cm (64\")     Body mass index is 23.52 kg/m².    Vitals reviewed.   Constitutional:       General: Not in acute distress.     Appearance: Well-developed. Not diaphoretic.   Eyes:      General: No scleral icterus.     Conjunctiva/sclera: Conjunctivae normal.   HENT:      Head: Normocephalic and atraumatic.   Neck:      Thyroid: No thyromegaly.      Vascular: No carotid bruit or JVD.      Lymphadenopathy: No cervical adenopathy.   Pulmonary:      Effort: Pulmonary effort is normal. No respiratory distress.      Breath sounds: Normal breath sounds. No wheezing. No rhonchi. No rales.   Chest:      Chest wall: Not tender to palpatation.   Cardiovascular:    "   Normal rate. Regular rhythm.      Murmurs: There is no murmur.      No gallop.   Pulses:     Intact distal pulses.   Edema:     Peripheral edema absent.   Abdominal:      General: Bowel sounds are normal. There is no distension or abdominal bruit.      Palpations: Abdomen is soft. There is no abdominal mass.      Tenderness: There is no abdominal tenderness.   Musculoskeletal:         General: No deformity.      Extremities: No clubbing present.     Cervical back: Neck supple. Skin:     General: Skin is warm and dry. There is no cyanosis.      Coloration: Skin is not pale.      Findings: No rash.   Neurological:      Mental Status: Alert and oriented to person, place, and time.      Cranial Nerves: No cranial nerve deficit.   Psychiatric:         Judgment: Judgment normal.         Lab Review:       Assessment:      Diagnosis Plan   1. Coronary artery disease involving native coronary artery of native heart without angina pectoris        2. S/P CABG (coronary artery bypass graft)        3. Essential hypertension        4. Mixed hyperlipidemia             1. TIA. The etiology for this is uncertain.  She has had no further instances since being on Plavix.   2. Hyperlipidemia, on Livalo, Vascepa and continue zetia.   3. Essential hypertension, goal less than 120/80.  Start ramipril 2.5 mg daily.  4. History of coronary artery disease, s/p CABG. She has angina, well treated with isordil.   5. Cerebral aneurysm, treated with coiling in 2015  6. Left breast cancer, to have lumpectomy 6/2023 then to start on Arimidex, sees Dr. White.       Plan:         See Laya in 3 months-no medication changes.  BP is high here today but typically does not.      Her White perioperative risk score is 0.02%-this is a very low risk.  She may proceed with lumpectomy without further testing.    Arimidex does have cardiovascular side effects including angina pectoris in12% of people with pre-existing ischemic disease as well as an  "increased incidence of cardiovascular events and hypertension.    STOP-Bang Score  Have you been diagnosed with Sleep Apnea?: no  Snoring?: no  Tired?: yes  Observed?: no  Pressure?: yes  Stop Score: 2  Body Mass Index more than 35 kg/m2?: no  Age older than 50 year old?: yes  Neck large? \">17\"/43cm-M, >16\"/41cm-F: no  Gender=Male?: no  Total Stop-Bang Score: 3    "

## 2023-06-08 ENCOUNTER — PATIENT OUTREACH (OUTPATIENT)
Dept: OTHER | Facility: HOSPITAL | Age: 73
End: 2023-06-08
Payer: MEDICARE

## 2023-06-08 NOTE — PROGRESS NOTES
Called Ms. Turcios to see how she was doing. She stated she is doing well but was unable to talk at the moment. She stated she has no immediate needs and will call back at a later time. She was thankful for the call.

## 2023-07-24 ENCOUNTER — PREP FOR SURGERY (OUTPATIENT)
Dept: OTHER | Facility: HOSPITAL | Age: 73
End: 2023-07-24
Payer: MEDICARE

## 2023-07-24 ENCOUNTER — TRANSCRIBE ORDERS (OUTPATIENT)
Dept: SURGERY | Facility: CLINIC | Age: 73
End: 2023-07-24
Payer: MEDICARE

## 2023-07-24 ENCOUNTER — TELEPHONE (OUTPATIENT)
Dept: SURGERY | Facility: CLINIC | Age: 73
End: 2023-07-24
Payer: MEDICARE

## 2023-07-24 ENCOUNTER — LAB (OUTPATIENT)
Dept: LAB | Facility: HOSPITAL | Age: 73
End: 2023-07-24
Payer: MEDICARE

## 2023-07-24 ENCOUNTER — TELEMEDICINE (OUTPATIENT)
Dept: ONCOLOGY | Facility: CLINIC | Age: 73
End: 2023-07-24
Payer: MEDICARE

## 2023-07-24 VITALS
HEART RATE: 71 BPM | RESPIRATION RATE: 16 BRPM | BODY MASS INDEX: 23.63 KG/M2 | WEIGHT: 138.4 LBS | HEIGHT: 64 IN | TEMPERATURE: 98 F | OXYGEN SATURATION: 96 % | DIASTOLIC BLOOD PRESSURE: 73 MMHG | SYSTOLIC BLOOD PRESSURE: 149 MMHG

## 2023-07-24 DIAGNOSIS — Z17.0 MALIGNANT NEOPLASM OF UPPER-INNER QUADRANT OF LEFT BREAST IN FEMALE, ESTROGEN RECEPTOR POSITIVE: ICD-10-CM

## 2023-07-24 DIAGNOSIS — C50.212 MALIGNANT NEOPLASM OF UPPER-INNER QUADRANT OF LEFT BREAST IN FEMALE, ESTROGEN RECEPTOR POSITIVE: Primary | ICD-10-CM

## 2023-07-24 DIAGNOSIS — Z17.0 MALIGNANT NEOPLASM OF UPPER-INNER QUADRANT OF LEFT BREAST IN FEMALE, ESTROGEN RECEPTOR POSITIVE: Primary | ICD-10-CM

## 2023-07-24 DIAGNOSIS — C50.212 MALIGNANT NEOPLASM OF UPPER-INNER QUADRANT OF LEFT BREAST IN FEMALE, ESTROGEN RECEPTOR POSITIVE: ICD-10-CM

## 2023-07-24 DIAGNOSIS — R92.8 ABNORMAL MAMMOGRAM: ICD-10-CM

## 2023-07-24 LAB
ALBUMIN SERPL-MCNC: 4.6 G/DL (ref 3.5–5.2)
ALBUMIN/GLOB SERPL: 2 G/DL
ALP SERPL-CCNC: 69 U/L (ref 39–117)
ALT SERPL W P-5'-P-CCNC: 14 U/L (ref 1–33)
ANION GAP SERPL CALCULATED.3IONS-SCNC: 13.6 MMOL/L (ref 5–15)
AST SERPL-CCNC: 24 U/L (ref 1–32)
BASOPHILS # BLD AUTO: 0.07 10*3/MM3 (ref 0–0.2)
BASOPHILS NFR BLD AUTO: 0.9 % (ref 0–1.5)
BILIRUB SERPL-MCNC: 0.7 MG/DL (ref 0–1.2)
BUN SERPL-MCNC: 13 MG/DL (ref 8–23)
BUN/CREAT SERPL: 17.8 (ref 7–25)
CALCIUM SPEC-SCNC: 9.3 MG/DL (ref 8.6–10.5)
CHLORIDE SERPL-SCNC: 103 MMOL/L (ref 98–107)
CO2 SERPL-SCNC: 24.4 MMOL/L (ref 22–29)
CREAT SERPL-MCNC: 0.73 MG/DL (ref 0.6–1.1)
DEPRECATED RDW RBC AUTO: 38.8 FL (ref 37–54)
EGFRCR SERPLBLD CKD-EPI 2021: 87.5 ML/MIN/1.73
EOSINOPHIL # BLD AUTO: 0.22 10*3/MM3 (ref 0–0.4)
EOSINOPHIL NFR BLD AUTO: 3 % (ref 0.3–6.2)
ERYTHROCYTE [DISTWIDTH] IN BLOOD BY AUTOMATED COUNT: 11.8 % (ref 12.3–15.4)
GLOBULIN UR ELPH-MCNC: 2.3 GM/DL
GLUCOSE SERPL-MCNC: 103 MG/DL (ref 65–99)
HCT VFR BLD AUTO: 37.4 % (ref 34–46.6)
HGB BLD-MCNC: 13.3 G/DL (ref 12–15.9)
IMM GRANULOCYTES # BLD AUTO: 0.01 10*3/MM3 (ref 0–0.05)
IMM GRANULOCYTES NFR BLD AUTO: 0.1 % (ref 0–0.5)
LYMPHOCYTES # BLD AUTO: 2.62 10*3/MM3 (ref 0.7–3.1)
LYMPHOCYTES NFR BLD AUTO: 35.4 % (ref 19.6–45.3)
MCH RBC QN AUTO: 32.2 PG (ref 26.6–33)
MCHC RBC AUTO-ENTMCNC: 35.6 G/DL (ref 31.5–35.7)
MCV RBC AUTO: 90.6 FL (ref 79–97)
MONOCYTES # BLD AUTO: 0.63 10*3/MM3 (ref 0.1–0.9)
MONOCYTES NFR BLD AUTO: 8.5 % (ref 5–12)
NEUTROPHILS NFR BLD AUTO: 3.86 10*3/MM3 (ref 1.7–7)
NEUTROPHILS NFR BLD AUTO: 52.1 % (ref 42.7–76)
NRBC BLD AUTO-RTO: 0 /100 WBC (ref 0–0.2)
PLATELET # BLD AUTO: 257 10*3/MM3 (ref 140–450)
PMV BLD AUTO: 9.3 FL (ref 6–12)
POTASSIUM SERPL-SCNC: 4.2 MMOL/L (ref 3.5–5.2)
PROT SERPL-MCNC: 6.9 G/DL (ref 6–8.5)
RBC # BLD AUTO: 4.13 10*6/MM3 (ref 3.77–5.28)
SODIUM SERPL-SCNC: 141 MMOL/L (ref 136–145)
WBC NRBC COR # BLD: 7.41 10*3/MM3 (ref 3.4–10.8)

## 2023-07-24 PROCEDURE — 99214 OFFICE O/P EST MOD 30 MIN: CPT | Performed by: INTERNAL MEDICINE

## 2023-07-24 PROCEDURE — 80053 COMPREHEN METABOLIC PANEL: CPT

## 2023-07-24 PROCEDURE — 3077F SYST BP >= 140 MM HG: CPT | Performed by: INTERNAL MEDICINE

## 2023-07-24 PROCEDURE — 1126F AMNT PAIN NOTED NONE PRSNT: CPT | Performed by: INTERNAL MEDICINE

## 2023-07-24 PROCEDURE — 85025 COMPLETE CBC W/AUTO DIFF WBC: CPT

## 2023-07-24 PROCEDURE — 3078F DIAST BP <80 MM HG: CPT | Performed by: INTERNAL MEDICINE

## 2023-07-24 PROCEDURE — 36415 COLL VENOUS BLD VENIPUNCTURE: CPT

## 2023-07-24 RX ORDER — PSYLLIUM SEED (WITH DEXTROSE)
POWDER (GRAM) ORAL DAILY
COMMUNITY

## 2023-07-24 RX ORDER — CEFAZOLIN SODIUM 2 G/100ML
2000 INJECTION, SOLUTION INTRAVENOUS ONCE
Status: CANCELLED | OUTPATIENT
Start: 2023-07-24 | End: 2023-07-24

## 2023-07-24 RX ORDER — DIAZEPAM 5 MG/1
5 TABLET ORAL ONCE
Status: CANCELLED | OUTPATIENT
Start: 2023-07-24 | End: 2023-07-24

## 2023-07-24 NOTE — TELEPHONE ENCOUNTER
I called Ms. Turcios to discuss her pathology report from the recent biopsy which showed incidental atypical lobular hyperplasia and I explained what this means.  Since she will be undergoing surgery anyway, I will also excise this site.  Her surgery will now be a left needle localized partial mastectomy and left breast needle localized excisional biopsy.  She is in agreement with this plan.     Jaleesa Hsieh MD

## 2023-07-24 NOTE — PROGRESS NOTES
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 by FISH referred for possible neoadjuvant treatment due to large postbiopsy hematoma causing clip migration  Provide an opinion on any further workup or treatment                             REQUESTING PHYSICIAN: Jaleesa Hsieh MD      History of Present Illness       Past Medical History:   Diagnosis Date    Abnormal blood chemistry     Acute UTI (urinary tract infection)     Allergic     Anemia     Aneurysm, cerebral     Bloating     Brain aneurysm     CAD (coronary artery disease)     Chronic headaches     Coronary artery disease     Coronary artery stenosis     Dysuria     Encounter for screening colonoscopy     Environmental allergies     Fall from slip, trip, or stumble     Gait disturbance     H/O cardiovascular stress test 09/17/2013    Treadmill    H/O colonoscopy     H/O echocardiogram 09/25/2013    H/O fall     Heart murmur     Hereditary spherocytosis     History of EKG 07/31/2015    Hyperlipemia     Hyperlipidemia     Hypertension     Hyperthyroidism     Injury of back     Insomnia     Left forearm pain     Left leg pain     Left wrist pain     Lower abdominal pain     Malignant neoplasm of upper-inner quadrant of left breast in female, estrogen receptor positive 05/18/2023    Need for pneumococcal vaccination     Onychomycosis of toenail     AKIRA (obstructive sleep apnea)     pt states this is an incorrect diagnosis    Pelvic pressure in female     Right foot pain     Stroke     TIA (transient ischemic attack) 11/30/2016    URI (upper respiratory infection)     Urine frequency         Past Surgical History:   Procedure Laterality Date    BREAST EXCISIONAL BIOPSY Right 1977    b9    BREAST EXCISIONAL BIOPSY Right 1979    b9    BUNIONECTOMY      CARDIAC CATHETERIZATION N/A 04/08/2019    Procedure: Left Heart Cath;  Surgeon: Jayme Ramirez MD;  Location: [facility] INVASIVE  LOCATION;  Service: Cardiovascular    CARDIAC CATHETERIZATION N/A 2019    Procedure: Coronary angiography;  Surgeon: Jayme Ramirez MD;  Location:  [facility] INVASIVE LOCATION;  Service: Cardiovascular    CARDIAC CATHETERIZATION N/A 2019    Procedure: Left ventriculography;  Surgeon: Jayme Ramirez MD;  Location:  [facility] INVASIVE LOCATION;  Service: Cardiovascular    CEREBRAL ANEURYSM REPAIR      CHOLECYSTECTOMY      COLONOSCOPY N/A 2021    Procedure: COLONOSCOPY with polypectomy;  Surgeon: Colin Ladd MD;  Location:  [facility] OR;  Service: Gastroenterology;  Laterality: N/A;  Diverticulosis  Sigmoid colon polyp    CORONARY ARTERY BYPASS GRAFT  2004    Triple    FOOT SURGERY Right 2016    ROTATOR CUFF REPAIR Left     SPLENECTOMY      TONSILLECTOMY      TUBAL ABDOMINAL LIGATION      US GUIDED CYST ASPIRATION BREAST N/A 2023   Oncologic history  patient is a 73-year-old female with hereditary spherocytosis and coronary artery disease cardiovascular disease who had a routine screening mammogram which was abnormal in the left breast and this led to diagnostic imaging and ultrasound the findings of a 4 mm abnormality in the 12 o'clock position of the left breast.  A biopsy was done showing grade 2 invasive ductal carcinoma measuring 4 mm ER/RI positive HER2 2+ FISH negative.    An MRI was done but there was a large 4 cm postbiopsy hematoma because clip to move to the periphery of the hematoma and for this reason surgery was delayed until the biopsy could improve and adequate localization of the clip could be performed    She is  2 para 2 menarche was at age 13 menopause at 56 she is taking no hormone replacement.  First childbirth was age 28 she breast-fed for 6 months    Family history is positive for sister with breast cancer at age 66 brother with lung cancer at age 57 her genetic testing was drawn and results are pending    She is not a  smoker or drinker  She has never had a DVT or MI but did have a TIA in 2016 after she stopped her Plavix and Plavix was reinstituted.  She has had a cerebral aneurysm treated by interventional radiology in 2015    She is not a free bleeder and was on Plavix and this was held for the biopsy but she was still on fish oil supplements which may have  contributed to the post biopsy hematoma.    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 age and is up-to-date with vaccinations for splenectomy    She has had bypass surgery for coronary artery disease in the past also    Discussed with Dr. Hsieh who states that she was able to aspirate much of the hematoma which is significantly smaller and she will reevaluate her at the end of June and decide on surgery and we will see her after surgery to get the final results and make a decision about adjuvant treatment.    Based on her TIA I have recommended an aromatase inhibitor rather than tamoxifen and because of this we will do a but DEXA scan to see where we stand and use Prolia to prevent worsening of her bone density is already bad    I have prescribed Arimidex but I told her not to pick it up until after I see her again on the off chance that this is a sub-5 mm tumor that does not need treatment    Explained the rationale for adjuvant hormonal blockade and possibly addition of Prolia to prevent worsening of her bone density and she is agreeable and we will discuss this further at her next visit      Current Outpatient Medications on File Prior to Visit   Medication Sig Dispense Refill    amitriptyline (ELAVIL) 10 MG tablet Take 1 tablet by mouth Every Night. 90 tablet 3    Cholecalciferol (VITAMIN D3) 5000 UNITS capsule capsule Take 1 capsule by mouth 1 (One) Time Per Week.      clopidogrel (PLAVIX) 75 MG tablet Take 1 tablet by mouth Daily. 90 tablet 3    ezetimibe (ZETIA) 10 MG tablet Take 1 tablet by mouth Daily. 90 tablet 3    icosapent ethyl (Vascepa) 1 g capsule capsule Take  2 g by mouth 2 (Two) Times a Day With Meals. 360 capsule 3    isosorbide dinitrate (ISORDIL) 5 MG tablet Take 1 tablet by mouth 2 (Two) Times a Day. 180 tablet 3    Melatonin 12 MG tablet Take 12 mg by mouth Every Night.      Menaquinone-7 (VITAMIN K2 PO) Take  by mouth.      montelukast (Singulair) 10 MG tablet Take 1 tablet by mouth Every Night. 90 tablet 3    multivitamin (THERAGRAN) tablet tablet Take  by mouth Daily.      Omega-3 Fatty Acids (FISH OIL PO) Take  by mouth Daily.      pitavastatin calcium (Livalo) 2 MG tablet tablet Take 1 tablet by mouth Every Night. 90 tablet 2    Probiotic Product (PROBIOTIC PO) Take 4 capsules by mouth Daily. gutbio-align      Psyllium (Metamucil) wafer wafer Take  by mouth Daily. Fiber tablet      ramipril (ALTACE) 2.5 MG capsule Take 1 capsule by mouth Every Night. 90 capsule 2     No current facility-administered medications on file prior to visit.        ALLERGIES:    Allergies   Allergen Reactions    Adhesive Tape Rash     Redness, bruising and peeling of skin    *Use Paper Tape Only*  Redness, bruising and peeling of skin    *Use Paper Tape Only*    Beta Adrenergic Blockers Unknown - High Severity     hypotension  bradycardic    Statins Myalgia    Elemental Sulfur Rash    Sulfa Antibiotics Rash        Social History     Socioeconomic History    Marital status:     Number of children: 2   Tobacco Use    Smoking status: Never     Passive exposure: Never    Smokeless tobacco: Never   Vaping Use    Vaping Use: Never used   Substance and Sexual Activity    Alcohol use: Yes     Comment: Rare/ occ.    Drug use: No    Sexual activity: Not Currently     Partners: Male     Birth control/protection: Abstinence     Comment: BTL        Family History   Problem Relation Age of Onset    Heart attack Mother     Heart failure Mother     Hypertension Mother     Stroke Mother     Cervical cancer Mother     Hypertension Father     Heart failure Father     Heart disease Father      "Aneurysm Sister     Breast cancer Sister 66    Anxiety disorder Sister     No Known Problems Sister     Heart attack Brother     Cancer Brother     Lung cancer Brother     No Known Problems Daughter     No Known Problems Son     Ovarian cancer Neg Hx     Colon cancer Neg Hx     Pulmonary embolism Neg Hx     Deep vein thrombosis Neg Hx         Review of Systems   Musculoskeletal:  Positive for arthralgias.   Psychiatric/Behavioral:  The patient is nervous/anxious.       Objective     Vitals:    07/24/23 1557   BP: 149/73   Pulse: 71   Resp: 16   Temp: 98 °F (36.7 °C)   TempSrc: Infrared   SpO2: 96%   Weight: 62.8 kg (138 lb 6.4 oz)   Height: 162.6 cm (64.02\")   PainSc: 0-No pain         7/24/2023     3:59 PM   Current Status   ECOG score 0       Physical Exam    CONSTITUTIONAL:  Vital signs reviewed.  No distress, looks comfortable.  EYES:  Conjunctivae and lids unremarkable.  PERRLA  EARS,NOSE,MOUTH,THROAT:  Ears and nose appear unremarkable.  Lips, teeth, gums appear unremarkable.  RESPIRATORY:  Normal respiratory effort.  Lungs clear to auscultation bilaterally.  CARDIOVASCULAR:  Normal S1, S2.  No murmurs rubs or gallops.  No significant lower extremity edema.  GASTROINTESTINAL: Abdomen appears unremarkable.  Nontender.  No hepatomegaly.  No splenomegaly.  LYMPHATIC:  No cervical, supraclavicular, axillary lymphadenopathy.  SKIN:  Warm.  No rashes.  PSYCHIATRIC:  Normal judgment and insight.  Normal mood and affect.      RECENT LABS:  Hematology WBC   Date Value Ref Range Status   07/24/2023 7.41 3.40 - 10.80 10*3/mm3 Final   01/25/2023 6.27 3.40 - 10.80 10*3/mm3 Final     RBC   Date Value Ref Range Status   07/24/2023 4.13 3.77 - 5.28 10*6/mm3 Final   01/25/2023 4.00 3.77 - 5.28 10*6/mm3 Final     Hemoglobin   Date Value Ref Range Status   07/24/2023 13.3 12.0 - 15.9 g/dL Final     Hematocrit   Date Value Ref Range Status   07/24/2023 37.4 34.0 - 46.6 % Final     Platelets   Date Value Ref Range Status "   07/24/2023 257 140 - 450 10*3/mm3 Final        Final Diagnosis   1. Left Breast at 11:00 o'clock, 8-9 cm from Nipple (for calcifications), Stereotactic Core Biopsy:                 A. Invasive ductal carcinoma:                              1. Overall [place] grade II (tubular score=3, nuclear score=2, mitotic score=1).                            2. Invasive carcinoma measures at least 4 mm.                            3. No lymphovascular space invasion identified.               B. 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 Low-grade cribriform DCIS.     Jat./kds    Electronically signed by Christopher Lara MD on 5/1/2023 at 1311   Synoptic Checklist   Breast Biomarker Reporting Template  Protocol posted: 6/22/2022  BREAST: BIOMARKER REPORTING TEMPLATE - All Specimens  Test(s) Performed     Estrogen Receptor (ER) Status  Positive (greater than 10% of cells demonstrate nuclear positivity)    Percentage of Cells with Nuclear Positivity  %    Average Intensity of Staining  Strong    Test Type  Food and Drug Administration (FDA) cleared (test / vendor): [facility]    Primary Antibody  SP1    Scoring System  Sugar    Proportion Score  5    Intensity Score  3    Total Sugar Score  8    Test(s) Performed     Progesterone Receptor (PgR) Status  Positive    Percentage of Cells with Nuclear Positivity  51-60%    Average Intensity of Staining  Moderate    Test Type  Food and Drug Administration (FDA) cleared (test / vendor): [employer]    Primary Antibody  1E2    Scoring System  Sugar    Proportion Score  4    Intensity Score  2    Total Sugar Score  6    Test(s) Performed     HER2 by Immunohistochemistry  Equivocal (Score 2+)    Percentage of Cells with Uniform Intense Complete Membrane Staining  0 %   Test Type  Food and Drug Administration (FDA) cleared (test / vendor): [employer]    Primary Antibody  4B5    Test(s) Performed  Ki-67    Ki-67 Percentage of Positive Nuclei  8 %    Primary Antibody  30-9    Cold Ischemia and Fixation Times  Meet requirements specified in latest version of the ASCO / CAP Guidelines    Cold Ischemia Time (minutes)  16 min   Fixation Time (hours)  8 hours   Testing Performed on Block Number(s)  1D    METHODS   Fixative  Formalin    Image Analysis  Not performed    Comment(s)  Her 2 FISH NEG    .            Assessment & Plan   1.cT1aN0 IDC Grade 2 left breast cancer ER/[place] + her 2 -2+ neg FISH post biopsy with a large postbiopsy hematoma - resolving  Additional biopsy 7/21/2023 on left breast at 2:00 shows atypical lobular hyperplasia incidental    2.  Hereditary spherocytosis postsplenectomy age 3  Up-to-date on post splenectomy vaccines    3.  Cerebral aneurysm hereditary post ablation in 2015    4.  Coronary artery disease post coronary artery bypass in 2004    5.  TIA 2016 currently on Plavix    6.  Osteopenia    7.  Large postbiopsy hematoma possibly aggravated by fish oil which she will hold before her surgery    Plan  At this time she is scheduled for surgery next week and we will await results of her final path report before recommending any prevention or treatment and she will see me in mid August to make this decision  Video visit 15 minutes

## 2023-07-25 ENCOUNTER — PRE-ADMISSION TESTING (OUTPATIENT)
Dept: PREADMISSION TESTING | Facility: HOSPITAL | Age: 73
End: 2023-07-25
Payer: MEDICARE

## 2023-07-25 VITALS
OXYGEN SATURATION: 95 % | SYSTOLIC BLOOD PRESSURE: 155 MMHG | WEIGHT: 139.6 LBS | HEIGHT: 65 IN | DIASTOLIC BLOOD PRESSURE: 80 MMHG | HEART RATE: 77 BPM | BODY MASS INDEX: 23.26 KG/M2 | RESPIRATION RATE: 16 BRPM | TEMPERATURE: 97.9 F

## 2023-07-25 NOTE — DISCHARGE INSTRUCTIONS
Take the following medications the morning of surgery:    ISOSORBIDE    ARRIVE AT 0630.    If you are on prescription narcotic pain medication to control your pain you may also take that medication the morning of surgery.    General Instructions:  Do not eat solid food after midnight the night before surgery.  You may drink clear liquids day of surgery but must stop at least one hour before your hospital arrival time.  It is beneficial for you to have a clear drink that contains carbohydrates the day of surgery.  We suggest a 12 to 20 ounce bottle of Gatorade or Powerade for non-diabetic patients or a 12 to 20 ounce bottle of G2 or Powerade Zero for diabetic patients. (Pediatric patients, are not advised to drink a 12 to 20 ounce carbohydrate drink)    Clear liquids are liquids you can see through.  Nothing red in color.     Plain water                               Sports drinks  Sodas                                   Gelatin (Jell-O)  Fruit juices without pulp such as white grape juice and apple juice  Popsicles that contain no fruit or yogurt  Tea or coffee (no cream or milk added)  Gatorade / Powerade  G2 / Powerade Zero    Infants may have breast milk up to four hours before surgery.  Infants drinking formula may drink formula up to six hours before surgery.   Patients who avoid smoking, chewing tobacco and alcohol for 4 weeks prior to surgery have a reduced risk of post-operative complications.  Quit smoking as many days before surgery as you can.  Do not smoke, use chewing tobacco or drink alcohol the day of surgery.   If applicable bring your C-PAP/ BI-PAP machine in with you to preop day of surgery.  Bring any papers given to you in the doctor's office.  Wear clean comfortable clothes.  Do not wear contact lenses, false eyelashes or make-up.  Bring a case for your glasses.   Bring crutches or walker if applicable.  Remove all piercings.  Leave jewelry and any other valuables at home.  Hair extensions with  metal clips must be removed prior to surgery.  The Pre-Admission Testing nurse will instruct you to bring medications if unable to obtain an accurate list in Pre-Admission Testing.        If you were given a blood bank ID arm band remember to bring it with you the day of surgery.    Preventing a Surgical Site Infection:  For 2 to 3 days before surgery, avoid shaving with a razor because the razor can irritate skin and make it easier to develop an infection.    Any areas of open skin can increase the risk of a post-operative wound infection by allowing bacteria to enter and travel throughout the body.  Notify your surgeon if you have any skin wounds / rashes even if it is not near the expected surgical site.  The area will need assessed to determine if surgery should be delayed until it is healed.  The night prior to surgery shower using a fresh bar of anti-bacterial soap (such as Dial) and clean washcloth.  Sleep in a clean bed with clean clothing.  Do not allow pets to sleep with you.  Shower on the morning of surgery using a fresh bar of anti-bacterial soap (such as Dial) and clean washcloth.  Dry with a clean towel and dress in clean clothing.  Ask your surgeon if you will be receiving antibiotics prior to surgery.  Make sure you, your family, and all healthcare providers clean their hands with soap and water or an alcohol based hand  before caring for you or your wound.    Day of surgery:  Your arrival time is approximately two hours before your scheduled surgery time.  Upon arrival, a Pre-op nurse and Anesthesiologist will review your health history, obtain vital signs, and answer questions you may have.  The only belongings needed at this time will be a list of your home medications and if applicable your C-PAP/BI-PAP machine.  A Pre-op nurse will start an IV and you may receive medication in preparation for surgery, including something to help you relax.     Please be aware that surgery does come  with discomfort.  We want to make every effort to control your discomfort so please discuss any uncontrolled symptoms with your nurse.   Your doctor will most likely have prescribed pain medications.      If you are going home after surgery you will receive individualized written care instructions before being discharged.  A responsible adult must drive you to and from the hospital on the day of your surgery and stay with you for 24 hours.  Discharge prescriptions can be filled by the hospital pharmacy during regular pharmacy hours.  If you are having surgery late in the day/evening your prescription may be e-prescribed to your pharmacy.  Please verify your pharmacy hours or chose a 24 hour pharmacy to avoid not having access to your prescription because your pharmacy has closed for the day.    If you are staying overnight following surgery, you will be transported to your hospital room following the recovery period.  [facility] has all private rooms.    If you have any questions please call Pre-Admission Testing at [phone].  Deductibles and co-payments are collected on the day of service. Please be prepared to pay the required co-pay, deductible or deposit on the day of service as defined by your plan.    Call your surgeon immediately if you experience any of the following symptoms:  Sore Throat  Shortness of Breath or difficulty breathing  Cough  Chills  Body soreness or muscle pain  Headache  Fever  New loss of taste or smell  Do not arrive for your surgery ill.  Your procedure will need to be rescheduled to another time.  You will need to call your physician before the day of surgery to avoid any unnecessary exposure to hospital staff as well as other patients.

## 2023-07-26 ENCOUNTER — TELEPHONE (OUTPATIENT)
Dept: ONCOLOGY | Facility: CLINIC | Age: 73
End: 2023-07-26
Payer: MEDICARE

## 2023-07-26 NOTE — TELEPHONE ENCOUNTER
Caller: Brady Turcios    Relationship: Self    Best call back number: [phone]    What is the best time to reach you: ANY    Who are you requesting to speak with (clinical staff, provider,  specific staff member): SCHEDULING        What was the call regarding: PATIENT BRADY CALLED TO SCHEDULE A FOLLOW UP APPT AFTER SURGERY ON AUGUST 1ST. SHE SAID DR MEYERS RECOMMENDED 3 WEEK FOLLOW UP AFTER SURGERY. REQUESTING TO SCHEDULE ON 8/29/23 OR 8/30/23 TO MAKE SURE THAT DR MEYERS IS BACK FROM VACATION.    Is it okay if the provider responds through [employer]: YES. PREFERRED.

## 2023-07-31 ENCOUNTER — PATIENT OUTREACH (OUTPATIENT)
Dept: OTHER | Facility: HOSPITAL | Age: 73
End: 2023-07-31
Payer: MEDICARE

## 2023-08-01 ENCOUNTER — ANESTHESIA EVENT (OUTPATIENT)
Dept: PERIOP | Facility: HOSPITAL | Age: 73
End: 2023-08-01
Payer: MEDICARE

## 2023-08-01 ENCOUNTER — ANESTHESIA (OUTPATIENT)
Dept: PERIOP | Facility: HOSPITAL | Age: 73
End: 2023-08-01
Payer: MEDICARE

## 2023-08-01 ENCOUNTER — ANCILLARY PROCEDURE (OUTPATIENT)
Dept: LAB | Facility: HOSPITAL | Age: 73
End: 2023-08-01
Payer: MEDICARE

## 2023-08-01 ENCOUNTER — HOSPITAL ENCOUNTER (OUTPATIENT)
Dept: MAMMOGRAPHY | Facility: HOSPITAL | Age: 73
Discharge: HOME | End: 2023-08-01
Payer: MEDICARE

## 2023-08-01 ENCOUNTER — APPOINTMENT (OUTPATIENT)
Dept: GENERAL RADIOLOGY | Facility: HOSPITAL | Age: 73
End: 2023-08-01
Payer: MEDICARE

## 2023-08-01 ENCOUNTER — HOSPITAL ENCOUNTER (OUTPATIENT)
Facility: HOSPITAL | Age: 73
Setting detail: HOSPITAL OUTPATIENT SURGERY
Discharge: HOME | End: 2023-08-01
Attending: SURGERY | Admitting: SURGERY
Payer: MEDICARE

## 2023-08-01 ENCOUNTER — TRANSCRIBE ORDERS (OUTPATIENT)
Dept: LAB | Facility: HOSPITAL | Age: 73
End: 2023-08-01
Payer: MEDICARE

## 2023-08-01 VITALS
HEART RATE: 70 BPM | SYSTOLIC BLOOD PRESSURE: 159 MMHG | BODY MASS INDEX: 23.82 KG/M2 | TEMPERATURE: 98.8 F | RESPIRATION RATE: 16 BRPM | HEIGHT: 64 IN | DIASTOLIC BLOOD PRESSURE: 76 MMHG | WEIGHT: 139.55 LBS | OXYGEN SATURATION: 94 %

## 2023-08-01 DIAGNOSIS — Z17.0 MALIGNANT NEOPLASM OF UPPER-INNER QUADRANT OF LEFT BREAST IN FEMALE, ESTROGEN RECEPTOR POSITIVE: ICD-10-CM

## 2023-08-01 DIAGNOSIS — C50.912 INVASIVE DUCTAL CARCINOMA OF BREAST, FEMALE, LEFT: Primary | ICD-10-CM

## 2023-08-01 DIAGNOSIS — C50.212 MALIGNANT NEOPLASM OF UPPER-INNER QUADRANT OF LEFT BREAST IN FEMALE, ESTROGEN RECEPTOR POSITIVE: ICD-10-CM

## 2023-08-01 DIAGNOSIS — C50.912 INVASIVE DUCTAL CARCINOMA OF BREAST, FEMALE, LEFT: ICD-10-CM

## 2023-08-01 DIAGNOSIS — R92.8 ABNORMAL MAMMOGRAM: ICD-10-CM

## 2023-08-01 PROCEDURE — 19301 PARTIAL MASTECTOMY: CPT | Performed by: SURGERY

## 2023-08-01 PROCEDURE — S0260 H&P FOR SURGERY: HCPCS | Performed by: SURGERY

## 2023-08-01 PROCEDURE — 25010000002 FENTANYL CITRATE (PF) 50 MCG/ML SOLUTION: Performed by: NURSE ANESTHETIST, CERTIFIED REGISTERED

## 2023-08-01 PROCEDURE — 76098 X-RAY EXAM SURGICAL SPECIMEN: CPT

## 2023-08-01 PROCEDURE — 19125 EXCISION BREAST LESION: CPT | Performed by: SURGERY

## 2023-08-01 PROCEDURE — 0 LIDOCAINE 1 % SOLUTION: Performed by: SURGERY

## 2023-08-01 PROCEDURE — C1819 TISSUE LOCALIZATION-EXCISION: HCPCS

## 2023-08-01 PROCEDURE — 25010000002 ONDANSETRON PER 1 MG: Performed by: NURSE ANESTHETIST, CERTIFIED REGISTERED

## 2023-08-01 PROCEDURE — 88307 TISSUE EXAM BY PATHOLOGIST: CPT | Performed by: SURGERY

## 2023-08-01 PROCEDURE — 88342 IMHCHEM/IMCYTCHM 1ST ANTB: CPT | Performed by: SURGERY

## 2023-08-01 PROCEDURE — 25010000002 CEFAZOLIN IN DEXTROSE 2-4 GM/100ML-% SOLUTION: Performed by: SURGERY

## 2023-08-01 PROCEDURE — 25010000002 HYDROMORPHONE PER 4 MG: Performed by: NURSE ANESTHETIST, CERTIFIED REGISTERED

## 2023-08-01 PROCEDURE — 25010000002 PROPOFOL 10 MG/ML EMULSION: Performed by: NURSE ANESTHETIST, CERTIFIED REGISTERED

## 2023-08-01 PROCEDURE — 25010000002 DEXAMETHASONE SODIUM PHOSPHATE 20 MG/5ML SOLUTION: Performed by: NURSE ANESTHETIST, CERTIFIED REGISTERED

## 2023-08-01 PROCEDURE — 88341 IMHCHEM/IMCYTCHM EA ADD ANTB: CPT | Performed by: SURGERY

## 2023-08-01 DEVICE — LIGACLIP MCA MULTIPLE CLIP APPLIERS, 20 SMALL CLIPS
Type: IMPLANTABLE DEVICE | Site: BREAST | Status: FUNCTIONAL
Brand: LIGACLIP

## 2023-08-01 RX ORDER — SODIUM CHLORIDE 0.9 % (FLUSH) 0.9 %
3-10 SYRINGE (ML) INJECTION AS NEEDED
Status: DISCONTINUED | OUTPATIENT
Start: 2023-08-01 | End: 2023-08-01 | Stop reason: HOSPADM

## 2023-08-01 RX ORDER — CEFAZOLIN SODIUM 2 G/100ML
2000 INJECTION, SOLUTION INTRAVENOUS ONCE
Status: COMPLETED | OUTPATIENT
Start: 2023-08-01 | End: 2023-08-01

## 2023-08-01 RX ORDER — FENTANYL CITRATE 50 UG/ML
INJECTION, SOLUTION INTRAMUSCULAR; INTRAVENOUS AS NEEDED
Status: DISCONTINUED | OUTPATIENT
Start: 2023-08-01 | End: 2023-08-01 | Stop reason: SURG

## 2023-08-01 RX ORDER — PROMETHAZINE HYDROCHLORIDE 25 MG/1
25 SUPPOSITORY RECTAL ONCE AS NEEDED
Status: DISCONTINUED | OUTPATIENT
Start: 2023-08-01 | End: 2023-08-01 | Stop reason: HOSPADM

## 2023-08-01 RX ORDER — MIDAZOLAM HYDROCHLORIDE 1 MG/ML
0.5 INJECTION INTRAMUSCULAR; INTRAVENOUS
Status: DISCONTINUED | OUTPATIENT
Start: 2023-08-01 | End: 2023-08-01 | Stop reason: HOSPADM

## 2023-08-01 RX ORDER — DIPHENHYDRAMINE HYDROCHLORIDE 50 MG/ML
12.5 INJECTION INTRAMUSCULAR; INTRAVENOUS
Status: DISCONTINUED | OUTPATIENT
Start: 2023-08-01 | End: 2023-08-01 | Stop reason: HOSPADM

## 2023-08-01 RX ORDER — FENTANYL CITRATE 50 UG/ML
25 INJECTION, SOLUTION INTRAMUSCULAR; INTRAVENOUS
Status: DISCONTINUED | OUTPATIENT
Start: 2023-08-01 | End: 2023-08-01 | Stop reason: HOSPADM

## 2023-08-01 RX ORDER — ACETAMINOPHEN 500 MG
1000 TABLET ORAL EVERY 8 HOURS
Qty: 30 TABLET | Refills: 0 | Status: SHIPPED | OUTPATIENT
Start: 2023-08-01 | End: 2023-08-06

## 2023-08-01 RX ORDER — DIAZEPAM 5 MG/1
5 TABLET ORAL ONCE
Status: COMPLETED | OUTPATIENT
Start: 2023-08-01 | End: 2023-08-01

## 2023-08-01 RX ORDER — HYDROCODONE BITARTRATE AND ACETAMINOPHEN 5; 325 MG/1; MG/1
1 TABLET ORAL ONCE AS NEEDED
Status: DISCONTINUED | OUTPATIENT
Start: 2023-08-01 | End: 2023-08-01 | Stop reason: HOSPADM

## 2023-08-01 RX ORDER — HYDROCODONE BITARTRATE AND ACETAMINOPHEN 7.5; 325 MG/1; MG/1
1 TABLET ORAL EVERY 4 HOURS PRN
Status: DISCONTINUED | OUTPATIENT
Start: 2023-08-01 | End: 2023-08-01 | Stop reason: HOSPADM

## 2023-08-01 RX ORDER — ONDANSETRON 2 MG/ML
4 INJECTION INTRAMUSCULAR; INTRAVENOUS ONCE AS NEEDED
Status: COMPLETED | OUTPATIENT
Start: 2023-08-01 | End: 2023-08-01

## 2023-08-01 RX ORDER — LIDOCAINE HYDROCHLORIDE 20 MG/ML
INJECTION, SOLUTION INFILTRATION; PERINEURAL AS NEEDED
Status: DISCONTINUED | OUTPATIENT
Start: 2023-08-01 | End: 2023-08-01 | Stop reason: SURG

## 2023-08-01 RX ORDER — LIDOCAINE HYDROCHLORIDE 10 MG/ML
6 INJECTION, SOLUTION INFILTRATION; PERINEURAL ONCE
Status: COMPLETED | OUTPATIENT
Start: 2023-08-01 | End: 2023-08-01

## 2023-08-01 RX ORDER — DROPERIDOL 2.5 MG/ML
0.62 INJECTION, SOLUTION INTRAMUSCULAR; INTRAVENOUS
Status: DISCONTINUED | OUTPATIENT
Start: 2023-08-01 | End: 2023-08-01 | Stop reason: HOSPADM

## 2023-08-01 RX ORDER — NALOXONE HCL 0.4 MG/ML
0.2 VIAL (ML) INJECTION AS NEEDED
Status: DISCONTINUED | OUTPATIENT
Start: 2023-08-01 | End: 2023-08-01 | Stop reason: HOSPADM

## 2023-08-01 RX ORDER — IPRATROPIUM BROMIDE AND ALBUTEROL SULFATE 2.5; .5 MG/3ML; MG/3ML
3 SOLUTION RESPIRATORY (INHALATION) ONCE AS NEEDED
Status: DISCONTINUED | OUTPATIENT
Start: 2023-08-01 | End: 2023-08-01 | Stop reason: HOSPADM

## 2023-08-01 RX ORDER — SODIUM CHLORIDE 0.9 % (FLUSH) 0.9 %
3 SYRINGE (ML) INJECTION EVERY 12 HOURS SCHEDULED
Status: DISCONTINUED | OUTPATIENT
Start: 2023-08-01 | End: 2023-08-01 | Stop reason: HOSPADM

## 2023-08-01 RX ORDER — DEXAMETHASONE SODIUM PHOSPHATE 4 MG/ML
INJECTION, SOLUTION INTRA-ARTICULAR; INTRALESIONAL; INTRAMUSCULAR; INTRAVENOUS; SOFT TISSUE AS NEEDED
Status: DISCONTINUED | OUTPATIENT
Start: 2023-08-01 | End: 2023-08-01 | Stop reason: SURG

## 2023-08-01 RX ORDER — HYDRALAZINE HYDROCHLORIDE 20 MG/ML
5 INJECTION INTRAMUSCULAR; INTRAVENOUS
Status: DISCONTINUED | OUTPATIENT
Start: 2023-08-01 | End: 2023-08-01 | Stop reason: HOSPADM

## 2023-08-01 RX ORDER — ONDANSETRON 2 MG/ML
INJECTION INTRAMUSCULAR; INTRAVENOUS AS NEEDED
Status: DISCONTINUED | OUTPATIENT
Start: 2023-08-01 | End: 2023-08-01 | Stop reason: SURG

## 2023-08-01 RX ORDER — HYDROMORPHONE HYDROCHLORIDE 1 MG/ML
0.25 INJECTION, SOLUTION INTRAMUSCULAR; INTRAVENOUS; SUBCUTANEOUS
Status: DISCONTINUED | OUTPATIENT
Start: 2023-08-01 | End: 2023-08-01 | Stop reason: HOSPADM

## 2023-08-01 RX ORDER — PROPOFOL 10 MG/ML
VIAL (ML) INTRAVENOUS AS NEEDED
Status: DISCONTINUED | OUTPATIENT
Start: 2023-08-01 | End: 2023-08-01 | Stop reason: SURG

## 2023-08-01 RX ORDER — FAMOTIDINE 10 MG/ML
20 INJECTION, SOLUTION INTRAVENOUS ONCE
Status: COMPLETED | OUTPATIENT
Start: 2023-08-01 | End: 2023-08-01

## 2023-08-01 RX ORDER — EPHEDRINE SULFATE 50 MG/ML
5 INJECTION, SOLUTION INTRAVENOUS ONCE AS NEEDED
Status: DISCONTINUED | OUTPATIENT
Start: 2023-08-01 | End: 2023-08-01 | Stop reason: HOSPADM

## 2023-08-01 RX ORDER — FENTANYL CITRATE 50 UG/ML
50 INJECTION, SOLUTION INTRAMUSCULAR; INTRAVENOUS ONCE AS NEEDED
Status: DISCONTINUED | OUTPATIENT
Start: 2023-08-01 | End: 2023-08-01 | Stop reason: HOSPADM

## 2023-08-01 RX ORDER — PROMETHAZINE HYDROCHLORIDE 25 MG/1
25 TABLET ORAL ONCE AS NEEDED
Status: DISCONTINUED | OUTPATIENT
Start: 2023-08-01 | End: 2023-08-01 | Stop reason: HOSPADM

## 2023-08-01 RX ORDER — BUPIVACAINE HYDROCHLORIDE AND EPINEPHRINE 2.5; 5 MG/ML; UG/ML
INJECTION, SOLUTION INFILTRATION; PERINEURAL AS NEEDED
Status: DISCONTINUED | OUTPATIENT
Start: 2023-08-01 | End: 2023-08-01 | Stop reason: HOSPADM

## 2023-08-01 RX ORDER — TRAMADOL HYDROCHLORIDE 50 MG/1
50 TABLET ORAL EVERY 8 HOURS PRN
Qty: 9 TABLET | Refills: 0 | Status: SHIPPED | OUTPATIENT
Start: 2023-08-01

## 2023-08-01 RX ORDER — SODIUM CHLORIDE, SODIUM LACTATE, POTASSIUM CHLORIDE, CALCIUM CHLORIDE 600; 310; 30; 20 MG/100ML; MG/100ML; MG/100ML; MG/100ML
9 INJECTION, SOLUTION INTRAVENOUS CONTINUOUS
Status: DISCONTINUED | OUTPATIENT
Start: 2023-08-01 | End: 2023-08-01 | Stop reason: HOSPADM

## 2023-08-01 RX ORDER — FLUMAZENIL 0.1 MG/ML
0.2 INJECTION INTRAVENOUS AS NEEDED
Status: DISCONTINUED | OUTPATIENT
Start: 2023-08-01 | End: 2023-08-01 | Stop reason: HOSPADM

## 2023-08-01 RX ADMIN — SODIUM CHLORIDE, POTASSIUM CHLORIDE, SODIUM LACTATE AND CALCIUM CHLORIDE 9 ML/HR: 600; 310; 30; 20 INJECTION, SOLUTION INTRAVENOUS at 07:26

## 2023-08-01 RX ADMIN — PROPOFOL 30 MG: 10 INJECTION, EMULSION INTRAVENOUS at 13:01

## 2023-08-01 RX ADMIN — DIAZEPAM 5 MG: 5 TABLET ORAL at 07:35

## 2023-08-01 RX ADMIN — PROPOFOL 30 MG: 10 INJECTION, EMULSION INTRAVENOUS at 13:27

## 2023-08-01 RX ADMIN — FENTANYL CITRATE 50 MCG: 50 INJECTION, SOLUTION INTRAMUSCULAR; INTRAVENOUS at 11:59

## 2023-08-01 RX ADMIN — ONDANSETRON 4 MG: 2 INJECTION INTRAMUSCULAR; INTRAVENOUS at 12:34

## 2023-08-01 RX ADMIN — CEFAZOLIN SODIUM 2000 MG: 2 INJECTION, SOLUTION INTRAVENOUS at 11:38

## 2023-08-01 RX ADMIN — FENTANYL CITRATE 25 MCG: 50 INJECTION, SOLUTION INTRAMUSCULAR; INTRAVENOUS at 12:42

## 2023-08-01 RX ADMIN — HYDROMORPHONE HYDROCHLORIDE 0.25 MG: 1 INJECTION, SOLUTION INTRAMUSCULAR; INTRAVENOUS; SUBCUTANEOUS at 14:21

## 2023-08-01 RX ADMIN — HYDROMORPHONE HYDROCHLORIDE 0.25 MG: 1 INJECTION, SOLUTION INTRAMUSCULAR; INTRAVENOUS; SUBCUTANEOUS at 14:12

## 2023-08-01 RX ADMIN — FAMOTIDINE 20 MG: 10 INJECTION INTRAVENOUS at 07:26

## 2023-08-01 RX ADMIN — Medication 6 ML: at 08:52

## 2023-08-01 RX ADMIN — PROPOFOL 170 MG: 10 INJECTION, EMULSION INTRAVENOUS at 11:49

## 2023-08-01 RX ADMIN — HYDROCODONE BITARTRATE AND ACETAMINOPHEN 1 TABLET: 7.5; 325 TABLET ORAL at 15:40

## 2023-08-01 RX ADMIN — DEXAMETHASONE SODIUM PHOSPHATE 8 MG: 4 INJECTION, SOLUTION INTRAMUSCULAR; INTRAVENOUS at 11:51

## 2023-08-01 RX ADMIN — LIDOCAINE HYDROCHLORIDE 60 MG: 20 INJECTION, SOLUTION INFILTRATION; PERINEURAL at 11:49

## 2023-08-01 RX ADMIN — FENTANYL CITRATE 25 MCG: 50 INJECTION, SOLUTION INTRAMUSCULAR; INTRAVENOUS at 13:27

## 2023-08-01 RX ADMIN — PROPOFOL 30 MG: 10 INJECTION, EMULSION INTRAVENOUS at 12:42

## 2023-08-01 RX ADMIN — ONDANSETRON 4 MG: 2 INJECTION INTRAMUSCULAR; INTRAVENOUS at 15:40

## 2023-08-01 NOTE — H&P
BREAST SURGERY HISTORY & PHYSICAL        Chief complaint: Routine followup breast cancer     Subjective      HPI:   5/18/2023:  Ms. Maddison Turcios is a 72 yo woman, seen at the request of Kasandra Mcdowell PA-C, for a new diagnosis of left breast cancer. This was initially detected as an imaging abnormality on routine screening. Her work-up is detailed in the oncologic history below.  Prior to the biopsy, she denies any breast lumps, pain, skin changes, or nipple discharge.  However after the biopsy, she developed a very large hematoma in her upper left breast.  She has a past history of 2 benign right breast surgical biopsies in the 1970s.  Her past history is significant for CAD sp CABG in 2004, as well as a TIA of unclear etiology in 2016 for which she is on Plavix.  She had a normal echo and stress test in 2022.  She has a family history of breast cancer in a sister (diagnosed in at age 66).       6/30/2023:  At her last visit, I aspirated a large postbiopsy hematoma.  She saw Dr. White, but we decided to hold off on preoperative endocrine therapy since surgery was not going to be delayed as long as we initially thought.  Genetic testing was negative for mutation.  She also saw cardiology and was cleared for surgery.  Prior to this visit, she underwent a repeat left mammogram to assess hematoma resolution and this demonstrated a new separate group of calcifications in the upper left breast that was not seen on her initial imaging.    8/1/2023, Interval History:  Additional left breast stereotactic biopsy showed ALH and this will also be excised today.  She denies any new complaints.            Oncology/Hematology History   Malignant neoplasm of upper-inner quadrant of left breast in female, estrogen receptor positive   3/3/2022 Imaging     Screening MMG with Jason (KARINA Kunz)  FINDINGS: The breasts are heterogeneously dense, which may obscure small masses. There are no masses or suspicious calcifications.  BI-RADS  2: Benign Findings      3/7/2023 Initial Diagnosis     Malignant neoplasm of upper-inner quadrant of left breast in female, estrogen receptor positive      3/8/2023 Imaging     Screening MMG with Jason ( [facility])  Heterogeneously dense  There is a subtle small focus of ill-defined stellate sub centimeter asymmetric opacity in the deep upper left breast just medial to midline, 8 to 9 cm from the nipple, not visible on prior studies. Recall for supplemental diagnostic mammogram images is recommended. Breast ultrasound may be added at that time if necessary.  The remainder the examination is negative and unchanged.  BI-RADS 0: Needs additional evaluation.      4/11/2023 Imaging     Left Diagnostic MMG with Jason & Left Breast Limited US ([facility])  MMG:   Tiny nodular opacity measuring about 4 mm with the surrounding stellate architectural distortion is confirmed in the upper inner left breast along the 11:00 axis, 8 to 9 cm from the nipple. One or two tiny punctate calcifications within the nodule. The mammographic appearance is suspicious.  US:   The lesion is very difficult to visualize by ultrasound. There is a subtle isoechoic focus with inconsistent acoustic shadowing positioned along the 11:00 axis about 8 cm from the nipple measuring about 4 mm achieving accurate ultrasound-guided core biopsy of this focus would not be reliable.  BI-RADS 4: Suspicious      4/28/2023 Biopsy     [facility]     1. Left Breast at 11:00 o'clock, 8-9 cm from Nipple (for calcifications), Stereotactic Core Biopsy:                 A. Invasive ductal carcinoma:                              1. Overall Debbie grade II (tubular score=3, nuclear score=2, mitotic score=1).                            2. Invasive carcinoma measures at least 4 mm.                            3. No lymphovascular space invasion identified.               B. 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 Low-grade cribriform DCIS.     ER positive (%, strong)  IN positive (51-60%, moderate)  HER2 negative (IHC 2+; FISH copy #3.2, ratio 1.4)  Ki-67 8%      5/15/2023 Imaging     Bilateral Breast MRI ( [facility]):  RIGHT BREAST:    Benign-appearing postsurgical changes are identified in the right breast. No suspicious enhancing mass or area of non-mass enhancement is identified. There is intrinsic T1 hyperintense signal within multiple ducts in the anterior and middle retroareolar right breast, consistent with proteinaceous and/or hemorrhagic contents.  The visualized axilla is within normal limits.    LEFT BREAST:    At 11:00 in the posterior left breast, centered 8.5 cm posterior to the nipple, there is a 3.5 cm AP dimension, 3.5 cm transverse dimension, 4.0 cm craniocaudal dimension predominantly T1 hyperintense mass/collection consistent with a postbiopsy hematoma. A focus of susceptibility from a biopsy clip along the anterior, superior, lateral margin of the hematoma marks the site of biopsy-proven malignancy. No suspicious enhancing mass or area of non-mass enhancement is identified at the biopsy site or ulcer within the left breast. No suspicious enhancement is identified in the left nipple or chest wall.  The visualized axilla is within normal limits.    EXTRAMAMMARY FINDINGS:   There are no pathologically enlarged internal mammary chain lymph nodes on either side.     There is susceptibility from sternotomy wires.  BI-RADS 6: Known malignancy.      5/18/2023 Genetic Testing     [employer] Common Hereditary Cancers Panel (47 genes):  Negative      5/24/2023 Cancer Staged     Staging form: Breast, AJCC 8th Edition  - Clinical: Stage IA (cT1b, cN0, cM0, G2, ER+, IN+, HER2-) - Signed by Aba White MD on 5/24/2023 6/20/2023 Imaging     Left Diagnostic MMG with Jason ( [facility]):  In the upper slightly inner posterior left breast, there is an approximately 1.3 cm focal asymmetry with architectural  distortion and a central biopsy clip, which represents the site of biopsy-proven malignancy. The focal asymmetry likely reflects a combination of malignancy and residual post biopsy hematoma, which has significantly improved, previously measuring up to 4.0 cm on recent MRI.   In the slightly upper outer posterior left breast, there is a 1 cm group of amorphous calcifications, which does not layer on the lateral view and is not seen on more remote prior mammograms. These are different in configuration compared to additional benign-appearing calcifications in the left breast and are suspicious.  BI-RADS 4: Suspicious.            Review of Systems:  See interval history.         Medications:     Current Outpatient Medications:     amitriptyline (ELAVIL) 10 MG tablet, Take 1 tablet by mouth Every Night., Disp: 90 tablet, Rfl: 3    Cholecalciferol (VITAMIN D3) 5000 UNITS capsule capsule, Take 1 capsule by mouth 1 (One) Time Per Week., Disp: , Rfl:     clopidogrel (PLAVIX) 75 MG tablet, Take 1 tablet by mouth Daily., Disp: 90 tablet, Rfl: 3    ezetimibe (ZETIA) 10 MG tablet, Take 1 tablet by mouth Daily., Disp: 90 tablet, Rfl: 3    icosapent ethyl (Vascepa) 1 g capsule capsule, Take 2 g by mouth 2 (Two) Times a Day With Meals., Disp: 360 capsule, Rfl: 3    isosorbide dinitrate (ISORDIL) 5 MG tablet, Take 1 tablet by mouth 2 (Two) Times a Day., Disp: 180 tablet, Rfl: 3    Melatonin 12 MG tablet, Take 12 mg by mouth Every Night., Disp: , Rfl:     Menaquinone-7 (VITAMIN K2 PO), Take  by mouth., Disp: , Rfl:     montelukast (Singulair) 10 MG tablet, Take 1 tablet by mouth Every Night., Disp: 90 tablet, Rfl: 3    multivitamin (THERAGRAN) tablet tablet, Take  by mouth Daily., Disp: , Rfl:     pitavastatin calcium (Livalo) 2 MG tablet tablet, Take 1 tablet by mouth Every Night., Disp: 90 tablet, Rfl: 2    Probiotic Product (PROBIOTIC PO), Take 4 capsules by mouth Daily. gutbio-align, Disp: , Rfl:     ramipril (ALTACE) 2.5 MG  capsule, Take 1 capsule by mouth Every Night., Disp: 90 capsule, Rfl: 2              Allergies   Allergen Reactions    Adhesive Tape Rash       Redness, bruising and peeling of skin     *Use Paper Tape Only*  Redness, bruising and peeling of skin     *Use Paper Tape Only*    Beta Adrenergic Blockers Unknown - High Severity       hypotension  bradycardic    Statins Myalgia    Elemental Sulfur Rash    Sulfa Antibiotics Rash               Family History   Problem Relation Age of Onset    Heart attack Mother      Heart failure Mother      Hypertension Mother      Stroke Mother      Cervical cancer Mother      Hypertension Father      Heart failure Father      Heart disease Father      Aneurysm Sister      Breast cancer Sister 66    Anxiety disorder Sister      No Known Problems Sister      Heart attack Brother      Cancer Brother      Lung cancer Brother      No Known Problems Daughter      No Known Problems Son      Ovarian cancer Neg Hx      Colon cancer Neg Hx      Pulmonary embolism Neg Hx      Deep vein thrombosis Neg Hx              Objective      PHYSICAL EXAMINATION:   Vitals:    08/01/23 0706   BP: 159/90   Pulse: 65   Resp: 18   Temp: 98.8 øF (37.1 øC)   SpO2: 96%   ECOG 0 - Asymptomatic  General: NAD, well appearing  Psych: a&o x3, normal mood and affect  Eyes: EOMI, no scleral icterus  ENMT: neck supple without masses or thyromegaly, mucous membranes moist  MSK: normal gait, normal ROM in bilateral shoulders  Lymph nodes: no cervical, supraclavicular or axillary lymphadenopathy  Breast: symmetric, moderate size, grade 3 ptosis, sternal scar   Right: Superior curvilinear scar, otherwise no visible abnormalities on inspection while seated, with arms raised or hands on hips. No masses or nipple abnormalities.   Left: On inspection, upper inner quadrant ecchymoses have nearly resolved.  At 11:00, 7 cm FN, there is a 1 cm nodule (postbiopsy hematoma, previously 5 x 5 cm).  No nipple abnormalities.    I have  independently reviewed her imaging and here are my findings:   In the left upper inner breast, there initially was a 4 mm nodule seen on mammogram with associated architectural distortion and calcifications. There is not an easily identifiable ultrasound correlate.  She had a large postbiopsy hematoma which has almost resolved.  Follow-up imaging also demonstrates a 1 cm cluster of calcifications in the upper outer left breast.  This is located 8 cm away from the biopsy clip on cc view.  This is the site of ALH.         Assessment & Plan     Assessment:   1. 73 y.o. F with a diagnosis of left breast cancer: Intermediate grade, invasive ductal carcinoma, ER/HI positive, Her2 negative, with associated DCIS; clinical T1aN0, anatomic stage IA, prognostic stage IA.  She developed a large postbiopsy hematoma and surgery was delayed to allow this to resolve.  2.  She also has a new diagnosis of left breast atypical lobular hyperplasia.     Plan:  -Left needle localized partial mastectomy and left breast atypical lobular hyperplasia     Jaleesa Hsieh MD     Copied text in this note has been reviewed and is accurate as of 08/01/23.

## 2023-08-01 NOTE — OP NOTE
Operative Report    Patient Name:  Maddison Turcios  YOB: 1950    Date of Surgery:  8/1/2023    Pre-op Diagnosis:   Malignant neoplasm of upper-inner quadrant of left breast in female, estrogen receptor positive [C50.212, Z17.0]  Left breast atypical lobular hyperplasia       Post-Op Diagnosis:  Malignant neoplasm of upper-inner quadrant of left breast in female, estrogen receptor positive [C50.212, Z17.0]  2.   Left breast atypical lobular hyperplasia      Procedures:  Left needle-localized partial mastectomy at 11:00  Left breast needle-localized excisional biopsy at 3:00      Staff:  Surgeon(s):  Jaleesa Hsieh MD       Anesthesia: General    Estimated Blood Loss:  10 mL    Implants:    Implant Name Type Inv. Item Serial No.  Lot No. LRB No. Used Action   CLIPAPPLR M/ ENDO LIGACLIP 9 3/8IN SM - [id number] Implant CLIPAPPLR M/ ENDO LIGACLIP 9 3/8IN   [employer]  DIV OF J AND J [id number] Left 1 Implanted       Specimen:          Specimens       ID Source Type Tests Collected By Collected At Frozen?    A Breast, Left Tissue TISSUE PATHOLOGY EXAM   Jaleesa Hsieh MD 8/1/23 1232 No    Description: Left partial mastectomy, ink marks margin - fresh for permanent    Comment: fresh for permanent    B Breast, Left Tissue TISSUE PATHOLOGY EXAM   Jaleesa Hsieh MD 8/1/23 1239 No    Description: Left partial mastectomy, additional superior and medial margin, ink marks margin - fresh for permanent    C Breast, Left Tissue TISSUE PATHOLOGY EXAM   Jaleesa Hsieh MD 8/1/23 1243 No    Description: Left partial mastectomy, additional inferior and lateral margins, ink marks margin - fresh for permanent    Comment: fresh for permanent    D Breast, Left Tissue TISSUE PATHOLOGY EXAM   Jaleesa Hsieh MD 8/1/23 1255 No    Description: Left breast skin 3 o'clock mammotomy site - fresh for permanent    E Breast, Left Tissue TISSUE PATHOLOGY EXAM   Jaleesa Hsieh MD 8/1/23  1309 No    Description: Left breast excisional biopsy at 3 o' clock - ink marks margin - fresh for permanent    Comment: Fresh for permanent                Findings: Wire and clip in good position in specimen radiograph #1.  Wire and clip in good position in specimen radiograph #2.    Complications: None     Indications: The patient is a 73 y.o. F with a diagnosis of left breast cancer: Intermediate grade, invasive ductal carcinoma, ER/GA positive, Her2 negative, with associated DCIS; clinical T1aN0, anatomic stage IA, prognostic stage IA.  She developed a large postbiopsy hematoma and surgery was delayed to allow this to resolve. She also has a new diagnosis of left breast atypical lobular hyperplasia. She is a good candidate for lumpectomy and she desires breast conservation. This required preoperative wire-localization of both sites. Preoperatively, we reviewed the data that suggests in women over 70 with small ER+ tumors who will receive adjuvant endocrine therapy, the risks of axillary staging may outweigh the benefits, given that there will be no change in adjuvant therapy or outcome regardless of axillary status. This was explained in detail to the patient and she declined axillary staging. The risks and benefits of surgery were discussed preoperatively and she understood and agreed to proceed.     Description of Procedure:  Preoperatively the patient was taken to the radiology department and the lesions were localized with 2 separate needles/wires. I reviewed the post-localization mammogram to determine the trajectory of the wires. The patient was identified in the preoperative area and brought to the operating room and placed supine on the table. Patient verification was performed and general anesthesia was induced. The patient was prepped and draped in sterile fashion with the wires/needles prepped into the field. A time out was performed and all were in agreement. I confirmed that the patient had received  preoperative antibiotics.     On the skin, I marked the suggested location of the lesions based on the mammographic localization imaging. I used this to plan my intended area of resection and incisions and marked these as well. I began with the partial mastectomy. The skin was infiltrated with local anesthesia. A superior horizontal incision was made with a scalpel and carried down through the dermis with sharp dissection. Flaps were raised according to the planned dissection. An anterior flap was raised first. Dissection was then carried out superiorly, inferiorly, medially, and laterally according to the planned area of resection. The wire was delivered into the wound. The posterior dissection extended to the pectoralis major muscle. The specimen, including the pectoralis fascia was lifted off the underlying muscle and removed. The specimen was oriented with paint (red - superior, green - anterior, blue - inferior, yellow - medial, orange - lateral, black - posterior). Specimen radiograph showed the wire and clip in good position. An additional superior and medial margin was taken as a single specimen. An additional inferior and lateral margin was taken as a single specimen. These were inked with the corresponding colors and ink marked the true margins. The wound was irrigated and hemostasis achieved.  Additional local anesthesia was infiltrated into the breast cavity. Marking clips were placed in the breast cavity.      I then proceeded with the excisional biopsy at 3:00. The skin was infiltrated with local anesthesia.  I made a crescent-shaped incision surrounding the stereotactic mammotomy site and excised this skin since it was associated with some erythema. Flaps were then raised according to the planned dissection. An anterior flap was raised first. Dissection was then carried out superiorly, inferiorly, medially, and laterally. The wire was delivered into the wound. The posterior dissection was completed  and the specimen removed. The specimen was oriented with paint (red - superior, green - anterior, blue - inferior, yellow - medial, orange - lateral, black - posterior). Specimen radiograph showed the wire and clip in good position.     The breast cavity was irrigated and hemostasis achieved. The breast parenchyma at 3:00 was brought together with 3-0 vicryl stitches. The deep dermis of both sites was closed with interrupted 3-0 vicryl stitches. The skin was closed with 4-0 subcuticular running monocryl and covered with skin glue. Counts were correct at the end of the case. The patient was awakened from anesthesia and taken to the PACU in stable condition.    Jaleesa Hsieh MD     Date: 8/1/2023  Time: 13:47 EDT

## 2023-08-02 ENCOUNTER — TELEPHONE (OUTPATIENT)
Dept: FAMILY MEDICINE CLINIC | Facility: CLINIC | Age: 73
End: 2023-08-02

## 2023-08-02 NOTE — TELEPHONE ENCOUNTER
Caller: Maddison Turcios    Relationship to patient: Self    Best call back number:     New or established patient?  [] New  [x] Established    Date of [id number]/01/23    Facility discharged from: [facility]    Diagnosis/Symptoms: LUMPECTOMY    Length of stay (If applicable):     Specialty Only: Did you see a [employer] [facility] provider?    [] Yes  [] No  If so, who?

## 2023-08-04 ENCOUNTER — TELEPHONE (OUTPATIENT)
Dept: SURGERY | Facility: CLINIC | Age: 73
End: 2023-08-04
Payer: MEDICARE

## 2023-08-04 DIAGNOSIS — Z17.0 MALIGNANT NEOPLASM OF UPPER-INNER QUADRANT OF LEFT BREAST IN FEMALE, ESTROGEN RECEPTOR POSITIVE: Primary | ICD-10-CM

## 2023-08-04 DIAGNOSIS — C50.212 MALIGNANT NEOPLASM OF UPPER-INNER QUADRANT OF LEFT BREAST IN FEMALE, ESTROGEN RECEPTOR POSITIVE: Primary | ICD-10-CM

## 2023-08-04 LAB
LAB AP CASE REPORT: NORMAL
LAB AP CLINICAL INFORMATION: NORMAL
LAB AP DIAGNOSIS COMMENT: NORMAL
LAB AP SPECIAL STAINS: NORMAL
LAB AP SYNOPTIC CHECKLIST: NORMAL
PATH REPORT.FINAL DX SPEC: NORMAL
PATH REPORT.GROSS SPEC: NORMAL

## 2023-08-07 ENCOUNTER — OFFICE VISIT (OUTPATIENT)
Dept: FAMILY MEDICINE CLINIC | Facility: CLINIC | Age: 73
End: 2023-08-07
Payer: MEDICARE

## 2023-08-07 VITALS
HEART RATE: 83 BPM | SYSTOLIC BLOOD PRESSURE: 128 MMHG | WEIGHT: 134 LBS | BODY MASS INDEX: 22.88 KG/M2 | RESPIRATION RATE: 15 BRPM | OXYGEN SATURATION: 97 % | HEIGHT: 64 IN | DIASTOLIC BLOOD PRESSURE: 82 MMHG | TEMPERATURE: 97.8 F

## 2023-08-07 DIAGNOSIS — E78.2 MIXED HYPERLIPIDEMIA: Primary | ICD-10-CM

## 2023-08-07 DIAGNOSIS — Z17.0 MALIGNANT NEOPLASM OF UPPER-INNER QUADRANT OF LEFT BREAST IN FEMALE, ESTROGEN RECEPTOR POSITIVE: Primary | ICD-10-CM

## 2023-08-07 DIAGNOSIS — C50.212 MALIGNANT NEOPLASM OF UPPER-INNER QUADRANT OF LEFT BREAST IN FEMALE, ESTROGEN RECEPTOR POSITIVE: Primary | ICD-10-CM

## 2023-08-07 PROCEDURE — 99213 OFFICE O/P EST LOW 20 MIN: CPT | Performed by: PHYSICIAN ASSISTANT

## 2023-08-07 PROCEDURE — 3074F SYST BP LT 130 MM HG: CPT | Performed by: PHYSICIAN ASSISTANT

## 2023-08-07 PROCEDURE — 3079F DIAST BP 80-89 MM HG: CPT | Performed by: PHYSICIAN ASSISTANT

## 2023-08-07 NOTE — PROGRESS NOTES
"Chief Complaint  Hospital Follow Up Visit    Subjective          Maddison Turcios presents to [facility]  History of Present Illness  Maddison is a 73-year-old female who presents for follow-up of left breast lumpectomy at [facility] on August 1, 2023. She is recuperating from her left breast lumpectomy.  Has follow-up appointment with breast surgeon later this week.  Has appointment with radiation oncology as well.  Denied any chest pain, shortness of air, wheezing, dizziness, fevers, chills or redness to surgical site.  Appetite and sleep have been normal.  States she has been taking Tylenol as needed for pain.  States she did not like how the pain medication made her feel.  She is not lifting or tugging any objects.     Objective   Vital Signs:   /82 (BP Location: Left arm, Patient Position: Sitting, Cuff Size: Adult)   Pulse 83   Temp 97.8 øF (36.6 øC)   Resp 15   Ht 162.6 cm (64\")   Wt 60.8 kg (134 lb)   SpO2 97%   BMI 23.00 kg/mý     Physical Exam  Vitals and nursing note reviewed.   Constitutional:       Appearance: Normal appearance. She is well-developed, well-groomed and normal weight.   HENT:      Head: Normocephalic and atraumatic.   Neck:      Vascular: No carotid bruit.      Trachea: Trachea and phonation normal. No tracheal tenderness.   Cardiovascular:      Rate and Rhythm: Normal rate and regular rhythm.      Pulses: Normal pulses.      Heart sounds: Normal heart sounds, S1 normal and S2 normal. No murmur heard.  Pulmonary:      Effort: Pulmonary effort is normal.      Breath sounds: Normal breath sounds and air entry.   Chest:          Comments: Healing surgical laceration without erythema or discharge.  Abdominal:      General: Bowel sounds are normal.      Palpations: Abdomen is soft. There is no hepatomegaly.      Tenderness: There is no abdominal tenderness.   Musculoskeletal:      Cervical back: Neck supple.      Right lower leg: No edema. "      Left lower leg: No edema.   Skin:     General: Skin is warm and dry.      Capillary Refill: Capillary refill takes less than 2 seconds.   Neurological:      Mental Status: She is alert and oriented to person, place, and time.   Psychiatric:         Attention and Perception: Attention and perception normal.         Mood and Affect: Mood and affect normal.         Speech: Speech normal.         Behavior: Behavior normal. Behavior is cooperative.         Thought Content: Thought content normal.         Cognition and Memory: Cognition and memory normal.         Judgment: Judgment normal.      Result Review :                 Assessment and Plan    Diagnoses and all orders for this visit:    1. Malignant neoplasm of upper-inner quadrant of left breast in female, estrogen receptor positive (Primary)    Maddison was seen in office today for follow-up from left breast lumpectomy for left breast cancer.  Overall she is doing well at office visit today.  She will keep her follow-up appointments.  She will return to office in 1 month for cholesterol check.  Schedule Medicare wellness in February.    I spent 15 minutes caring for Maddison on this date of service. This time includes time spent by me in the following activities:preparing for the visit, obtaining and/or reviewing a separately obtained history, performing a medically appropriate examination and/or evaluation , and documenting information in the medical record  Follow Up   Return in about 6 months (around 2/7/2024), or if symptoms worsen or fail to improve-labs prior, for Medicare Wellness.  Patient was given instructions and counseling regarding her condition or for health maintenance advice. Please see specific information pulled into the AVS if appropriate.     CELI Corado [facility]  [address] [place] [id number]  Dept: [phone]  Dept Fax: [phone]  Loc:  [phone]  Loc Fax: [phone]

## 2023-08-10 ENCOUNTER — OFFICE VISIT (OUTPATIENT)
Dept: SURGERY | Facility: CLINIC | Age: 73
End: 2023-08-10
Payer: MEDICARE

## 2023-08-10 VITALS
RESPIRATION RATE: 17 BRPM | HEART RATE: 80 BPM | HEIGHT: 64 IN | WEIGHT: 134 LBS | SYSTOLIC BLOOD PRESSURE: 136 MMHG | DIASTOLIC BLOOD PRESSURE: 78 MMHG | OXYGEN SATURATION: 99 % | BODY MASS INDEX: 22.88 KG/M2

## 2023-08-10 DIAGNOSIS — Z48.89 POSTOPERATIVE VISIT: Primary | ICD-10-CM

## 2023-08-10 PROCEDURE — 1159F MED LIST DOCD IN RCRD: CPT | Performed by: SURGERY

## 2023-08-10 PROCEDURE — 3075F SYST BP GE 130 - 139MM HG: CPT | Performed by: SURGERY

## 2023-08-10 PROCEDURE — 1160F RVW MEDS BY RX/DR IN RCRD: CPT | Performed by: SURGERY

## 2023-08-10 PROCEDURE — 3078F DIAST BP <80 MM HG: CPT | Performed by: SURGERY

## 2023-08-10 PROCEDURE — 99024 POSTOP FOLLOW-UP VISIT: CPT | Performed by: SURGERY

## 2023-08-10 NOTE — PROGRESS NOTES
[facility]     Referring Provider: Kasandra Mcdowell PA-C     Chief complaint: Postoperative visit     Subjective   HPI:   5/18/2023:  Ms. Maddison Turcios is a 72 yo woman, seen at the request of Kasandra Mcdowell PA-C, for a new diagnosis of left breast cancer. This was initially detected as an imaging abnormality on routine screening. Her work-up is detailed in the oncologic history below.  Prior to the biopsy, she denies any breast lumps, pain, skin changes, or nipple discharge.  However after the biopsy, she developed a very large hematoma in her upper left breast.  She has a past history of 2 benign right breast surgical biopsies in the 1970s.  Her past history is significant for CAD sp CABG in 2004, as well as a TIA of unclear etiology in 2016 for which she is on Plavix.  She had a normal echo and stress test in 2022.  She has a family history of breast cancer in a sister (diagnosed in at age 66).      6/30/2023:  At her last visit, I aspirated a large postbiopsy hematoma.  She saw Dr. White, but we decided to hold off on preoperative endocrine therapy since surgery was not going to be delayed as long as we initially thought.  Genetic testing was negative for mutation.  She also saw cardiology and was cleared for surgery.  Prior to this visit, she underwent a repeat left mammogram to assess hematoma resolution and this demonstrated a new separate group of calcifications in the upper left breast that was not seen on her initial imaging.      8/10/2023, Interval History:  Prior to surgery she underwent an additional left stereotactic biopsy which showed incidental ALH and we decided to also excise this site.  She underwent left partial mastectomy and excisional biopsy on 8/1/23 (with no residual disease, see pathology details below). She has been recovering well and has no complaints.       Oncology/Hematology History   Malignant neoplasm of upper-inner quadrant of left breast in female, estrogen receptor  positive   3/3/2022 Imaging    Screening MMG with [facility] ( [place])  FINDINGS: The breasts are heterogeneously dense, which may obscure small masses. There are no masses or suspicious calcifications.  BI-RADS 2: Benign Findings     3/7/2023 Initial Diagnosis    Malignant neoplasm of upper-inner quadrant of left breast in female, estrogen receptor positive     3/8/2023 Imaging    Screening MMG with [facility] ( [place])  Heterogeneously dense  There is a subtle small focus of ill-defined stellate sub centimeter asymmetric opacity in the deep upper left breast just medial to midline, 8 to 9 cm from the nipple, not visible on prior studies. Recall for supplemental diagnostic mammogram images is recommended. Breast ultrasound may be added at that time if necessary.  The remainder the examination is negative and unchanged.  BI-RADS 0: Needs additional evaluation.     4/11/2023 Imaging    Left Diagnostic MMG with [facility] & Left Breast Limited US ([facility])  MMG:   Tiny nodular opacity measuring about 4 mm with the surrounding stellate architectural distortion is confirmed in the upper inner left breast along the 11:00 axis, 8 to 9 cm from the nipple. One or two tiny punctate calcifications within the nodule. The mammographic appearance is suspicious.  US:   The lesion is very difficult to visualize by ultrasound. There is a subtle isoechoic focus with inconsistent acoustic shadowing positioned along the 11:00 axis about 8 cm from the nipple measuring about 4 mm achieving accurate ultrasound-guided core biopsy of this focus would not be reliable.  BI-RADS 4: Suspicious     4/28/2023 Biopsy    Left Breast, Stereotactic Biopsy ( [facility]):    1. Left Breast at 11:00 o'clock, 8-9 cm from Nipple (for calcifications), Stereotactic Core Biopsy:                 A. Invasive ductal carcinoma:                              1. Overall [place] grade II (tubular score=3, nuclear score=2, mitotic score=1).                            2. Invasive  carcinoma measures at least 4 mm.                            3. No lymphovascular space invasion identified.               B. 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 Low-grade cribriform DCIS.    ER positive (%, strong)  IA positive (51-60%, moderate)  HER2 negative (IHC 2+; FISH copy #3.2, ratio 1.4)  Ki-67 8%     5/15/2023 Imaging    Bilateral Breast MRI ([facility]):  RIGHT BREAST:    Benign-appearing postsurgical changes are identified in the right breast. No suspicious enhancing mass or area of non-mass enhancement is identified. There is intrinsic T1 hyperintense signal within multiple ducts in the anterior and middle retroareolar right breast, consistent with proteinaceous and/or hemorrhagic contents.  The visualized axilla is within normal limits.    LEFT BREAST:    At 11:00 in the posterior left breast, centered 8.5 cm posterior to the nipple, there is a 3.5 cm AP dimension, 3.5 cm transverse dimension, 4.0 cm craniocaudal dimension predominantly T1 hyperintense mass/collection consistent with a postbiopsy hematoma. A focus of susceptibility from a biopsy clip along the anterior, superior, lateral margin of the hematoma marks the site of biopsy-proven malignancy. No suspicious enhancing mass or area of non-mass enhancement is identified at the biopsy site or ulcer within the left breast. No suspicious enhancement is identified in the left nipple or chest wall.  The visualized axilla is within normal limits.    EXTRAMAMMARY FINDINGS:   There are no pathologically enlarged internal mammary chain lymph nodes on either side.     There is susceptibility from sternotomy wires.  BI-RADS 6: Known malignancy.     5/18/2023 Genetic Testing    [employer] Common Hereditary Cancers Panel (47 genes):  Negative     5/24/2023 Cancer Staged    Staging form: Breast, AJCC 8th Edition  - Clinical: Stage IA (cT1b, cN0, cM0, G2, ER+, IA+, HER2-) - Signed by Aba White MD on  5/24/2023 6/20/2023 Imaging    Left Diagnostic MMG with [facility] ( [facility]):  In the upper slightly inner posterior left breast, there is an approximately 1.3 cm focal asymmetry with architectural distortion and a central biopsy clip, which represents the site of biopsy-proven malignancy. The focal asymmetry likely reflects a combination of malignancy and residual post biopsy hematoma, which has significantly improved, previously measuring up to 4.0 cm on recent MRI.   In the slightly upper outer posterior left breast, there is a 1 cm group of amorphous calcifications, which does not layer on the lateral view and is not seen on more remote prior mammograms. These are different in configuration compared to additional benign-appearing calcifications in the left breast and are suspicious.  BI-RADS 4: Suspicious.     7/21/2023 Biopsy    Left Breast, Stereotactic Biopsy ( [facility]):    1. Breast, Left 2 O'clock Position, Core Biopsy For Calcifications:                 A. Incidental atypical lobular hyperplasia.   B. Microcalcifications identified in association with benign ductal structures and dilated cysts with apocrine metaplasia.               C. No evidence of in-situ nor invasive malignancy.  -Bowtie clip.      8/1/2023 Surgery    Left needle-localized partial mastectomy and left breast needle-localized excisional biopsy:    1. Left Breast, Oriented Needle Localization Lumpectomy (26 grams):                A. Biopsy site changes without evidence of residual carcinoma (see Comment).               B. No atypical hyperplasia nor in situ carcinoma identified.               C. Biopsy site changes are present and the clip retrieved.  D. Hormone receptor status: ER % positive, WA 51-60% positive, HER2 2+ by IHC, negative by FISH, Ki-67 8% (performed on prior biopsy [id number], slides reviewed).  E. Pathologic stage: pT1a, NX.     2. Left Breast, Additional Superior and Medial Margins, Oriented Excision:               A.  Benign unremarkable breast tissue.     3. Left Breast, Additional Inferior and Lateral Margins, Oriented Excision:               A. Benign breast tissue with sclerosing adenosis with associated microcalcifications, usual ductal hyperplasia and apocrine cysts.     4. Skin, Left Breast at 3 o'clock, Unoriented Excision:               A. Benign skin with dermal chronic active inflammation and changes consistent with prior biopsy.               B. No atypical hyperplasia, in-situ nor invasive carcinoma identified.     5. Left Breast, 3 o'clock, Oriented Needle Localization Lumpectomy (6 grams):               A. Benign breast tissue with fibroadenomatoid change with associated calcifications and microcalcifications associated with benign breast ducts.  B. Biopsy site change is present and clip retrieved.  C. No residual atypical hyperplasia, in-situ, nor invasive carcinoma identified (margins clear).         Review of Systems:  See interval history.       Medications:    Current Outpatient Medications:     amitriptyline (ELAVIL) 10 MG tablet, Take 1 tablet by mouth Every Night., Disp: 90 tablet, Rfl: 3    Cholecalciferol (VITAMIN D3) 5000 UNITS capsule capsule, Take 1 capsule by mouth 1 (One) Time Per Week., Disp: , Rfl:     clopidogrel (PLAVIX) 75 MG tablet, Take 1 tablet by mouth Daily., Disp: 90 tablet, Rfl: 3    ezetimibe (ZETIA) 10 MG tablet, Take 1 tablet by mouth Daily., Disp: 90 tablet, Rfl: 3    icosapent ethyl (Vascepa) 1 g capsule capsule, Take 2 g by mouth 2 (Two) Times a Day With Meals., Disp: 360 capsule, Rfl: 3    isosorbide dinitrate (ISORDIL) 5 MG tablet, Take 1 tablet by mouth 2 (Two) Times a Day., Disp: 180 tablet, Rfl: 3    Melatonin 12 MG tablet, Take 12 mg by mouth Every Night., Disp: , Rfl:     Menaquinone-7 (VITAMIN K2 PO), Take  by mouth. HOLD PER MD INSTR, Disp: , Rfl:     montelukast (Singulair) 10 MG tablet, Take 1 tablet by mouth Every Night., Disp: 90 tablet, Rfl: 3    multivitamin  (THERAGRAN) tablet tablet, Take 1 tablet by mouth Daily. HOLD PER MD INSTR, Disp: , Rfl:     Omega-3 Fatty Acids (FISH OIL PO), Take 1 tablet by mouth Daily. HOLD PER MD INSTR, Disp: , Rfl:     pitavastatin calcium (Livalo) 2 MG tablet tablet, Take 1 tablet by mouth Every Night., Disp: 90 tablet, Rfl: 2    Probiotic Product (PROBIOTIC PO), Take 4 capsules by mouth Daily. gutbio-align, Disp: , Rfl:     Psyllium (Metamucil) wafer wafer, Take  by mouth Daily. Fiber tablet, Disp: , Rfl:     ramipril (ALTACE) 2.5 MG capsule, Take 1 capsule by mouth Every Night., Disp: 90 capsule, Rfl: 2    traMADol (Ultram) 50 MG tablet, Take 1 tablet by mouth Every 8 (Eight) Hours As Needed for Moderate Pain., Disp: 9 tablet, Rfl: 0      Allergies   Allergen Reactions    Adhesive Tape Rash     Redness, bruising and peeling of skin    *Use Paper Tape Only*  Redness, bruising and peeling of skin    *Use Paper Tape Only*    Beta Adrenergic Blockers Unknown - High Severity     hypotension  bradycardic    Statins Myalgia    Elemental Sulfur Rash    Sulfa Antibiotics Rash       Family History   Problem Relation Age of Onset    Heart attack Mother     Heart failure Mother     Hypertension Mother     Stroke Mother     Cervical cancer Mother     Hypertension Father     Heart failure Father     Heart disease Father     Aneurysm Sister     Breast cancer Sister 66    Anxiety disorder Sister     No Known Problems Sister     Heart attack Brother     Cancer Brother     Lung cancer Brother     No Known Problems Daughter     No Known Problems Son     Ovarian cancer Neg Hx     Colon cancer Neg Hx     Pulmonary embolism Neg Hx     Deep vein thrombosis Neg Hx     Malig Hyperthermia Neg Hx        Objective   PHYSICAL EXAMINATION:   Vitals:    08/10/23 1156   BP: 136/78   Pulse: 80   Resp: 17   SpO2: 99%     ECOG 0 - Asymptomatic  General: NAD, well appearing  Psych: a&o x3, normal mood and affect  Eyes: EOMI, no scleral icterus  ENMT: neck supple without  masses or thyromegaly, mucous membranes moist  MSK: normal gait, normal ROM in bilateral shoulders  Lymph nodes: no cervical, supraclavicular or axillary lymphadenopathy  Breast: symmetric, moderate size, grade 3 ptosis, sternal scar   Right: deferred.  Left: Well-healed superior horizontal and lateral curvilinear incisions with skin glue intact and mild swelling.        Assessment & Plan   Assessment:   73 y.o. F with a diagnosis of left breast cancer: Intermediate grade, invasive ductal carcinoma, ER/MS positive, Her2 negative. She developed a large postbiopsy hematoma and surgery was delayed to allow this to resolve.  Preoperative follow-up imaging then demonstrated a separate area of calcifications.  This was biopsied and showed incidental atypical lobular hyperplasia (ALH). She underwent left partial mastectomy and excisional biopsy on 8/1/23, pT1aNx (no residual disease in surgical specimen, 4 mm on core).    Plan:  -Radiation oncology referral.  Appointment scheduled with Dr. Leon on 8/23/2023.  -Continue follow-up with Dr. White.  -Follow-up in 3 months for clinical exam with NP.    Jaleesa Hsieh MD      CC:  Kasandra Mcdowell PA-C

## 2023-08-10 NOTE — LETTER
August 10, 2023       No Recipients    Patient: Maddison Turcios   YOB: 1950   Date of Visit: 8/10/2023     Dear Kasandra Mcdowell PA-C:       Thank you for referring Maddison Turcios to me for evaluation. Below are the relevant portions of my assessment and plan of care.    If you have questions, please do not hesitate to call me. I look forward to following Maddison along with you.         Sincerely,        Jaleesa Hsieh MD        CC:   No Recipients    Jaleesa Hsieh MD  08/10/23 1228  Sign when Signing Visit  [facility]     Referring Provider: Kasandra Mcdowell PA-C     Chief complaint: Postoperative visit     Subjective   HPI:   5/18/2023:  Ms. Maddison Turcios is a 72 yo woman, seen at the request of Kasandra Mcdowell PA-C, for a new diagnosis of left breast cancer. This was initially detected as an imaging abnormality on routine screening. Her work-up is detailed in the oncologic history below.  Prior to the biopsy, she denies any breast lumps, pain, skin changes, or nipple discharge.  However after the biopsy, she developed a very large hematoma in her upper left breast.  She has a past history of 2 benign right breast surgical biopsies in the 1970s.  Her past history is significant for CAD sp CABG in 2004, as well as a TIA of unclear etiology in 2016 for which she is on Plavix.  She had a normal echo and stress test in 2022.  She has a family history of breast cancer in a sister (diagnosed in at age 66).      6/30/2023:  At her last visit, I aspirated a large postbiopsy hematoma.  She saw Dr. White, but we decided to hold off on preoperative endocrine therapy since surgery was not going to be delayed as long as we initially thought.  Genetic testing was negative for mutation.  She also saw cardiology and was cleared for surgery.  Prior to this visit, she underwent a repeat left mammogram to assess hematoma resolution and this demonstrated a new separate group of calcifications in the  upper left breast that was not seen on her initial imaging.      8/10/2023, Interval History:  Prior to surgery she underwent an additional left stereotactic biopsy which showed incidental ALH and we decided to also excise this site.  She underwent left partial mastectomy and excisional biopsy on 8/1/23 (with no residual disease, see pathology details below). She has been recovering well and has no complaints.       Oncology/Hematology History   Malignant neoplasm of upper-inner quadrant of left breast in female, estrogen receptor positive   3/3/2022 Imaging    Screening MMG with [facility] ( [place])  FINDINGS: The breasts are heterogeneously dense, which may obscure small masses. There are no masses or suspicious calcifications.  BI-RADS 2: Benign Findings     3/7/2023 Initial Diagnosis    Malignant neoplasm of upper-inner quadrant of left breast in female, estrogen receptor positive     3/8/2023 Imaging    Screening MMG with [facility] ( [place])  Heterogeneously dense  There is a subtle small focus of ill-defined stellate sub centimeter asymmetric opacity in the deep upper left breast just medial to midline, 8 to 9 cm from the nipple, not visible on prior studies. Recall for supplemental diagnostic mammogram images is recommended. Breast ultrasound may be added at that time if necessary.  The remainder the examination is negative and unchanged.  BI-RADS 0: Needs additional evaluation.     4/11/2023 Imaging    Left Diagnostic MMG with [facility] & Left Breast Limited US ([facility])  MMG:   Tiny nodular opacity measuring about 4 mm with the surrounding stellate architectural distortion is confirmed in the upper inner left breast along the 11:00 axis, 8 to 9 cm from the nipple. One or two tiny punctate calcifications within the nodule. The mammographic appearance is suspicious.  US:   The lesion is very difficult to visualize by ultrasound. There is a subtle isoechoic focus with inconsistent acoustic shadowing positioned along the  11:00 axis about 8 cm from the nipple measuring about 4 mm achieving accurate ultrasound-guided core biopsy of this focus would not be reliable.  BI-RADS 4: Suspicious     4/28/2023 Biopsy    Left Breast, Stereotactic Biopsy ( [facility]):    1. Left Breast at 11:00 o'clock, 8-9 cm from Nipple (for calcifications), Stereotactic Core Biopsy:                 A. Invasive ductal carcinoma:                              1. Overall [place] grade II (tubular score=3, nuclear score=2, mitotic score=1).                            2. Invasive carcinoma measures at least 4 mm.                            3. No lymphovascular space invasion identified.               B. 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 Low-grade cribriform DCIS.    ER positive (%, strong)  DC positive (51-60%, moderate)  HER2 negative (IHC 2+; FISH copy #3.2, ratio 1.4)  Ki-67 8%     5/15/2023 Imaging    [facility]  RIGHT BREAST:    Benign-appearing postsurgical changes are identified in the right breast. No suspicious enhancing mass or area of non-mass enhancement is identified. There is intrinsic T1 hyperintense signal within multiple ducts in the anterior and middle retroareolar right breast, consistent with proteinaceous and/or hemorrhagic contents.  The visualized axilla is within normal limits.    LEFT BREAST:    At 11:00 in the posterior left breast, centered 8.5 cm posterior to the nipple, there is a 3.5 cm AP dimension, 3.5 cm transverse dimension, 4.0 cm craniocaudal dimension predominantly T1 hyperintense mass/collection consistent with a postbiopsy hematoma. A focus of susceptibility from a biopsy clip along the anterior, superior, lateral margin of the hematoma marks the site of biopsy-proven malignancy. No suspicious enhancing mass or area of non-mass enhancement is identified at the biopsy site or ulcer within the left breast. No suspicious enhancement is identified in the left  nipple or chest wall.  The visualized axilla is within normal limits.    EXTRAMAMMARY FINDINGS:   There are no pathologically enlarged internal mammary chain lymph nodes on either side.     There is susceptibility from sternotomy wires.  BI-RADS 6: Known malignancy.     5/18/2023 Genetic Testing    [employer] Common Hereditary Cancers Panel (47 genes):  Negative     5/24/2023 Cancer Staged    Staging form: Breast, AJCC 8th Edition  - Clinical: Stage IA (cT1b, cN0, cM0, G2, ER+, RI+, HER2-) - Signed by Aba White MD on 5/24/2023 6/20/2023 Imaging    Left Diagnostic MMG with [facility] ( [facility]):  In the upper slightly inner posterior left breast, there is an approximately 1.3 cm focal asymmetry with architectural distortion and a central biopsy clip, which represents the site of biopsy-proven malignancy. The focal asymmetry likely reflects a combination of malignancy and residual post biopsy hematoma, which has significantly improved, previously measuring up to 4.0 cm on recent MRI.   In the slightly upper outer posterior left breast, there is a 1 cm group of amorphous calcifications, which does not layer on the lateral view and is not seen on more remote prior mammograms. These are different in configuration compared to additional benign-appearing calcifications in the left breast and are suspicious.  BI-RADS 4: Suspicious.     7/21/2023 Biopsy    Left Breast, Stereotactic Biopsy ( [facility]):    1. Breast, Left 2 O'clock Position, Core Biopsy For Calcifications:                 A. Incidental atypical lobular hyperplasia.   B. Microcalcifications identified in association with benign ductal structures and dilated cysts with apocrine metaplasia.               C. No evidence of in-situ nor invasive malignancy.  -Bowtie clip.      8/1/2023 Surgery    Left needle-localized partial mastectomy and left breast needle-localized excisional biopsy:    1. Left Breast, Oriented Needle Localization Lumpectomy (26 grams):                 A. Biopsy site changes without evidence of residual carcinoma (see Comment).               B. No atypical hyperplasia nor in situ carcinoma identified.               C. Biopsy site changes are present and the clip retrieved.  D. Hormone receptor status: ER % positive, MO 51-60% positive, HER2 2+ by IHC, negative by FISH, Ki-67 8% (performed on prior biopsy [id number], slides reviewed).  E. Pathologic stage: pT1a, NX.     2. Left Breast, Additional Superior and Medial Margins, Oriented Excision:               A. Benign unremarkable breast tissue.     3. Left Breast, Additional Inferior and Lateral Margins, Oriented Excision:               A. Benign breast tissue with sclerosing adenosis with associated microcalcifications, usual ductal hyperplasia and apocrine cysts.     4. Skin, Left Breast at 3 o'clock, Unoriented Excision:               A. Benign skin with dermal chronic active inflammation and changes consistent with prior biopsy.               B. No atypical hyperplasia, in-situ nor invasive carcinoma identified.     5. Left Breast, 3 o'clock, Oriented Needle Localization Lumpectomy (6 grams):               A. Benign breast tissue with fibroadenomatoid change with associated calcifications and microcalcifications associated with benign breast ducts.  B. Biopsy site change is present and clip retrieved.  C. No residual atypical hyperplasia, in-situ, nor invasive carcinoma identified (margins clear).         Review of Systems:  See interval history.       Medications:    Current Outpatient Medications:     amitriptyline (ELAVIL) 10 MG tablet, Take 1 tablet by mouth Every Night., Disp: 90 tablet, Rfl: 3    Cholecalciferol (VITAMIN D3) 5000 UNITS capsule capsule, Take 1 capsule by mouth 1 (One) Time Per Week., Disp: , Rfl:     clopidogrel (PLAVIX) 75 MG tablet, Take 1 tablet by mouth Daily., Disp: 90 tablet, Rfl: 3    ezetimibe (ZETIA) 10 MG tablet, Take 1 tablet by mouth Daily., Disp: 90 tablet,  Rfl: 3    icosapent ethyl (Vascepa) 1 g capsule capsule, Take 2 g by mouth 2 (Two) Times a Day With Meals., Disp: 360 capsule, Rfl: 3    isosorbide dinitrate (ISORDIL) 5 MG tablet, Take 1 tablet by mouth 2 (Two) Times a Day., Disp: 180 tablet, Rfl: 3    Melatonin 12 MG tablet, Take 12 mg by mouth Every Night., Disp: , Rfl:     Menaquinone-7 (VITAMIN K2 PO), Take  by mouth. HOLD PER MD INSTR, Disp: , Rfl:     montelukast (Singulair) 10 MG tablet, Take 1 tablet by mouth Every Night., Disp: 90 tablet, Rfl: 3    multivitamin (THERAGRAN) tablet tablet, Take 1 tablet by mouth Daily. HOLD PER MD INSTR, Disp: , Rfl:     Omega-3 Fatty Acids (FISH OIL PO), Take 1 tablet by mouth Daily. HOLD PER MD INSTR, Disp: , Rfl:     pitavastatin calcium (Livalo) 2 MG tablet tablet, Take 1 tablet by mouth Every Night., Disp: 90 tablet, Rfl: 2    Probiotic Product (PROBIOTIC PO), Take 4 capsules by mouth Daily. gutbio-align, Disp: , Rfl:     Psyllium (Metamucil) wafer wafer, Take  by mouth Daily. Fiber tablet, Disp: , Rfl:     ramipril (ALTACE) 2.5 MG capsule, Take 1 capsule by mouth Every Night., Disp: 90 capsule, Rfl: 2    traMADol (Ultram) 50 MG tablet, Take 1 tablet by mouth Every 8 (Eight) Hours As Needed for Moderate Pain., Disp: 9 tablet, Rfl: 0      Allergies   Allergen Reactions    Adhesive Tape Rash     Redness, bruising and peeling of skin    *Use Paper Tape Only*  Redness, bruising and peeling of skin    *Use Paper Tape Only*    Beta Adrenergic Blockers Unknown - High Severity     hypotension  bradycardic    Statins Myalgia    Elemental Sulfur Rash    Sulfa Antibiotics Rash       Family History   Problem Relation Age of Onset    Heart attack Mother     Heart failure Mother     Hypertension Mother     Stroke Mother     Cervical cancer Mother     Hypertension Father     Heart failure Father     Heart disease Father     Aneurysm Sister     Breast cancer Sister 66    Anxiety disorder Sister     No Known  Problems Sister     Heart attack Brother     Cancer Brother     Lung cancer Brother     No Known Problems Daughter     No Known Problems Son     Ovarian cancer Neg Hx     Colon cancer Neg Hx     Pulmonary embolism Neg Hx     Deep vein thrombosis Neg Hx     Malig Hyperthermia Neg Hx        Objective   PHYSICAL EXAMINATION:   Vitals:    08/10/23 1156   BP: 136/78   Pulse: 80   Resp: 17   SpO2: 99%     ECOG 0 - Asymptomatic  General: NAD, well appearing  Psych: a&o x3, normal mood and affect  Eyes: EOMI, no scleral icterus  ENMT: neck supple without masses or thyromegaly, mucous membranes moist  MSK: normal gait, normal ROM in bilateral shoulders  Lymph nodes: no cervical, supraclavicular or axillary lymphadenopathy  Breast: symmetric, moderate size, grade 3 ptosis, sternal scar   Right: deferred.  Left: Well-healed superior horizontal and lateral curvilinear incisions with skin glue intact and mild swelling.        Assessment & Plan   Assessment:   73 y.o. F with a diagnosis of left breast cancer: Intermediate grade, invasive ductal carcinoma, ER/LA positive, Her2 negative. She developed a large postbiopsy hematoma and surgery was delayed to allow this to resolve.  Preoperative follow-up imaging then demonstrated a separate area of calcifications.  This was biopsied and showed incidental atypical lobular hyperplasia (ALH). She underwent left partial mastectomy and excisional biopsy on 8/1/23, pT1aNx (no residual disease in surgical specimen, 4 mm on core).    Plan:  -Radiation oncology referral.  Appointment scheduled with Dr. Leon on 8/23/2023.  -Continue follow-up with Dr. White.  -Follow-up in 3 months for clinical exam with NP.    Jaleesa Hsieh MD      CC:  Kasandra Mcdowell PA-C

## 2023-08-21 ENCOUNTER — PATIENT OUTREACH (OUTPATIENT)
Dept: OTHER | Facility: HOSPITAL | Age: 73
End: 2023-08-21
Payer: MEDICARE

## 2023-08-21 NOTE — PROGRESS NOTES
Called Ms. Turcios to see how she was doing. She stated she is doing well and has no needs at this time. She was thankful for the call and will reach out if any questions or needs arise.

## 2023-08-23 ENCOUNTER — HOSPITAL ENCOUNTER (OUTPATIENT)
Dept: RADIATION ONCOLOGY | Facility: HOSPITAL | Age: 73
Setting detail: RADIATION/ONCOLOGY SERIES
End: 2023-08-23
Payer: MEDICARE

## 2023-08-23 ENCOUNTER — CONSULT (OUTPATIENT)
Dept: RADIATION ONCOLOGY | Facility: HOSPITAL | Age: 73
End: 2023-08-23
Payer: MEDICARE

## 2023-08-23 VITALS
HEART RATE: 72 BPM | SYSTOLIC BLOOD PRESSURE: 144 MMHG | OXYGEN SATURATION: 98 % | BODY MASS INDEX: 23.48 KG/M2 | WEIGHT: 136.8 LBS | DIASTOLIC BLOOD PRESSURE: 81 MMHG

## 2023-08-23 DIAGNOSIS — C50.212 MALIGNANT NEOPLASM OF UPPER-INNER QUADRANT OF LEFT BREAST IN FEMALE, ESTROGEN RECEPTOR POSITIVE: Primary | ICD-10-CM

## 2023-08-23 DIAGNOSIS — Z17.0 MALIGNANT NEOPLASM OF UPPER-INNER QUADRANT OF LEFT BREAST IN FEMALE, ESTROGEN RECEPTOR POSITIVE: Primary | ICD-10-CM

## 2023-08-23 PROCEDURE — G0463 HOSPITAL OUTPT CLINIC VISIT: HCPCS | Performed by: RADIOLOGY

## 2023-08-23 NOTE — PROGRESS NOTES
Subjective     Jaleesa Hsieh MD    Cancer Staging   Stage IA (cT1b, cN0, cM0, G2, ER+, OK+, HER2-)    CC: left breast cancer, UIQ, pT1aNx ERPR pos Her 2 negative                                 Dear Jaleesa Hsieh MD    I had the pleasure of seeing Maddison Turcios  [facility].   The patient is a 73 y.o. female with recently diagnosed left breast cancer.  She had a screening mammogram on 3/8/23 which showed heterogeneously dense breast tissue with a subtle small focus of ill-defined stellate sub centimeter asymmetric opacity in the deep upper left breast just medial to midline, 8 to 9 cm from the nipple, not visible on prior studies. Recall for supplemental diagnostic mammogram images is recommended. BiRADS 0: She had a diagnostic left mammogram and ultrasound on 23 which showed a 4mm opcaity in uiq left breast 11 oclock 8-9cm from nipple.  Ultrasound showed subtle isoechoic focus at 11 oclock axis measuring 4mm.     She had a stereotactic biopsy on 23 which revealed grade II invasive ductal carcinoma, 4mm, with low grade dcis, ER % OK 50-60% her 2 negative and ki67 8%.    She had a breast mri on 5/15/23 which showed a 4 cm postbiopsy hematoma at 11:00 in the posterior left breast represents the site of biopsy-proven malignancy.     She underwent a stereotactic biopsy of left breast 2 oclock position on 23 with incidental atypical lobular hyperplasia.    She underwent a left breast lumpectomy on 23 with pathology revealing biopsy site changes with no residual carcinoma and no in situ carcinoma.      She is  with menarche age 13 and first childbirth age 28.  She breast fed for 4 months.  She went through menopause age 55 and took oral contraceptives for 4-5 years.  She has never taken hormone replacement therapy.  She has a personal hx of TIA and CABG.     She is recovering well from surgery and referred today for evaluation for adjuvant radiation.  She met  with Dr. White preoperatively and she discussed possibly arimidex but she has not seen her back since surgery.              Review of Systems   Constitutional:  Positive for unexpected weight change.   HENT:  Positive for hearing loss.    Eyes: Negative.    Respiratory: Negative.     Cardiovascular: Negative.    Gastrointestinal: Negative.    Genitourinary: Negative.    Musculoskeletal: Negative.    Neurological: Negative.    Hematological:  Bruises/bleeds easily.   Psychiatric/Behavioral: Negative.         Past Medical History:   Diagnosis Date    Abnormal blood chemistry     Acute UTI (urinary tract infection)     Allergic     Anemia     Anesthesia complication     SLOW TO WAKE UP    Aneurysm, cerebral     Bloating     Brain aneurysm     CAD (coronary artery disease)     Chronic headaches     Coronary artery disease     Coronary artery stenosis     Dysuria     Encounter for screening colonoscopy     Environmental allergies     Fall from slip, trip, or stumble     Gait disturbance     H/O cardiovascular stress test 09/17/2013    Treadmill    H/O colonoscopy     H/O echocardiogram 09/25/2013    H/O fall     Heart murmur     Hereditary spherocytosis     History of EKG 07/31/2015    Hyperlipemia     Hyperlipidemia     Hypertension     Hyperthyroidism     Injury of back     Insomnia     Left forearm pain     Left leg pain     Left wrist pain     Lower abdominal pain     Malignant neoplasm of upper-inner quadrant of left breast in female, estrogen receptor positive 05/18/2023    Need for pneumococcal vaccination     Onychomycosis of toenail     Pelvic pressure in female     Right foot pain     Stroke     TIA (transient ischemic attack) 11/30/2016    URI (upper respiratory infection)     Urine frequency          Past Surgical History:   Procedure Laterality Date    BREAST EXCISIONAL BIOPSY Right 1977    b9    BREAST EXCISIONAL BIOPSY Right 1979    b9    BREAST LUMPECTOMY  Left 8/1/2023    Procedure: Left needle-localized partial mastectomy and left breast needle-localized excisional biopsy;  Surgeon: Jaleesa Hsieh MD;  Location: [facility] MAIN OR;  Service: General;  Laterality: Left;    BUNIONECTOMY      CARDIAC CATHETERIZATION N/A 04/08/2019    Procedure: Left Heart Cath;  Surgeon: Jayme Ramirez MD;  Location:  [facility] CATH INVASIVE [facility]ATION;  Service: Cardiovascular    CARDIAC CATHETERIZATION N/A 04/08/2019    Procedure: Coronary angiography;  Surgeon: Jayme Ramirez MD;  Location:  [facility];  Service: Cardiovascular    CARDIAC CATHETERIZATION N/A 04/08/2019    Procedure: Left ventriculography;  Surgeon: Jayme Ramirez MD;  [facility] C[facility];  Service: Cardiovascular    CEREBRAL ANEURYSM REPAIR  2015    CHOLECYSTECTOMY      COLONOSCOPY N/A 01/29/2021    Procedure: COLONOSCOPY with polypectomy;  Surgeon: Colin Ladd MD;  [facility]: [facility];  Service: Gastroenterology;  Laterality: N/A;  Diverticulosis  Sigmoid colon polyp    CORONARY ARTERY BYPASS GRAFT  2004    Triple    FOOT SURGERY Right 2016    ROTATOR CUFF REPAIR Left     SPLENECTOMY  1953    TONSILLECTOMY  1984    TUBAL ABDOMINAL LIGATION  1979    US GUIDED CYST ASPIRATION BREAST N/A 5/18/2023         Social History     Socioeconomic History    Marital status:     Number of children: 2   Tobacco Use    Smoking status: Never     Passive exposure: Never    Smokeless tobacco: Never   Vaping Use    Vaping Use: Never used   Substance and Sexual Activity    Alcohol use: Yes     Comment: once or twice a year    Drug use: No    Sexual activity: Not Currently     Partners: Male     Birth control/protection: Abstinence     Comment: BTL         Family History   Problem Relation Age of Onset    Heart attack Mother     Heart failure Mother     Hypertension Mother     Stroke Mother     Cervical cancer Mother     Hypertension  Father     Heart failure Father     Heart disease Father     Aneurysm Sister     Breast cancer Sister 66    Anxiety disorder Sister     No Known Problems Sister     Heart attack Brother     Cancer Brother     Lung cancer Brother     No Known Problems Daughter     No Known Problems Son     Ovarian cancer Neg Hx     Colon cancer Neg Hx     Pulmonary embolism Neg Hx     Deep vein thrombosis Neg Hx     Malig Hyperthermia Neg Hx           Objective    Physical Exam  Constitutional:       Appearance: Normal appearance.   HENT:      Head: Atraumatic.   Pulmonary:      Effort: Pulmonary effort is normal.   Chest:      Comments: Incision healing well, no palpable masses appreciated  Musculoskeletal:         General: Normal range of motion.   Skin:     General: Skin is warm.   Neurological:      Mental Status: She is alert.   Psychiatric:         Mood and Affect: Mood normal.         Current Outpatient Medications on File Prior to Visit   Medication Sig Dispense Refill    amitriptyline (ELAVIL) 10 MG tablet Take 1 tablet by mouth Every Night. 90 tablet 3    Cholecalciferol (VITAMIN D3) 5000 UNITS capsule capsule Take 1 capsule by mouth 1 (One) Time Per Week.      clopidogrel (PLAVIX) 75 MG tablet Take 1 tablet by mouth Daily. 90 tablet 3    ezetimibe (ZETIA) 10 MG tablet Take 1 tablet by mouth Daily. 90 tablet 3    icosapent ethyl (Vascepa) 1 g capsule capsule Take 2 g by mouth 2 (Two) Times a Day With Meals. 360 capsule 3    isosorbide dinitrate (ISORDIL) 5 MG tablet Take 1 tablet by mouth 2 (Two) Times a Day. 180 tablet 3    Melatonin 12 MG tablet Take 12 mg by mouth Every Night.      Menaquinone-7 (VITAMIN K2 PO) Take  by mouth. HOLD PER MD INSTR      montelukast (Singulair) 10 MG tablet Take 1 tablet by mouth Every Night. 90 tablet 3    multivitamin (THERAGRAN) tablet tablet Take 1 tablet by mouth Daily. HOLD PER MD INSTR      Omega-3 Fatty Acids (FISH OIL PO) Take 1 tablet by mouth Daily. HOLD  PER MD INSTR      pitavastatin calcium (Livalo) 2 MG tablet tablet Take 1 tablet by mouth Every Night. 90 tablet 2    Probiotic Product (PROBIOTIC PO) Take 4 capsules by mouth Daily. gutbio-align      Psyllium (Metamucil) wafer wafer Take  by mouth Daily. Fiber tablet      ramipril (ALTACE) 2.5 MG capsule Take 1 capsule by mouth Every Night. 90 capsule 2    traMADol (Ultram) 50 MG tablet Take 1 tablet by mouth Every 8 (Eight) Hours As Needed for Moderate Pain. 9 tablet 0     No current facility-administered medications on file prior to visit.       ALLERGIES:    Allergies   Allergen Reactions    Adhesive Tape Rash     Redness, bruising and peeling of skin    *Use Paper Tape Only*  Redness, bruising and peeling of skin    *Use Paper Tape Only*    Beta Adrenergic Blockers Unknown - High Severity     hypotension  bradycardic    Statins Myalgia    Elemental Sulfur Rash    Sulfa Antibiotics Rash       There were no vitals taken for this visit.         7/24/2023     3:59 PM   Current Status   ECOG score 0         Assessment & Plan     73 year old female with pT1aNx invasive ductal carcinoma of the left breast, ER OK Pos Her 2 negative s/p lumpectomy with no residual disease.  We discussed the long-term results of the CALGB [id number] randomized clinical trial assessing the benefits of radiation therapy and women with stage I, ER+ breast cancer in addition to hormonal therapy.  At a 10 year time point 98% of women receiving hormonal therapy + radiation were free from local and regional recurrence compared to 90% of those receiving hormonal therapy alone.  There were no differences in time to mastectomy, time to distant metastases, breast cancer specific survival, or overall survival.     I discussed with her the role for post-operative radiation therapy to the left breast to decrease the risk of local recurrence.      I discussed with her in detail the risks, benefits and rationale of radiation therapy to the left  breast to include but not limited to the following:    Acute: skin erythema, breakdown/moist desquamation, swelling or discomfort of the breast, fatigue, pneumonitis resulting in shortness of breath, cough or pain    Late: Permanent skin changes including hyperpigmentation, telangiectasias, fibrosis of the breast resulting in smaller size or poor cosmetic outcome, late edema or cellulitis, late rib fracture, late pulmonary fibrosis and the remote risk of late cardiac damage resulting in increased risk of heart attack or second malignancies.      She voiced understanding and was given an opportunity to ask questions which I believe were answered to her satisfaction. At the end of our conversation she decided not to proceed with radiation which I think is reasonable.  I have not scheduled a follow up with me but I asked her to call with any questions or concerns in the future.  She will have regular follow up with Dr. White.    I personally spent greater than 60 minutes today assessing, managing, discussing and documenting my visit with the patient. That time includes review of records, imaging and pathology reports, obtaining my own history, performing a medically appropriate evaluation, counseling and educating the patient, discussing goals, logistics, alternatives and risks of my recommendations, surveillance options and potential outcomes. It also includes the time documenting the clinical information in the EMR and communicating my recommendations to the other involved physicians.              Thank you very much for allowing me to participate in the care of this very pleasant patient.    Sincerely,      Jill Leon MD

## 2023-08-28 ENCOUNTER — OFFICE VISIT (OUTPATIENT)
Dept: ONCOLOGY | Facility: CLINIC | Age: 73
End: 2023-08-28
Payer: MEDICARE

## 2023-08-28 ENCOUNTER — LAB (OUTPATIENT)
Dept: OTHER | Facility: HOSPITAL | Age: 73
End: 2023-08-28
Payer: MEDICARE

## 2023-08-28 VITALS
HEART RATE: 58 BPM | DIASTOLIC BLOOD PRESSURE: 75 MMHG | SYSTOLIC BLOOD PRESSURE: 133 MMHG | BODY MASS INDEX: 23.32 KG/M2 | RESPIRATION RATE: 16 BRPM | TEMPERATURE: 98.2 F | HEIGHT: 64 IN | WEIGHT: 136.6 LBS | OXYGEN SATURATION: 96 %

## 2023-08-28 DIAGNOSIS — Z17.0 MALIGNANT NEOPLASM OF UPPER-INNER QUADRANT OF LEFT BREAST IN FEMALE, ESTROGEN RECEPTOR POSITIVE: Primary | ICD-10-CM

## 2023-08-28 DIAGNOSIS — C50.212 MALIGNANT NEOPLASM OF UPPER-INNER QUADRANT OF LEFT BREAST IN FEMALE, ESTROGEN RECEPTOR POSITIVE: ICD-10-CM

## 2023-08-28 DIAGNOSIS — Z17.0 MALIGNANT NEOPLASM OF UPPER-INNER QUADRANT OF LEFT BREAST IN FEMALE, ESTROGEN RECEPTOR POSITIVE: ICD-10-CM

## 2023-08-28 DIAGNOSIS — C50.212 MALIGNANT NEOPLASM OF UPPER-INNER QUADRANT OF LEFT BREAST IN FEMALE, ESTROGEN RECEPTOR POSITIVE: Primary | ICD-10-CM

## 2023-08-28 LAB
ALBUMIN SERPL-MCNC: 4.7 G/DL (ref 3.5–5.2)
ALBUMIN/GLOB SERPL: 1.8 G/DL
ALP SERPL-CCNC: 78 U/L (ref 39–117)
ALT SERPL W P-5'-P-CCNC: 16 U/L (ref 1–33)
ANION GAP SERPL CALCULATED.3IONS-SCNC: 9.4 MMOL/L (ref 5–15)
AST SERPL-CCNC: 23 U/L (ref 1–32)
BASOPHILS # BLD AUTO: 0.05 10*3/MM3 (ref 0–0.2)
BASOPHILS NFR BLD AUTO: 0.6 % (ref 0–1.5)
BILIRUB SERPL-MCNC: 0.8 MG/DL (ref 0–1.2)
BUN SERPL-MCNC: 16 MG/DL (ref 8–23)
BUN/CREAT SERPL: 23.5 (ref 7–25)
CALCIUM SPEC-SCNC: 10.1 MG/DL (ref 8.6–10.5)
CHLORIDE SERPL-SCNC: 101 MMOL/L (ref 98–107)
CO2 SERPL-SCNC: 26.6 MMOL/L (ref 22–29)
CREAT SERPL-MCNC: 0.68 MG/DL (ref 0.57–1)
DEPRECATED RDW RBC AUTO: 38.8 FL (ref 37–54)
EGFRCR SERPLBLD CKD-EPI 2021: 92.7 ML/MIN/1.73
EOSINOPHIL # BLD AUTO: 0.18 10*3/MM3 (ref 0–0.4)
EOSINOPHIL NFR BLD AUTO: 2.2 % (ref 0.3–6.2)
ERYTHROCYTE [DISTWIDTH] IN BLOOD BY AUTOMATED COUNT: 11.8 % (ref 12.3–15.4)
GLOBULIN UR ELPH-MCNC: 2.6 GM/DL
GLUCOSE SERPL-MCNC: 85 MG/DL (ref 65–99)
HCT VFR BLD AUTO: 37.3 % (ref 34–46.6)
HGB BLD-MCNC: 13.1 G/DL (ref 12–15.9)
IMM GRANULOCYTES # BLD AUTO: 0.03 10*3/MM3 (ref 0–0.05)
IMM GRANULOCYTES NFR BLD AUTO: 0.4 % (ref 0–0.5)
LYMPHOCYTES # BLD AUTO: 3.46 10*3/MM3 (ref 0.7–3.1)
LYMPHOCYTES NFR BLD AUTO: 42.1 % (ref 19.6–45.3)
MCH RBC QN AUTO: 31.7 PG (ref 26.6–33)
MCHC RBC AUTO-ENTMCNC: 35.1 G/DL (ref 31.5–35.7)
MCV RBC AUTO: 90.3 FL (ref 79–97)
MONOCYTES # BLD AUTO: 0.74 10*3/MM3 (ref 0.1–0.9)
MONOCYTES NFR BLD AUTO: 9 % (ref 5–12)
NEUTROPHILS NFR BLD AUTO: 3.76 10*3/MM3 (ref 1.7–7)
NEUTROPHILS NFR BLD AUTO: 45.7 % (ref 42.7–76)
NRBC BLD AUTO-RTO: 0 /100 WBC (ref 0–0.2)
PLATELET # BLD AUTO: 294 10*3/MM3 (ref 140–450)
PMV BLD AUTO: 9.4 FL (ref 6–12)
POTASSIUM SERPL-SCNC: 4.2 MMOL/L (ref 3.5–5.2)
PROT SERPL-MCNC: 7.3 G/DL (ref 6–8.5)
RBC # BLD AUTO: 4.13 10*6/MM3 (ref 3.77–5.28)
SODIUM SERPL-SCNC: 137 MMOL/L (ref 136–145)
WBC NRBC COR # BLD: 8.22 10*3/MM3 (ref 3.4–10.8)

## 2023-08-28 PROCEDURE — 85025 COMPLETE CBC W/AUTO DIFF WBC: CPT | Performed by: INTERNAL MEDICINE

## 2023-08-28 PROCEDURE — 36415 COLL VENOUS BLD VENIPUNCTURE: CPT

## 2023-08-28 PROCEDURE — 80053 COMPREHEN METABOLIC PANEL: CPT | Performed by: INTERNAL MEDICINE

## 2023-08-28 RX ORDER — RALOXIFENE HYDROCHLORIDE 60 MG/1
60 TABLET, FILM COATED ORAL DAILY
Qty: 30 TABLET | Refills: 6 | Status: SHIPPED | OUTPATIENT
Start: 2023-08-28

## 2023-08-28 NOTE — PROGRESS NOTES
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    At the time of surgery thankfully there is no residual invasive cancer so the final size was 4 mm right PT1aNx grade 1 invasive ER/NM positive HER2 negative by FISH Ki-67 of 8%    There was no residual cancer or atypia in the breast no lymph nodes were removed    Radiation did not feel radiation was indicated and we have elected to give her Evista for prevention primarily because of her significant osteopenia borderline osteoporosis in both hips.  No definitive indication to treat 4 mm grade 1 cancer exists at this time    She has other comorbidities including TIAs and I think we are better off minimizing her treatments      Past Medical History:   Diagnosis Date    Abnormal blood chemistry     Acute UTI (urinary tract infection)     Allergic     Anemia     Anesthesia complication     SLOW TO WAKE UP    Aneurysm, cerebral     Bloating     Brain aneurysm     CAD (coronary artery disease)     Chronic headaches     Coronary artery disease     Coronary artery stenosis     Dysuria     Encounter for screening colonoscopy     Environmental allergies     Fall from slip, trip, or stumble     Gait disturbance     H/O cardiovascular stress test 09/17/2013    Treadmill    H/O colonoscopy     H/O echocardiogram 09/25/2013    H/O fall     Heart murmur     Hereditary spherocytosis     History of EKG 07/31/2015    Hyperlipemia     Hyperlipidemia     Hypertension      Hyperthyroidism     Injury of back     Insomnia     Left forearm pain     Left leg pain     Left wrist pain     Lower abdominal pain     Malignant neoplasm of upper-inner quadrant of left breast in female, estrogen receptor positive 05/18/2023    Need for pneumococcal vaccination     Onychomycosis of toenail     Pelvic pressure in female     Right foot pain     Stroke     TIA (transient ischemic attack) 11/30/2016    URI (upper respiratory infection)     Urine frequency         Past Surgical History:   Procedure Laterality Date    BREAST EXCISIONAL BIOPSY Right 1977    b9    BREAST EXCISIONAL BIOPSY Right 1979    b9    BREAST LUMPECTOMY Left 8/1/2023    Procedure: Left needle-localized partial mastectomy and left breast needle-localized excisional biopsy;  Surgeon: Jaleesa Hsieh MD;  Location: [facility] MAIN OR;  Service: General;  Laterality: Left;    BUNIONECTOMY      CARDIAC CATHETERIZATION N/A 04/08/2019    Procedure: Left Heart Cath;  Surgeon: Jayme Ramirez MD;  Location:  [facility]ASIVE [facility];  Service: Cardiovascular    CARDIAC CATHETERIZATION N/A 04/08/2019    Procedure: Coronary angiography;  Surgeon: Jayme Ramirez MD;  Location:  [facility];  Service: Cardiovascular    CARDIAC CATHETERIZATION N/A 04/08/2019    Procedure: Left ventriculography;  Surgeon: Jayme Ramirez MD;  Location:  [facility];  Service: Cardiovascular    CEREBRAL ANEURYSM REPAIR  2015    CHOLECYSTECTOMY      COLONOSCOPY N/A 01/29/2021    Procedure: COLONOSCOPY with polypectomy;  Surgeon: Colin Ladd MD;  Location: [facility];  Service: Gastroenterology;  Laterality: N/A;  Diverticulosis  Sigmoid colon polyp    CORONARY ARTERY BYPASS GRAFT  2004    Triple    FOOT SURGERY Right 2016    ROTATOR CUFF REPAIR Left     SPLENECTOMY  1953    TONSILLECTOMY  1984    TUBAL ABDOMINAL LIGATION  1979    US GUIDED CYST ASPIRATION BREAST N/A 5/18/2023   Oncologic  history  patient is a 73-year-old female with hereditary spherocytosis and coronary artery disease cardiovascular disease who had a routine screening mammogram which was abnormal in the left breast and this led to diagnostic imaging and ultrasound the findings of a 4 mm abnormality in the 12 o'clock position of the left breast.  A biopsy was done showing grade 2 invasive ductal carcinoma measuring 4 mm ER/ME positive HER2 2+ FISH negative.    An MRI was done but there was a large 4 cm postbiopsy hematoma because clip to move to the periphery of the hematoma and for this reason surgery was delayed until the biopsy could improve and adequate localization of the clip could be performed    She is  2 para 2 menarche was at age 13 menopause at 56 she is taking no hormone replacement.  First childbirth was age 28 she breast-fed for 6 months    Family history is positive for sister with breast cancer at age 66 brother with lung cancer at age 57 her genetic testing was drawn and results are pending    She is not a smoker or drinker  She has never had a DVT or MI but did have a TIA in  after she stopped her Plavix and Plavix was reinstituted.  She has had a cerebral aneurysm treated by interventional radiology in     She is not a free bleeder and was on Plavix and this was held for the biopsy but she was still on fish oil supplements which may have  contributed to the post biopsy hematoma.    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 age and is up-to-date with vaccinations for splenectomy    She has had bypass surgery for coronary artery disease in the past also    Discussed with Dr. Hsieh who states that she was able to aspirate much of the hematoma which is significantly smaller and she will reevaluate her at the end of  and decide on surgery and we will see her after surgery to get the final results and make a decision about adjuvant treatment.    Based on her TIA I have recommended an aromatase inhibitor  rather than tamoxifen and because of this we will do a but DEXA scan to see where we stand and use Prolia to prevent worsening of her bone density is already bad    I have prescribed Arimidex but I told her not to pick it up until after I see her again on the off chance that this is a sub-5 mm tumor that does not need treatment    Explained the rationale for adjuvant hormonal blockade and possibly addition of Prolia to prevent worsening of her bone density and she is agreeable and we will discuss this further at her next visit      Current Outpatient Medications on File Prior to Visit   Medication Sig Dispense Refill    amitriptyline (ELAVIL) 10 MG tablet Take 1 tablet by mouth Every Night. 90 tablet 3    Cholecalciferol (VITAMIN D3) 5000 UNITS capsule capsule Take 1 capsule by mouth 1 (One) Time Per Week.      clopidogrel (PLAVIX) 75 MG tablet Take 1 tablet by mouth Daily. 90 tablet 3    ezetimibe (ZETIA) 10 MG tablet Take 1 tablet by mouth Daily. 90 tablet 3    icosapent ethyl (Vascepa) 1 g capsule capsule Take 2 g by mouth 2 (Two) Times a Day With Meals. 360 capsule 3    isosorbide dinitrate (ISORDIL) 5 MG tablet Take 1 tablet by mouth 2 (Two) Times a Day. 180 tablet 3    Melatonin 12 MG tablet Take 12 mg by mouth Every Night.      Menaquinone-7 (VITAMIN K2 PO) Take  by mouth. HOLD PER MD INSTR      montelukast (Singulair) 10 MG tablet Take 1 tablet by mouth Every Night. 90 tablet 3    multivitamin (THERAGRAN) tablet tablet Take 1 tablet by mouth Daily. HOLD PER MD INSTR      Omega-3 Fatty Acids (FISH OIL PO) Take 1 tablet by mouth Daily. HOLD PER MD INSTR      pitavastatin calcium (Livalo) 2 MG tablet tablet Take 1 tablet by mouth Every Night. 90 tablet 2    Probiotic Product (PROBIOTIC PO) Take 4 capsules by mouth Daily. gutbio-align      Psyllium (Metamucil) wafer wafer Take  by mouth Daily. Fiber tablet      ramipril (ALTACE) 2.5 MG capsule Take 1 capsule by mouth Every Night. 90 capsule 2     No current  "facility-administered medications on file prior to visit.        ALLERGIES:    Allergies   Allergen Reactions    Adhesive Tape Rash     Redness, bruising and peeling of skin    *Use Paper Tape Only*  Redness, bruising and peeling of skin    *Use Paper Tape Only*    Beta Adrenergic Blockers Unknown - High Severity     hypotension  bradycardic    Statins Myalgia    Elemental Sulfur Rash    Sulfa Antibiotics Rash        Social History     Socioeconomic History    Marital status:     Number of children: 2   Tobacco Use    Smoking status: Never     Passive exposure: Never    Smokeless tobacco: Never   Vaping Use    Vaping Use: Never used   Substance and Sexual Activity    Alcohol use: Yes     Comment: once or twice a year    Drug use: No    Sexual activity: Not Currently     Partners: Male     Birth control/protection: Abstinence     Comment: BTL        Family History   Problem Relation Age of Onset    Heart attack Mother     Heart failure Mother     Hypertension Mother     Stroke Mother     Cervical cancer Mother     Hypertension Father     Heart failure Father     Heart disease Father     Aneurysm Sister     Breast cancer Sister 66    Anxiety disorder Sister     No Known Problems Sister     Heart attack Brother     Cancer Brother     Lung cancer Brother     No Known Problems Daughter     No Known Problems Son     Ovarian cancer Neg Hx     Colon cancer Neg Hx     Pulmonary embolism Neg Hx     Deep vein thrombosis Neg Hx     Malig Hyperthermia Neg Hx         Review of Systems   Musculoskeletal:  Positive for arthralgias.   Psychiatric/Behavioral:  The patient is nervous/anxious.       Objective     Vitals:    08/28/23 1514   BP: 133/75   Pulse: 58   Resp: 16   Temp: 98.2 øF (36.8 øC)   TempSrc: Temporal   SpO2: 96%   Weight: 62 kg (136 lb 9.6 oz)   Height: 162.6 cm (64.02\")   PainSc: 0-No pain         8/28/2023     3:12 PM   Current Status   ECOG score 0       Physical Exam    CONSTITUTIONAL:  Vital signs " reviewed.  No distress, looks comfortable.  EYES:  Conjunctivae and lids unremarkable.  PERRLA  EARS,NOSE,MOUTH,THROAT:  Ears and nose appear unremarkable.  Lips, teeth, gums appear unremarkable.  RESPIRATORY:  Normal respiratory effort.  Lungs clear to auscultation bilaterally.  CARDIOVASCULAR:  Normal S1, S2.  No murmurs rubs or gallops.  No significant lower extremity edema.  GASTROINTESTINAL: Abdomen appears unremarkable.  Nontender.  No hepatomegaly.  No splenomegaly.  LYMPHATIC:  No cervical, supraclavicular, axillary lymphadenopathy.  SKIN:  Warm.  No rashes.  PSYCHIATRIC:  Normal judgment and insight.  Normal mood and affect.      RECENT LABS:  Hematology WBC   Date Value Ref Range Status   08/28/2023 8.22 3.40 - 10.80 10*3/mm3 Final   01/25/2023 6.27 3.40 - 10.80 10*3/mm3 Final     RBC   Date Value Ref Range Status   08/28/2023 4.13 3.77 - 5.28 10*6/mm3 Final   01/25/2023 4.00 3.77 - 5.28 10*6/mm3 Final     Hemoglobin   Date Value Ref Range Status   08/28/2023 13.1 12.0 - 15.9 g/dL Final     Hematocrit   Date Value Ref Range Status   08/28/2023 37.3 34.0 - 46.6 % Final     Platelets   Date Value Ref Range Status   08/28/2023 294 140 - 450 10*3/mm3 Final        Final Diagnosis   1. Left Breast at 11:00 o'clock, 8-9 cm from Nipple (for calcifications), Stereotactic Core Biopsy:                 A. Invasive ductal carcinoma:                              1. Overall Debbie grade II (tubular score=3, nuclear score=2, mitotic score=1).                            2. Invasive carcinoma measures at least 4 mm.                            3. No lymphovascular space invasion identified.               B. 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 Low-grade cribriform DCIS.     Jat./kds    Electronically signed by Christopher Lara MD on 5/1/2023 at 1311   Synoptic Checklist   Breast Biomarker Reporting Template  Protocol posted: 6/22/2022  BREAST: BIOMARKER REPORTING TEMPLATE - All  Specimens  Test(s) Performed     Estrogen Receptor (ER) Status  Positive (greater than 10% of cells demonstrate nuclear positivity)    Percentage of Cells with Nuclear Positivity  %    Average Intensity of Staining  Strong    Test Type  Food and Drug Administration (FDA) cleared (test / vendor): [employer]    Primary Antibody  SP1    Scoring System  Sugar    Proportion Score  5    Intensity Score  3    Total Sugar Score  8    Test(s) Performed     Progesterone Receptor (PgR) Status  Positive    Percentage of Cells with Nuclear Positivity  51-60%    Average Intensity of Staining  Moderate    Test Type  Food and Drug Administration (FDA) cleared (test / vendor): [facility]    Primary Antibody  1E2    Scoring System  Sugar    Proportion Score  4    Intensity Score  2    Total Sugar Score  6    Test(s) Performed     HER2 by Immunohistochemistry  Equivocal (Score 2+)    Percentage of Cells with Uniform Intense Complete Membrane Staining  0 %   Test Type  Food and Drug Administration (FDA) cleared (test / vendor): [employer]    Primary Antibody  4B5    Test(s) Performed  Ki-67    Ki-67 Percentage of Positive Nuclei  8 %   Primary Antibody  30-9    Cold Ischemia and Fixation Times  Meet requirements specified in latest version of the ASCO / CAP Guidelines    Cold Ischemia Time (minutes)  16 min   Fixation Time (hours)  8 hours   Testing Performed on Block Number(s)  1D    METHODS   Fixative  Formalin    Image Analysis  Not performed    Comment(s)  Her 2 FISH NEG    .        al Diagnosis   1. Left Breast at 11:00 o'clock, 8-9 cm from Nipple (for calcifications), Stereotactic Core Biopsy:                 A. Invasive ductal carcinoma:                              1. Overall Debbie grade II (tubular score=3, nuclear score=2, mitotic score=1).                            2. Invasive carcinoma measures at least 4 mm.                            3. No lymphovascular space invasion identified.               B. Focal associated  ductal carcinoma in-situ (DCIS):                            1. Low-grade cribriform DCIS.     Jat./kds    Electronically signed by Christopher Lara MD on 5/1/2023 at 1311   Synoptic Checklist   Breast Biomarker Reporting Template   Protocol posted: 6/22/2022BREAST: BIOMARKER REPORTING TEMPLATE - All Specimens  Test(s) Performed     Estrogen Receptor (ER) Status  Positive (greater than 10% of cells demonstrate nuclear positivity)   Percentage of Cells with Nuclear Positivity  %   Average Intensity of Staining  Strong   Test Type  Food and Drug Administration (FDA) cleared (test / vendor): [place]   Primary Antibody  SP1   Scoring System  Sugar   Proportion Score  5   Intensity Score  3   Total Sugar Score  8   Test(s) Performed     Progesterone Receptor (PgR) Status  Positive   Percentage of Cells with Nuclear Positivity  51-60%   Average Intensity of Staining  Moderate   Test Type  Food and Drug Administration (FDA) cleared (test / vendor): [place]   Primary Antibody  1E2   Scoring System  Sugar   Proportion Score  4   Intensity Score  2   Total Sugar Score  6   Test(s) Performed     HER2 by Immunohistochemistry  Equivocal (Score 2+)   Percentage of Cells with Uniform Intense Complete Membrane Staining  0 %   Test Type  Food and Drug Administration (FDA) cleared (test / vendor): [place]   Primary Antibody  4B5   Test(s) Performed  Ki-67   Ki-67 Percentage of Positive Nuclei  8 %   Primary Antibody  30-9   Cold Ischemia and Fixation Times  Meet requirements specified in latest version of the ASCO / CAP Guidelines   Cold Ischemia Time (minutes)  16 min   Fixation Time (hours)  8 hours   Testing Performed on Block Number(s)  1D   METHODS   Fixative  Formalin   Image Analysis  Not performed   Comment(s)  Her 2 FISH not amplified   .      Comment         Final Diagnosis7/21/23  1. Breast, Left 2 O'clock Position, Core Biopsy For Calcifications:  A. Incidental atypical lobular hyperplasia.  B.  Microcalcifications identified in association with benign ductal structures and dilated cysts with apocrine  metaplasia.  C. No evidence of in-situ nor invasive malignancy                Final Diagnosis   1. Left Breast, Oriented Needle Localization Lumpectomy (26 grams):                A. Biopsy site changes without evidence of residual carcinoma (see Comment).               B. No atypical hyperplasia nor in situ carcinoma identified.               C. Biopsy site changes are present and the clip retrieved.  D. Hormone receptor status: ER % positive, NC 51-60% positive, HER2 2+ by IHC, negative by FISH,        Ki-67 8% (performed on prior biopsy [id number], slides reviewed).  E. Pathologic stage: pT1a, NX.     2. Left Breast, Additional Superior and Medial Margins, Oriented Excision:               A. Benign unremarkable breast tissue.     3. Left Breast, Additional Inferior and Lateral Margins, Oriented Excision:               A. Benign breast tissue with sclerosing adenosis with associated microcalcifications, usual ductal hyperplasia        and apocrine cysts.     4. Skin, Left Breast at 3 o'clock, Unoriented Excision:               A. Benign skin with dermal chronic active inflammation and changes consistent with prior biopsy.               B. No atypical hyperplasia, in-situ nor invasive carcinoma identified.     5. Left Breast, 3 o'clock, Oriented Needle Localization Lumpectomy (6 grams):               A. Benign breast tissue with fibroadenomatoid change with associated calcifications and microcalcifications        associated with benign breast ducts.  B. Biopsy site change is present and clip retrieved.  C. No residual atypical hyperplasia, in-situ, nor invasive carcinoma identified (margins clear).     swm/pkm         Assessment & Plan   1.cT1aN0 IDC Grade 2 left breast cancer ER/NC + her 2 -2+ neg FISH post biopsy with a large postbiopsy hematoma - resolving  Additional biopsy 7/21/2023 on left breast  at 2:00 shows atypical lobular hyperplasia incidental  Lumpectomy dated 8/20/2023 shows no residual cancer clear margins no lymph nodes were removed pT1aNx   Due to significant osteopenia in both hips Evista was given for prevention and no treatment was felt to be indicated    2.  Hereditary spherocytosis postsplenectomy age 3  Up-to-date on post splenectomy vaccines    3.  Cerebral aneurysm hereditary post ablation in 2015    4.  Coronary artery disease post coronary artery bypass in 2004    5.  TIA 2016 currently on Plavix    6.  Osteopenia    7.  Large postbiopsy hematoma possibly aggravated by fish oil which she will hold before her surgery    Plan  1.  Evista 60 mg daily for 5 years-side effects discussed  2.  See me in follow-up in 6 months

## 2023-09-08 ENCOUNTER — OFFICE VISIT (OUTPATIENT)
Dept: CARDIOLOGY | Facility: CLINIC | Age: 73
End: 2023-09-08
Payer: MEDICARE

## 2023-09-08 VITALS
WEIGHT: 135.7 LBS | HEIGHT: 64 IN | BODY MASS INDEX: 23.17 KG/M2 | HEART RATE: 63 BPM | SYSTOLIC BLOOD PRESSURE: 126 MMHG | DIASTOLIC BLOOD PRESSURE: 68 MMHG

## 2023-09-08 DIAGNOSIS — Z98.890 S/P COIL EMBOLIZATION OF CEREBRAL ANEURYSM: ICD-10-CM

## 2023-09-08 DIAGNOSIS — G45.9 TRANSIENT CEREBRAL ISCHEMIA, UNSPECIFIED TYPE: ICD-10-CM

## 2023-09-08 DIAGNOSIS — I51.7 BILATERAL ENLARGEMENT OF ATRIA: ICD-10-CM

## 2023-09-08 DIAGNOSIS — E78.2 MIXED HYPERLIPIDEMIA: ICD-10-CM

## 2023-09-08 DIAGNOSIS — I67.1 CEREBRAL ANEURYSM: ICD-10-CM

## 2023-09-08 DIAGNOSIS — I25.10 CORONARY ARTERY DISEASE INVOLVING NATIVE CORONARY ARTERY OF NATIVE HEART WITHOUT ANGINA PECTORIS: Primary | ICD-10-CM

## 2023-09-08 DIAGNOSIS — Z95.1 S/P CABG (CORONARY ARTERY BYPASS GRAFT): ICD-10-CM

## 2023-09-08 DIAGNOSIS — I10 ESSENTIAL HYPERTENSION: ICD-10-CM

## 2023-09-08 DIAGNOSIS — Z17.0 MALIGNANT NEOPLASM OF UPPER-INNER QUADRANT OF LEFT BREAST IN FEMALE, ESTROGEN RECEPTOR POSITIVE: ICD-10-CM

## 2023-09-08 DIAGNOSIS — C50.212 MALIGNANT NEOPLASM OF UPPER-INNER QUADRANT OF LEFT BREAST IN FEMALE, ESTROGEN RECEPTOR POSITIVE: ICD-10-CM

## 2023-09-08 RX ORDER — CLOPIDOGREL BISULFATE 75 MG/1
75 TABLET ORAL DAILY
Qty: 90 TABLET | Refills: 3
Start: 2023-09-08

## 2023-09-08 NOTE — PROGRESS NOTES
"      [facility] CARDIOLOGY  [address]  [address]  [place] [address]  Phone: [phone]  Fax: [phone]  Patient Name: Maddison Turcios  :1950  Age: 73 y.o.  Primary Cardiologist: Helen Urbina MD  Encounter Provider:  RENATE Hair    History of Present Illness     Maddison Turcios is a 73 y.o.  female whose medical history includes hypertension, hyperlipidemia with statin intolerance, history of TIA, history of cerebral aneurysm s/p coil emoblization in  ([place]), history of breast cancer, history of splenectomy for hereditary spherocytosis. She is followed in our office by Dr. Urbina for coronary artery disease s/p CABG. I have reviewed the past medical records in preparation of today's visit.     23 Follow-up:  She is here for 3 month follow-up and I'm seeing her for the first time today.  At last visit her blood pressure was noted to be high so she is here today for follow-up.  Since last visit she had lumpectomy but additional biopsy in 2023 showed incidental atypical lobular hyperplasia she underwent a second lumpectomy/tissue resection with clear margins.  She states she will not require radiation. Her blood pressure is controlled today and at home is running 120s/70s.  She denies chest pain, dyspnea with exertion, orthopnea, or palpitations.  She sometimes has leg swelling at the end of the day.  She was cutting her Plavix in half due to bruising but is also worried about potential clotting issues now that she is on Evista.    Data Review     The following data was reviewed by RENATE Hair on 23:    Vital Signs:   /68 (BP Location: Right arm)   Pulse 63   Ht 162.6 cm (64.02\")   Wt 61.6 kg (135 lb 11.2 oz)   BMI 23.28 kg/m²       Weight:  Wt Readings from Last 3 Encounters:   23 61.6 kg (135 lb 11.2 oz)   23 62 kg (136 lb 9.6 oz)   23 62.1 kg (136 lb 12.8 oz)     Body mass " index is 23.28 kg/m².    Below is a summary of pertinent cardiology findings:  September 2004 she had a  a mildly abnormal stress study that was done for severe fatigue.  Cardiac catheterization showed ostial 60% stenosis of the left main coronary artery; this was done at [facility] in [facility].  She then had CABG with LIMA to mid LAD and sequential SVG to the ramus intermedius and first OM branch at [facility].  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 Hewitt with neurosurgery.  November 2016 she was admitted to [facility] with TIA; MRI was negative for acute infarct.  She was treated for TIA versus complicated migraine.  A few days later she did have symptoms of feeling unbalanced and blurred vision.  She had DENY which did not show anything to explain TIA.  There was biatrial enlargement but was otherwise normal.  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  There is no evidence of new aneurysm.  January 2017 she had a normal 21-day event monitor.  April 2019 she was seen for exertional chest pressure while mowing; EMS was called and she was treated with nitro which helped.  She was seen at [facility] and ruled out for ACS.  She had repeat cardiac catheterization which showed no treated LIMA to LAD, patent SVG to ramus intermedius and then OM, 30% left main stenosis proximally but normal LAD, left circumflex, and RCA.  EF was normal.  May 2019 she had a nonischemic stress echocardiogram showing baseline EF 56%, grade 1 LV diastolic function, and post exercise EF of 81%.  April 2022 echocardiogram showed EF 65%, mild concentric LVH, normal diastolic function, and no significant valvular abnormalities.  She also had a stress nuclear perfusion study which showed no evidence of ischemia.  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  Dr. Jaleesa Hsieh did the surgery.  She had left lumpectomy at the end of June 2023; Arimidex is to be started by Dr. Johnson    Labs:  09/05/2023:  cr 0.7, K 4.9, otherwise unremarkable CMP, Chol 169, HDL 84, LDL 72, Trig 69, CRP 0.59, Hgb 13.1, Plt 266      ECG 12 Lead    Date/Time: 9/8/2023 10:04 AM  Performed by: Maria Dolores Little APRN  Authorized by: Maria Dolores Little APRN   Comparison: compared with previous ECG from 6/2/2023  Similar to previous ECG  Rhythm: sinus rhythm  Rate: normal  BPM: 63    Clinical impression: normal ECG        Medications     Allergies as of 09/08/2023 - Reviewed 09/08/2023   Allergen Reaction Noted    Adhesive tape Rash 02/02/2016    Beta adrenergic blockers Unknown - High Severity 07/13/2015    Statins Myalgia 01/27/2022    Elemental sulfur Rash 07/13/2015    Sulfa antibiotics Rash 02/05/2016       Current Outpatient Medications   Medication Instructions    amitriptyline (ELAVIL) 10 mg, Oral, Nightly    clopidogrel (PLAVIX) 75 mg, Oral, Daily    ezetimibe (ZETIA) 10 mg, Oral, Daily    icosapent ethyl (VASCEPA) 2 g, Oral, 2 Times Daily With Meals    isosorbide dinitrate (ISORDIL) 5 mg, Oral, 2 Times Daily    Melatonin 12 mg, Oral, Nightly    Menaquinone-7 (VITAMIN K2 PO) Oral, HOLD PER MD INSTR    montelukast (SINGULAIR) 10 mg, Oral, Nightly    multivitamin (THERAGRAN) tablet tablet 1 tablet, Oral, Daily, HOLD PER MD INSTR    Omega-3 Fatty Acids (FISH OIL PO) 1 tablet, Oral, Daily, HOLD PER MD INSTR    pitavastatin calcium (LIVALO) 2 mg, Oral, Nightly    Probiotic Product (PROBIOTIC PO) 4 capsules, Oral, Daily, gutbio-align     Psyllium (Metamucil) wafer wafer Oral, Daily, Fiber tablet    raloxifene (EVISTA) 60 mg, Oral, Daily    ramipril (ALTACE) 2.5 mg, Oral, Nightly    vitamin D3 5,000 Units, Oral, Weekly        Past History, Review of Systems, Exam     Past Medical History:   Diagnosis Date     Abnormal blood chemistry     Acute UTI (urinary tract infection)     Allergic     Anemia     Anesthesia complication     Aneurysm     Aneurysm, cerebral     Bloating     Brain aneurysm     CAD (coronary artery disease)     Chronic headaches     Coronary artery disease     Coronary artery stenosis     Dysuria     Encounter for screening colonoscopy     Environmental allergies     Fall from slip, trip, or stumble     Gait disturbance     H/O cardiovascular stress test 09/17/2013    H/O colonoscopy     H/O echocardiogram 09/25/2013    H/O fall     Heart murmur     Hereditary spherocytosis     History of EKG 07/31/2015    Hyperlipemia     Hyperlipidemia     Hypertension     Hyperthyroidism     Injury of back     Insomnia     Left forearm pain     Left leg pain     Left wrist pain     Lower abdominal pain     Malignant neoplasm of upper-inner quadrant of left breast in female, estrogen receptor positive 05/18/2023    Need for pneumococcal vaccination     Onychomycosis of toenail     Pelvic pressure in female     Right foot pain     Stroke     TIA (transient ischemic attack) 11/30/2016    URI (upper respiratory infection)     Urine frequency        Past Surgical History:   has a past surgical history that includes Coronary artery bypass graft (2004); Rotator cuff repair (Left); Tonsillectomy (1984); Splenectomy, total (1953); Tubal ligation (1979); Cholecystectomy; Bunionectomy; Cerebral aneurysm repair (2015); Cardiac catheterization (N/A, 04/08/2019); Cardiac catheterization (N/A, 04/08/2019); Cardiac catheterization (N/A, 04/08/2019); Colonoscopy (N/A, 01/29/2021); Breast excisional biopsy (Right, 1977); Breast excisional biopsy (Right, 1979); Foot surgery (Right, 2016); US Guided Cyst Aspiration Breast (N/A, 05/18/2023); and Breast lumpectomy (Left, 08/01/2023).     Social History     Socioeconomic History    Marital status:     Number of children: 2   Tobacco Use    Smoking status: Never     Passive  exposure: Never    Smokeless tobacco: Never   Vaping Use    Vaping Use: Never used   Substance and Sexual Activity    Alcohol use: Not Currently     Comment: once or twice a year    Drug use: No    Sexual activity: Not Currently     Partners: Male     Birth control/protection: Abstinence, Post-menopausal     Comment: BTL       Review of Systems   Cardiovascular: Negative.    Hematologic/Lymphatic: Bruises/bleeds easily.     Vitals reviewed.   Constitutional:       Appearance: Not in distress.   Eyes:      Conjunctiva/sclera: Conjunctivae normal.      Pupils: Pupils are equal, round, and reactive to light.   HENT:      Head: Normocephalic.      Nose: Nose normal.    Mouth/Throat:      Pharynx: Oropharynx is clear.   Neck:      Vascular: JVD normal.   Pulmonary:      Effort: Pulmonary effort is normal.      Breath sounds: Normal breath sounds. No wheezing. No rhonchi. No rales.   Cardiovascular:      Normal rate. Regular rhythm. Normal S1. Normal S2.       Murmurs: There is no murmur.   Pulses:     Intact distal pulses.   Edema:     Peripheral edema absent.   Abdominal:      General: Bowel sounds are normal. There is no distension.      Palpations: Abdomen is soft.      Tenderness: There is no abdominal tenderness.   Musculoskeletal: Normal range of motion.      Cervical back: Normal range of motion and neck supple. Skin:     General: Skin is warm and dry.   Neurological:      Mental Status: Alert and oriented to person, place and time.   Psychiatric:         Attention and Perception: Attention normal.         Mood and Affect: Mood normal.         Speech: Speech normal.         Behavior: Behavior is cooperative.        Assessment and Plan     Assessment:  1. Coronary artery disease involving native coronary artery of native heart without angina pectoris    2. S/P CABG (coronary artery bypass graft)    3. Essential hypertension    4. Mixed hyperlipidemia    5. Bilateral enlargement of atria    6. 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 Transient cerebral ischemia, unspecified type    8. Cerebral aneurysm    9. S/P coil embolization of cerebral aneurysm         Coronary artery disease: She has history of CABG with LIMA to LAD and sequential SVG to ramus intermedius and first OM branch.  Her last stress study was April 2022 and showed no evidence of ischemia.  She denies chest pain and her medical therapy includes Plavix, Zetia, Livalo, Vascepa, Isordil, and ramipril.  Hypertension: Controlled on regimen listed above.  Hyperlipidemia: She has history of statin intolerance but her lipids were at goal when checked in September 2023; she is on Livalo, Zetia, and Vascepa.  Biatrial enlargement: Noted on echocardiogram in November 2016 but last echo in April 2022 was normal.  Left breast cancer: She underwent lumpectomy in June 2023 and then had some atypical cells removed in August 2023.  She had clear margins and will not require radiation.  TIA: She had history of TIA in November 2016 which she feels was related to stress.  She is on Plavix.  Cerebral aneurysm: She had pipeline R ICA in August 2015 and was treated with neurosurgery s/p coil embolization.    Ms. Turcios is a patient of Dr. Urbina's with history of CAD s/p CABG with LIMA to LAD and sequential SVG to RI and first OM. She also has hypertension which is well-controlled. Given her TIA and CAD history, I recommend that she continue Plavix at 75 mg daily. I will have her see Dr. Urbina in 6 months.     Return in about 6 months (around 3/8/2024) for Follow-up with Dr. Urbina.  Orders Placed This Encounter   Procedures    ECG 12 Lead      New Medications Ordered This Visit   Medications    clopidogrel (PLAVIX) 75 MG tablet     Sig: Take 1 tablet by mouth Daily. Indications: Resume on Monday, 8/7/2023.     Dispense:  90 tablet     Refill:  3     Arimidex does have cardiovascular side effects including angina  pectoris in12% of people with pre-existing ischemic disease as well as an increased incidence of cardiovascular events and hypertension.     Thank you for the opportunity to participate in this patient's care.    RENATE Bahtti    This office note has been dictated.

## 2023-09-08 NOTE — PROGRESS NOTES
RM:________     PCP: Kasandra Mcdwoell PA-C    : 1950  AGE: 73 y.o.  EST PATIENT     REASON FOR VISIT/  CC:        BP Readings from Last 3 Encounters:   23 133/75   23 144/81   08/10/23 136/78      Wt Readings from Last 3 Encounters:   23 62 kg (136 lb 9.6 oz)   23 62.1 kg (136 lb 12.8 oz)   08/10/23 60.8 kg (134 lb)        WT: ____________ BP: __________L __________R HR______    CHEST PAIN: _____________    SOA: _____________PALPS: _______________     LIGHTHEADED: ___________FATIGUE: ________________ EDEMA __________    ALLERGIES:Adhesive tape, Beta adrenergic blockers, Statins, Elemental sulfur, and Sulfa antibiotics SMOKING HISTORY:  Social History     Tobacco Use    Smoking status: Never     Passive exposure: Never    Smokeless tobacco: Never   Vaping Use    Vaping Use: Never used   Substance Use Topics    Alcohol use: Not Currently     Comment: once or twice a year    Drug use: No     CAFFEINE USE_________________  ALCOHOL ______________________         NEW DIAGNOSIS/ SURGERY/ HOSP OR ED VISITS: ______________________    __________________________________________________________________      RECENT LABS OR DIAGNOSTIC TESTING:  _____________________________    __________________________________________________________________      ASSESSMENT/ PLAN: _______________________________________________    __________________________________________________________________

## 2023-10-03 ENCOUNTER — PATIENT OUTREACH (OUTPATIENT)
Dept: OTHER | Facility: HOSPITAL | Age: 73
End: 2023-10-03
Payer: MEDICARE

## 2023-10-03 NOTE — PROGRESS NOTES
Called MsAna María Karolina to see how she was doing. Left a message with my contact information and asked her to call back at her convenience. Will mail survivorship information as well.

## 2023-10-20 DIAGNOSIS — E78.2 MIXED HYPERLIPIDEMIA: ICD-10-CM

## 2023-10-20 RX ORDER — EZETIMIBE 10 MG/1
10 TABLET ORAL DAILY
Qty: 90 TABLET | Refills: 2 | Status: SHIPPED | OUTPATIENT
Start: 2023-10-20

## 2023-10-20 RX ORDER — RAMIPRIL 2.5 MG/1
2.5 CAPSULE ORAL NIGHTLY
Qty: 90 CAPSULE | Refills: 2 | Status: SHIPPED | OUTPATIENT
Start: 2023-10-20

## 2023-10-24 ENCOUNTER — PATIENT OUTREACH (OUTPATIENT)
Dept: OTHER | Facility: HOSPITAL | Age: 73
End: 2023-10-24
Payer: MEDICARE

## 2023-10-24 NOTE — PROGRESS NOTES
Ms Turcios called regarding pain in the breast. We discussed the sharp shooting pain can be normal even weeks after surgery. Discussed signs and symptoms of infection and she stated she is not experiencing any of those. Encouraged her to contact Dr. Hsieh's office is it persists or gets worse. She stated she would do that.

## 2023-10-27 DIAGNOSIS — E78.2 MIXED HYPERLIPIDEMIA: ICD-10-CM

## 2023-10-27 RX ORDER — PITAVASTATIN CALCIUM 2.09 MG/1
2 TABLET, FILM COATED ORAL
Qty: 90 TABLET | Refills: 2 | Status: SHIPPED | OUTPATIENT
Start: 2023-10-27

## 2023-11-06 ENCOUNTER — TELEPHONE (OUTPATIENT)
Dept: SURGERY | Facility: CLINIC | Age: 73
End: 2023-11-06

## 2023-11-06 NOTE — PROGRESS NOTES
[facility]     Referring Provider: Kasandra Mcdowell PA-C     Chief complaint: left breast cancer follow up     Subjective   HPI:   5/18/2023:  Ms. Maddison Turcios is a 72 yo woman, seen at the request of Kasandra Mcdowell PA-C, for a new diagnosis of left breast cancer. This was initially detected as an imaging abnormality on routine screening. Her work-up is detailed in the oncologic history below.  Prior to the biopsy, she denies any breast lumps, pain, skin changes, or nipple discharge.  However after the biopsy, she developed a very large hematoma in her upper left breast.  She has a past history of 2 benign right breast surgical biopsies in the 1970s.  Her past history is significant for CAD sp CABG in 2004, as well as a TIA of unclear etiology in 2016 for which she is on Plavix.  She had a normal echo and stress test in 2022.  She has a family history of breast cancer in a sister (diagnosed in at age 66).      6/30/2023:  At her last visit, I aspirated a large postbiopsy hematoma.  She saw Dr. White, but we decided to hold off on preoperative endocrine therapy since surgery was not going to be delayed as long as we initially thought.  Genetic testing was negative for mutation.  She also saw cardiology and was cleared for surgery.  Prior to this visit, she underwent a repeat left mammogram to assess hematoma resolution and this demonstrated a new separate group of calcifications in the upper left breast that was not seen on her initial imaging.      8/10/2023  Prior to surgery she underwent an additional left stereotactic biopsy which showed incidental ALH and we decided to also excise this site.  She underwent left partial mastectomy and excisional biopsy on 8/1/23 (with no residual disease, see pathology details below). She has been recovering well and has no complaints.     11/7/2023 Interval History  Patient presenting to the office today for routine follow-up.  She last saw her oncologist in August  with no changes made to the treatment plan.  She is currently on Evista and tolerating well.  She had a consultation with radiation oncology in late August and the patient decided to not proceed with radiation therapy.  She has no new breast complaints today.      Oncology/Hematology History   Malignant neoplasm of upper-inner quadrant of left breast in female, estrogen receptor positive   3/3/2022 Imaging    Screening MMG with [facility] ( [place])  FINDINGS: The breasts are heterogeneously dense, which may obscure small masses. There are no masses or suspicious calcifications.  BI-RADS 2: Benign Findings     3/7/2023 Initial Diagnosis    Malignant neoplasm of upper-inner quadrant of left breast in female, estrogen receptor positive     3/8/2023 Imaging    Screening MMG with [facility] ([facility])  Heterogeneously dense  There is a subtle small focus of ill-defined stellate sub centimeter asymmetric opacity in the deep upper left breast just medial to midline, 8 to 9 cm from the nipple, not visible on prior studies. Recall for supplemental diagnostic mammogram images is recommended. Breast ultrasound may be added at that time if necessary.  The remainder the examination is negative and unchanged.  BI-RADS 0: Needs additional evaluation.     4/11/2023 Imaging    Left Diagnostic MMG with [facility] & Left Breast Limited US ([facility])  MMG:   Tiny nodular opacity measuring about 4 mm with the surrounding stellate architectural distortion is confirmed in the upper inner left breast along the 11:00 axis, 8 to 9 cm from the nipple. One or two tiny punctate calcifications within the nodule. The mammographic appearance is suspicious.  US:   The lesion is very difficult to visualize by ultrasound. There is a subtle isoechoic focus with inconsistent acoustic shadowing positioned along the 11:00 axis about 8 cm from the nipple measuring about 4 mm achieving accurate ultrasound-guided core biopsy of this focus would not be reliable.  BI-RADS  4: Suspicious     4/28/2023 Biopsy    Left Breast, Stereotactic Biopsy ( [facility]):    1. Left Breast at 11:00 o'clock, 8-9 cm from Nipple (for calcifications), Stereotactic Core Biopsy:                 A. Invasive ductal carcinoma:                              1. Overall Debbie grade II (tubular score=3, nuclear score=2, mitotic score=1).                            2. Invasive carcinoma measures at least 4 mm.                            3. No lymphovascular space invasion identified.               B. 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 Low-grade cribriform DCIS.    ER positive (%, strong)  GA positive (51-60%, moderate)  HER2 negative (IHC 2+; FISH copy #3.2, ratio 1.4)  Ki-67 8%     5/15/2023 Imaging    [facility]  RIGHT BREAST:    Benign-appearing postsurgical changes are identified in the right breast. No suspicious enhancing mass or area of non-mass enhancement is identified. There is intrinsic T1 hyperintense signal within multiple ducts in the anterior and middle retroareolar right breast, consistent with proteinaceous and/or hemorrhagic contents.  The visualized axilla is within normal limits.    LEFT BREAST:    At 11:00 in the posterior left breast, centered 8.5 cm posterior to the nipple, there is a 3.5 cm AP dimension, 3.5 cm transverse dimension, 4.0 cm craniocaudal dimension predominantly T1 hyperintense mass/collection consistent with a postbiopsy hematoma. A focus of susceptibility from a biopsy clip along the anterior, superior, lateral margin of the hematoma marks the site of biopsy-proven malignancy. No suspicious enhancing mass or area of non-mass enhancement is identified at the biopsy site or ulcer within the left breast. No suspicious enhancement is identified in the left nipple or chest wall.  The visualized axilla is within normal limits.    EXTRAMAMMARY FINDINGS:   There are no pathologically enlarged internal mammary  chain lymph nodes on either side.     There is susceptibility from sternotomy wires.  BI-RADS 6: Known malignancy.     5/18/2023 Genetic Testing    [employer] Common Hereditary Cancers Panel (47 genes):  Negative     5/24/2023 Cancer Staged    Staging form: Breast, AJCC 8th Edition  - Clinical: Stage IA (cT1b, cN0, cM0, G2, ER+, PA+, HER2-) - Signed by Aba White MD on 5/24/2023 6/20/2023 Imaging    Left Diagnostic MMG with [facility] ( [facility]):  In the upper slightly inner posterior left breast, there is an approximately 1.3 cm focal asymmetry with architectural distortion and a central biopsy clip, which represents the site of biopsy-proven malignancy. The focal asymmetry likely reflects a combination of malignancy and residual post biopsy hematoma, which has significantly improved, previously measuring up to 4.0 cm on recent MRI.   In the slightly upper outer posterior left breast, there is a 1 cm group of amorphous calcifications, which does not layer on the lateral view and is not seen on more remote prior mammograms. These are different in configuration compared to additional benign-appearing calcifications in the left breast and are suspicious.  BI-RADS 4: Suspicious.     7/21/2023 Biopsy    Left Breast, Stereotactic Biopsy ( [facility]):    1. Breast, Left 2 O'clock Position, Core Biopsy For Calcifications:                 A. Incidental atypical lobular hyperplasia.   B. Microcalcifications identified in association with benign ductal structures and dilated cysts with apocrine metaplasia.               C. No evidence of in-situ nor invasive malignancy.  -Bowtie clip.      8/1/2023 Surgery    Left needle-localized partial mastectomy and left breast needle-localized excisional biopsy:    1. Left Breast, Oriented Needle Localization Lumpectomy (26 grams):                A. Biopsy site changes without evidence of residual carcinoma (see Comment).               B. No atypical hyperplasia nor in situ carcinoma  identified.               C. Biopsy site changes are present and the clip retrieved.  D. Hormone receptor status: ER % positive, WV 51-60% positive, HER2 2+ by IHC, negative by FISH, Ki-67 8% (performed on prior biopsy [id number], slides reviewed).  E. Pathologic stage: pT1a, NX.     2. Left Breast, Additional Superior and Medial Margins, Oriented Excision:               A. Benign unremarkable breast tissue.     3. Left Breast, Additional Inferior and Lateral Margins, Oriented Excision:               A. Benign breast tissue with sclerosing adenosis with associated microcalcifications, usual ductal hyperplasia and apocrine cysts.     4. Skin, Left Breast at 3 o'clock, Unoriented Excision:               A. Benign skin with dermal chronic active inflammation and changes consistent with prior biopsy.               B. No atypical hyperplasia, in-situ nor invasive carcinoma identified.     5. Left Breast, 3 o'clock, Oriented Needle Localization Lumpectomy (6 grams):               A. Benign breast tissue with fibroadenomatoid change with associated calcifications and microcalcifications associated with benign breast ducts.  B. Biopsy site change is present and clip retrieved.  C. No residual atypical hyperplasia, in-situ, nor invasive carcinoma identified (margins clear).         Review of Systems:  See interval history.       Medications:    Current Outpatient Medications:     amitriptyline (ELAVIL) 10 MG tablet, Take 1 tablet by mouth Every Night., Disp: 90 tablet, Rfl: 3    Cholecalciferol (VITAMIN D3) 5000 UNITS capsule capsule, Take 1 capsule by mouth 1 (One) Time Per Week., Disp: , Rfl:     clopidogrel (PLAVIX) 75 MG tablet, Take 1 tablet by mouth Daily. Indications: Resume on Monday, 8/7/2023., Disp: 90 tablet, Rfl: 3    ezetimibe (ZETIA) 10 MG tablet, TAKE 1 TABLET BY MOUTH EVERY DAY, Disp: 90 tablet, Rfl: 2    icosapent ethyl (Vascepa) 1 g capsule capsule, Take 2 g by mouth 2 (Two) Times a Day With Meals.,  Disp: 360 capsule, Rfl: 3    isosorbide dinitrate (ISORDIL) 5 MG tablet, Take 1 tablet by mouth 2 (Two) Times a Day., Disp: 180 tablet, Rfl: 3    Livalo 2 MG tablet tablet, TAKE 1 TABLET BY MOUTH EVERY DAY AT NIGHT, Disp: 90 tablet, Rfl: 2    Melatonin 12 MG tablet, Take 12 mg by mouth Every Night., Disp: , Rfl:     Menaquinone-7 (VITAMIN K2 PO), Take  by mouth. HOLD PER MD INSTR, Disp: , Rfl:     montelukast (Singulair) 10 MG tablet, Take 1 tablet by mouth Every Night., Disp: 90 tablet, Rfl: 3    multivitamin (THERAGRAN) tablet tablet, Take 1 tablet by mouth Daily. HOLD PER MD INSTR, Disp: , Rfl:     Omega-3 Fatty Acids (FISH OIL PO), Take 1 tablet by mouth Daily. HOLD PER MD INSTR, Disp: , Rfl:     Probiotic Product (PROBIOTIC PO), Take 4 capsules by mouth Daily. gutbio-align, Disp: , Rfl:     Psyllium (Metamucil) wafer wafer, Take  by mouth Daily. Fiber tablet, Disp: , Rfl:     raloxifene (EVISTA) 60 MG tablet, Take 1 tablet by mouth Daily., Disp: 30 tablet, Rfl: 6    ramipril (ALTACE) 2.5 MG capsule, TAKE 1 CAPSULE BY MOUTH EVERY DAY AT NIGHT, Disp: 90 capsule, Rfl: 2      Allergies   Allergen Reactions    Adhesive Tape Rash     Redness, bruising and peeling of skin    *Use Paper Tape Only*  Redness, bruising and peeling of skin    *Use Paper Tape Only*    Beta Adrenergic Blockers Unknown - High Severity     hypotension  bradycardic    Statins Myalgia    Elemental Sulfur Rash    Sulfa Antibiotics Rash       Family History   Problem Relation Age of Onset    Heart attack Mother     Heart failure Mother     Hypertension Mother     Stroke Mother     Cervical cancer Mother     Hypertension Father     Heart failure Father     Heart disease Father     Aneurysm Sister     Breast cancer Sister 66    Clotting disorder Sister     Anxiety disorder Sister     No Known Problems Sister     Heart attack Brother     Cancer Brother     Lung cancer Brother     No Known Problems Daughter     No Known Problems Son     Ovarian cancer  Neg Hx     Colon cancer Neg Hx     Pulmonary embolism Neg Hx     Deep vein thrombosis Neg Hx     Malig Hyperthermia Neg Hx        Objective   PHYSICAL EXAMINATION:   Vitals:    11/07/23 1044   BP: 142/90   Pulse: 64   SpO2: 98%       ECOG 0 - Asymptomatic  General: NAD, well appearing  Psych: a&o x3, normal mood and affect  Eyes: EOMI, no scleral icterus  ENMT: neck supple without masses or thyromegaly, mucous membranes moist  MSK: normal gait, normal ROM in bilateral shoulders  Lymph nodes: no cervical, supraclavicular or axillary lymphadenopathy  Breast: symmetric, moderate size, grade 3 ptosis, sternal scar   Right: No visible abnormalities on inspection while seated, with arms raised or hands on hips.  No masses skin changes or nipple abnormalities  Left: Well-healed superior horizontal and lateral curvilinear incisions.  No visible abnormalities on inspection while seated, with arms raised or hands on hips.  No masses, skin changes or nipple abnormalities        Assessment & Plan   Assessment:   73 y.o. F with a diagnosis of left breast cancer: Intermediate grade, invasive ductal carcinoma, ER/IA positive, Her2 negative. She developed a large postbiopsy hematoma and surgery was delayed to allow this to resolve.  Preoperative follow-up imaging then demonstrated a separate area of calcifications.  This was biopsied and showed incidental atypical lobular hyperplasia (ALH). She underwent left partial mastectomy and excisional biopsy on 8/1/23, pT1aNx (no residual disease in surgical specimen, 4 mm on core).    Plan:  -cont with Dr. Leon as needed   -Continue follow-up with Dr. White.  -screening mammo in march followed by exam  -monthly self breast exams  -rto if any new issues    [employer]      CC:  Kasandra Mcdowell PA-C

## 2023-11-07 ENCOUNTER — OFFICE VISIT (OUTPATIENT)
Dept: SURGERY | Facility: CLINIC | Age: 73
End: 2023-11-07
Payer: MEDICARE

## 2023-11-07 VITALS
HEIGHT: 64 IN | DIASTOLIC BLOOD PRESSURE: 90 MMHG | BODY MASS INDEX: 23.05 KG/M2 | OXYGEN SATURATION: 98 % | WEIGHT: 135 LBS | SYSTOLIC BLOOD PRESSURE: 142 MMHG | HEART RATE: 64 BPM

## 2023-11-07 DIAGNOSIS — Z17.0 MALIGNANT NEOPLASM OF UPPER-INNER QUADRANT OF LEFT BREAST IN FEMALE, ESTROGEN RECEPTOR POSITIVE: Primary | ICD-10-CM

## 2023-11-07 DIAGNOSIS — Z12.31 ENCOUNTER FOR SCREENING MAMMOGRAM FOR MALIGNANT NEOPLASM OF BREAST: ICD-10-CM

## 2023-11-07 DIAGNOSIS — C50.212 MALIGNANT NEOPLASM OF UPPER-INNER QUADRANT OF LEFT BREAST IN FEMALE, ESTROGEN RECEPTOR POSITIVE: Primary | ICD-10-CM

## 2023-11-07 PROCEDURE — 1160F RVW MEDS BY RX/DR IN RCRD: CPT | Performed by: NURSE PRACTITIONER

## 2023-11-07 PROCEDURE — 99213 OFFICE O/P EST LOW 20 MIN: CPT | Performed by: NURSE PRACTITIONER

## 2023-11-07 PROCEDURE — 3080F DIAST BP >= 90 MM HG: CPT | Performed by: NURSE PRACTITIONER

## 2023-11-07 PROCEDURE — 3077F SYST BP >= 140 MM HG: CPT | Performed by: NURSE PRACTITIONER

## 2023-11-07 PROCEDURE — 1159F MED LIST DOCD IN RCRD: CPT | Performed by: NURSE PRACTITIONER

## 2023-11-24 DIAGNOSIS — E78.2 MIXED HYPERLIPIDEMIA: ICD-10-CM

## 2023-11-26 RX ORDER — ICOSAPENT ETHYL 1000 MG/1
2 CAPSULE ORAL 2 TIMES DAILY WITH MEALS
Qty: 360 CAPSULE | Refills: 3 | Status: SHIPPED | OUTPATIENT
Start: 2023-11-26

## 2023-12-20 DIAGNOSIS — J30.89 NON-SEASONAL ALLERGIC RHINITIS, UNSPECIFIED TRIGGER: ICD-10-CM

## 2023-12-20 RX ORDER — RALOXIFENE HYDROCHLORIDE 60 MG/1
60 TABLET, FILM COATED ORAL DAILY
Qty: 90 TABLET | Refills: 2 | Status: SHIPPED | OUTPATIENT
Start: 2023-12-20

## 2023-12-20 RX ORDER — MONTELUKAST SODIUM 10 MG/1
10 TABLET ORAL
Qty: 90 TABLET | Refills: 0 | Status: SHIPPED | OUTPATIENT
Start: 2023-12-20

## 2023-12-20 RX ORDER — AMITRIPTYLINE HYDROCHLORIDE 10 MG/1
10 TABLET, FILM COATED ORAL
Qty: 90 TABLET | Refills: 0 | Status: SHIPPED | OUTPATIENT
Start: 2023-12-20

## 2024-01-11 ENCOUNTER — OFFICE VISIT (OUTPATIENT)
Dept: FAMILY MEDICINE CLINIC | Facility: CLINIC | Age: 74
End: 2024-01-11
Payer: MEDICARE

## 2024-01-11 VITALS
HEART RATE: 74 BPM | HEIGHT: 64 IN | RESPIRATION RATE: 15 BRPM | WEIGHT: 141 LBS | DIASTOLIC BLOOD PRESSURE: 74 MMHG | SYSTOLIC BLOOD PRESSURE: 134 MMHG | BODY MASS INDEX: 24.07 KG/M2 | OXYGEN SATURATION: 97 % | TEMPERATURE: 98 F

## 2024-01-11 DIAGNOSIS — H10.32 ACUTE BACTERIAL CONJUNCTIVITIS OF LEFT EYE: Primary | ICD-10-CM

## 2024-01-11 RX ORDER — CIPROFLOXACIN HYDROCHLORIDE 3.5 MG/ML
SOLUTION/ DROPS TOPICAL
Qty: 10 ML | Refills: 0 | Status: SHIPPED | OUTPATIENT
Start: 2024-01-11

## 2024-01-11 NOTE — PROGRESS NOTES
"Chief Complaint  Conjunctivitis (Left)    Subjective          Maddison Turcios presents to [facility]  History of Present Illness  Maddison is a 73-year-old female who presents with new left eye redness, itching and drainage.  States about a week ago she noticed her eye had started itching.  She has noticed some matting off and on for the past few days as well.  This morning her left eye was red with some discharge.  Denied any change in facial washes or eye make-up.  Has not been sick recently.  Did did use some allergy drops this morning without relief.  She is concerned for pinkeye.  Denied any fevers, chills or upper respiratory symptoms.  Appetite and sleep have been normal.     Objective   Vital Signs:   /74 (BP Location: Right arm, Patient Position: Sitting, Cuff Size: Adult)   Pulse 74   Temp 98 °F (36.7 °C)   Resp 15   Ht 162.6 cm (64\")   Wt 64 kg (141 lb)   SpO2 97%   BMI 24.20 kg/m²     Physical Exam  Vitals and nursing note reviewed.   Constitutional:       Appearance: Normal appearance. She is well-developed, well-groomed and normal weight.   HENT:      Head: Normocephalic and atraumatic.      Jaw: There is normal jaw occlusion.      Right Ear: Hearing, tympanic membrane, ear canal and external ear normal.      Left Ear: Hearing, tympanic membrane, ear canal and external ear normal.      Nose: Nose normal.      Right Sinus: No maxillary sinus tenderness or frontal sinus tenderness.      Left Sinus: No maxillary sinus tenderness or frontal sinus tenderness.      Mouth/Throat:      Lips: Pink.      Mouth: Mucous membranes are moist.      Dentition: Normal dentition.      Tongue: No lesions.      Pharynx: Oropharynx is clear. Uvula midline.      Tonsils: No tonsillar exudate.   Eyes:      General: Lids are normal.      Conjunctiva/sclera:      Left eye: Left conjunctiva is injected.      Pupils: Pupils are equal, round, and reactive to light.     Neck:      Trachea: " Trachea and phonation normal. No tracheal tenderness.   Cardiovascular:      Rate and Rhythm: Normal rate and regular rhythm.      Pulses: Normal pulses.      Heart sounds: Normal heart sounds, S1 normal and S2 normal. No murmur heard.  Pulmonary:      Effort: Pulmonary effort is normal.      Breath sounds: Normal breath sounds and air entry.   Abdominal:      General: Bowel sounds are normal.      Palpations: Abdomen is soft.      Tenderness: There is no abdominal tenderness.   Musculoskeletal:      Cervical back: Neck supple.      Right lower leg: No edema.      Left lower leg: No edema.   Lymphadenopathy:      Cervical: No cervical adenopathy.      Right cervical: No superficial, deep or posterior cervical adenopathy.     Left cervical: No superficial, deep or posterior cervical adenopathy.   Skin:     General: Skin is warm and dry.      Capillary Refill: Capillary refill takes less than 2 seconds.   Neurological:      Mental Status: She is alert and oriented to person, place, and time.   Psychiatric:         Attention and Perception: Attention and perception normal.         Mood and Affect: Mood and affect normal.         Speech: Speech normal.         Behavior: Behavior is cooperative.         Thought Content: Thought content normal.         Cognition and Memory: Cognition and memory normal.         Judgment: Judgment normal.        Result Review :                 Assessment and Plan    Diagnoses and all orders for this visit:    1. Acute bacterial conjunctivitis of left eye (Primary)  -     ciprofloxacin (Ciloxan) 0.3 % ophthalmic solution; Apply one drop in left eye every 2 hours while awake for 2 days then every 4 hours for 5 days  Dispense: 10 mL; Refill: 0    Maddison was seen in office today and diagnosed with new acute bacterial conjunctivitis of left eye.  Have sent ciprofloxacin ophthalmic solution to pharmacy to use as directed in left eye for the next week.  She will use good handwashing techniques as  well.  Replace any eye make-up.  I have given her a work excuse for tomorrow.  Return to office if no improvement.    I spent 15 minutes caring for Maddison on this date of service. This time includes time spent by me in the following activities:preparing for the visit, obtaining and/or reviewing a separately obtained history, performing a medically appropriate examination and/or evaluation , counseling and educating the patient/family/caregiver, ordering medications, tests, or procedures, and documenting information in the medical record  Follow Up   Return if symptoms worsen or fail to improve.  Patient was given instructions and counseling regarding her condition or for health maintenance advice. Please see specific information pulled into the AVS if appropriate.     [facility]  [address] [place] [id number]  Dept: [phone]  Dept Fax: [phone]  Loc: [phone]  Loc Fax: [phone]

## 2024-01-11 NOTE — LETTER
January 11, 2024     Patient: Maddison Turcios   YOB: 1950   Date of Visit: 1/11/2024       To Whom It May Concern:    It is my medical opinion that Maddison Turcios may return to work on January 18,2023 due to illness .           Sincerely,        Kasandra Mcdowell PA-C    CC:   No Recipients

## 2024-01-30 RX ORDER — ISOSORBIDE DINITRATE 5 MG/1
5 TABLET ORAL 2 TIMES DAILY
Qty: 180 TABLET | Refills: 3 | Status: SHIPPED | OUTPATIENT
Start: 2024-01-30

## 2024-01-30 NOTE — TELEPHONE ENCOUNTER
NOV-04/01/24-RM  LOV-09/08/23-MM    Ms. Turcios is a patient of Dr. Urbina's with history of CAD s/p CABG with LIMA to LAD and sequential SVG to RI and first OM. She also has hypertension which is well-controlled. Given her TIA and CAD history, I recommend that she continue Plavix at 75 mg daily. I will have her see Dr. Urbina in 6 months.

## 2024-02-12 DIAGNOSIS — I10 ESSENTIAL HYPERTENSION: ICD-10-CM

## 2024-02-12 DIAGNOSIS — E05.90 HYPERTHYROIDISM: ICD-10-CM

## 2024-02-12 DIAGNOSIS — E78.2 MIXED HYPERLIPIDEMIA: ICD-10-CM

## 2024-02-12 DIAGNOSIS — Z00.00 MEDICARE ANNUAL WELLNESS VISIT, SUBSEQUENT: Primary | ICD-10-CM

## 2024-02-13 DIAGNOSIS — E78.2 MIXED HYPERLIPIDEMIA: ICD-10-CM

## 2024-02-13 DIAGNOSIS — Z00.00 MEDICARE ANNUAL WELLNESS VISIT, SUBSEQUENT: ICD-10-CM

## 2024-02-13 DIAGNOSIS — I10 ESSENTIAL HYPERTENSION: ICD-10-CM

## 2024-02-13 DIAGNOSIS — E05.90 HYPERTHYROIDISM: ICD-10-CM

## 2024-02-14 LAB
ALBUMIN SERPL-MCNC: 4.4 G/DL (ref 3.5–5.2)
ALBUMIN/GLOB SERPL: 2 G/DL
ALP SERPL-CCNC: 62 U/L (ref 39–117)
ALT SERPL-CCNC: 14 U/L (ref 1–33)
AST SERPL-CCNC: 21 U/L (ref 1–32)
BASOPHILS # BLD AUTO: 0.06 10*3/MM3 (ref 0–0.2)
BASOPHILS NFR BLD AUTO: 0.7 % (ref 0–1.5)
BILIRUB SERPL-MCNC: 1 MG/DL (ref 0–1.2)
BUN SERPL-MCNC: 12 MG/DL (ref 8–23)
BUN/CREAT SERPL: 15.8 (ref 7–25)
CALCIUM SERPL-MCNC: 9.4 MG/DL (ref 8.6–10.5)
CHLORIDE SERPL-SCNC: 96 MMOL/L (ref 98–107)
CHOLEST SERPL-MCNC: 155 MG/DL (ref 0–200)
CO2 SERPL-SCNC: 24.5 MMOL/L (ref 22–29)
CREAT SERPL-MCNC: 0.76 MG/DL (ref 0.57–1)
EGFRCR SERPLBLD CKD-EPI 2021: 83.4 ML/MIN/1.73
EOSINOPHIL # BLD AUTO: 0.2 10*3/MM3 (ref 0–0.4)
EOSINOPHIL NFR BLD AUTO: 2.4 % (ref 0.3–6.2)
ERYTHROCYTE [DISTWIDTH] IN BLOOD BY AUTOMATED COUNT: 12.4 % (ref 12.3–15.4)
FT4I SERPL CALC-MCNC: 2 (ref 1.2–4.9)
GLOBULIN SER CALC-MCNC: 2.2 GM/DL
GLUCOSE SERPL-MCNC: 94 MG/DL (ref 65–99)
HCT VFR BLD AUTO: 35.4 % (ref 34–46.6)
HDLC SERPL-MCNC: 77 MG/DL (ref 40–60)
HGB BLD-MCNC: 12.6 G/DL (ref 12–15.9)
IMM GRANULOCYTES # BLD AUTO: 0.06 10*3/MM3 (ref 0–0.05)
IMM GRANULOCYTES NFR BLD AUTO: 0.7 % (ref 0–0.5)
LDLC SERPL CALC-MCNC: 66 MG/DL (ref 0–100)
LDLC/HDLC SERPL: 0.86 {RATIO}
LYMPHOCYTES # BLD AUTO: 2.59 10*3/MM3 (ref 0.7–3.1)
LYMPHOCYTES NFR BLD AUTO: 30.5 % (ref 19.6–45.3)
MCH RBC QN AUTO: 32.5 PG (ref 26.6–33)
MCHC RBC AUTO-ENTMCNC: 35.6 G/DL (ref 31.5–35.7)
MCV RBC AUTO: 91.2 FL (ref 79–97)
MONOCYTES # BLD AUTO: 0.72 10*3/MM3 (ref 0.1–0.9)
MONOCYTES NFR BLD AUTO: 8.5 % (ref 5–12)
NEUTROPHILS # BLD AUTO: 4.87 10*3/MM3 (ref 1.7–7)
NEUTROPHILS NFR BLD AUTO: 57.2 % (ref 42.7–76)
NRBC BLD AUTO-RTO: 0 /100 WBC (ref 0–0.2)
PLATELET # BLD AUTO: 316 10*3/MM3 (ref 140–450)
POTASSIUM SERPL-SCNC: 4 MMOL/L (ref 3.5–5.2)
PROT SERPL-MCNC: 6.6 G/DL (ref 6–8.5)
RBC # BLD AUTO: 3.88 10*6/MM3 (ref 3.77–5.28)
SODIUM SERPL-SCNC: 137 MMOL/L (ref 136–145)
T3RU NFR SERPL: 26 % (ref 24–39)
T4 SERPL-MCNC: 7.8 UG/DL (ref 4.5–12)
TRIGL SERPL-MCNC: 59 MG/DL (ref 0–150)
TSH SERPL DL<=0.005 MIU/L-ACNC: 2.19 UIU/ML (ref 0.45–4.5)
VLDLC SERPL CALC-MCNC: 12 MG/DL (ref 5–40)
WBC # BLD AUTO: 8.5 10*3/MM3 (ref 3.4–10.8)

## 2024-02-18 PROBLEM — L23.7 POISON IVY DERMATITIS: Status: RESOLVED | Noted: 2019-09-04 | Resolved: 2024-02-18

## 2024-02-19 ENCOUNTER — OFFICE VISIT (OUTPATIENT)
Dept: FAMILY MEDICINE CLINIC | Facility: CLINIC | Age: 74
End: 2024-02-19
Payer: MEDICARE

## 2024-02-19 VITALS
HEART RATE: 69 BPM | RESPIRATION RATE: 15 BRPM | SYSTOLIC BLOOD PRESSURE: 120 MMHG | DIASTOLIC BLOOD PRESSURE: 84 MMHG | TEMPERATURE: 97.7 F | WEIGHT: 137 LBS | OXYGEN SATURATION: 96 % | HEIGHT: 64 IN | BODY MASS INDEX: 23.39 KG/M2

## 2024-02-19 DIAGNOSIS — F51.01 PRIMARY INSOMNIA: ICD-10-CM

## 2024-02-19 DIAGNOSIS — E78.2 MIXED HYPERLIPIDEMIA: ICD-10-CM

## 2024-02-19 DIAGNOSIS — Z00.00 MEDICARE ANNUAL WELLNESS VISIT, SUBSEQUENT: Primary | ICD-10-CM

## 2024-02-19 DIAGNOSIS — I10 ESSENTIAL HYPERTENSION: ICD-10-CM

## 2024-02-19 PROCEDURE — 3079F DIAST BP 80-89 MM HG: CPT | Performed by: PHYSICIAN ASSISTANT

## 2024-02-19 PROCEDURE — G0439 PPPS, SUBSEQ VISIT: HCPCS | Performed by: PHYSICIAN ASSISTANT

## 2024-02-19 PROCEDURE — 1170F FXNL STATUS ASSESSED: CPT | Performed by: PHYSICIAN ASSISTANT

## 2024-02-19 PROCEDURE — 3074F SYST BP LT 130 MM HG: CPT | Performed by: PHYSICIAN ASSISTANT

## 2024-02-19 NOTE — PROGRESS NOTES
The ABCs of the Annual Wellness Visit  Subsequent Medicare Wellness Visit    Chief Complaint   Patient presents with    Medicare Wellness-subsequent    Hyperlipidemia     management    Hypertension     Management      Insomnia     management       Subjective      Maddison Turcios is a 73 y.o. female who presents for a Subsequent Medicare Wellness Visit,hyperlipidemia,hypertension and insomnia management.  Overall she is feeling well at office visit today.  States recuperated from her breast surgery/biopsy.  Has follow-up appointments with oncology and breast surgeon.  Has been compliant with her blood pressure medication.  Has appointment with her cardiologist in April 2024.  Denied any current chest pain, shortness of air, cough, wheezing, abdominal pain, GI upset or swelling of ankles.  She has had some bruising off and on which is related to her Plavix medication.  Bowel movements have been normal without dark black tarry stools.  Diet has been very healthy.  States she is doing well fit program daily.  States she walks in place and moves her arms for 30 minutes.  Sleep has been normal.  Denied any fevers, chills, or upper respiratory symptoms.    The following portions of the patient's history were reviewed and   updated as appropriate: She  has a past medical history of Abnormal blood chemistry, Acute UTI (urinary tract infection), Allergic, Anemia, Anesthesia complication, Aneurysm, Aneurysm, cerebral, Antiplatelet or antithrombotic long-term use (10/20/2021), Bloating, Brain aneurysm, CAD (coronary artery disease), Chronic headaches, Coronary artery disease, Coronary artery stenosis, Dysuria, Encounter for screening colonoscopy, Environmental allergies, Fall from slip, trip, or stumble, Gait disturbance, H/O cardiovascular stress test (09/17/2013), H/O colonoscopy, H/O echocardiogram (09/25/2013), H/O fall, Heart murmur, Hereditary spherocytosis, History of EKG (07/31/2015), Hyperlipemia, Hyperlipidemia,  Hypertension, Hyperthyroidism, Injury of back, Insomnia, Left forearm pain, Left leg pain, Left wrist pain, Lower abdominal pain, Malignant neoplasm of upper-inner quadrant of left breast in female, estrogen receptor positive (05/18/2023), Need for pneumococcal vaccination, Onychomycosis of toenail, Pelvic pressure in female, Right foot pain, Stroke, TIA (transient ischemic attack) (11/30/2016), URI (upper respiratory infection), and Urine frequency.  She does not have any pertinent problems on file.  She  has a past surgical history that includes Coronary artery bypass graft (2004); Rotator cuff repair (Left); Tonsillectomy (1984); Splenectomy, total (1953); Tubal ligation (1979); Cholecystectomy; Bunionectomy; Cerebral aneurysm repair (2015); Cardiac catheterization (N/A, 04/08/2019); Cardiac catheterization (N/A, 04/08/2019); Cardiac catheterization (N/A, 04/08/2019); Colonoscopy (N/A, 01/29/2021); Breast excisional biopsy (Right, 1977); Breast excisional biopsy (Right, 1979); Foot surgery (Right, 2016); US Guided Cyst Aspiration Breast (N/A, 05/18/2023); and Breast lumpectomy (Left, 08/01/2023).  Her family history includes Aneurysm in her sister; Anxiety disorder in her sister; Breast cancer (age of onset: 66) in her sister; Cancer in her brother; Cervical cancer in her mother; Clotting disorder in her sister; Heart attack in her brother and mother; Heart disease in her father; Heart failure in her father and mother; Hypertension in her father and mother; Lung cancer in her brother; No Known Problems in her daughter, sister, and son; Stroke in her mother.  She  reports that she has never smoked. She has never been exposed to tobacco smoke. She has never used smokeless tobacco. She reports that she does not currently use alcohol. She reports that she does not use drugs.  Current Outpatient Medications   Medication Sig Dispense Refill    amitriptyline (ELAVIL) 10 MG tablet TAKE 1 TABLET BY MOUTH EVERY DAY AT  NIGHT 90 tablet 0    Cholecalciferol (VITAMIN D3) 5000 UNITS capsule capsule Take 1 capsule by mouth 1 (One) Time Per Week.      clopidogrel (PLAVIX) 75 MG tablet Take 1 tablet by mouth Daily. Indications: Resume on Monday, 8/7/2023. 90 tablet 3    ezetimibe (ZETIA) 10 MG tablet TAKE 1 TABLET BY MOUTH EVERY DAY 90 tablet 2    icosapent ethyl (VASCEPA) 1 g capsule capsule TAKE 2 G BY MOUTH 2 (TWO) TIMES A DAY WITH MEALS. 360 capsule 3    isosorbide dinitrate (ISORDIL) 5 MG tablet TAKE 1 TABLET BY MOUTH TWICE A  tablet 3    Livalo 2 MG tablet tablet TAKE 1 TABLET BY MOUTH EVERY DAY AT NIGHT 90 tablet 2    Melatonin 12 MG tablet Take 12 mg by mouth Every Night.      Menaquinone-7 (VITAMIN K2 PO) Take  by mouth. HOLD PER MD INSTR      montelukast (SINGULAIR) 10 MG tablet TAKE 1 TABLET BY MOUTH EVERY DAY AT NIGHT 90 tablet 0    multivitamin (THERAGRAN) tablet tablet Take 1 tablet by mouth Daily. HOLD PER MD INSTR      Omega-3 Fatty Acids (FISH OIL PO) Take 1 tablet by mouth Daily. HOLD PER MD INSTR      Probiotic Product (PROBIOTIC PO) Take 4 capsules by mouth Daily. gutbio-align      Psyllium (Metamucil) wafer wafer Take  by mouth Daily. Fiber tablet      raloxifene (EVISTA) 60 MG tablet TAKE 1 TABLET BY MOUTH EVERY DAY 90 tablet 2    ramipril (ALTACE) 2.5 MG capsule TAKE 1 CAPSULE BY MOUTH EVERY DAY AT NIGHT 90 capsule 2     No current facility-administered medications for this visit.     Current Outpatient Medications on File Prior to Visit   Medication Sig    amitriptyline (ELAVIL) 10 MG tablet TAKE 1 TABLET BY MOUTH EVERY DAY AT NIGHT    Cholecalciferol (VITAMIN D3) 5000 UNITS capsule capsule Take 1 capsule by mouth 1 (One) Time Per Week.    clopidogrel (PLAVIX) 75 MG tablet Take 1 tablet by mouth Daily. Indications: Resume on Monday, 8/7/2023.    ezetimibe (ZETIA) 10 MG tablet TAKE 1 TABLET BY MOUTH EVERY DAY    icosapent ethyl (VASCEPA) 1 g capsule capsule TAKE 2 G BY MOUTH 2 (TWO) TIMES A DAY WITH MEALS.     isosorbide dinitrate (ISORDIL) 5 MG tablet TAKE 1 TABLET BY MOUTH TWICE A DAY    Livalo 2 MG tablet tablet TAKE 1 TABLET BY MOUTH EVERY DAY AT NIGHT    Melatonin 12 MG tablet Take 12 mg by mouth Every Night.    Menaquinone-7 (VITAMIN K2 PO) Take  by mouth. HOLD PER MD INSTR    montelukast (SINGULAIR) 10 MG tablet TAKE 1 TABLET BY MOUTH EVERY DAY AT NIGHT    multivitamin (THERAGRAN) tablet tablet Take 1 tablet by mouth Daily. HOLD PER MD INSTR    Omega-3 Fatty Acids (FISH OIL PO) Take 1 tablet by mouth Daily. HOLD PER MD INSTR    Probiotic Product (PROBIOTIC PO) Take 4 capsules by mouth Daily. gutbio-align    Psyllium (Metamucil) wafer wafer Take  by mouth Daily. Fiber tablet    raloxifene (EVISTA) 60 MG tablet TAKE 1 TABLET BY MOUTH EVERY DAY    ramipril (ALTACE) 2.5 MG capsule TAKE 1 CAPSULE BY MOUTH EVERY DAY AT NIGHT    [DISCONTINUED] promethazine-dextromethorphan (PROMETHAZINE-DM) 6.25-15 MG/5ML syrup Take 5 mL by mouth 4 (Four) Times a Day As Needed for Cough.     No current facility-administered medications on file prior to visit.     She is allergic to adhesive tape, beta adrenergic blockers, statins, elemental sulfur, and sulfa antibiotics..    Compared to one year ago, the patient feels her physical   health is better.    Compared to one year ago, the patient feels her mental   health is the same.    Recent Hospitalizations:  She was not admitted to the hospital during the last year.       Current Medical Providers:  Patient Care Team:  Kasandra Mcdowell PA-C as PCP - General (Family Medicine)  Helen Urbina MD as Consulting Physician (Cardiology)  Ruben Valderrama MD (Dermatology)  Bennett Whelan MD as MDS Coordinator (Allergy)  Sara Araiza MD as Consulting Physician (Obstetrics and Gynecology)  Jaleesa Hsieh MD as Referring Physician (Breast Surgery)  Aba White MD as Consulting Physician (Hematology and Oncology)  Jill Leon MD as Consulting  Physician (Radiation Oncology)  Toby Maher APRN as Nurse Practitioner (Breast Surgery)  Anum Lanier APRN as Nurse Practitioner (Nurse Practitioner)  Clarisse Childs, PT as Physical Therapist (Physical Therapy)  Ruben Martinez PA as Physician Assistant (Physician Assistant)  Fazal Salomon MD as Surgeon (General Surgery)  Theron Arnold MD as Surgeon (Orthopedic Surgery)  Kimura, Zorre Zeno, PT as Physical Therapist (Physical Therapy)  Colin Ladd MD as Consulting Physician (Gastroenterology)    Outpatient Medications Prior to Visit   Medication Sig Dispense Refill    amitriptyline (ELAVIL) 10 MG tablet TAKE 1 TABLET BY MOUTH EVERY DAY AT NIGHT 90 tablet 0    Cholecalciferol (VITAMIN D3) 5000 UNITS capsule capsule Take 1 capsule by mouth 1 (One) Time Per Week.      clopidogrel (PLAVIX) 75 MG tablet Take 1 tablet by mouth Daily. Indications: Resume on Monday, 8/7/2023. 90 tablet 3    ezetimibe (ZETIA) 10 MG tablet TAKE 1 TABLET BY MOUTH EVERY DAY 90 tablet 2    icosapent ethyl (VASCEPA) 1 g capsule capsule TAKE 2 G BY MOUTH 2 (TWO) TIMES A DAY WITH MEALS. 360 capsule 3    isosorbide dinitrate (ISORDIL) 5 MG tablet TAKE 1 TABLET BY MOUTH TWICE A  tablet 3    Livalo 2 MG tablet tablet TAKE 1 TABLET BY MOUTH EVERY DAY AT NIGHT 90 tablet 2    Melatonin 12 MG tablet Take 12 mg by mouth Every Night.      Menaquinone-7 (VITAMIN K2 PO) Take  by mouth. HOLD PER MD INSTR      montelukast (SINGULAIR) 10 MG tablet TAKE 1 TABLET BY MOUTH EVERY DAY AT NIGHT 90 tablet 0    multivitamin (THERAGRAN) tablet tablet Take 1 tablet by mouth Daily. HOLD PER MD INSTR      Omega-3 Fatty Acids (FISH OIL PO) Take 1 tablet by mouth Daily. HOLD PER MD INSTR      Probiotic Product (PROBIOTIC PO) Take 4 capsules by mouth Daily. gutbio-align      Psyllium (Metamucil) wafer wafer Take  by mouth Daily. Fiber tablet      raloxifene (EVISTA) 60 MG tablet TAKE 1 TABLET BY MOUTH EVERY DAY 90 tablet 2     ramipril (ALTACE) 2.5 MG capsule TAKE 1 CAPSULE BY MOUTH EVERY DAY AT NIGHT 90 capsule 2    promethazine-dextromethorphan (PROMETHAZINE-DM) 6.25-15 MG/5ML syrup Take 5 mL by mouth 4 (Four) Times a Day As Needed for Cough. 118 mL 0     No facility-administered medications prior to visit.       No opioid medication identified on active medication list. I have reviewed chart for other potential  high risk medication/s and harmful drug interactions in the elderly.        Aspirin is not on active medication list.  Aspirin use is not indicated based on review of current medical condition/s. Risk of harm outweighs potential benefits.  .    Patient Active Problem List   Diagnosis    Cerebral aneurysm    Coronary artery disease    Mixed hyperlipidemia    Essential hypertension    Hyperthyroidism    Insomnia    Onychomycosis of toenail    Bilateral enlargement of atria    Sinus bradycardia    Transient cerebral ischemia    Skin lesion of right ear    Fatigue    Medicare annual wellness visit, subsequent    Osteopenia    Necrotic hand wound    Family history of breast cancer    S/P CABG (coronary artery bypass graft)    Chest pain    Neck strain, initial encounter    Need for influenza vaccination    Greater trochanteric bursitis of right hip    Prophylactic antibiotic    Nondisplaced fracture of fifth metatarsal bone, right foot, initial encounter for closed fracture    Irritable bowel syndrome with both constipation and diarrhea    Personal history of colonic polyps    Encounter for screening for malignant neoplasm of colon    Antiplatelet or antithrombotic long-term use    S/P coil embolization of cerebral aneurysm    Malignant neoplasm of upper-inner quadrant of left breast in female, estrogen receptor positive     Advance Care Planning   Advance Care Planning     Advance Directive is on file.  ACP discussion was held with the patient during this visit. Patient has an advance directive in EMR which is still valid.     "  Objective    Vitals:    02/19/24 0933   BP: 120/84   Pulse: 69   Resp: 15   Temp: 97.7 °F (36.5 °C)   SpO2: 96%   Weight: 62.1 kg (137 lb)   Height: 162.6 cm (64\")     Estimated body mass index is 23.52 kg/m² as calculated from the following:    Height as of this encounter: 162.6 cm (64\").    Weight as of this encounter: 62.1 kg (137 lb).    BMI is within normal parameters. No other follow-up for BMI required.      Does the patient have evidence of cognitive impairment?   No    Lab Results   Component Value Date    CHLPL 155 02/13/2024    TRIG 59 02/13/2024    HDL 77 (H) 02/13/2024    LDL 66 02/13/2024    VLDL 12 02/13/2024       Orders Only on 02/13/2024   Component Date Value Ref Range Status    TSH 02/13/2024 2.190  0.450 - 4.500 uIU/mL Final    T4, Total 02/13/2024 7.8  4.5 - 12.0 ug/dL Final    T3 Uptake 02/13/2024 26  24 - 39 % Final    Free Thyroxine Index 02/13/2024 2.0  1.2 - 4.9 Final    Total Cholesterol 02/13/2024 155  0 - 200 mg/dL Final    Comment: Cholesterol Reference Ranges  (U.S. Department of Health and Human Services ATP III  Classifications)  Desirable          <200 mg/dL  Borderline High    200-239 mg/dL  High Risk          >240 mg/dL  Triglyceride Reference Ranges  (U.S. Department of Health and Human Services ATP III  Classifications)  Normal           <150 mg/dL  Borderline High  150-199 mg/dL  High             200-499 mg/dL  Very High        >500 mg/dL  HDL Reference Ranges  (U.S. Department of Health and Human Services ATP III  Classifications)  Low     <40 mg/dl (major risk factor for CHD)  High    >60 mg/dl ('negative' risk factor for CHD)  LDL Reference Ranges  (U.S. Department of Health and Human Services ATP III  Classifications)  Optimal          <100 mg/dL  Near Optimal     100-129 mg/dL  Borderline High  130-159 mg/dL  High             160-189 mg/dL  Very High        >189 mg/dL      Triglycerides 02/13/2024 59  0 - 150 mg/dL Final    HDL Cholesterol 02/13/2024 77 (H)  40 - 60 " mg/dL Final    VLDL Cholesterol Jun 02/13/2024 12  5 - 40 mg/dL Final    LDL Chol Calc (NIH) 02/13/2024 66  0 - 100 mg/dL Final    LDL/HDL RATIO 02/13/2024 0.86   Final    Glucose 02/13/2024 94  65 - 99 mg/dL Final    BUN 02/13/2024 12  8 - 23 mg/dL Final    Creatinine 02/13/2024 0.76  0.57 - 1.00 mg/dL Final    EGFR Result 02/13/2024 83.4  >60.0 mL/min/1.73 Final    Comment: GFR Normal >60  Chronic Kidney Disease <60  Kidney Failure <15  The GFR formula is only valid for adults with stable renal  function between ages 18 and 70.      BUN/Creatinine Ratio 02/13/2024 15.8  7.0 - 25.0 Final    Sodium 02/13/2024 137  136 - 145 mmol/L Final    Potassium 02/13/2024 4.0  3.5 - 5.2 mmol/L Final    Chloride 02/13/2024 96 (L)  98 - 107 mmol/L Final    Total CO2 02/13/2024 24.5  22.0 - 29.0 mmol/L Final    Calcium 02/13/2024 9.4  8.6 - 10.5 mg/dL Final    Total Protein 02/13/2024 6.6  6.0 - 8.5 g/dL Final    Albumin 02/13/2024 4.4  3.5 - 5.2 g/dL Final    Globulin 02/13/2024 2.2  gm/dL Final    A/G Ratio 02/13/2024 2.0  g/dL Final    Total Bilirubin 02/13/2024 1.0  0.0 - 1.2 mg/dL Final    Alkaline Phosphatase 02/13/2024 62  39 - 117 U/L Final    AST (SGOT) 02/13/2024 21  1 - 32 U/L Final    ALT (SGPT) 02/13/2024 14  1 - 33 U/L Final    WBC 02/13/2024 8.50  3.40 - 10.80 10*3/mm3 Final    RBC 02/13/2024 3.88  3.77 - 5.28 10*6/mm3 Final    Hemoglobin 02/13/2024 12.6  12.0 - 15.9 g/dL Final    Hematocrit 02/13/2024 35.4  34.0 - 46.6 % Final    MCV 02/13/2024 91.2  79.0 - 97.0 fL Final    MCH 02/13/2024 32.5  26.6 - 33.0 pg Final    MCHC 02/13/2024 35.6  31.5 - 35.7 g/dL Final    RDW 02/13/2024 12.4  12.3 - 15.4 % Final    Platelets 02/13/2024 316  140 - 450 10*3/mm3 Final    Neutrophil Rel % 02/13/2024 57.2  42.7 - 76.0 % Final    Lymphocyte Rel % 02/13/2024 30.5  19.6 - 45.3 % Final    Monocyte Rel % 02/13/2024 8.5  5.0 - 12.0 % Final    Eosinophil Rel % 02/13/2024 2.4  0.3 - 6.2 % Final    Basophil Rel % 02/13/2024 0.7  0.0 -  1.5 % Final    Neutrophils Absolute 02/13/2024 4.87  1.70 - 7.00 10*3/mm3 Final    Lymphocytes Absolute 02/13/2024 2.59  0.70 - 3.10 10*3/mm3 Final    Monocytes Absolute 02/13/2024 0.72  0.10 - 0.90 10*3/mm3 Final    Eosinophils Absolute 02/13/2024 0.20  0.00 - 0.40 10*3/mm3 Final    Basophils Absolute 02/13/2024 0.06  0.00 - 0.20 10*3/mm3 Final    Immature Granulocyte Rel % 02/13/2024 0.7 (H)  0.0 - 0.5 % Final    Immature Grans Absolute 02/13/2024 0.06 (H)  0.00 - 0.05 10*3/mm3 Final    nRBC 02/13/2024 0.0  0.0 - 0.2 /100 WBC Final   Admission on 02/06/2024, Discharged on 02/06/2024   Component Date Value Ref Range Status    SARS Antigen 02/06/2024 Not Detected  Not Detected, Presumptive Negative Final    Influenza A Antigen ELSIE 02/06/2024 Not Detected  Not Detected Final    Influenza B Antigen ELSIE 02/06/2024 Not Detected  Not Detected Final    Internal Control 02/06/2024 Passed  Passed Final    Lot Number 02/06/2024 [id number]   Final    Expiration Date 02/06/2024 01/16/2025   Final     Physical Exam  Vitals and nursing note reviewed.   Constitutional:       Appearance: Normal appearance. She is well-developed, well-groomed and normal weight.   HENT:      Head: Normocephalic and atraumatic.      Jaw: There is normal jaw occlusion.      Right Ear: Hearing, tympanic membrane, ear canal and external ear normal.      Left Ear: Hearing, tympanic membrane, ear canal and external ear normal.      Nose: Nose normal.      Right Sinus: No maxillary sinus tenderness or frontal sinus tenderness.      Left Sinus: No maxillary sinus tenderness or frontal sinus tenderness.      Mouth/Throat:      Lips: Pink.      Mouth: Mucous membranes are moist.      Dentition: Normal dentition.      Tongue: No lesions.      Pharynx: Oropharynx is clear. Uvula midline.      Tonsils: No tonsillar exudate.   Eyes:      General: Lids are normal.      Conjunctiva/sclera: Conjunctivae normal.      Pupils: Pupils are equal, round, and reactive to  light.   Neck:      Thyroid: No thyroid mass, thyromegaly or thyroid tenderness.      Vascular: No carotid bruit.      Trachea: Trachea and phonation normal. No tracheal tenderness.   Cardiovascular:      Rate and Rhythm: Normal rate and regular rhythm.      Pulses: Normal pulses.      Heart sounds: Normal heart sounds, S1 normal and S2 normal. No murmur heard.  Pulmonary:      Effort: Pulmonary effort is normal.      Breath sounds: Normal breath sounds and air entry.   Abdominal:      General: Bowel sounds are normal.      Palpations: Abdomen is soft.      Tenderness: There is no abdominal tenderness. There is no right CVA tenderness, left CVA tenderness, guarding or rebound. Negative signs include Person's sign, Rovsing's sign, McBurney's sign, psoas sign and obturator sign.   Musculoskeletal:      Cervical back: Neck supple.      Right lower leg: No edema.      Left lower leg: No edema.   Lymphadenopathy:      Cervical: No cervical adenopathy.      Right cervical: No superficial, deep or posterior cervical adenopathy.     Left cervical: No superficial, deep or posterior cervical adenopathy.   Skin:     General: Skin is warm and dry.      Capillary Refill: Capillary refill takes less than 2 seconds.   Neurological:      Mental Status: She is alert and oriented to person, place, and time.      Deep Tendon Reflexes:      Reflex Scores:       Patellar reflexes are 2+ on the right side and 2+ on the left side.  Psychiatric:         Attention and Perception: Attention and perception normal.         Mood and Affect: Mood and affect normal.         Speech: Speech normal.         Behavior: Behavior is cooperative.         Thought Content: Thought content normal.         Cognition and Memory: Cognition and memory normal.         Judgment: Judgment normal.                HEALTH RISK ASSESSMENT    Smoking Status:  Social History     Tobacco Use   Smoking Status Never    Passive exposure: Never   Smokeless Tobacco Never      Alcohol Consumption:  Social History     Substance and Sexual Activity   Alcohol Use Not Currently    Comment: once or twice a year     Fall Risk Screen:    GAGE Fall Risk Assessment was completed, and patient is at LOW risk for falls.Assessment completed on:2024    Depression Screenin/19/2024     9:38 AM   PHQ-2/PHQ-9 Depression Screening   Little Interest or Pleasure in Doing Things 0-->not at all   Feeling Down, Depressed or Hopeless 0-->not at all   PHQ-9: Brief Depression Severity Measure Score 0       Health Habits and Functional and Cognitive Screenin/19/2024     9:38 AM   Functional & Cognitive Status   Do you have difficulty preparing food and eating? No   Do you have difficulty bathing yourself, getting dressed or grooming yourself? No   Do you have difficulty using the toilet? No   Do you have difficulty moving around from place to place? No   Do you have trouble with steps or getting out of a bed or a chair? No   Current Diet Unhealthy Diet   Dental Exam Up to date   Eye Exam Up to date   Exercise (times per week) 7 times per week   Current Exercises Include Walking;Home Exercise Program (TV, Computer, Etc.)   Do you need help using the phone?  No   Are you deaf or do you have serious difficulty hearing?  No   Do you need help to go to places out of walking distance? No   Do you need help shopping? No   Do you need help preparing meals?  No   Do you need help with housework?  No   Do you need help with laundry? No   Do you need help taking your medications? No   Do you need help managing money? No   Do you ever drive or ride in a car without wearing a seat belt? No   Have you felt unusual stress, anger or loneliness in the last month? No   Who do you live with? Alone   If you need help, do you have trouble finding someone available to you? No   Have you been bothered in the last four weeks by sexual problems? No   Do you have difficulty concentrating, remembering or making  decisions? No       Age-appropriate Screening Schedule:  Refer to the list below for future screening recommendations based on patient's age, sex and/or medical conditions. Orders for these recommended tests are listed in the plan section. The patient has been provided with a written plan.    Health Maintenance   Topic Date Due    ANNUAL WELLNESS VISIT  02/01/2024    COVID-19 Vaccine (7 - 2023-24 season) 05/10/2024 (Originally 11/21/2023)    PAP SMEAR  03/22/2024    MAMMOGRAM  06/20/2024    LIPID PANEL  02/13/2025    COLORECTAL CANCER SCREENING  01/29/2026    TDAP/TD VACCINES (5 - Td or Tdap) 06/29/2027    HEPATITIS C SCREENING  Completed    RSV Vaccine - Adults  Completed    INFLUENZA VACCINE  Completed    Pneumococcal Vaccine 65+  Completed    ZOSTER VACCINE  Addressed    DXA SCAN  Discontinued                  CMS Preventative Services Quick Reference  Risk Factors Identified During Encounter:    Immunizations Discussed/Encouraged: COVID19    The above risks/problems have been discussed with the patient.  Pertinent information has been shared with the patient in the After Visit Summary.    Diagnoses and all orders for this visit:    1. Medicare annual wellness visit, subsequent (Primary)    2. Mixed hyperlipidemia    3. Essential hypertension    4. Primary insomnia    Annual sequential Medicare wellness exam with hyperlipidemia: I have reviewed above blood work with her at office visit today.  Cholesterol and triglyceride readings are stable.  She will continue her current medication at home as directed.  Follow-up in 6 months.  Chronic and stable hypertension: I have rechecked blood pressure and got 130/80 in left arm.  Continue her current blood pressure medications at home.  Keep follow-up appointments with cardiology.  Chronic and stable insomnia: Doing well with her amitriptyline.  No refills are needed.    Follow Up:   Next Medicare Wellness visit to be scheduled in 1 year.      An After Visit Summary and  PPPS were made available to the patient.      Patient Counseling:  --Nutrition: Stressed importance of moderation in sodium/caffeine intake, saturated fat and cholesterol.  Discussed caloric balance, sufficient intake of fresh fruits, vegetables, fiber,   calcium, iron.  --Discussed the new recommendation against daily use of baby aspirin for primary prevention in low risk patients.  --Exercise: Stressed the importance of regular exercise by incorporating into daily routine.    --Substance Abuse: Discussed cessation/primary prevention of tobacco, alcohol, or other drug use; driving or other dangerous activities under the influence.    --Dental health: Discussed importance of regular tooth brushing, flossing, and dental visits.  -- Suggested having eyes and vision checked if needed or past due.  --Immunizations reviewed.  --Discussed benefits of screening colonoscopy.    [facility]  [address] [place] [id number]  Dept: [phone]  Dept Fax: [phone]  Loc: [phone]  Loc Fax: [phone]

## 2024-03-04 ENCOUNTER — OFFICE VISIT (OUTPATIENT)
Dept: ONCOLOGY | Facility: CLINIC | Age: 74
End: 2024-03-04
Payer: MEDICARE

## 2024-03-04 ENCOUNTER — LAB (OUTPATIENT)
Dept: OTHER | Facility: HOSPITAL | Age: 74
End: 2024-03-04
Payer: MEDICARE

## 2024-03-04 VITALS
BODY MASS INDEX: 23.92 KG/M2 | SYSTOLIC BLOOD PRESSURE: 138 MMHG | RESPIRATION RATE: 16 BRPM | HEART RATE: 65 BPM | HEIGHT: 64 IN | OXYGEN SATURATION: 98 % | TEMPERATURE: 98 F | DIASTOLIC BLOOD PRESSURE: 75 MMHG | WEIGHT: 140.1 LBS

## 2024-03-04 DIAGNOSIS — Z17.0 MALIGNANT NEOPLASM OF UPPER-INNER QUADRANT OF LEFT BREAST IN FEMALE, ESTROGEN RECEPTOR POSITIVE: Primary | ICD-10-CM

## 2024-03-04 DIAGNOSIS — C50.212 MALIGNANT NEOPLASM OF UPPER-INNER QUADRANT OF LEFT BREAST IN FEMALE, ESTROGEN RECEPTOR POSITIVE: Primary | ICD-10-CM

## 2024-03-04 DIAGNOSIS — C50.212 MALIGNANT NEOPLASM OF UPPER-INNER QUADRANT OF LEFT BREAST IN FEMALE, ESTROGEN RECEPTOR POSITIVE: ICD-10-CM

## 2024-03-04 DIAGNOSIS — Z17.0 MALIGNANT NEOPLASM OF UPPER-INNER QUADRANT OF LEFT BREAST IN FEMALE, ESTROGEN RECEPTOR POSITIVE: ICD-10-CM

## 2024-03-04 LAB
ALBUMIN SERPL-MCNC: 4.3 G/DL (ref 3.5–5.2)
ALBUMIN/GLOB SERPL: 1.6 G/DL
ALP SERPL-CCNC: 62 U/L (ref 39–117)
ALT SERPL W P-5'-P-CCNC: 16 U/L (ref 1–33)
ANION GAP SERPL CALCULATED.3IONS-SCNC: 7.6 MMOL/L (ref 5–15)
AST SERPL-CCNC: 24 U/L (ref 1–32)
BASOPHILS # BLD AUTO: 0.09 10*3/MM3 (ref 0–0.2)
BASOPHILS NFR BLD AUTO: 0.9 % (ref 0–1.5)
BILIRUB SERPL-MCNC: 0.9 MG/DL (ref 0–1.2)
BUN SERPL-MCNC: 17 MG/DL (ref 8–23)
BUN/CREAT SERPL: 25 (ref 7–25)
CALCIUM SPEC-SCNC: 9.6 MG/DL (ref 8.6–10.5)
CHLORIDE SERPL-SCNC: 100 MMOL/L (ref 98–107)
CO2 SERPL-SCNC: 27.4 MMOL/L (ref 22–29)
CREAT SERPL-MCNC: 0.68 MG/DL (ref 0.57–1)
DEPRECATED RDW RBC AUTO: 40.6 FL (ref 37–54)
EGFRCR SERPLBLD CKD-EPI 2021: 92.7 ML/MIN/1.73
EOSINOPHIL # BLD AUTO: 0.22 10*3/MM3 (ref 0–0.4)
EOSINOPHIL NFR BLD AUTO: 2.3 % (ref 0.3–6.2)
ERYTHROCYTE [DISTWIDTH] IN BLOOD BY AUTOMATED COUNT: 12.3 % (ref 12.3–15.4)
GLOBULIN UR ELPH-MCNC: 2.7 GM/DL
GLUCOSE SERPL-MCNC: 93 MG/DL (ref 65–99)
HCT VFR BLD AUTO: 36.3 % (ref 34–46.6)
HGB BLD-MCNC: 12.7 G/DL (ref 12–15.9)
IMM GRANULOCYTES # BLD AUTO: 0.03 10*3/MM3 (ref 0–0.05)
IMM GRANULOCYTES NFR BLD AUTO: 0.3 % (ref 0–0.5)
LYMPHOCYTES # BLD AUTO: 2.55 10*3/MM3 (ref 0.7–3.1)
LYMPHOCYTES NFR BLD AUTO: 26.3 % (ref 19.6–45.3)
MCH RBC QN AUTO: 31.6 PG (ref 26.6–33)
MCHC RBC AUTO-ENTMCNC: 35 G/DL (ref 31.5–35.7)
MCV RBC AUTO: 90.3 FL (ref 79–97)
MONOCYTES # BLD AUTO: 0.85 10*3/MM3 (ref 0.1–0.9)
MONOCYTES NFR BLD AUTO: 8.8 % (ref 5–12)
NEUTROPHILS NFR BLD AUTO: 5.94 10*3/MM3 (ref 1.7–7)
NEUTROPHILS NFR BLD AUTO: 61.4 % (ref 42.7–76)
NRBC BLD AUTO-RTO: 0 /100 WBC (ref 0–0.2)
PLATELET # BLD AUTO: 282 10*3/MM3 (ref 140–450)
PMV BLD AUTO: 8.9 FL (ref 6–12)
POTASSIUM SERPL-SCNC: 4.9 MMOL/L (ref 3.5–5.2)
PROT SERPL-MCNC: 7 G/DL (ref 6–8.5)
RBC # BLD AUTO: 4.02 10*6/MM3 (ref 3.77–5.28)
SODIUM SERPL-SCNC: 135 MMOL/L (ref 136–145)
WBC NRBC COR # BLD AUTO: 9.68 10*3/MM3 (ref 3.4–10.8)

## 2024-03-04 PROCEDURE — 1126F AMNT PAIN NOTED NONE PRSNT: CPT | Performed by: INTERNAL MEDICINE

## 2024-03-04 PROCEDURE — 36415 COLL VENOUS BLD VENIPUNCTURE: CPT

## 2024-03-04 PROCEDURE — 80053 COMPREHEN METABOLIC PANEL: CPT | Performed by: INTERNAL MEDICINE

## 2024-03-04 PROCEDURE — 3075F SYST BP GE 130 - 139MM HG: CPT | Performed by: INTERNAL MEDICINE

## 2024-03-04 PROCEDURE — 3078F DIAST BP <80 MM HG: CPT | Performed by: INTERNAL MEDICINE

## 2024-03-04 PROCEDURE — 85025 COMPLETE CBC W/AUTO DIFF WBC: CPT | Performed by: INTERNAL MEDICINE

## 2024-03-04 PROCEDURE — 99214 OFFICE O/P EST MOD 30 MIN: CPT | Performed by: INTERNAL MEDICINE

## 2024-03-04 NOTE — PROGRESS NOTES
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    At the time of surgery thankfully there is no residual invasive cancer so the final size was 4 mm right pT1aNx grade 1 invasive ER/NM positive HER2 negative by FISH Ki-67 of 8%    There was no residual cancer or atypia in the breast no lymph nodes were removed    Radiation did not feel radiation was indicated and we have elected to give her Evista for prevention primarily because of her significant osteopenia borderline osteoporosis in both hips.  No definitive indication to treat 4 mm grade 1 cancer exists at this time    She has other comorbidities including TIAs and I think we are better off minimizing her treatments    So far she has had no side effects whatsoever from the Evista    Mammogram is due again in 2 weeks  Bone density in June 2025          Past Medical History:   Diagnosis Date    Abnormal blood chemistry     Acute UTI (urinary tract infection)     Allergic     Anemia     Anesthesia complication     SLOW TO WAKE UP    Aneurysm     Aneurysm, cerebral     Antiplatelet or antithrombotic long-term use 10/20/2021    Bloating     Brain aneurysm     CAD (coronary artery disease)     Chronic headaches     Coronary artery disease     Coronary artery stenosis     Dysuria     Encounter for screening colonoscopy     Environmental allergies     Fall from slip, trip, or stumble     Gait disturbance     H/O cardiovascular stress test 09/17/2013     Treadmill    H/O colonoscopy     H/O echocardiogram 09/25/2013    H/O fall     Heart murmur     Hereditary spherocytosis     History of EKG 07/31/2015    Hyperlipemia     Hyperlipidemia     Hypertension     Hyperthyroidism     Injury of back     Insomnia     Left forearm pain     Left leg pain     Left wrist pain     Lower abdominal pain     Malignant neoplasm of upper-inner quadrant of left breast in female, estrogen receptor positive 05/18/2023    Need for pneumococcal vaccination     Onychomycosis of toenail     Pelvic pressure in female     Right foot pain     Stroke     TIA (transient ischemic attack) 11/30/2016    URI (upper respiratory infection)     Urine frequency         Past Surgical History:   Procedure Laterality Date    BREAST EXCISIONAL BIOPSY Right 1977    b9    BREAST EXCISIONAL BIOPSY Right 1979    b9    BREAST LUMPECTOMY Left 08/01/2023    Procedure: Left needle-localized partial mastectomy and left breast needle-localized excisional biopsy;  Surgeon: Jaleesa Hsieh MD;  Location: [facility]  Service: General;  Laterality: Left;    BUNIONECTOMY      CARDIAC CATHETERIZATION N/A 04/08/2019    Procedure: Left Heart Cath;  Surgeon: Jayme Ramirez MD;  Location: [facility] CATH INVASIVE LOCATION;  Service: Cardiovascular    CARDIAC CATHETERIZATION N/A 04/08/2019    Procedure: Coronary angiography;  Surgeon: Jayme Ramirez MD;  Location: [facility] CATH INVASIVE LOCATION;  Service: Cardiovascular    CARDIAC CATHETERIZATION N/A 04/08/2019    Procedure: Left ventriculography;  Surgeon: Jayme Ramirez MD;  Location: [facility] CATH INVASIVE LOCATION;  Service: Cardiovascular    CEREBRAL ANEURYSM REPAIR  2015    CHOLECYSTECTOMY      COLONOSCOPY N/A 01/29/2021    Procedure: COLONOSCOPY with polypectomy;  Surgeon: Colin Ladd MD;  Location: [facility] OR;  Service: Gastroenterology;  Laterality: N/A;  Diverticulosis  Sigmoid colon polyp    CORONARY ARTERY BYPASS GRAFT  2004     Triple    FOOT SURGERY Right 2016    ROTATOR CUFF REPAIR Left     SPLENECTOMY      TONSILLECTOMY      TUBAL ABDOMINAL LIGATION      US GUIDED CYST ASPIRATION BREAST N/A 2023   Oncologic history  patient is a 73-year-old female with hereditary spherocytosis and coronary artery disease cardiovascular disease who had a routine screening mammogram which was abnormal in the left breast and this led to diagnostic imaging and ultrasound the findings of a 4 mm abnormality in the 12 o'clock position of the left breast.  A biopsy was done showing grade 2 invasive ductal carcinoma measuring 4 mm ER/[place] positive HER2 2+ FISH negative.    An MRI was done but there was a large 4 cm postbiopsy hematoma because clip to move to the periphery of the hematoma and for this reason surgery was delayed until the biopsy could improve and adequate localization of the clip could be performed    She is  2 para 2 menarche was at age 13 menopause at 56 she is taking no hormone replacement.  First childbirth was age 28 she breast-fed for 6 months    Family history is positive for sister with breast cancer at age 66 brother with lung cancer at age 57 her genetic testing was drawn and results are pending    She is not a smoker or drinker  She has never had a DVT or MI but did have a TIA in 2016 after she stopped her Plavix and Plavix was reinstituted.  She has had a cerebral aneurysm treated by interventional radiology in     She is not a free bleeder and was on Plavix and this was held for the biopsy but she was still on fish oil supplements which may have  contributed to the post biopsy hematoma.    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 age and is up-to-date with vaccinations for splenectomy    She has had bypass surgery for coronary artery disease in the past also    Discussed with Dr. Hsieh who states that she was able to aspirate much of the hematoma which is significantly smaller and she will reevaluate her at  the end of June and decide on surgery and we will see her after surgery to get the final results and make a decision about adjuvant treatment.    Based on her TIA I have recommended an aromatase inhibitor rather than tamoxifen and because of this we will do a but DEXA scan to see where we stand and use Prolia to prevent worsening of her bone density is already bad    I have prescribed Arimidex but I told her not to pick it up until after I see her again on the off chance that this is a sub-5 mm tumor that does not need treatment    Explained the rationale for adjuvant hormonal blockade and possibly addition of Prolia to prevent worsening of her bone density and she is agreeable and we will discuss this further at her next visit      Current Outpatient Medications on File Prior to Visit   Medication Sig Dispense Refill    amitriptyline (ELAVIL) 10 MG tablet TAKE 1 TABLET BY MOUTH EVERY DAY AT NIGHT 90 tablet 0    Cholecalciferol (VITAMIN D3) 5000 UNITS capsule capsule Take 1 capsule by mouth 1 (One) Time Per Week.      clopidogrel (PLAVIX) 75 MG tablet Take 1 tablet by mouth Daily. Indications: Resume on Monday, 8/7/2023. 90 tablet 3    ezetimibe (ZETIA) 10 MG tablet TAKE 1 TABLET BY MOUTH EVERY DAY 90 tablet 2    icosapent ethyl (VASCEPA) 1 g capsule capsule TAKE 2 G BY MOUTH 2 (TWO) TIMES A DAY WITH MEALS. 360 capsule 3    isosorbide dinitrate (ISORDIL) 5 MG tablet TAKE 1 TABLET BY MOUTH TWICE A  tablet 3    Livalo 2 MG tablet tablet TAKE 1 TABLET BY MOUTH EVERY DAY AT NIGHT 90 tablet 2    Melatonin 12 MG tablet Take 12 mg by mouth Every Night.      Menaquinone-7 (VITAMIN K2 PO) Take  by mouth. HOLD PER MD INSTR      montelukast (SINGULAIR) 10 MG tablet TAKE 1 TABLET BY MOUTH EVERY DAY AT NIGHT 90 tablet 0    multivitamin (THERAGRAN) tablet tablet Take 1 tablet by mouth Daily. HOLD PER MD INSTR      Omega-3 Fatty Acids (FISH OIL PO) Take 1 tablet by mouth Daily. HOLD PER MD INSTR      Probiotic Product  (PROBIOTIC PO) Take 4 capsules by mouth Daily. gutbio-align      Psyllium (Metamucil) wafer wafer Take  by mouth Daily. Fiber tablet      raloxifene (EVISTA) 60 MG tablet TAKE 1 TABLET BY MOUTH EVERY DAY 90 tablet 2    ramipril (ALTACE) 2.5 MG capsule TAKE 1 CAPSULE BY MOUTH EVERY DAY AT NIGHT 90 capsule 2     No current facility-administered medications on file prior to visit.        ALLERGIES:    Allergies   Allergen Reactions    Adhesive Tape Rash     Redness, bruising and peeling of skin    *Use Paper Tape Only*  Redness, bruising and peeling of skin    *Use Paper Tape Only*    Beta Adrenergic Blockers Unknown - High Severity     hypotension  bradycardic    Statins Myalgia    Elemental Sulfur Rash    Sulfa Antibiotics Rash        Social History     Socioeconomic History    Marital status:     Number of children: 2   Tobacco Use    Smoking status: Never     Passive exposure: Never    Smokeless tobacco: Never   Vaping Use    Vaping status: Never Used   Substance and Sexual Activity    Alcohol use: Not Currently     Comment: once or twice a year    Drug use: No    Sexual activity: Not Currently     Partners: Male     Birth control/protection: Abstinence, Post-menopausal     Comment: BTL        Family History   Problem Relation Age of Onset    Heart attack Mother     Heart failure Mother     Hypertension Mother     Stroke Mother     Cervical cancer Mother     Hypertension Father     Heart failure Father     Heart disease Father     Aneurysm Sister     Breast cancer Sister 66    Clotting disorder Sister     Anxiety disorder Sister     No Known Problems Sister     Heart attack Brother     Cancer Brother     Lung cancer Brother     No Known Problems Daughter     No Known Problems Son     Ovarian cancer Neg Hx     Colon cancer Neg Hx     Pulmonary embolism Neg Hx     Deep vein thrombosis Neg Hx     Malig Hyperthermia Neg Hx         Review of Systems   Musculoskeletal:  Positive for arthralgias.  "  Psychiatric/Behavioral:  The patient is nervous/anxious.         Objective     Vitals:    03/04/24 1027   BP: 138/75   Pulse: 65   Resp: 16   Temp: 98 °F (36.7 °C)   TempSrc: Temporal   SpO2: 98%   Weight: 63.5 kg (140 lb 1.6 oz)   Height: 162 cm (63.78\")   PainSc: 0-No pain         3/4/2024    10:28 AM   Current Status   ECOG score 0       Physical Exam    CONSTITUTIONAL:  Vital signs reviewed.  No distress, looks comfortable.  EYES:  Conjunctivae and lids unremarkable.  PERRLA  EARS,NOSE,MOUTH,THROAT:  Ears and nose appear unremarkable.  Lips, teeth, gums appear unremarkable.  RESPIRATORY:  Normal respiratory effort.  Lungs clear to auscultation bilaterally.  BREASTS: The right breast shows no palpable masses left breast is benign with a well-healed lumpectomy scar  CARDIOVASCULAR:  Normal S1, S2.  No murmurs rubs or gallops.  No significant lower extremity edema.  GASTROINTESTINAL: Abdomen appears unremarkable.  Nontender.  No hepatomegaly.  No splenomegaly.  LYMPHATIC:  No cervical, supraclavicular, axillary lymphadenopathy.  SKIN:  Warm.  No rashes.  PSYCHIATRIC:  Normal judgment and insight.  Normal mood and affect.  I have reexamined the patient and the results are consistent with the previously documented exam. Aba White MD       RECENT LABS:  Hematology WBC   Date Value Ref Range Status   03/04/2024 9.68 3.40 - 10.80 10*3/mm3 Final   02/13/2024 8.50 3.40 - 10.80 10*3/mm3 Final     RBC   Date Value Ref Range Status   03/04/2024 4.02 3.77 - 5.28 10*6/mm3 Final   02/13/2024 3.88 3.77 - 5.28 10*6/mm3 Final     Hemoglobin   Date Value Ref Range Status   03/04/2024 12.7 12.0 - 15.9 g/dL Final     Hematocrit   Date Value Ref Range Status   03/04/2024 36.3 34.0 - 46.6 % Final     Platelets   Date Value Ref Range Status   03/04/2024 282 140 - 450 10*3/mm3 Final        Final Diagnosis   1. Left Breast at 11:00 o'clock, 8-9 cm from Nipple (for calcifications), Stereotactic Core Biopsy:                 A. " Invasive ductal carcinoma:                              1. Overall Debbie grade II (tubular score=3, nuclear score=2, mitotic score=1).                            2. Invasive carcinoma measures at least 4 mm.                            3. No lymphovascular space invasion identified.               B. 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 Low-grade cribriform DCIS.     Jat./kds    Electronically signed by Christopher Lara MD on 5/1/2023 at 1311   Synoptic Checklist   Breast Biomarker Reporting Template  Protocol posted: 6/22/2022  BREAST: BIOMARKER REPORTING TEMPLATE - All Specimens  Test(s) Performed     Estrogen Receptor (ER) Status  Positive (greater than 10% of cells demonstrate nuclear positivity)    Percentage of Cells with Nuclear Positivity  %    Average Intensity of Staining  Strong    Test Type  Food and Drug Administration (FDA) cleared (test / vendor): [place]    Primary Antibody  SP1    Scoring System  Sugar    Proportion Score  5    Intensity Score  3    Total Sugar Score  8    Test(s) Performed     Progesterone Receptor (PgR) Status  Positive    Percentage of Cells with Nuclear Positivity  51-60%    Average Intensity of Staining  Moderate    Test Type  Food and Drug Administration (FDA) cleared (test / vendor): [place]    Primary Antibody  1E2    Scoring System  Sugar    Proportion Score  4    Intensity Score  2    Total Sugar Score  6    Test(s) Performed     HER2 by Immunohistochemistry  Equivocal (Score 2+)    Percentage of Cells with Uniform Intense Complete Membrane Staining  0 %   Test Type  Food and Drug Administration (FDA) cleared (test / vendor): [place]    Primary Antibody  4B5    Test(s) Performed  Ki-67    Ki-67 Percentage of Positive Nuclei  8 %   Primary Antibody  30-9    Cold Ischemia and Fixation Times  Meet requirements specified in latest version of the ASCO / CAP Guidelines    Cold Ischemia Time (minutes)  16 min   Fixation  Time (hours)  8 hours   Testing Performed on Block Number(s)  1D    METHODS   Fixative  Formalin    Image Analysis  Not performed    Comment(s)  Her 2 FISH NEG    .        al Diagnosis   1. Left Breast at 11:00 o'clock, 8-9 cm from Nipple (for calcifications), Stereotactic Core Biopsy:                 A. Invasive ductal carcinoma:                              1. Overall Murray grade II (tubular score=3, nuclear score=2, mitotic score=1).                            2. Invasive carcinoma measures at least 4 mm.                            3. No lymphovascular space invasion identified.               B. 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 Low-grade cribriform DCIS.     Jat./kds    Electronically signed by Christopher Lara MD on 5/1/2023 at 1311   Synoptic Checklist   Breast Biomarker Reporting Template   Protocol posted: 6/22/2022BREAST: BIOMARKER REPORTING TEMPLATE - All Specimens  Test(s) Performed     Estrogen Receptor (ER) Status  Positive (greater than 10% of cells demonstrate nuclear positivity)   Percentage of Cells with Nuclear Positivity  %   Average Intensity of Staining  Strong   Test Type  Food and Drug Administration (FDA) cleared (test / vendor): [place]   Primary Antibody  SP1   Scoring System  Sugar   Proportion Score  5   Intensity Score  3   Total Sugar Score  8   Test(s) Performed     Progesterone Receptor (PgR) Status  Positive   Percentage of Cells with Nuclear Positivity  51-60%   Average Intensity of Staining  Moderate   Test Type  Food and Drug Administration (FDA) cleared (test / vendor): [place]   Primary Antibody  1E2   Scoring System  Sugar   Proportion Score  4   Intensity Score  2   Total Sugar Score  6   Test(s) Performed     HER2 by Immunohistochemistry  Equivocal (Score 2+)   Percentage of Cells with Uniform Intense Complete Membrane Staining  0 %   Test Type  Food and Drug Administration (FDA) cleared (test / vendor): [place]    Primary Antibody  4B5   Test(s) Performed  Ki-67   Ki-67 Percentage of Positive Nuclei  8 %   Primary Antibody  30-9   Cold Ischemia and Fixation Times  Meet requirements specified in latest version of the ASCO / CAP Guidelines   Cold Ischemia Time (minutes)  16 min   Fixation Time (hours)  8 hours   Testing Performed on Block Number(s)  1D   METHODS   Fixative  Formalin   Image Analysis  Not performed   Comment(s)  Her 2 FISH not amplified   .      Comment         Final Diagnosis7/21/23  1. Breast, Left 2 O'clock Position, Core Biopsy For Calcifications:  A. Incidental atypical lobular hyperplasia.  B. Microcalcifications identified in association with benign ductal structures and dilated cysts with apocrine  metaplasia.  C. No evidence of in-situ nor invasive malignancy                Final Diagnosis   1. Left Breast, Oriented Needle Localization Lumpectomy (26 grams):                A. Biopsy site changes without evidence of residual carcinoma (see Comment).               B. No atypical hyperplasia nor in situ carcinoma identified.               C. Biopsy site changes are present and the clip retrieved.  D. Hormone receptor status: ER % positive, CT 51-60% positive, HER2 2+ by IHC, negative by FISH,        Ki-67 8% (performed on prior biopsy [id number], slides reviewed).  E. Pathologic stage: pT1a, NX.     2. Left Breast, Additional Superior and Medial Margins, Oriented Excision:               A. Benign unremarkable breast tissue.     3. Left Breast, Additional Inferior and Lateral Margins, Oriented Excision:               A. Benign breast tissue with sclerosing adenosis with associated microcalcifications, usual ductal hyperplasia        and apocrine cysts.     4. Skin, Left Breast at 3 o'clock, Unoriented Excision:               A. Benign skin with dermal chronic active inflammation and changes consistent with prior biopsy.               B. No atypical hyperplasia, in-situ nor invasive carcinoma  identified.     5. Left Breast, 3 o'clock, Oriented Needle Localization Lumpectomy (6 grams):               A. Benign breast tissue with fibroadenomatoid change with associated calcifications and microcalcifications        associated with benign breast ducts.  B. Biopsy site change is present and clip retrieved.  C. No residual atypical hyperplasia, in-situ, nor invasive carcinoma identified (margins clear).     swm/pkm         Assessment & Plan   1.cT1aN0 IDC Grade 2 left breast cancer ER/MA + her 2 -2+ neg FISH post biopsy with a large postbiopsy hematoma - resolving  Additional biopsy 7/21/2023 on left breast at 2:00 shows atypical lobular hyperplasia incidental  Lumpectomy dated 8/20/2023 shows no residual cancer clear margins no lymph nodes were removed pT1aNx   Due to significant osteopenia in both hips Evista was given in 7/23 for prevention and no treatment was felt to be indicated  Tolerating Evista well as of 3/24    2.  Hereditary spherocytosis postsplenectomy age 3  Up-to-date on post splenectomy vaccines    3.  Cerebral aneurysm hereditary post ablation in 2015    4.  Coronary artery disease post coronary artery bypass in 2004    5.  TIA 2016 currently on Plavix    6.  Osteopenia    7.  Large postbiopsy hematoma possibly aggravated by fish oil which she will hold before her surgery    Plan  1.  Continue Evista 60 mg daily   2.  See me in follow-up in 12 months with mammo in 2 weeks bone density in June 2025

## 2024-03-13 ENCOUNTER — HOSPITAL ENCOUNTER (OUTPATIENT)
Dept: MAMMOGRAPHY | Facility: HOSPITAL | Age: 74
Discharge: HOME | End: 2024-03-13
Admitting: NURSE PRACTITIONER
Payer: MEDICARE

## 2024-03-13 DIAGNOSIS — Z12.31 ENCOUNTER FOR SCREENING MAMMOGRAM FOR MALIGNANT NEOPLASM OF BREAST: ICD-10-CM

## 2024-03-13 PROCEDURE — 77063 BREAST TOMOSYNTHESIS BI: CPT

## 2024-03-13 PROCEDURE — 77067 SCR MAMMO BI INCL CAD: CPT | Performed by: RADIOLOGY

## 2024-03-13 PROCEDURE — 77067 SCR MAMMO BI INCL CAD: CPT

## 2024-03-13 PROCEDURE — 77063 BREAST TOMOSYNTHESIS BI: CPT | Performed by: RADIOLOGY

## 2024-03-18 NOTE — PROGRESS NOTES
[facility]     Referring Provider: Kasandra Mcdowell PA-C     Chief complaint: left breast cancer follow up     Subjective   HPI:   5/18/2023:  Ms. Maddison Turcios is a 72 yo woman, seen at the request of Kasandra Mcdowell PA-C, for a new diagnosis of left breast cancer. This was initially detected as an imaging abnormality on routine screening. Her work-up is detailed in the oncologic history below.  Prior to the biopsy, she denies any breast lumps, pain, skin changes, or nipple discharge.  However after the biopsy, she developed a very large hematoma in her upper left breast.  She has a past history of 2 benign right breast surgical biopsies in the 1970s.  Her past history is significant for CAD sp CABG in 2004, as well as a TIA of unclear etiology in 2016 for which she is on Plavix.  She had a normal echo and stress test in 2022.  She has a family history of breast cancer in a sister (diagnosed in at age 66).      6/30/2023:  At her last visit, I aspirated a large postbiopsy hematoma.  She saw Dr. White, but we decided to hold off on preoperative endocrine therapy since surgery was not going to be delayed as long as we initially thought.  Genetic testing was negative for mutation.  She also saw cardiology and was cleared for surgery.  Prior to this visit, she underwent a repeat left mammogram to assess hematoma resolution and this demonstrated a new separate group of calcifications in the upper left breast that was not seen on her initial imaging.      8/10/2023  Prior to surgery she underwent an additional left stereotactic biopsy which showed incidental ALH and we decided to also excise this site.  She underwent left partial mastectomy and excisional biopsy on 8/1/23 (with no residual disease, see pathology details below). She has been recovering well and has no complaints.     11/7/2023   Patient presenting to the office today for routine follow-up.  She last saw her oncologist in August with no changes  made to the treatment plan.  She is currently on Evista and tolerating well.  She had a consultation with radiation oncology in late August and the patient decided to not proceed with radiation therapy.  She has no new breast complaints today.    3/19/2024 interval history  Presenting to the office today for routine follow-up.  On 3/13/2024 she had a bilateral screening mammogram that resulted as BI-RADS 2.  She last saw her oncologist earlier this month with no changes made to the treatment plan.  Is currently on Evista and tolerating that well. No new breast issues.    Oncology/Hematology History   Malignant neoplasm of upper-inner quadrant of left breast in female, estrogen receptor positive   3/3/2022 Imaging    Screening MMG with [facility] ( [place])  FINDINGS: The breasts are heterogeneously dense, which may obscure small masses. There are no masses or suspicious calcifications.  BI-RADS 2: Benign Findings     3/7/2023 Initial Diagnosis    Malignant neoplasm of upper-inner quadrant of left breast in female, estrogen receptor positive     3/8/2023 Imaging    Screening MMG with Jasno ( [place])  Heterogeneously dense  There is a subtle small focus of ill-defined stellate sub centimeter asymmetric opacity in the deep upper left breast just medial to midline, 8 to 9 cm from the nipple, not visible on prior studies. Recall for supplemental diagnostic mammogram images is recommended. Breast ultrasound may be added at that time if necessary.  The remainder the examination is negative and unchanged.  BI-RADS 0: Needs additional evaluation.     4/11/2023 Imaging    Left Diagnostic MMG with [facility] & Left Breast Limited US ([facility])  MMG:   Tiny nodular opacity measuring about 4 mm with the surrounding stellate architectural distortion is confirmed in the upper inner left breast along the 11:00 axis, 8 to 9 cm from the nipple. One or two tiny punctate calcifications within the nodule. The mammographic appearance is  suspicious.  US:   The lesion is very difficult to visualize by ultrasound. There is a subtle isoechoic focus with inconsistent acoustic shadowing positioned along the 11:00 axis about 8 cm from the nipple measuring about 4 mm achieving accurate ultrasound-guided core biopsy of this focus would not be reliable.  BI-RADS 4: Suspicious     4/28/2023 Biopsy    Left [facility]    1. Left Breast at 11:00 o'clock, 8-9 cm from Nipple (for calcifications), Stereotactic Core Biopsy:                 A. Invasive ductal carcinoma:                              1. Overall Debbie grade II (tubular score=3, nuclear score=2, mitotic score=1).                            2. Invasive carcinoma measures at least 4 mm.                            3. No lymphovascular space invasion identified.               B. 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 Low-grade cribriform DCIS.    ER positive (%, strong)  CA positive (51-60%, moderate)  HER2 negative (IHC 2+; FISH copy #3.2, ratio 1.4)  Ki-67 8%     5/15/2023 Imaging    [facility]ilateral [facility]  RIGHT BREAST:    Benign-appearing postsurgical changes are identified in the right breast. No suspicious enhancing mass or area of non-mass enhancement is identified. There is intrinsic T1 hyperintense signal within multiple ducts in the anterior and middle retroareolar right breast, consistent with proteinaceous and/or hemorrhagic contents.  The visualized axilla is within normal limits.    LEFT BREAST:    At 11:00 in the posterior left breast, centered 8.5 cm posterior to the nipple, there is a 3.5 cm AP dimension, 3.5 cm transverse dimension, 4.0 cm craniocaudal dimension predominantly T1 hyperintense mass/collection consistent with a postbiopsy hematoma. A focus of susceptibility from a biopsy clip along the anterior, superior, lateral margin of the hematoma marks the site of biopsy-proven malignancy. No  suspicious enhancing mass or area of non-mass enhancement is identified at the biopsy site or ulcer within the left breast. No suspicious enhancement is identified in the left nipple or chest wall.  The visualized axilla is within normal limits.    EXTRAMAMMARY FINDINGS:   There are no pathologically enlarged internal mammary chain lymph nodes on either side.     There is susceptibility from sternotomy wires.  BI-RADS 6: Known malignancy.     5/18/2023 Genetic Testing    [employer] Common Hereditary Cancers Panel (47 genes):  Negative     5/24/2023 Cancer Staged    Staging form: Breast, AJCC 8th Edition  - Clinical: Stage IA (cT1a, cN0, cM0, G2, ER+, MA+, HER2-) - Signed by Aba White MD on 3/4/2024     6/20/2023 Imaging    Left Diagnostic MMG with [facility] ( [place]):  In the upper slightly inner posterior left breast, there is an approximately 1.3 cm focal asymmetry with architectural distortion and a central biopsy clip, which represents the site of biopsy-proven malignancy. The focal asymmetry likely reflects a combination of malignancy and residual post biopsy hematoma, which has significantly improved, previously measuring up to 4.0 cm on recent MRI.   In the slightly upper outer posterior left breast, there is a 1 cm group of amorphous calcifications, which does not layer on the lateral view and is not seen on more remote prior mammograms. These are different in configuration compared to additional benign-appearing calcifications in the left breast and are suspicious.  BI-RADS 4: Suspicious.     7/21/2023 Biopsy    Left Breast, Stereotactic Biopsy ( [place]):    1. Breast, Left 2 O'clock Position, Core Biopsy For Calcifications:                 A. Incidental atypical lobular hyperplasia.   B. Microcalcifications identified in association with benign ductal structures and dilated cysts with apocrine metaplasia.               C. No evidence of in-situ nor invasive malignancy.  -Bowtie clip.      8/1/2023  Surgery    Left needle-localized partial mastectomy and left breast needle-localized excisional biopsy:    1. Left Breast, Oriented Needle Localization Lumpectomy (26 grams):                A. Biopsy site changes without evidence of residual carcinoma (see Comment).               B. No atypical hyperplasia nor in situ carcinoma identified.               C. Biopsy site changes are present and the clip retrieved.  D. Hormone receptor status: ER % positive, AR 51-60% positive, HER2 2+ by IHC, negative by FISH, Ki-67 8% (performed on prior biopsy [id number], slides reviewed).  E. Pathologic stage: pT1a, NX.     2. Left Breast, Additional Superior and Medial Margins, Oriented Excision:               A. Benign unremarkable breast tissue.     3. Left Breast, Additional Inferior and Lateral Margins, Oriented Excision:               A. Benign breast tissue with sclerosing adenosis with associated microcalcifications, usual ductal hyperplasia and apocrine cysts.     4. Skin, Left Breast at 3 o'clock, Unoriented Excision:               A. Benign skin with dermal chronic active inflammation and changes consistent with prior biopsy.               B. No atypical hyperplasia, in-situ nor invasive carcinoma identified.     5. Left Breast, 3 o'clock, Oriented Needle Localization Lumpectomy (6 grams):               A. Benign breast tissue with fibroadenomatoid change with associated calcifications and microcalcifications associated with benign breast ducts.  B. Biopsy site change is present and clip retrieved.  C. No residual atypical hyperplasia, in-situ, nor invasive carcinoma identified (margins clear).         Review of Systems:  See interval history.       Medications:    Current Outpatient Medications:     amitriptyline (ELAVIL) 10 MG tablet, TAKE 1 TABLET BY MOUTH EVERY DAY AT NIGHT, Disp: 90 tablet, Rfl: 0    Cholecalciferol (VITAMIN D3) 5000 UNITS capsule capsule, Take 1 capsule by mouth 1 (One) Time Per Week., Disp: ,  Rfl:     clopidogrel (PLAVIX) 75 MG tablet, Take 1 tablet by mouth Daily. Indications: Resume on Monday, 8/7/2023., Disp: 90 tablet, Rfl: 3    ezetimibe (ZETIA) 10 MG tablet, TAKE 1 TABLET BY MOUTH EVERY DAY, Disp: 90 tablet, Rfl: 2    icosapent ethyl (VASCEPA) 1 g capsule capsule, TAKE 2 G BY MOUTH 2 (TWO) TIMES A DAY WITH MEALS., Disp: 360 capsule, Rfl: 3    isosorbide dinitrate (ISORDIL) 5 MG tablet, TAKE 1 TABLET BY MOUTH TWICE A DAY, Disp: 180 tablet, Rfl: 3    Livalo 2 MG tablet tablet, TAKE 1 TABLET BY MOUTH EVERY DAY AT NIGHT, Disp: 90 tablet, Rfl: 2    Melatonin 12 MG tablet, Take 12 mg by mouth Every Night., Disp: , Rfl:     Menaquinone-7 (VITAMIN K2 PO), Take  by mouth. HOLD PER MD INSTR, Disp: , Rfl:     montelukast (SINGULAIR) 10 MG tablet, TAKE 1 TABLET BY MOUTH EVERY DAY AT NIGHT, Disp: 90 tablet, Rfl: 0    multivitamin (THERAGRAN) tablet tablet, Take 1 tablet by mouth Daily. HOLD PER MD INSTR, Disp: , Rfl:     Omega-3 Fatty Acids (FISH OIL PO), Take 1 tablet by mouth Daily. HOLD PER MD INSTR, Disp: , Rfl:     Probiotic Product (PROBIOTIC PO), Take 4 capsules by mouth Daily. gutbio-align, Disp: , Rfl:     Psyllium (Metamucil) wafer wafer, Take  by mouth Daily. Fiber tablet, Disp: , Rfl:     raloxifene (EVISTA) 60 MG tablet, TAKE 1 TABLET BY MOUTH EVERY DAY, Disp: 90 tablet, Rfl: 2    ramipril (ALTACE) 2.5 MG capsule, TAKE 1 CAPSULE BY MOUTH EVERY DAY AT NIGHT, Disp: 90 capsule, Rfl: 2      Allergies   Allergen Reactions    Adhesive Tape Rash     Redness, bruising and peeling of skin    *Use Paper Tape Only*  Redness, bruising and peeling of skin    *Use Paper Tape Only*    Beta Adrenergic Blockers Unknown - High Severity     hypotension  bradycardic    Statins Myalgia    Elemental Sulfur Rash    Sulfa Antibiotics Rash       Family History   Problem Relation Age of Onset    Heart attack Mother     Heart failure Mother     Hypertension Mother     Stroke Mother     Cervical cancer Mother     Hypertension  Father     Heart failure Father     Heart disease Father     Aneurysm Sister     Breast cancer Sister 66    Clotting disorder Sister     Anxiety disorder Sister     No Known Problems Sister     Heart attack Brother     Cancer Brother     Lung cancer Brother     No Known Problems Daughter     No Known Problems Son     Ovarian cancer Neg Hx     Colon cancer Neg Hx     Pulmonary embolism Neg Hx     Deep vein thrombosis Neg Hx     Malig Hyperthermia Neg Hx        Objective   PHYSICAL EXAMINATION:   There were no vitals filed for this visit.      ECOG 0 - Asymptomatic  General: NAD, well appearing  Psych: a&o x3, normal mood and affect  Eyes: EOMI, no scleral icterus  ENMT: neck supple without masses or thyromegaly, mucous membranes moist  MSK: normal gait, normal ROM in bilateral shoulders  Lymph nodes: no cervical, supraclavicular or axillary lymphadenopathy  Breast: symmetric, moderate size, grade 3 ptosis, sternal scar   Right: No visible abnormalities on inspection while seated, with arms raised or hands on hips.  No masses skin changes or nipple abnormalities  Left: Well-healed superior horizontal and lateral curvilinear incisions.  No visible abnormalities on inspection while seated, with arms raised or hands on hips.  No masses, skin changes or nipple abnormalities        Assessment & Plan   Assessment:   73 y.o. F with a diagnosis of left breast cancer: Intermediate grade, invasive ductal carcinoma, ER/OR positive, Her2 negative. She developed a large postbiopsy hematoma and surgery was delayed to allow this to resolve.  Preoperative follow-up imaging then demonstrated a separate area of calcifications.  This was biopsied and showed incidental atypical lobular hyperplasia (ALH). She underwent left partial mastectomy and excisional biopsy on 8/1/23, pT1aNx (no residual disease in surgical specimen, 4 mm on core).    Plan:  -cont with Dr. Leon as needed   -Continue follow-up with Dr. White.  -exam in 6 mons    -monthly self breast exams  -rto if any new issues    [employer] Cle[employer]      CC:  Kasandra Mcdowell PA-C

## 2024-03-19 ENCOUNTER — OFFICE VISIT (OUTPATIENT)
Dept: SURGERY | Facility: CLINIC | Age: 74
End: 2024-03-19
Payer: MEDICARE

## 2024-03-19 VITALS
OXYGEN SATURATION: 96 % | HEIGHT: 64 IN | BODY MASS INDEX: 23.56 KG/M2 | DIASTOLIC BLOOD PRESSURE: 92 MMHG | WEIGHT: 138 LBS | HEART RATE: 98 BPM | SYSTOLIC BLOOD PRESSURE: 152 MMHG

## 2024-03-19 DIAGNOSIS — Z17.0 MALIGNANT NEOPLASM OF UPPER-INNER QUADRANT OF LEFT BREAST IN FEMALE, ESTROGEN RECEPTOR POSITIVE: Primary | ICD-10-CM

## 2024-03-19 DIAGNOSIS — C50.212 MALIGNANT NEOPLASM OF UPPER-INNER QUADRANT OF LEFT BREAST IN FEMALE, ESTROGEN RECEPTOR POSITIVE: Primary | ICD-10-CM

## 2024-03-19 PROCEDURE — 3077F SYST BP >= 140 MM HG: CPT | Performed by: NURSE PRACTITIONER

## 2024-03-19 PROCEDURE — 99213 OFFICE O/P EST LOW 20 MIN: CPT | Performed by: NURSE PRACTITIONER

## 2024-03-19 PROCEDURE — 3080F DIAST BP >= 90 MM HG: CPT | Performed by: NURSE PRACTITIONER

## 2024-03-19 PROCEDURE — 1160F RVW MEDS BY RX/DR IN RCRD: CPT | Performed by: NURSE PRACTITIONER

## 2024-03-19 PROCEDURE — 1159F MED LIST DOCD IN RCRD: CPT | Performed by: NURSE PRACTITIONER

## 2024-03-23 DIAGNOSIS — J30.89 NON-SEASONAL ALLERGIC RHINITIS, UNSPECIFIED TRIGGER: ICD-10-CM

## 2024-03-25 RX ORDER — MONTELUKAST SODIUM 10 MG/1
10 TABLET ORAL
Qty: 90 TABLET | Refills: 0 | Status: SHIPPED | OUTPATIENT
Start: 2024-03-25

## 2024-03-25 RX ORDER — AMITRIPTYLINE HYDROCHLORIDE 10 MG/1
10 TABLET, FILM COATED ORAL
Qty: 90 TABLET | Refills: 0 | Status: SHIPPED | OUTPATIENT
Start: 2024-03-25

## 2024-04-01 ENCOUNTER — OFFICE VISIT (OUTPATIENT)
Dept: CARDIOLOGY | Facility: CLINIC | Age: 74
End: 2024-04-01
Payer: MEDICARE

## 2024-04-01 VITALS
BODY MASS INDEX: 23.73 KG/M2 | HEIGHT: 64 IN | SYSTOLIC BLOOD PRESSURE: 136 MMHG | WEIGHT: 139 LBS | HEART RATE: 70 BPM | DIASTOLIC BLOOD PRESSURE: 78 MMHG

## 2024-04-01 DIAGNOSIS — Z95.1 S/P CABG (CORONARY ARTERY BYPASS GRAFT): ICD-10-CM

## 2024-04-01 DIAGNOSIS — I49.3 PVC'S (PREMATURE VENTRICULAR CONTRACTIONS): ICD-10-CM

## 2024-04-01 DIAGNOSIS — I10 ESSENTIAL HYPERTENSION: ICD-10-CM

## 2024-04-01 DIAGNOSIS — E78.2 MIXED HYPERLIPIDEMIA: ICD-10-CM

## 2024-04-01 DIAGNOSIS — I25.10 CORONARY ARTERY DISEASE INVOLVING NATIVE CORONARY ARTERY OF NATIVE HEART WITHOUT ANGINA PECTORIS: Primary | ICD-10-CM

## 2024-04-01 PROCEDURE — 93000 ELECTROCARDIOGRAM COMPLETE: CPT | Performed by: INTERNAL MEDICINE

## 2024-04-01 PROCEDURE — 3078F DIAST BP <80 MM HG: CPT | Performed by: INTERNAL MEDICINE

## 2024-04-01 PROCEDURE — 99214 OFFICE O/P EST MOD 30 MIN: CPT | Performed by: INTERNAL MEDICINE

## 2024-04-01 PROCEDURE — 1159F MED LIST DOCD IN RCRD: CPT | Performed by: INTERNAL MEDICINE

## 2024-04-01 PROCEDURE — 1160F RVW MEDS BY RX/DR IN RCRD: CPT | Performed by: INTERNAL MEDICINE

## 2024-04-01 PROCEDURE — 3075F SYST BP GE 130 - 139MM HG: CPT | Performed by: INTERNAL MEDICINE

## 2024-04-01 NOTE — PROGRESS NOTES
Date of Office Visit: 2023  Encounter Provider: Helen Urbina MD  Place of Service: [facility] CARDIOLOGY  Patient Name: Maddison Turcios  :1950      Patient ID:  Maddison Turcios is a 74 y.o. female is here for  followup for CAD.         History of Present Illness    She has a history of CAD- s/p CABG with LIMA to mid LAD and sequential saphenous vein graft to ramus intermedius and first obtuse marginal branch, history of splenectomy for hereditary spherocytosis, hyperlipidemia with statin intolerance except Livalo, hypertension, history of TIA, history of cerebral aneurysm status post coil embolization in 2015 (Marcelina), and breast cancer.    She has a history of episodes of severe fatigue and had a stress study  which showed a very mild abnormality.  Her has cardiac catheterization done 2004 that showed  ostial 60% stenosis of the left main coronary artery done at [facility] in [facility].CABG done 2004 she received a LIMA to mid LAD and sequential saphenous vein graft to the ramus intermedius and first obtuse marginal branch done at  [facility].       She was at [facility], admitted there on 2016 through 2016. At that time she was diagnosed with a TIA. During her hospitalization she had an MRI which was negative for an acute infarct. They felt possible TIA versus complicated migraine. She had had for 2-3 days prior to this a feeling of imbalance and leaned to the left along with some blurred vision. She had a transesophageal echocardiogram whether did not show anything to explain her TIA. There was bilateral atrial enlargement but otherwise looked fine. She was loaded with Plavix 300 mg and then continued with 75 mg of Plavix daily. Neurosurgery was consulted because she had a history of prior pipelined right internal carotid artery aneurysm. Dr. Hewitt had seen her and treated her for his in  08/2015. Neurosurgery said there was no evidence of aneurysm. Her statin medication was increased because of elevated hyperlipidemia. She actually had garbled speech, visual changes and balance issues all with the TIA and they happened over a matter of three days, kind of intermittently.      She had a normal 21 day event recorder 1/2017.      She was tolerating Livalo without muscle complaints, Zetia and vascepa.      On 4/8/2019, she was having exertional chest pressure with doing mowing the normal household activities.  When EMS arrived, they administered 2 nitroglycerin and it helped her chest pressure.  She was taken to [facility] and there she ruled out for myocardial infarction.  Cardiac catheterization showed atretic LIMA to LAD, patent SVG to ramus intermedius then to OM, 30% left main stenosis proximally but normal LAD, left circumflex and RCA.  Her ejection fraction was normal.  She was dismissed from the hospital. She had a nonischemic stress echocardiogram done 5/14/19 with baseline ejection fraction 56%, grade 1 diastolic dysfunction and a post exercise ejection fraction of 81%.     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  She developed hematoma requiring aspiration after the biopsy-likely due to being on Vascepa still.  Dr. Jaleesa Hsieh did the surgery.  She is now scheduled for a left lumpectomy to be done at the end of June. She then saw Dr. White 5/24/2023- Arimidex will be started after the lumpectomy is completed.    Echocardiogram done 4/20/2022 showed ejection fraction of 65% with mild concentric left ventricular hypertrophy, normal diastolic function and no significant valvular abnormalities.  Stress nuclear perfusion study done 4/20/2022 showed no evidence of ischemia.    Labs on 3/4/2024 show sodium 135, otherwise normal CMP, normal CBC.  Labs on 2/13/2024 show normal CMP, normal thyroid panel, total cholesterol 55, HDL 77,  LDL 66, triglycerides 59, VLDL 12, normal CBC.She saw Dr. White 3/4/2024.  She is on Evista for osteopenia.  She did not receive radiation for her breast cancer.  She did not receive chemotherapy.    She has occasional palpitations but no dizziness, syncope or falls.  She has no chest pain or pressure.  She has no difficulty breathing.  She does not have orthopnea or PND.  She does have some mild lower extremity edema.  Overall, she feels well.    Past Medical History:   Diagnosis Date    Abnormal blood chemistry     Acute UTI (urinary tract infection)     Allergic     Anemia     Anesthesia complication     SLOW TO WAKE UP    Aneurysm     Aneurysm, cerebral     Antiplatelet or antithrombotic long-term use 10/20/2021    Bloating     Brain aneurysm     CAD (coronary artery disease)     Chronic headaches     Coronary artery disease     Coronary artery stenosis     Dysuria     Encounter for screening colonoscopy     Environmental allergies     Fall from slip, trip, or stumble     Gait disturbance     H/O cardiovascular stress test 09/17/2013    Treadmill    H/O colonoscopy     H/O echocardiogram 09/25/2013    H/O fall     Heart murmur     Hereditary spherocytosis     History of EKG 07/31/2015    Hyperlipemia     Hyperlipidemia     Hypertension     Hyperthyroidism     Injury of back     Insomnia     Left forearm pain     Left leg pain     Left wrist pain     Lower abdominal pain     Malignant neoplasm of upper-inner quadrant of left breast in female, estrogen receptor positive 05/18/2023    Need for pneumococcal vaccination     Onychomycosis of toenail     Pelvic pressure in female     Right foot pain     Stroke     TIA (transient ischemic attack) 11/30/2016    URI (upper respiratory infection)     Urine frequency          Past Surgical History:   Procedure Laterality Date    BREAST EXCISIONAL BIOPSY Right 1977    b9    BREAST EXCISIONAL BIOPSY Right 1979    b9    BREAST LUMPECTOMY Left 08/01/2023    Procedure: Left  needle-localized partial mastectomy and left breast needle-localized excisional biopsy;  Surgeon: Jaleesa Hsieh MD;  [facility] MAIN OR;  Service: General;  Laterality: Left;    BUNIONECTOMY      CARDIAC CATHETERIZATION N/A 04/08/2019    Procedure: Left Heart Cath;  Surgeon: Jayme Ramirez MD;  Location:  [facility] CATH INVASIVE LOCATION;  Service: Cardiovascular    CARDIAC CATHETERIZATION N/A 04/08/2019    Procedure: Coronary angiography;  Surgeon: Jayme Ramirez MD;  Location:  [facility]VASIVE [facility];  Service: Cardiovascular    CARDIAC CATHETERIZATION N/A 04/08/2019    Procedure: Left ventriculography;  Surgeon: Jayme Ramirez MD;  Location:  [facility] INVASIVE [facility]ATION;  Service: Cardiovascular    CEREBRAL ANEURYSM REPAIR  2015    CHOLECYSTECTOMY      COLONOSCOPY N/A 01/29/2021    Procedure: COLONOSCOPY with polypectomy;  Surgeon: Colin Ladd MD;  Location:  [facility] OR;  Service: Gastroenterology;  Laterality: N/A;  Diverticulosis  Sigmoid colon polyp    CORONARY ARTERY BYPASS GRAFT  2004    Triple    FOOT SURGERY Right 2016    ROTATOR CUFF REPAIR Left     SPLENECTOMY  1953    TONSILLECTOMY  1984    TUBAL ABDOMINAL LIGATION  1979    US GUIDED CYST ASPIRATION BREAST N/A 05/18/2023       Current Outpatient Medications on File Prior to Visit   Medication Sig Dispense Refill    amitriptyline (ELAVIL) 10 MG tablet TAKE 1 TABLET BY MOUTH EVERY DAY AT NIGHT 90 tablet 0    Cholecalciferol (VITAMIN D3) 5000 UNITS capsule capsule Take 1 capsule by mouth 1 (One) Time Per Week.      clopidogrel (PLAVIX) 75 MG tablet Take 1 tablet by mouth Daily. Indications: Resume on Monday, 8/7/2023. 90 tablet 3    ezetimibe (ZETIA) 10 MG tablet TAKE 1 TABLET BY MOUTH EVERY DAY 90 tablet 2    icosapent ethyl (VASCEPA) 1 g capsule capsule TAKE 2 G BY MOUTH 2 (TWO) TIMES A DAY WITH MEALS. 360 capsule 3    isosorbide dinitrate (ISORDIL) 5 MG tablet TAKE 1 TABLET BY MOUTH TWICE A  tablet  "3    Livalo 2 MG tablet tablet TAKE 1 TABLET BY MOUTH EVERY DAY AT NIGHT 90 tablet 2    Melatonin 12 MG tablet Take 12 mg by mouth Every Night.      Menaquinone-7 (VITAMIN K2 PO) Take  by mouth. HOLD PER MD INSTR      montelukast (SINGULAIR) 10 MG tablet TAKE 1 TABLET BY MOUTH EVERY DAY AT NIGHT 90 tablet 0    multivitamin (THERAGRAN) tablet tablet Take 1 tablet by mouth Daily. HOLD PER MD INSTR      raloxifene (EVISTA) 60 MG tablet TAKE 1 TABLET BY MOUTH EVERY DAY 90 tablet 2    ramipril (ALTACE) 2.5 MG capsule TAKE 1 CAPSULE BY MOUTH EVERY DAY AT NIGHT 90 capsule 2    [DISCONTINUED] Probiotic Product (PROBIOTIC PO) Take 4 capsules by mouth Daily. gutbio-align (Patient not taking: Reported on 4/1/2024)      [DISCONTINUED] Psyllium (Metamucil) wafer wafer Take  by mouth Daily. Fiber tablet (Patient not taking: Reported on 4/1/2024)       No current facility-administered medications on file prior to visit.       Social History     Socioeconomic History    Marital status:     Number of children: 2   Tobacco Use    Smoking status: Never     Passive exposure: Never    Smokeless tobacco: Never   Vaping Use    Vaping status: Never Used   Substance and Sexual Activity    Alcohol use: Not Currently     Comment: once or twice a year    Drug use: No    Sexual activity: Not Currently     Partners: Male     Birth control/protection: Abstinence, Post-menopausal     Comment: BTL           ROS    Procedures    ECG 12 Lead    Date/Time: 4/1/2024 9:27 AM  Performed by: Helen Urbina MD    Authorized by: Helen Urbina MD  Comparison: compared with previous ECG   Comparison to previous ECG: Now pvcs  Rhythm: sinus rhythm    Clinical impression: non-specific ECG              Objective:      Vitals:    04/01/24 0910   BP: 136/78   Pulse: 70   Weight: 63 kg (139 lb)   Height: 162 cm (63.78\")     Body mass index is 24.02 kg/m².    Vitals reviewed.   Constitutional:       General: Not in acute distress.     " Appearance: Well-developed. Not diaphoretic.   Eyes:      General: No scleral icterus.     Conjunctiva/sclera: Conjunctivae normal.   HENT:      Head: Normocephalic and atraumatic.   Neck:      Thyroid: No thyromegaly.      Vascular: No carotid bruit or JVD.      Lymphadenopathy: No cervical adenopathy.   Pulmonary:      Effort: Pulmonary effort is normal. No respiratory distress.      Breath sounds: Normal breath sounds. No wheezing. No rhonchi. No rales.   Chest:      Chest wall: Not tender to palpatation.   Cardiovascular:      Normal rate. Regularly irregular rhythm.      Murmurs: There is a grade 1/6 midsystolic murmur at the URSB and ULSB.      No gallop.    Pulses:     Intact distal pulses.   Edema:     Peripheral edema absent.   Abdominal:      General: Bowel sounds are normal. There is no distension or abdominal bruit.      Palpations: Abdomen is soft. There is no abdominal mass.      Tenderness: There is no abdominal tenderness.   Musculoskeletal:         General: No deformity.      Extremities: No clubbing present.     Cervical back: Neck supple. Skin:     General: Skin is warm and dry. There is no cyanosis.      Coloration: Skin is not pale.      Findings: No rash.   Neurological:      Mental Status: Alert and oriented to person, place, and time.      Cranial Nerves: No cranial nerve deficit.   Psychiatric:         Judgment: Judgment normal.         Lab Review:       Assessment:      Diagnosis Plan   1. Coronary artery disease involving native coronary artery of native heart without angina pectoris  Adult Transthoracic Echo Complete W/ Cont if Necessary Per Protocol      2. S/P CABG (coronary artery bypass graft)  Adult Transthoracic Echo Complete W/ Cont if Necessary Per Protocol      3. Essential hypertension        4. Mixed hyperlipidemia        5. PVC's (premature ventricular contractions)  Adult Transthoracic Echo Complete W/ Cont if Necessary Per Protocol           TIA. The etiology for this is  "uncertain.  She has had no further instances since being on Plavix.   Hyperlipidemia, on Livalo, Vascepa and continue zetia.   Essential hypertension, goal less than 120/80.  On ramipril, continue.  History of coronary artery disease, s/p CABG. She has had angina, well treated with isordil.   Cerebral aneurysm, treated with coiling in 2015  Left breast cancer, to have lumpectomy 6/2023, has had no chemotherapy or radiation, sees Dr. White.  PVCs, new, check echocardiogram to make sure she does not have a developing cardiomyopathy.  She has no anginal chest pain at this time.       Plan:         See APRN in 1 year, set up echocardiogram for PVCs.  No medication changes.    STOP-Bang Score  Have you been diagnosed with Sleep Apnea?: no  Snoring?: no  Tired?: no  Observed?: no  Pressure?: yes  Stop Score: 1  Body Mass Index more than 35 kg/m2?: no  Age older than 50 year old?: yes  Neck large? \">17\"/43cm-M, >16\"/41cm-F: no  Gender=Male?: no  Total Stop-Bang Score: 2    "

## 2024-04-05 ENCOUNTER — OFFICE VISIT (OUTPATIENT)
Dept: OBSTETRICS AND GYNECOLOGY | Facility: CLINIC | Age: 74
End: 2024-04-05
Payer: MEDICARE

## 2024-04-05 VITALS
BODY MASS INDEX: 23.56 KG/M2 | SYSTOLIC BLOOD PRESSURE: 152 MMHG | DIASTOLIC BLOOD PRESSURE: 92 MMHG | WEIGHT: 138 LBS | HEIGHT: 64 IN

## 2024-04-05 DIAGNOSIS — Z78.0 POSTMENOPAUSAL: ICD-10-CM

## 2024-04-05 DIAGNOSIS — Z01.419 ROUTINE GYNECOLOGICAL EXAMINATION: Primary | ICD-10-CM

## 2024-04-05 NOTE — PROGRESS NOTES
New GYN Exam    CC- Here for AE.     Maddison Turcios is a 74 y.o. female previous patient not seen in the last three years who presents for AE.     Dx with left breast cancer- DCIS in 3/2023; estrogen receptor +.  She is s/p Surgery; did not need chemo or radiation.  On Evista.     OB History          3    Para   2    Term   2            AB   1    Living             SAB        IAB        Ectopic        Molar        Multiple        Live Births              Obstetric Comments   2              Menarche: 13 y.o.  Menopause: 55 y.o.  HRT: none  Current contraception: post menopausal status and tubal ligation  History of abnormal Pap smear: no  History of abnormal mammogram: yes - left DCIS   Family history of uterine, colon or ovarian cancer: no; mom with hx of cervical cancer  Family history of breast cancer: yes - sister dx @ 67 y.o.  STD's: none  Last pap smear: 3/2021- NIL  Last mammogram: 3/2024- Neg  Last DEXA: 2023- osteopenia  Last colonoscopy: 2021  TRACI: family history; personal hx of TIA in ; mom with DVT and stroke      Health Maintenance   Topic Date Due    PAP SMEAR  2024    COVID-19 Vaccine (2023-24 season) 05/10/2024 (Originally 2023)    INFLUENZA VACCINE  2024    LIPID PANEL  2025    ANNUAL WELLNESS VISIT  2025    MAMMOGRAM  2025    COLORECTAL CANCER SCREENING  2026    TDAP/TD VACCINES (5 - Td or Tdap) 2027    HEPATITIS C SCREENING  Completed    RSV Vaccine - Adults  Completed    Pneumococcal Vaccine 65+  Completed    ZOSTER VACCINE  Addressed    DXA SCAN  Discontinued       Past Medical History:   Diagnosis Date    Abnormal blood chemistry     Acute UTI (urinary tract infection)     Allergic     Anemia     Anesthesia complication     SLOW TO WAKE UP    Aneurysm     Aneurysm, cerebral     Antiplatelet or antithrombotic long-term use 10/20/2021    Bloating     Brain aneurysm     CAD (coronary artery disease)     Chronic  headaches     Coronary artery disease     Coronary artery stenosis     Dysuria     Encounter for screening colonoscopy     Environmental allergies     Fall from slip, trip, or stumble     Gait disturbance     H/O cardiovascular stress test 09/17/2013    Treadmill    H/O colonoscopy     H/O echocardiogram 09/25/2013    H/O fall     Heart murmur     Hereditary spherocytosis     History of EKG 07/31/2015    Hyperlipemia     Hyperlipidemia     Hypertension     Hyperthyroidism     Injury of back     Insomnia     Left forearm pain     Left leg pain     Left wrist pain     Lower abdominal pain     Malignant neoplasm of upper-inner quadrant of left breast in female, estrogen receptor positive 05/18/2023    Need for pneumococcal vaccination     Onychomycosis of toenail     Pelvic pressure in female     Right foot pain     Stroke     TIA (transient ischemic attack) 11/30/2016    URI (upper respiratory infection)     Urine frequency        Past Surgical History:   Procedure Laterality Date    BREAST EXCISIONAL BIOPSY Right 1977    b9    BREAST EXCISIONAL BIOPSY Right 1979    b9    BREAST LUMPECTOMY Left 08/01/2023    Procedure: Left needle-localized partial mastectomy and left breast needle-localized excisional biopsy;  Surgeon: Jaleesa Hsieh MD;  Location: [facility];  Service: General;  Laterality: Left;    BUNIONECTOMY      CARDIAC CATHETERIZATION N/A 04/08/2019    Procedure: Left Heart Cath;  Surgeon: Jayme Ramirez MD;  Location: [facility] CATH INVASIVE LOCATION;  Service: Cardiovascular    CARDIAC CATHETERIZATION N/A 04/08/2019    Procedure: Coronary angiography;  Surgeon: Jayme Ramirez MD;  Location: [facility] CATH INVASIVE LOCATION;  Service: Cardiovascular    CARDIAC CATHETERIZATION N/A 04/08/2019    Procedure: Left ventriculography;  Surgeon: Jayme Ramirez MD;  Location: [facility] CATH INVASIVE LOCATION;  Service: Cardiovascular    CEREBRAL ANEURYSM REPAIR  2015    CHOLECYSTECTOMY       COLONOSCOPY N/A 01/29/2021    Procedure: COLONOSCOPY with polypectomy;  Surgeon: Colin Ladd MD;  Location: [facility] OR;  Service: Gastroenterology;  Laterality: N/A;  Diverticulosis  Sigmoid colon polyp    CORONARY ARTERY BYPASS GRAFT  2004    Triple    FOOT SURGERY Right 2016    ROTATOR CUFF REPAIR Left     SPLENECTOMY  1953    TONSILLECTOMY  1984    TUBAL ABDOMINAL LIGATION  1979    US GUIDED CYST ASPIRATION BREAST N/A 05/18/2023         Current Outpatient Medications:     amitriptyline (ELAVIL) 10 MG tablet, TAKE 1 TABLET BY MOUTH EVERY DAY AT NIGHT, Disp: 90 tablet, Rfl: 0    Cholecalciferol (VITAMIN D3) 5000 UNITS capsule capsule, Take 1 capsule by mouth 1 (One) Time Per Week., Disp: , Rfl:     clopidogrel (PLAVIX) 75 MG tablet, Take 1 tablet by mouth Daily. Indications: Resume on Monday, 8/7/2023., Disp: 90 tablet, Rfl: 3    ezetimibe (ZETIA) 10 MG tablet, TAKE 1 TABLET BY MOUTH EVERY DAY, Disp: 90 tablet, Rfl: 2    icosapent ethyl (VASCEPA) 1 g capsule capsule, TAKE 2 G BY MOUTH 2 (TWO) TIMES A DAY WITH MEALS., Disp: 360 capsule, Rfl: 3    isosorbide dinitrate (ISORDIL) 5 MG tablet, TAKE 1 TABLET BY MOUTH TWICE A DAY, Disp: 180 tablet, Rfl: 3    Livalo 2 MG tablet tablet, TAKE 1 TABLET BY MOUTH EVERY DAY AT NIGHT, Disp: 90 tablet, Rfl: 2    Melatonin 12 MG tablet, Take 12 mg by mouth Every Night., Disp: , Rfl:     Menaquinone-7 (VITAMIN K2 PO), Take  by mouth. HOLD PER MD INSTR, Disp: , Rfl:     montelukast (SINGULAIR) 10 MG tablet, TAKE 1 TABLET BY MOUTH EVERY DAY AT NIGHT, Disp: 90 tablet, Rfl: 0    multivitamin (THERAGRAN) tablet tablet, Take 1 tablet by mouth Daily. HOLD PER MD INSTR, Disp: , Rfl:     raloxifene (EVISTA) 60 MG tablet, TAKE 1 TABLET BY MOUTH EVERY DAY, Disp: 90 tablet, Rfl: 2    ramipril (ALTACE) 2.5 MG capsule, TAKE 1 CAPSULE BY MOUTH EVERY DAY AT NIGHT, Disp: 90 capsule, Rfl: 2    Allergies   Allergen Reactions    Adhesive Tape Rash     Redness, bruising and peeling of  "skin    *Use Paper Tape Only*  Redness, bruising and peeling of skin    *Use Paper Tape Only*    Beta Adrenergic Blockers Unknown - High Severity     hypotension  bradycardic    Statins Myalgia    Elemental Sulfur Rash    Sulfa Antibiotics Rash       Social History     Tobacco Use    Smoking status: Never     Passive exposure: Never    Smokeless tobacco: Never   Vaping Use    Vaping status: Never Used   Substance Use Topics    Alcohol use: Not Currently     Comment: once or twice a year    Drug use: No       Family History   Problem Relation Age of Onset    Hypertension Father     Heart failure Father     Heart disease Father     Heart attack Mother     Heart failure Mother     Hypertension Mother     Stroke Mother     Cervical cancer Mother     Deep vein thrombosis Mother     Heart attack Brother     Cancer Brother     Lung cancer Brother     Aneurysm Sister     Breast cancer Sister 66    Clotting disorder Sister     Anxiety disorder Sister     No Known Problems Sister     No Known Problems Son     No Known Problems Daughter     Ovarian cancer Neg Hx     Colon cancer Neg Hx     Pulmonary embolism Neg Hx     Malig Hyperthermia Neg Hx     Uterine cancer Neg Hx          Review of Systems   Genitourinary:  Negative for breast discharge, breast lump, breast pain, genital sores, urinary incontinence, vaginal bleeding, vaginal discharge and vaginal pain.        /92   Ht 162.6 cm (64\")   Wt 62.6 kg (138 lb)   BMI 23.69 kg/m²     Physical Exam  Vitals reviewed.   Constitutional:       General: She is awake. She is not in acute distress.     Appearance: She is not ill-appearing.   HENT:      Head: Normocephalic and atraumatic.   Eyes:      Conjunctiva/sclera: Conjunctivae normal.   Pulmonary:      Effort: Pulmonary effort is normal. No respiratory distress.   Chest:   Breasts:     Right: Normal.      Comments: Left partial mastectomy; sternal scar present  Abdominal:      Palpations: Abdomen is soft. There is no " mass.      Tenderness: There is no abdominal tenderness.   Genitourinary:     General: Normal vulva.      Exam position: Supine.      Pubic Area: No rash.       Labia:         Right: No rash.         Left: No rash.       Urethra: No urethral lesion.      Vagina: Normal.      Cervix: Normal.      Uterus: Normal.       Adnexa: Right adnexa normal and left adnexa normal.      Comments: Vaginal atrophy noted  Musculoskeletal:      Cervical back: Neck supple. No rigidity.   Lymphadenopathy:      Upper Body:      Right upper body: No axillary adenopathy.      Left upper body: No axillary adenopathy.      Lower Body: No right inguinal adenopathy. No left inguinal adenopathy.   Skin:     General: Skin is warm and dry.      Capillary Refill: Capillary refill takes less than 2 seconds.   Neurological:      Mental Status: She is alert and oriented to person, place, and time.   Psychiatric:         Mood and Affect: Mood and affect normal.         Behavior: Behavior normal.            Assessment/Plan  1) AE  2) GYN HM: no pap indicated  SBE demonstrated and encouraged.  3) STD screening: declines Condoms encouraged.  4) Bone health: Weight bearing exercise, dietary calcium recommendations and vitamin D reviewed.   5) Colon Health: recommend screening if not up to date  6) Body mass index is 23.69 kg/m². Diet and Exercise discussed  7) Smoking Status: No  8) Social:   9) MMG: UTD 3/2024  10) DEXA-UTD 6/2023  11)C scope-UTD 1/2021    Diagnoses and all orders for this visit:    1. Routine gynecological examination (Primary)  -     POC Urinalysis Dipstick  -     Cancel: IGP, Apt HPV,rfx 16 / 18,45  -     Cancel: Pap IG (Image Guided)    2. Postmenopausal        Follow up prn and 1 year    I spent 20 minutes on this encounter, before, during and after the visit, evaluating the patient, reviewing records and writing orders.      Marleen Lanier, APRN  04/05/2024    10:14 EDT

## 2024-04-08 ENCOUNTER — HOSPITAL ENCOUNTER (OUTPATIENT)
Dept: CARDIOLOGY | Facility: HOSPITAL | Age: 74
Discharge: HOME | End: 2024-04-08
Admitting: INTERNAL MEDICINE
Payer: MEDICARE

## 2024-04-08 VITALS
HEART RATE: 73 BPM | SYSTOLIC BLOOD PRESSURE: 137 MMHG | BODY MASS INDEX: 23.34 KG/M2 | HEIGHT: 64 IN | WEIGHT: 136.69 LBS | DIASTOLIC BLOOD PRESSURE: 66 MMHG

## 2024-04-08 DIAGNOSIS — I25.10 CORONARY ARTERY DISEASE INVOLVING NATIVE CORONARY ARTERY OF NATIVE HEART WITHOUT ANGINA PECTORIS: ICD-10-CM

## 2024-04-08 DIAGNOSIS — Z95.1 S/P CABG (CORONARY ARTERY BYPASS GRAFT): ICD-10-CM

## 2024-04-08 DIAGNOSIS — I49.3 PVC'S (PREMATURE VENTRICULAR CONTRACTIONS): ICD-10-CM

## 2024-04-08 LAB
AORTIC DIMENSIONLESS INDEX: 0.8 (DI)
BH CV ECHO LEFT VENTRICLE GLOBAL LONGITUDINAL STRAIN: -19.3 %
BH CV ECHO MEAS - ACS: 2.27 CM
BH CV ECHO MEAS - AO MAX PG: 5.2 MMHG
BH CV ECHO MEAS - AO MEAN PG: 3 MMHG
BH CV ECHO MEAS - AO V2 MAX: 114 CM/SEC
BH CV ECHO MEAS - AO V2 VTI: 25.1 CM
BH CV ECHO MEAS - AVA(I,D): 3.1 CM2
BH CV ECHO MEAS - EDV(CUBED): 64 ML
BH CV ECHO MEAS - EDV(MOD-SP2): 99 ML
BH CV ECHO MEAS - EDV(MOD-SP4): 106 ML
BH CV ECHO MEAS - EF(MOD-BP): 58.5 %
BH CV ECHO MEAS - EF(MOD-SP2): 63.6 %
BH CV ECHO MEAS - EF(MOD-SP4): 57.5 %
BH CV ECHO MEAS - ESV(CUBED): 21.1 ML
BH CV ECHO MEAS - ESV(MOD-SP2): 36 ML
BH CV ECHO MEAS - ESV(MOD-SP4): 45 ML
BH CV ECHO MEAS - FS: 30.9 %
BH CV ECHO MEAS - IVS/LVPW: 1.22 CM
BH CV ECHO MEAS - IVSD: 1.1 CM
BH CV ECHO MEAS - LAT PEAK E' VEL: 9.1 CM/SEC
BH CV ECHO MEAS - LV DIASTOLIC VOL/BSA (35-75): 63.9 CM2
BH CV ECHO MEAS - LV MASS(C)D: 127.1 GRAMS
BH CV ECHO MEAS - LV MAX PG: 4.8 MMHG
BH CV ECHO MEAS - LV MEAN PG: 3 MMHG
BH CV ECHO MEAS - LV SYSTOLIC VOL/BSA (12-30): 27.1 CM2
BH CV ECHO MEAS - LV V1 MAX: 110 CM/SEC
BH CV ECHO MEAS - LV V1 VTI: 20.8 CM
BH CV ECHO MEAS - LVIDD: 4 CM
BH CV ECHO MEAS - LVIDS: 2.8 CM
BH CV ECHO MEAS - LVOT AREA: 3.8 CM2
BH CV ECHO MEAS - LVOT DIAM: 2.19 CM
BH CV ECHO MEAS - LVPWD: 0.9 CM
BH CV ECHO MEAS - MED PEAK E' VEL: 5.8 CM/SEC
BH CV ECHO MEAS - MV A MAX VEL: 62.4 CM/SEC
BH CV ECHO MEAS - MV DEC SLOPE: 467.9 CM/SEC2
BH CV ECHO MEAS - MV DEC TIME: 0.25 SEC
BH CV ECHO MEAS - MV E MAX VEL: 59.7 CM/SEC
BH CV ECHO MEAS - MV E/A: 0.96
BH CV ECHO MEAS - MV MAX PG: 2.3 MMHG
BH CV ECHO MEAS - MV MEAN PG: 1.17 MMHG
BH CV ECHO MEAS - MV P1/2T: 44.3 MSEC
BH CV ECHO MEAS - MV V2 VTI: 20.5 CM
BH CV ECHO MEAS - MVA(P1/2T): 5 CM2
BH CV ECHO MEAS - MVA(VTI): 3.8 CM2
BH CV ECHO MEAS - PA ACC TIME: 0.1 SEC
BH CV ECHO MEAS - PA V2 MAX: 149 CM/SEC
BH CV ECHO MEAS - QP/QS: 0.89
BH CV ECHO MEAS - RAP SYSTOLE: 3 MMHG
BH CV ECHO MEAS - RV MAX PG: 2.7 MMHG
BH CV ECHO MEAS - RV V1 MAX: 82.3 CM/SEC
BH CV ECHO MEAS - RV V1 VTI: 16.7 CM
BH CV ECHO MEAS - RVOT DIAM: 2.3 CM
BH CV ECHO MEAS - RVSP: 23.2 MMHG
BH CV ECHO MEAS - SI(MOD-SP2): 38 ML/M2
BH CV ECHO MEAS - SI(MOD-SP4): 36.8 ML/M2
BH CV ECHO MEAS - SV(LVOT): 78.4 ML
BH CV ECHO MEAS - SV(MOD-SP2): 63 ML
BH CV ECHO MEAS - SV(MOD-SP4): 61 ML
BH CV ECHO MEAS - SV(RVOT): 69.5 ML
BH CV ECHO MEAS - TR MAX PG: 20.2 MMHG
BH CV ECHO MEAS - TR MAX VEL: 224.6 CM/SEC
BH CV ECHO MEASUREMENTS AVERAGE E/E' RATIO: 8.01
BH CV ECHO SHUNT ASSESSMENT PERFORMED (HIDDEN SCRIPTING): 1
BH CV XLRA - RV BASE: 2.5 CM
BH CV XLRA - RV LENGTH: 6.7 CM
BH CV XLRA - RV MID: 2.33 CM
BH CV XLRA - TDI S': 11.1 CM/SEC
LEFT ATRIUM VOLUME INDEX: 24.9 ML/M2
SINUS: 2.8 CM

## 2024-04-08 PROCEDURE — 93306 TTE W/DOPPLER COMPLETE: CPT

## 2024-04-08 PROCEDURE — 93306 TTE W/DOPPLER COMPLETE: CPT | Performed by: INTERNAL MEDICINE

## 2024-04-08 PROCEDURE — 93356 MYOCRD STRAIN IMG SPCKL TRCK: CPT

## 2024-04-08 PROCEDURE — 93356 MYOCRD STRAIN IMG SPCKL TRCK: CPT | Performed by: INTERNAL MEDICINE

## 2024-04-08 PROCEDURE — 25510000001 PERFLUTREN (DEFINITY) 8.476 MG IN SODIUM CHLORIDE (PF) 0.9 % 10 ML INJECTION: Performed by: INTERNAL MEDICINE

## 2024-04-08 RX ADMIN — SODIUM CHLORIDE 2 ML: 9 INJECTION INTRAMUSCULAR; INTRAVENOUS; SUBCUTANEOUS at 08:18

## 2024-05-29 ENCOUNTER — OFFICE VISIT (OUTPATIENT)
Dept: FAMILY MEDICINE CLINIC | Facility: CLINIC | Age: 74
End: 2024-05-29
Payer: MEDICARE

## 2024-05-29 VITALS
SYSTOLIC BLOOD PRESSURE: 128 MMHG | DIASTOLIC BLOOD PRESSURE: 80 MMHG | OXYGEN SATURATION: 96 % | HEART RATE: 69 BPM | WEIGHT: 138 LBS | HEIGHT: 64 IN | TEMPERATURE: 97.8 F | BODY MASS INDEX: 23.56 KG/M2

## 2024-05-29 DIAGNOSIS — M79.672 LEFT FOOT PAIN: Primary | ICD-10-CM

## 2024-05-29 PROCEDURE — 1126F AMNT PAIN NOTED NONE PRSNT: CPT | Performed by: PHYSICIAN ASSISTANT

## 2024-05-29 PROCEDURE — 1160F RVW MEDS BY RX/DR IN RCRD: CPT | Performed by: PHYSICIAN ASSISTANT

## 2024-05-29 PROCEDURE — 3074F SYST BP LT 130 MM HG: CPT | Performed by: PHYSICIAN ASSISTANT

## 2024-05-29 PROCEDURE — 99213 OFFICE O/P EST LOW 20 MIN: CPT | Performed by: PHYSICIAN ASSISTANT

## 2024-05-29 PROCEDURE — 3079F DIAST BP 80-89 MM HG: CPT | Performed by: PHYSICIAN ASSISTANT

## 2024-05-29 PROCEDURE — 1159F MED LIST DOCD IN RCRD: CPT | Performed by: PHYSICIAN ASSISTANT

## 2024-05-29 NOTE — PROGRESS NOTES
"Chief Complaint  Foot Pain    Subjective          Maddison Turcios presents to [facility]  History of Present Illness  Maddison is 74-year-old female who presents with left foot pain.  States she has been having foot pain for the past 6 weeks or so.  Describes the pain as a dull ache on the lateral and top part of her left foot.  States she does not recall any recent injuries, trauma or falls.  She has been taking Tylenol arthritis strength without relief.  She has noticed some swelling recently.  States she is able to walk but it does hurt.  Describes the pain as a dull ache that waxes and wanes.  Sleep has been restless due to the foot pain at times.  Appetite has been normal.     Objective   Vital Signs:   /80   Pulse 69   Temp 97.8 °F (36.6 °C)   Ht 162 cm (63.78\")   Wt 62.6 kg (138 lb)   SpO2 96%   BMI 23.85 kg/m²     Physical Exam  Vitals and nursing note reviewed.   Constitutional:       Appearance: Normal appearance. She is well-developed, well-groomed and normal weight.   Musculoskeletal:      Right foot: Normal.      Left foot: Decreased range of motion. Normal capillary refill. Swelling (mild), tenderness and bony tenderness present. No deformity, prominent metatarsal heads, laceration or crepitus. Normal pulse.        Legs:         Feet:    Feet:      Right foot:      Skin integrity: Skin integrity normal.      Toenail Condition: Right toenails are normal.      Left foot:      Skin integrity: Skin integrity normal.      Toenail Condition: Left toenails are normal.   Skin:     General: Skin is warm and dry.      Capillary Refill: Capillary refill takes less than 2 seconds.   Neurological:      Mental Status: She is alert and oriented to person, place, and time.   Psychiatric:         Attention and Perception: Attention and perception normal.         Mood and Affect: Mood and affect normal.         Speech: Speech normal.         Behavior: Behavior normal. Behavior is " uncooperative. Behavior is cooperative.         Thought Content: Thought content normal.         Cognition and Memory: Cognition and memory normal.        Result Review :                 Assessment and Plan    Diagnoses and all orders for this visit:    1. Left foot pain (Primary)  -     XR foot 3+ vw left; Future    Maddison was seen in office today for new left foot pain.  Will proceed with left foot x-ray for further evaluation and rule out arthritis versus stress fracture.  She will be notified of test results when completed.  Meanwhile she will continue the arthritis strength Tylenol at home as directed.    I spent 18 minutes caring for Maddison on this date of service. This time includes time spent by me in the following activities:preparing for the visit, obtaining and/or reviewing a separately obtained history, performing a medically appropriate examination and/or evaluation , counseling and educating the patient/family/caregiver, ordering medications, tests, or procedures, and documenting information in the medical record    Follow Up   Return in about 3 months (around 8/29/2024), or labs prior chol/htn with Sara.  Patient was given instructions and counseling regarding her condition or for health maintenance advice. Please see specific information pulled into the AVS if appropriate.     [facility]  [address] [place] [id number]  Dept: [phone]  Dept Fax: [phone]  Loc: [phone]  Loc Fax: [phone]

## 2024-05-30 ENCOUNTER — HOSPITAL ENCOUNTER (OUTPATIENT)
Dept: GENERAL RADIOLOGY | Facility: HOSPITAL | Age: 74
Discharge: HOME | End: 2024-05-30
Admitting: PHYSICIAN ASSISTANT
Payer: MEDICARE

## 2024-05-30 DIAGNOSIS — M79.672 LEFT FOOT PAIN: ICD-10-CM

## 2024-05-30 PROCEDURE — 73630 X-RAY EXAM OF FOOT: CPT

## 2024-07-05 ENCOUNTER — APPOINTMENT (OUTPATIENT)
Dept: GENERAL RADIOLOGY | Facility: HOSPITAL | Age: 74
End: 2024-07-05
Payer: MEDICARE

## 2024-07-05 ENCOUNTER — HOSPITAL ENCOUNTER (EMERGENCY)
Facility: HOSPITAL | Age: 74
Discharge: HOME | End: 2024-07-05
Attending: EMERGENCY MEDICINE
Payer: MEDICARE

## 2024-07-05 VITALS
WEIGHT: 137 LBS | DIASTOLIC BLOOD PRESSURE: 81 MMHG | BODY MASS INDEX: 23.39 KG/M2 | HEIGHT: 64 IN | SYSTOLIC BLOOD PRESSURE: 148 MMHG | TEMPERATURE: 98.9 F | RESPIRATION RATE: 18 BRPM | HEART RATE: 69 BPM | OXYGEN SATURATION: 96 %

## 2024-07-05 DIAGNOSIS — S62.647A CLOSED NONDISPLACED FRACTURE OF PROXIMAL PHALANX OF LEFT LITTLE FINGER, INITIAL ENCOUNTER: Primary | ICD-10-CM

## 2024-07-05 DIAGNOSIS — M79.645 PAIN OF FINGER OF LEFT HAND: ICD-10-CM

## 2024-07-05 PROCEDURE — 99283 EMERGENCY DEPT VISIT LOW MDM: CPT

## 2024-07-05 PROCEDURE — 73130 X-RAY EXAM OF HAND: CPT

## 2024-07-05 NOTE — ED PROVIDER NOTES
Subjective   History of Present Illness  Patient presents complaining of left hand pain.  Patient was playing football and tried to catch 1 last night.  Patient injured her left hand at the base of her fourth and fifth fingers on the palmar surface as well as external lateral strain of the fifth digit.  Patient is able to move them without difficulty but can get the fifth digit straight.  There is obvious bruising around the site.  No previous fractures or surgeries to the site.  Patient is taken ice therapy prior to arrival.      Review of Systems   All other systems reviewed and are negative.      Past Medical History:   Diagnosis Date    Abnormal blood chemistry     Acute UTI (urinary tract infection)     Allergic     Anemia     Anesthesia complication     SLOW TO WAKE UP    Aneurysm     Aneurysm, cerebral     Antiplatelet or antithrombotic long-term use 10/20/2021    Arthritis     Hands and spine    Bloating     Brain aneurysm     CAD (coronary artery disease)     Chronic headaches     Coronary artery disease     Coronary artery stenosis     Dysuria     Encounter for screening colonoscopy     Environmental allergies     Fall from slip, trip, or stumble     Gait disturbance     H/O cardiovascular stress test 09/17/2013    Treadmill    H/O colonoscopy     H/O echocardiogram 09/25/2013    H/O fall     Heart murmur     Hereditary spherocytosis     History of EKG 07/31/2015    Hyperlipemia     Hyperlipidemia     Hypertension     Hyperthyroidism     Injury of back     Insomnia     Irritable bowel syndrome     Left forearm pain     Left leg pain     Left wrist pain     Lower abdominal pain     Malignant neoplasm of upper-inner quadrant of left breast in female, estrogen receptor positive 05/18/2023    Need for pneumococcal vaccination     Onychomycosis of toenail     Pelvic pressure in female     Right foot pain     Stroke     TIA (transient ischemic attack) 11/30/2016    URI (upper respiratory infection)     Urine  frequency        Allergies   Allergen Reactions    Adhesive Tape Rash     Redness, bruising and peeling of skin    *Use Paper Tape Only*  Redness, bruising and peeling of skin    *Use Paper Tape Only*    Beta Adrenergic Blockers Unknown - High Severity     hypotension  bradycardic    Statins Myalgia    Elemental Sulfur Rash    Sulfa Antibiotics Rash       Past Surgical History:   Procedure Laterality Date    BREAST EXCISIONAL BIOPSY Right 1977    b9    BREAST EXCISIONAL BIOPSY Right 1979    b9    BREAST LUMPECTOMY Left 08/01/2023    Procedure: Left needle-localized partial mastectomy and left breast needle-localized excisional biopsy;  Surgeon: Jaleesa Hsieh MD;  Location: [facility] MAIN OR;  Service: General;  Laterality: Left;    BUNIONECTOMY      CARDIAC CATHETERIZATION N/A 04/08/2019    Procedure: Left Heart Cath;  Surgeon: Jayme Ramirez MD;  Location:  [facility] CATH INVASIVE LOCATION;  Service: Cardiovascular    CARDIAC CATHETERIZATION N/A 04/08/2019    Procedure: Coronary angiography;  Surgeon: Jayme Ramirez MD;  Location[facility];  Service: Cardiovascular    CARDIAC CATHETERIZATION N/A 04/08/2019    Procedure: Left ventriculography;  Surgeon: Jayme Ramirez MD;  [facility];  Service: Cardiovascular    CEREBRAL ANEURYSM REPAIR  2015    CHOLECYSTECTOMY      COLONOSCOPY N/A 01/29/2021    Procedure: COLONOSCOPY with polypectomy;  Surgeon: Colin Ladd MD;  Location: [facility] OR;  Service: Gastroenterology;  Laterality: N/A;  Diverticulosis  Sigmoid colon polyp    CORONARY ARTERY BYPASS GRAFT  2004    Triple    FOOT SURGERY Right 2016    ROTATOR CUFF REPAIR Left     SPLENECTOMY  1953    TONSILLECTOMY  1984    TUBAL ABDOMINAL LIGATION  1979    US GUIDED CYST ASPIRATION BREAST N/A 05/18/2023       Family History   Problem Relation Age of Onset    Hypertension Father     Heart failure Father     Heart disease Father     Alcohol abuse  Father     Heart attack Mother     Heart failure Mother     Hypertension Mother     Stroke Mother     Cervical cancer Mother     Deep vein thrombosis Mother     Heart attack Brother     Cancer Brother     Lung cancer Brother     Aneurysm Sister     Breast cancer Sister 66    Clotting disorder Sister     Anxiety disorder Sister     Hearing loss Sister     Diabetes Sister     Hypertension Sister     No Known Problems Son     No Known Problems Daughter     Alcohol abuse Brother     Ovarian cancer Neg Hx     Colon cancer Neg Hx     Pulmonary embolism Neg Hx     Malig Hyperthermia Neg Hx     Uterine cancer Neg Hx        Social History     Socioeconomic History    Marital status:     Number of children: 2   Tobacco Use    Smoking status: Never     Passive exposure: Never    Smokeless tobacco: Never   Vaping Use    Vaping status: Never Used   Substance and Sexual Activity    Alcohol use: Not Currently     Comment: once or twice a year    Drug use: No    Sexual activity: Not Currently     Partners: Male     Birth control/protection: Abstinence, Post-menopausal, Tubal ligation           Objective   Physical Exam  Vitals and nursing note reviewed.   Pulmonary:      Effort: Pulmonary effort is normal.   Musculoskeletal:      Comments: Bruising to the palmar surface at the base of the fourth and fifth fingers.  There is additional bruising and swelling to the fifth and fourth digit at the proximal portion.  Full range of motion at the hand.  Patient does have partial lateral extension at the fifth digit.   Skin:     General: Skin is warm and dry.      Capillary Refill: Capillary refill takes 2 to 3 seconds.   Neurological:      Mental Status: She is alert and oriented to person, place, and time.   Psychiatric:         Mood and Affect: Mood normal.         Behavior: Behavior normal.         Procedures           ED Course                                             Medical Decision Making  Ddx fracture, dislocation,  sprain, strain, tendon injury, ligamentous injury    Problems Addressed:  Closed nondisplaced fracture of proximal phalanx of left little finger, initial encounter: complicated acute illness or injury  Pain of finger of left hand: complicated acute illness or injury    Amount and/or Complexity of Data Reviewed  Radiology: ordered.        Final diagnoses:   Closed nondisplaced fracture of proximal phalanx of left little finger, initial encounter   Pain of finger of left hand       ED Disposition  ED Disposition       ED Disposition   Discharge    Condition   Stable    Comment   --               [facility]  [address]  [facility] [address]  [phone]  In 3 days      Anum Lanier, APRN  [address]  [facility] [address] [phone]    In 1 day           Medication List      No changes were made to your prescriptions during this visit.            Nikolas Angeles MD  07/05/24 0902

## 2024-07-16 ENCOUNTER — OFFICE VISIT (OUTPATIENT)
Dept: FAMILY MEDICINE CLINIC | Facility: CLINIC | Age: 74
End: 2024-07-16
Payer: MEDICARE

## 2024-07-16 VITALS
HEIGHT: 64 IN | WEIGHT: 140.3 LBS | DIASTOLIC BLOOD PRESSURE: 92 MMHG | HEART RATE: 71 BPM | BODY MASS INDEX: 23.95 KG/M2 | SYSTOLIC BLOOD PRESSURE: 140 MMHG | TEMPERATURE: 99.3 F | OXYGEN SATURATION: 96 %

## 2024-07-16 DIAGNOSIS — E78.2 MIXED HYPERLIPIDEMIA: Primary | ICD-10-CM

## 2024-07-16 DIAGNOSIS — Z86.69 HISTORY OF OTITIS EXTERNA: ICD-10-CM

## 2024-07-16 DIAGNOSIS — E05.90 HYPERTHYROIDISM: ICD-10-CM

## 2024-07-16 DIAGNOSIS — I10 ESSENTIAL HYPERTENSION: ICD-10-CM

## 2024-07-16 PROCEDURE — 1160F RVW MEDS BY RX/DR IN RCRD: CPT | Performed by: NURSE PRACTITIONER

## 2024-07-16 PROCEDURE — 3077F SYST BP >= 140 MM HG: CPT | Performed by: NURSE PRACTITIONER

## 2024-07-16 PROCEDURE — 99213 OFFICE O/P EST LOW 20 MIN: CPT | Performed by: NURSE PRACTITIONER

## 2024-07-16 PROCEDURE — 1126F AMNT PAIN NOTED NONE PRSNT: CPT | Performed by: NURSE PRACTITIONER

## 2024-07-16 PROCEDURE — 3080F DIAST BP >= 90 MM HG: CPT | Performed by: NURSE PRACTITIONER

## 2024-07-16 PROCEDURE — 1159F MED LIST DOCD IN RCRD: CPT | Performed by: NURSE PRACTITIONER

## 2024-07-16 NOTE — PROGRESS NOTES
Answers submitted by the patient for this visit:  Primary Reason for Visit (Submitted on 7/9/2024)  What is the primary reason for your visit?: Ear Pain  Ear Pain Questionnaire (Submitted on 7/9/2024)  Chief Complaint: Ear pain  Affected ear: left  Chronicity: new  Onset: in the past 7 days  Progression since onset: improving  Frequency: intermittently  Fever: no fever  Pain - numeric: 10/10  dizziness: No  cough: No  drainage: No  headaches: No  hearing loss: No  hoarse voice: No  neck pain: No  rash: No  rhinorrhea: No  sore throat: No  congestion: No  jaw pain: No  adenopathy: No  tinnitus: No  Treatments tried : acetaminophen, heat packs, analgesic ear drops  Improvement on treatment: significant  Additional Information: Follow up from Urgent Care visit  Patient ID: Maddison Turcios is a 74 y.o. female     Patient Care Team:  Head, RENATE Hernandez as PCP - General (Nurse Practitioner)  Helen Urbina MD as Consulting Physician (Cardiology)  Ruben Valderrama MD (Dermatology)  Bennett Whelan MD as MDS Coordinator (Allergy)  Sara Araiza MD as Consulting Physician (Obstetrics and Gynecology)  Jaleesa Hsieh MD as Referring Physician (Breast Surgery)  Aba White MD as Consulting Physician (Hematology and Oncology)  Jill Leon MD as Consulting Physician (Radiation Oncology)  Toby Maher APRN as Nurse Practitioner (Breast Surgery)  Anum Lanier APRN as Nurse Practitioner (Nurse Practitioner)  Calrisse Childs PT as Physical Therapist (Physical Therapy)  Ruben Martinez PA as Physician Assistant (Physician Assistant)  Fazal Salomon MD as Surgeon (General Surgery)  Theron Arnold MD as Surgeon (Orthopedic Surgery)  Kimura, Zorre Zeno, PT as Physical Therapist (Physical Therapy)  Colin Ladd MD as Consulting Physician (Gastroenterology)    Subjective     Chief Complaint   Patient presents with    Earache     Left ear, follow up for  infection, was seen at  on 7/6/24       History of Present Illness    Maddison Turcios presents to [facility] today for follow-up after being evaluated in the urgent care on July 6, 2024 due to left ear pain.  She was diagnosed with otitis externa.  She was treated with Floxin eardrops.  States her condition has improved.  Denies any other new problems or concerns.    Urgent Care Provider Note by Golden Cam MD (07/06/2024 15:59)    She denies any complaints of fever, chills, cough, chest pain, shortness of air, abdominal pain, nausea, or any other concerns.     The following portions of the patient's history were reviewed and updated as appropriate: allergies, current medications, past family history, past medical history, past social history, past surgical history and problem list.       ROS    Vitals:    07/16/24 1553   BP: 140/92   Pulse: 71   Temp: 99.3 °F (37.4 °C)   SpO2: 96%       Documented weights    07/16/24 1553   Weight: 63.6 kg (140 lb 4.8 oz)     Body mass index is 24.07 kg/m².        Objective     Physical Exam  Vitals reviewed.   Constitutional:       General: She is not in acute distress.  HENT:      Head: Normocephalic and atraumatic.      Right Ear: Tympanic membrane, ear canal and external ear normal.      Left Ear: Tympanic membrane, ear canal and external ear normal.   Cardiovascular:      Rate and Rhythm: Normal rate.      Comments: B/P 132/76.  Pulmonary:      Effort: Pulmonary effort is normal.   Neurological:      Mental Status: She is alert and oriented to person, place, and time.         BMI is within normal parameters. No other follow-up for BMI required.      Assessment & Plan     Assessment/Plan     Diagnoses and all orders for this visit:    1. Mixed hyperlipidemia (Primary)  -     Comprehensive Metabolic Panel; Future  -     Lipid Panel With / Chol / HDL Ratio; Future    2. Essential hypertension  -     CBC (No Diff); Future  -     Comprehensive  Metabolic Panel; Future  -     Lipid Panel With / Chol / HDL Ratio; Future  -     UA / M With / Rflx Culture([facility] ONLY) - Urine, Clean Catch; Future    3. Hyperthyroidism  -     T3, Free; Future  -     T4, Free; Future  -     TSH; Future    4. History of otitis externa   Resolved        Follow Up:  Return for Next scheduled follow up.    In the meantime, instructed to contact us sooner for any problems or concerns.    Patient was given instructions and counseling regarding condition or for health maintenance advice.  Please see specific information pulled into the AVS if appropriate.          Sara Reno, APRN  Family Medicine  [facility] [place]  07/18/24  12:38 EDT

## 2024-07-25 DIAGNOSIS — E78.2 MIXED HYPERLIPIDEMIA: ICD-10-CM

## 2024-07-25 RX ORDER — PITAVASTATIN CALCIUM 2.09 MG/1
2 TABLET, FILM COATED ORAL
Qty: 90 TABLET | Refills: 1 | Status: SHIPPED | OUTPATIENT
Start: 2024-07-25

## 2024-08-09 DIAGNOSIS — J30.89 NON-SEASONAL ALLERGIC RHINITIS, UNSPECIFIED TRIGGER: ICD-10-CM

## 2024-08-09 RX ORDER — AMITRIPTYLINE HYDROCHLORIDE 10 MG/1
10 TABLET, FILM COATED ORAL
Qty: 90 TABLET | Refills: 1 | Status: SHIPPED | OUTPATIENT
Start: 2024-08-09

## 2024-08-09 RX ORDER — MONTELUKAST SODIUM 10 MG/1
10 TABLET ORAL
Qty: 90 TABLET | Refills: 1 | Status: SHIPPED | OUTPATIENT
Start: 2024-08-09

## 2024-08-27 ENCOUNTER — OFFICE VISIT (OUTPATIENT)
Dept: FAMILY MEDICINE CLINIC | Facility: CLINIC | Age: 74
End: 2024-08-27
Payer: MEDICARE

## 2024-08-27 VITALS
BODY MASS INDEX: 23.9 KG/M2 | TEMPERATURE: 98.4 F | SYSTOLIC BLOOD PRESSURE: 130 MMHG | HEIGHT: 64 IN | HEART RATE: 77 BPM | OXYGEN SATURATION: 98 % | WEIGHT: 140 LBS | DIASTOLIC BLOOD PRESSURE: 80 MMHG

## 2024-08-27 DIAGNOSIS — I25.810 CORONARY ARTERY DISEASE INVOLVING OTHER CORONARY ARTERY BYPASS GRAFT, UNSPECIFIED WHETHER ANGINA PRESENT: ICD-10-CM

## 2024-08-27 DIAGNOSIS — I10 ESSENTIAL HYPERTENSION: Primary | ICD-10-CM

## 2024-08-27 DIAGNOSIS — F51.01 PRIMARY INSOMNIA: ICD-10-CM

## 2024-08-27 DIAGNOSIS — Z86.73 HISTORY OF TIA (TRANSIENT ISCHEMIC ATTACK): ICD-10-CM

## 2024-08-27 DIAGNOSIS — E78.2 MIXED HYPERLIPIDEMIA: ICD-10-CM

## 2024-08-27 DIAGNOSIS — J30.89 NON-SEASONAL ALLERGIC RHINITIS, UNSPECIFIED TRIGGER: ICD-10-CM

## 2024-08-27 PROCEDURE — 1126F AMNT PAIN NOTED NONE PRSNT: CPT | Performed by: NURSE PRACTITIONER

## 2024-08-27 PROCEDURE — 3079F DIAST BP 80-89 MM HG: CPT | Performed by: NURSE PRACTITIONER

## 2024-08-27 PROCEDURE — 1160F RVW MEDS BY RX/DR IN RCRD: CPT | Performed by: NURSE PRACTITIONER

## 2024-08-27 PROCEDURE — 99213 OFFICE O/P EST LOW 20 MIN: CPT | Performed by: NURSE PRACTITIONER

## 2024-08-27 PROCEDURE — 1159F MED LIST DOCD IN RCRD: CPT | Performed by: NURSE PRACTITIONER

## 2024-08-27 PROCEDURE — 3075F SYST BP GE 130 - 139MM HG: CPT | Performed by: NURSE PRACTITIONER

## 2024-08-27 NOTE — PROGRESS NOTES
Patient ID: Maddison Turcios is a 74 y.o. female     Patient Care Team:  Head, RENATE Hernandez as PCP - General (Nurse Practitioner)  Helen Urbina MD as Consulting Physician (Cardiology)  Ruben Valderrama MD (Dermatology)  Bennett Whelan MD as MDS Coordinator (Allergy)  Sara Araiza MD as Consulting Physician (Obstetrics and Gynecology)  Jaleesa Hsieh MD as Referring Physician (Breast Surgery)  Aba White MD as Consulting Physician (Hematology and Oncology)  Jill Leon MD as Consulting Physician (Radiation Oncology)  Toby Maher APRN as Nurse Practitioner (Breast Surgery)  Anum Lanier APRN as Nurse Practitioner (Nurse Practitioner)  Clarisse Childs PT as Physical Therapist (Physical Therapy)  Ruben Martinez PA as Physician Assistant (Physician Assistant)  Fazal Salomon MD as Surgeon (General Surgery)  Theron Arnold MD as Surgeon (Orthopedic Surgery)  Kimura, Zorre Zeno, MAHAD as Physical Therapist (Physical Therapy)  Colin Ladd MD as Consulting Physician (Gastroenterology)    Subjective     Chief Complaint   Patient presents with    [facility]     Transfer of care     Hyperlipidemia     F/u labs     Hypertension     Bp at home is low at times, feels very hot and weak.        History of Present Illness    Maddison Turcios was a patient of Kasandra Mcdowell PA-C who is no longer with our practice.  I will be taking over this patient's primary care.      She presents to [facility] today for continued management concerning hypertension, hyperlipidemia, and allergies..     Feels more tired lately.  Unknown cause.  She does admit to not drinking enough fluids at times.  Especially when working outside.  States her blood pressure will be low about once a month.  She will feel lightheaded during this time.  Symptoms gradually improved with rest and fluids.    Followed by cardiology.  Recommendations  for goal for hypertension is less than 120/80.  Recommend continue on verapamil.  History of TIA.  Currently on Plavix.  No further episodes.  Hyperlipidemia: Managed with Vascepa, and Zetia.  She was on Livalo however developed leg pain.  History of coronary artery disease status post CABG.  Also has had history of unruptured cerebral aneurysm which was treated with pipeline embolization in 2015.  She is followed yearly per neurology.  Upcoming appointment scheduled for October 31, 2024.  History of breast cancer status post lumpectomy in June 2023.  She is followed by oncology and breast surgery.  Breast surgery appointment scheduled for September 24, 2024.  Oncology appointment scheduled for March 3, 2025.  When seen by cardiology back in April, had new onset PVCs.  Echo completed which was stable which showed normal heart function with mild leaking of tricuspid valve.    Insomnia: Managed with amitriptyline as needed at night  Stopped Livalo 8/16/24 due to leg pain.  Condition improved 3 days later.    B/P has been running 108 to 130/70 to 80's.  Heart rate 70 to 93.      IGP,rfx Aptima HPV All Pth (03/22/2021 13:23)  Mammo Screening Digital Tomosynthesis Bilateral With CAD (03/13/2024 11:01)     at [facility].      She denies any complaints of fever, chills, cough, chest pain, shortness of air, abdominal pain, nausea, or any other concerns.     The following portions of the patient's history were reviewed and updated as appropriate: allergies, current medications, past family history, past medical history, past social history, past surgical history and problem list.       ROS    Vitals:    08/27/24 1306   BP: 130/80   Pulse: 77   Temp: 98.4 °F (36.9 °C)   SpO2: 98%       Documented weights    08/27/24 1306   Weight: 63.5 kg (140 lb)     Body mass index is 24.02 kg/m².    Results for orders placed or performed in visit on 08/20/24   CBC (No Diff)    Specimen: Blood   Result Value Ref Range    WBC 7.9  3.4 - 10.8 x10E3/uL    RBC 4.08 3.77 - 5.28 x10E6/uL    Hemoglobin 13.1 11.1 - 15.9 g/dL    Hematocrit 38.5 34.0 - 46.6 %    MCV 94 79 - 97 fL    MCH 32.1 26.6 - 33.0 pg    MCHC 34.0 31.5 - 35.7 g/dL    RDW 12.2 11.7 - 15.4 %    Platelets 271 150 - 450 x10E3/uL   Comprehensive Metabolic Panel    Specimen: Blood   Result Value Ref Range    Glucose 102 (H) 70 - 99 mg/dL    BUN 10 8 - 27 mg/dL    Creatinine 0.64 0.57 - 1.00 mg/dL    EGFR Result 93 >59 mL/min/1.73    BUN/Creatinine Ratio 16 12 - 28    Sodium 135 134 - 144 mmol/L    Potassium 5.0 3.5 - 5.2 mmol/L    Chloride 98 96 - 106 mmol/L    Total CO2 18 (L) 20 - 29 mmol/L    Calcium 9.7 8.7 - 10.3 mg/dL    Total Protein 6.8 6.0 - 8.5 g/dL    Albumin 4.5 3.8 - 4.8 g/dL    Globulin 2.3 1.5 - 4.5 g/dL    Total Bilirubin 1.1 0.0 - 1.2 mg/dL    Alkaline Phosphatase 59 44 - 121 IU/L    AST (SGOT) 24 0 - 40 IU/L    ALT (SGPT) 13 0 - 32 IU/L   Lipid Panel With / Chol / HDL Ratio    Specimen: Blood   Result Value Ref Range    Total Cholesterol 196 100 - 199 mg/dL    Triglycerides 104 0 - 149 mg/dL    HDL Cholesterol 76 >39 mg/dL    VLDL Cholesterol Jun 18 5 - 40 mg/dL    LDL Chol Calc (NIH) 102 (H) 0 - 99 mg/dL    Chol/HDL Ratio 2.6 0.0 - 4.4 ratio   T3, Free    Specimen: Blood   Result Value Ref Range    T3, Free 2.5 2.0 - 4.4 pg/mL   T4, Free    Specimen: Blood   Result Value Ref Range    Free T4 1.29 0.82 - 1.77 ng/dL   TSH    Specimen: Blood   Result Value Ref Range    TSH 2.580 0.450 - 4.500 uIU/mL   Specimen Status Report   Result Value Ref Range    Specimen Status CANCELED            Objective     Physical Exam  Vitals reviewed.   Constitutional:       General: She is not in acute distress.  HENT:      Head: Normocephalic and atraumatic.      Right Ear: Tympanic membrane normal. Decreased hearing noted.      Left Ear: Tympanic membrane normal. Decreased hearing noted.      Ears:      Comments: Bilateral hearing aids     Nose: No congestion.   Eyes:      Pupils:  Pupils are equal, round, and reactive to light.   Cardiovascular:      Rate and Rhythm: Normal rate and regular rhythm.      Heart sounds: No murmur heard.  Pulmonary:      Effort: Pulmonary effort is normal.      Breath sounds: Normal breath sounds. No wheezing.   Abdominal:      Palpations: Abdomen is soft.   Musculoskeletal:      Cervical back: Normal range of motion.      Right lower leg: No edema.      Left lower leg: No edema.   Neurological:      Mental Status: She is alert and oriented to person, place, and time.   Psychiatric:         Mood and Affect: Mood normal.         Behavior: Behavior normal.         BMI is within normal parameters. No other follow-up for BMI required.    Summary  Reviewed recent labs along with patient which all remained stable.  Cholesterol levels stable however recently stopped Livalo.  Will continue Zetia and Vascepa as directed.  Will continue to monitor.  Blood pressure currently stable in office at 130/80.  Unknown cause of fatigue.  Possibly related to not drinking enough fluids.  Advised to make sure she drinks enough fluids with a goal of at least 64 ounces per day.  Also supplement with electrolyte replacement daily.  Continue to monitor blood pressure at home.    Assessment & Plan     Assessment/Plan     Diagnoses and all orders for this visit:    1. Essential hypertension (Primary)  -     CBC (No Diff); Future  -     Comprehensive Metabolic Panel; Future  -     Lipid Panel With / Chol / HDL Ratio; Future  -     TSH Rfx On Abnormal To Free T4; Future    2. Mixed hyperlipidemia  -     CBC (No Diff); Future  -     Comprehensive Metabolic Panel; Future  -     Lipid Panel With / Chol / HDL Ratio; Future  -     TSH Rfx On Abnormal To Free T4; Future    3. Primary insomnia    4. Non-seasonal allergic rhinitis, unspecified trigger    5. History of TIA (transient ischemic attack)    6. Coronary artery disease involving other coronary artery bypass graft, unspecified whether angina  present        Follow Up:  Return in about 6 months (around 2/27/2025) for Recheck, Medicare Wellness with fasting labs week before.  .    In the meantime, instructed to contact us sooner for any problems or concerns.    Patient was given instructions and counseling regarding condition or for health maintenance advice.  Please see specific information pulled into the AVS if appropriate.          Sara Reno, APRN  Family Medicine  [facility] [place]  08/27/24  17:31 EDT

## 2024-09-18 DIAGNOSIS — I10 ESSENTIAL HYPERTENSION: Primary | ICD-10-CM

## 2024-09-18 RX ORDER — RAMIPRIL 2.5 MG/1
2.5 CAPSULE ORAL NIGHTLY
Qty: 90 CAPSULE | Refills: 1 | Status: SHIPPED | OUTPATIENT
Start: 2024-09-18

## 2024-09-24 ENCOUNTER — OFFICE VISIT (OUTPATIENT)
Dept: SURGERY | Facility: CLINIC | Age: 74
End: 2024-09-24
Payer: MEDICARE

## 2024-09-24 VITALS
SYSTOLIC BLOOD PRESSURE: 118 MMHG | OXYGEN SATURATION: 95 % | HEART RATE: 73 BPM | HEIGHT: 64 IN | BODY MASS INDEX: 23.73 KG/M2 | DIASTOLIC BLOOD PRESSURE: 78 MMHG | WEIGHT: 139 LBS

## 2024-09-24 DIAGNOSIS — Z17.0 MALIGNANT NEOPLASM OF UPPER-INNER QUADRANT OF LEFT BREAST IN FEMALE, ESTROGEN RECEPTOR POSITIVE: Primary | ICD-10-CM

## 2024-09-24 DIAGNOSIS — Z12.31 ENCOUNTER FOR SCREENING MAMMOGRAM FOR MALIGNANT NEOPLASM OF BREAST: ICD-10-CM

## 2024-09-24 DIAGNOSIS — C50.212 MALIGNANT NEOPLASM OF UPPER-INNER QUADRANT OF LEFT BREAST IN FEMALE, ESTROGEN RECEPTOR POSITIVE: Primary | ICD-10-CM

## 2024-09-24 PROCEDURE — 99213 OFFICE O/P EST LOW 20 MIN: CPT | Performed by: NURSE PRACTITIONER

## 2024-09-24 PROCEDURE — 3078F DIAST BP <80 MM HG: CPT | Performed by: NURSE PRACTITIONER

## 2024-09-24 PROCEDURE — 1159F MED LIST DOCD IN RCRD: CPT | Performed by: NURSE PRACTITIONER

## 2024-09-24 PROCEDURE — 3074F SYST BP LT 130 MM HG: CPT | Performed by: NURSE PRACTITIONER

## 2024-09-24 PROCEDURE — 1160F RVW MEDS BY RX/DR IN RCRD: CPT | Performed by: NURSE PRACTITIONER

## 2024-09-30 DIAGNOSIS — E78.2 MIXED HYPERLIPIDEMIA: ICD-10-CM

## 2024-09-30 RX ORDER — EZETIMIBE 10 MG/1
10 TABLET ORAL DAILY
Qty: 90 TABLET | Refills: 2 | Status: SHIPPED | OUTPATIENT
Start: 2024-09-30

## 2024-11-07 RX ORDER — RALOXIFENE HYDROCHLORIDE 60 MG/1
60 TABLET, FILM COATED ORAL DAILY
Qty: 90 TABLET | Refills: 2 | Status: SHIPPED | OUTPATIENT
Start: 2024-11-07

## 2024-12-27 DIAGNOSIS — I67.1 CEREBRAL ANEURYSM: ICD-10-CM

## 2024-12-27 RX ORDER — CLOPIDOGREL BISULFATE 75 MG/1
75 TABLET ORAL DAILY
Qty: 90 TABLET | Refills: 2
Start: 2024-12-27

## 2025-01-03 DIAGNOSIS — I67.1 CEREBRAL ANEURYSM: ICD-10-CM

## 2025-01-03 RX ORDER — CLOPIDOGREL BISULFATE 75 MG/1
75 TABLET ORAL DAILY
Qty: 90 TABLET | Refills: 0
Start: 2025-01-03

## 2025-01-07 DIAGNOSIS — E78.2 MIXED HYPERLIPIDEMIA: ICD-10-CM

## 2025-01-07 RX ORDER — ICOSAPENT ETHYL 1 G/1
2 CAPSULE ORAL 2 TIMES DAILY WITH MEALS
Qty: 360 CAPSULE | Refills: 3 | Status: SHIPPED | OUTPATIENT
Start: 2025-01-07

## 2025-01-09 DIAGNOSIS — I67.1 CEREBRAL ANEURYSM: ICD-10-CM

## 2025-01-09 RX ORDER — CLOPIDOGREL BISULFATE 75 MG/1
75 TABLET ORAL DAILY
Qty: 90 TABLET | Refills: 3 | Status: SHIPPED | OUTPATIENT
Start: 2025-01-09

## 2025-01-29 ENCOUNTER — TELEPHONE (OUTPATIENT)
Dept: GASTROENTEROLOGY | Facility: CLINIC | Age: 75
End: 2025-01-29
Payer: MEDICARE

## 2025-02-04 DIAGNOSIS — J30.89 NON-SEASONAL ALLERGIC RHINITIS, UNSPECIFIED TRIGGER: ICD-10-CM

## 2025-02-04 RX ORDER — AMITRIPTYLINE HYDROCHLORIDE 10 MG/1
10 TABLET ORAL
Qty: 90 TABLET | Refills: 1 | Status: SHIPPED | OUTPATIENT
Start: 2025-02-04

## 2025-02-04 RX ORDER — MONTELUKAST SODIUM 10 MG/1
TABLET ORAL
Qty: 90 TABLET | Refills: 1 | Status: SHIPPED | OUTPATIENT
Start: 2025-02-04

## 2025-03-03 ENCOUNTER — OFFICE VISIT (OUTPATIENT)
Dept: ONCOLOGY | Facility: CLINIC | Age: 75
End: 2025-03-03
Payer: MEDICARE

## 2025-03-03 ENCOUNTER — LAB (OUTPATIENT)
Dept: OTHER | Facility: HOSPITAL | Age: 75
End: 2025-03-03
Payer: MEDICARE

## 2025-03-03 VITALS
BODY MASS INDEX: 24.26 KG/M2 | HEART RATE: 76 BPM | OXYGEN SATURATION: 94 % | RESPIRATION RATE: 16 BRPM | TEMPERATURE: 98.3 F | SYSTOLIC BLOOD PRESSURE: 167 MMHG | WEIGHT: 142.1 LBS | DIASTOLIC BLOOD PRESSURE: 88 MMHG | HEIGHT: 64 IN

## 2025-03-03 DIAGNOSIS — Z17.0 MALIGNANT NEOPLASM OF UPPER-INNER QUADRANT OF LEFT BREAST IN FEMALE, ESTROGEN RECEPTOR POSITIVE: ICD-10-CM

## 2025-03-03 DIAGNOSIS — Z09 ENCOUNTER FOR FOLLOW-UP EXAMINATION AFTER COMPLETED TREATMENT FOR CONDITIONS OTHER THAN MALIGNANT NEOPLASM: ICD-10-CM

## 2025-03-03 DIAGNOSIS — C50.212 MALIGNANT NEOPLASM OF UPPER-INNER QUADRANT OF LEFT BREAST IN FEMALE, ESTROGEN RECEPTOR POSITIVE: ICD-10-CM

## 2025-03-03 DIAGNOSIS — C50.212 MALIGNANT NEOPLASM OF UPPER-INNER QUADRANT OF LEFT BREAST IN FEMALE, ESTROGEN RECEPTOR POSITIVE: Primary | ICD-10-CM

## 2025-03-03 DIAGNOSIS — Z17.0 MALIGNANT NEOPLASM OF UPPER-INNER QUADRANT OF LEFT BREAST IN FEMALE, ESTROGEN RECEPTOR POSITIVE: Primary | ICD-10-CM

## 2025-03-03 LAB
ALBUMIN SERPL-MCNC: 4.3 G/DL (ref 3.5–5.2)
ALBUMIN/GLOB SERPL: 1.6 G/DL
ALP SERPL-CCNC: 61 U/L (ref 39–117)
ALT SERPL W P-5'-P-CCNC: 16 U/L (ref 1–33)
ANION GAP SERPL CALCULATED.3IONS-SCNC: 7.5 MMOL/L (ref 5–15)
AST SERPL-CCNC: 24 U/L (ref 1–32)
BASOPHILS # BLD AUTO: 0.08 10*3/MM3 (ref 0–0.2)
BASOPHILS NFR BLD AUTO: 0.9 % (ref 0–1.5)
BILIRUB SERPL-MCNC: 0.7 MG/DL (ref 0–1.2)
BUN SERPL-MCNC: 19 MG/DL (ref 8–23)
BUN/CREAT SERPL: 32.8 (ref 7–25)
CALCIUM SPEC-SCNC: 9.7 MG/DL (ref 8.6–10.5)
CHLORIDE SERPL-SCNC: 101 MMOL/L (ref 98–107)
CO2 SERPL-SCNC: 28.5 MMOL/L (ref 22–29)
CREAT SERPL-MCNC: 0.58 MG/DL (ref 0.57–1)
DEPRECATED RDW RBC AUTO: 40.7 FL (ref 37–54)
EGFRCR SERPLBLD CKD-EPI 2021: 95.7 ML/MIN/1.73
EOSINOPHIL # BLD AUTO: 0.24 10*3/MM3 (ref 0–0.4)
EOSINOPHIL NFR BLD AUTO: 2.7 % (ref 0.3–6.2)
ERYTHROCYTE [DISTWIDTH] IN BLOOD BY AUTOMATED COUNT: 12.1 % (ref 12.3–15.4)
GLOBULIN UR ELPH-MCNC: 2.7 GM/DL
GLUCOSE SERPL-MCNC: 104 MG/DL (ref 65–99)
HCT VFR BLD AUTO: 36.7 % (ref 34–46.6)
HGB BLD-MCNC: 12.9 G/DL (ref 12–15.9)
IMM GRANULOCYTES # BLD AUTO: 0.04 10*3/MM3 (ref 0–0.05)
IMM GRANULOCYTES NFR BLD AUTO: 0.4 % (ref 0–0.5)
LYMPHOCYTES # BLD AUTO: 2.54 10*3/MM3 (ref 0.7–3.1)
LYMPHOCYTES NFR BLD AUTO: 28.4 % (ref 19.6–45.3)
MCH RBC QN AUTO: 32.3 PG (ref 26.6–33)
MCHC RBC AUTO-ENTMCNC: 35.1 G/DL (ref 31.5–35.7)
MCV RBC AUTO: 92 FL (ref 79–97)
MONOCYTES # BLD AUTO: 0.64 10*3/MM3 (ref 0.1–0.9)
MONOCYTES NFR BLD AUTO: 7.2 % (ref 5–12)
NEUTROPHILS NFR BLD AUTO: 5.4 10*3/MM3 (ref 1.7–7)
NEUTROPHILS NFR BLD AUTO: 60.4 % (ref 42.7–76)
NRBC BLD AUTO-RTO: 0 /100 WBC (ref 0–0.2)
PLATELET # BLD AUTO: 314 10*3/MM3 (ref 140–450)
PMV BLD AUTO: 9 FL (ref 6–12)
POTASSIUM SERPL-SCNC: 4.5 MMOL/L (ref 3.5–5.2)
PROT SERPL-MCNC: 7 G/DL (ref 6–8.5)
RBC # BLD AUTO: 3.99 10*6/MM3 (ref 3.77–5.28)
SODIUM SERPL-SCNC: 137 MMOL/L (ref 136–145)
WBC NRBC COR # BLD AUTO: 8.94 10*3/MM3 (ref 3.4–10.8)

## 2025-03-03 PROCEDURE — 80053 COMPREHEN METABOLIC PANEL: CPT | Performed by: INTERNAL MEDICINE

## 2025-03-03 PROCEDURE — 36415 COLL VENOUS BLD VENIPUNCTURE: CPT

## 2025-03-03 PROCEDURE — 85025 COMPLETE CBC W/AUTO DIFF WBC: CPT | Performed by: INTERNAL MEDICINE

## 2025-03-03 NOTE — PROGRESS NOTES
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    At the time of surgery thankfully there is no residual invasive cancer so the final size was 4 mm right pT1aNx grade 1 invasive ER/OK positive HER2 negative by FISH Ki-67 of 8%    There was no residual cancer or atypia in the breast no lymph nodes were removed    Radiation did not feel radiation was indicated and we have elected to give her Evista for prevention primarily because of her significant osteopenia borderline osteoporosis in both hips.  No definitive indication to treat 4 mm grade 1 cancer exists at this time    She has other comorbidities including TIAs and I think we are better off minimizing her treatments    So far she has had no side effects whatsoever from the Evista for a year and a half now    Mammogram is due again in 2 weeks  Bone density in June 2025          Past Medical History:   Diagnosis Date    Abnormal blood chemistry     Acute UTI (urinary tract infection)     Allergic     Anemia     Anesthesia complication     SLOW TO WAKE UP    Aneurysm     Aneurysm, cerebral     Antiplatelet or antithrombotic long-term use 10/20/2021    Arthritis     Hands and spine    Bloating     Brain aneurysm     CAD (coronary artery disease)     Cholelithiasis     Chronic headaches     Colon polyp     Coronary artery disease     Coronary artery stenosis     Dysuria     Encounter for screening colonoscopy     Environmental allergies     Fall from  slip, trip, or stumble     Gait disturbance     H/O cardiovascular stress test 09/17/2013    Treadmill    H/O colonoscopy     H/O echocardiogram 09/25/2013    H/O fall     Heart murmur     Hereditary spherocytosis     History of EKG 07/31/2015    Hyperlipemia     Hyperlipidemia     Hypertension     Hyperthyroidism     Injury of back     Insomnia     Irritable bowel syndrome     Left forearm pain     Left leg pain     Left wrist pain     Lower abdominal pain     Malignant neoplasm of upper-inner quadrant of left breast in female, estrogen receptor positive 05/18/2023    Need for pneumococcal vaccination     Onychomycosis of toenail     Pelvic pressure in female     Right foot pain     Stroke     TIA (transient ischemic attack) 11/30/2016    URI (upper respiratory infection)     Urine frequency         Past Surgical History:   Procedure Laterality Date    BREAST EXCISIONAL BIOPSY Right 1977    b9    BREAST EXCISIONAL BIOPSY Right 1979    b9    BREAST LUMPECTOMY Left 08/01/2023    Procedure: Left needle-localized partial mastectomy and left breast needle-localized excisional biopsy;  Surgeon: Jaleesa Hsieh MD;  Location: [facility] MAIN OR;  Service: General;  Laterality: Left;    BUNIONECTOMY      CARDIAC CATHETERIZATION N/A 04/08/2019    Procedure: Left Heart Cath;  Surgeon: Jayme Ramirez MD;  Location: [facility] CATH INVASIVE LOCATION;  Service: Cardiovascular    CARDIAC CATHETERIZATION N/A 04/08/2019    Procedure: Coronary angiography;  Surgeon: Jayme Ramirez MD;  [facility] CATH [facility]VASIVE LOC[facility];  Service: Cardiovascular    CARDIAC CATHETERIZATION N/A 04/08/2019    Procedure: Left ventriculography;  Surgeon: Jayme Ramirez MD;  Location[facility] CATH INVASIVE LOCATION;  Service: Cardiovascular    CARDIAC SURGERY      CEREBRAL ANEURYSM REPAIR  2015    CHOLECYSTECTOMY      COLONOSCOPY N/A 01/29/2021    Procedure: COLONOSCOPY with polypectomy;  Surgeon: Colin Ladd MD;   Location: [facility] OR;  Service: Gastroenterology;  Laterality: N/A;  Diverticulosis  Sigmoid colon polyp    CORONARY ARTERY BYPASS GRAFT  2004    Triple    FOOT SURGERY Right 2016    ROTATOR CUFF REPAIR Left     SPLENECTOMY      TONSILLECTOMY      TUBAL ABDOMINAL LIGATION      US GUIDED CYST ASPIRATION BREAST N/A 2023   Oncologic history  patient is a 73-year-old female with hereditary spherocytosis and coronary artery disease cardiovascular disease who had a routine screening mammogram which was abnormal in the left breast and this led to diagnostic imaging and ultrasound the findings of a 4 mm abnormality in the 12 o'clock position of the left breast.  A biopsy was done showing grade 2 invasive ductal carcinoma measuring 4 mm ER/TN positive HER2 2+ FISH negative.    An MRI was done but there was a large 4 cm postbiopsy hematoma because clip to move to the periphery of the hematoma and for this reason surgery was delayed until the biopsy could improve and adequate localization of the clip could be performed    She is  2 para 2 menarche was at age 13 menopause at 56 she is taking no hormone replacement.  First childbirth was age 28 she breast-fed for 6 months    Family history is positive for sister with breast cancer at age 66 brother with lung cancer at age 57 her genetic testing was drawn and results are pending    She is not a smoker or drinker  She has never had a DVT or MI but did have a TIA in 2016 after she stopped her Plavix and Plavix was reinstituted.  She has had a cerebral aneurysm treated by interventional radiology in     She is not a free bleeder and was on Plavix and this was held for the biopsy but she was still on fish oil supplements which may have  contributed to the post biopsy hematoma.    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 age and is up-to-date with vaccinations for splenectomy    She has had bypass surgery for coronary artery disease in the past  also    Discussed with Dr. Hsieh who states that she was able to aspirate much of the hematoma which is significantly smaller and she will reevaluate her at the end of June and decide on surgery and we will see her after surgery to get the final results and make a decision about adjuvant treatment.    Based on her TIA I have recommended an aromatase inhibitor rather than tamoxifen and because of this we will do a but DEXA scan to see where we stand and use Prolia to prevent worsening of her bone density is already bad    I have prescribed Arimidex but I told her not to pick it up until after I see her again on the off chance that this is a sub-5 mm tumor that does not need treatment    Explained the rationale for adjuvant hormonal blockade and possibly addition of Prolia to prevent worsening of her bone density and she is agreeable and we will discuss this further at her next visit      Current Outpatient Medications on File Prior to Visit   Medication Sig Dispense Refill    Cholecalciferol (VITAMIN D3) 5000 UNITS capsule capsule Take 1 capsule by mouth 1 (One) Time Per Week.      clopidogrel (PLAVIX) 75 MG tablet Take 1 tablet by mouth Daily. Indications: Resume on Monday, 8/7/2023. 90 tablet 3    ezetimibe (ZETIA) 10 MG tablet Take 1 tablet by mouth Daily. 90 tablet 2    icosapent ethyl (VASCEPA) 1 g capsule capsule Take 2 g by mouth 2 (Two) Times a Day With Meals. Indications: Resume on Monday, 8/7/2023. 360 capsule 3    isosorbide dinitrate (ISORDIL) 5 MG tablet TAKE 1 TABLET BY MOUTH TWICE A  tablet 3    Loratadine ([place] ALLERCLEAR PO) Take  by mouth.      Melatonin 10 MG tablet Take 1 tablet by mouth Every Night.      multivitamin (THERAGRAN) tablet tablet Take 1 tablet by mouth Daily. HOLD PER MD UDARTE      raloxifene (EVISTA) 60 MG tablet TAKE 1 TABLET BY MOUTH EVERY DAY 90 tablet 2    amitriptyline (ELAVIL) 10 MG tablet TAKE 1 TABLET BY MOUTH EVERYDAY AT BEDTIME (Patient not taking: Reported on  3/3/2025) 90 tablet 1    montelukast (SINGULAIR) 10 MG tablet TAKE 1 TABLET BY MOUTH ONCE NIGHTLY AT BEDTIME 90 tablet 1    ramipril (ALTACE) 2.5 MG capsule Take 1 capsule by mouth Every Night. (Patient not taking: Reported on 3/3/2025) 90 capsule 1     No current facility-administered medications on file prior to visit.        ALLERGIES:    Allergies   Allergen Reactions    Adhesive Tape Rash     Redness, bruising and peeling of skin    *Use Paper Tape Only*  Redness, bruising and peeling of skin    *Use Paper Tape Only*    Beta Adrenergic Blockers Unknown - High Severity     hypotension  bradycardic    Statins Myalgia    Elemental Sulfur Rash    Sulfa Antibiotics Rash        Social History     Socioeconomic History    Marital status:     Number of children: 2   Tobacco Use    Smoking status: Never     Passive exposure: Never    Smokeless tobacco: Never   Vaping Use    Vaping status: Never Used   Substance and Sexual Activity    Alcohol use: Not Currently     Comment: once or twice a year    Drug use: No    Sexual activity: Not Currently     Partners: Male     Birth control/protection: Abstinence, Post-menopausal, Tubal ligation        Family History   Problem Relation Age of Onset    Hypertension Father     Heart failure Father     Heart disease Father     Alcohol abuse Father     Heart attack Mother     Heart failure Mother     Hypertension Mother     Stroke Mother     Cervical cancer Mother     Deep vein thrombosis Mother     Heart attack Brother     Cancer Brother     Lung cancer Brother     Aneurysm Sister     Breast cancer Sister 66    Clotting disorder Sister     Anxiety disorder Sister     Hearing loss Sister     Diabetes Sister     Hypertension Sister     No Known Problems Son     No Known Problems Daughter     Alcohol abuse Brother     Ovarian cancer Neg Hx     Colon cancer Neg Hx     Pulmonary embolism Neg Hx     Malig Hyperthermia Neg Hx     Uterine cancer Neg Hx         Review of Systems  "  Musculoskeletal:  Positive for arthralgias.   Psychiatric/Behavioral:  The patient is nervous/anxious.         Objective     Vitals:    03/03/25 1120   BP: 167/88   Pulse: 76   Resp: 16   Temp: 98.3 °F (36.8 °C)   TempSrc: Oral   SpO2: 94%   Weight: 64.5 kg (142 lb 1.6 oz)   Height: 162.6 cm (64.02\")   PainSc: 0-No pain         3/3/2025    11:25 AM   Current Status   ECOG score 0       Physical Exam    CONSTITUTIONAL:  Vital signs reviewed.  No distress, looks comfortable.  EYES:  Conjunctivae and lids unremarkable.  PERRLA  EARS,NOSE,MOUTH,THROAT:  Ears and nose appear unremarkable.  Lips, teeth, gums appear unremarkable.  RESPIRATORY:  Normal respiratory effort.  Lungs clear to auscultation bilaterally.  BREASTS: The right breast shows no palpable masses left breast is benign with a well-healed lumpectomy scar  CARDIOVASCULAR:  Normal S1, S2.  No murmurs rubs or gallops.  No significant lower extremity edema.  GASTROINTESTINAL: Abdomen appears unremarkable.  Nontender.  No hepatomegaly.  No splenomegaly.  LYMPHATIC:  No cervical, supraclavicular, axillary lymphadenopathy.  SKIN:  Warm.  No rashes.  PSYCHIATRIC:  Normal judgment and insight.  Normal mood and affect.  I have reexamined the patient and the results are consistent with the previously documented exam. Aba White MD       RECENT LABS:  Hematology WBC   Date Value Ref Range Status   03/03/2025 8.94 3.40 - 10.80 10*3/mm3 Final   02/25/2025 7.62 3.40 - 10.80 10*3/mm3 Final     RBC   Date Value Ref Range Status   03/03/2025 3.99 3.77 - 5.28 10*6/mm3 Final   02/25/2025 3.99 3.77 - 5.28 10*6/mm3 Final     Hemoglobin   Date Value Ref Range Status   03/03/2025 12.9 12.0 - 15.9 g/dL Final     Hematocrit   Date Value Ref Range Status   03/03/2025 36.7 34.0 - 46.6 % Final     Platelets   Date Value Ref Range Status   03/03/2025 314 140 - 450 10*3/mm3 Final        Final Diagnosis   1. Left Breast at 11:00 o'clock, 8-9 cm from Nipple (for calcifications), " Stereotactic Core Biopsy:                 A. Invasive ductal carcinoma:                              1. Overall Debbie grade II (tubular score=3, nuclear score=2, mitotic score=1).                            2. Invasive carcinoma measures at least 4 mm.                            3. No lymphovascular space invasion identified.               B. 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 Low-grade cribriform DCIS.     Jat./kds    Electronically signed by Christopher Lara MD on 5/1/2023 at 1311   Synoptic Checklist   Breast Biomarker Reporting Template  Protocol posted: 6/22/2022  BREAST: BIOMARKER REPORTING TEMPLATE - All Specimens  Test(s) Performed     Estrogen Receptor (ER) Status  Positive (greater than 10% of cells demonstrate nuclear positivity)    Percentage of Cells with Nuclear Positivity  %    Average Intensity of Staining  Strong    Test Type  Food and Drug Administration (FDA) cleared (test / vendor): [place]    Primary Antibody  SP1    Scoring System  Sugar    Proportion Score  5    Intensity Score  3    Total Sugar Score  8    Test(s) Performed     Progesterone Receptor (PgR) Status  Positive    Percentage of Cells with Nuclear Positivity  51-60%    Average Intensity of Staining  Moderate    Test Type  Food and Drug Administration (FDA) cleared (test / vendor): [place]    Primary Antibody  1E2    Scoring System  Sugar    Proportion Score  4    Intensity Score  2    Total Sugar Score  6    Test(s) Performed     HER2 by Immunohistochemistry  Equivocal (Score 2+)    Percentage of Cells with Uniform Intense Complete Membrane Staining  0 %   Test Type  Food and Drug Administration (FDA) cleared (test / vendor): [place]    Primary Antibody  4B5    Test(s) Performed  Ki-67    Ki-67 Percentage of Positive Nuclei  8 %   Primary Antibody  30-9    Cold Ischemia and Fixation Times  Meet requirements specified in latest version of the ASCO / CAP Guidelines    Cold  Ischemia Time (minutes)  16 min   Fixation Time (hours)  8 hours   Testing Performed on Block Number(s)  1D    METHODS   Fixative  Formalin    Image Analysis  Not performed    Comment(s)  Her 2 FISH NEG    .        al Diagnosis   1. Left Breast at 11:00 o'clock, 8-9 cm from Nipple (for calcifications), Stereotactic Core Biopsy:                 A. Invasive ductal carcinoma:                              1. Overall Debbie grade II (tubular score=3, nuclear score=2, mitotic score=1).                            2. Invasive carcinoma measures at least 4 mm.                            3. No lymphovascular space invasion identified.               B. 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 Low-grade cribriform DCIS.     Jat./kds    Electronically signed by Christopher Lara MD on 5/1/2023 at 1311   Synoptic Checklist   Breast Biomarker Reporting Template   Protocol posted: 6/22/2022BREAST: BIOMARKER REPORTING TEMPLATE - All Specimens  Test(s) Performed     Estrogen Receptor (ER) Status  Positive (greater than 10% of cells demonstrate nuclear positivity)   Percentage of Cells with Nuclear Positivity  %   Average Intensity of Staining  Strong   Test Type  Food and Drug Administration (FDA) cleared (test / vendor): [place]   Primary Antibody  SP1   Scoring System  Sugar   Proportion Score  5   Intensity Score  3   Total Sugar Score  8   Test(s) Performed     Progesterone Receptor (PgR) Status  Positive   Percentage of Cells with Nuclear Positivity  51-60%   Average Intensity of Staining  Moderate   Test Type  Food and Drug Administration (FDA) cleared (test / vendor): [place]   Primary Antibody  1E2   Scoring System  Sugar   Proportion Score  4   Intensity Score  2   Total Sugar Score  6   Test(s) Performed     HER2 by Immunohistochemistry  Equivocal (Score 2+)   Percentage of Cells with Uniform Intense Complete Membrane Staining  0 %   Test Type  Food and Drug  Administration (FDA) cleared (test / vendor): [place]   Primary Antibody  4B5   Test(s) Performed  Ki-67   Ki-67 Percentage of Positive Nuclei  8 %   Primary Antibody  30-9   Cold Ischemia and Fixation Times  Meet requirements specified in latest version of the ASCO / CAP Guidelines   Cold Ischemia Time (minutes)  16 min   Fixation Time (hours)  8 hours   Testing Performed on Block Number(s)  1D   METHODS   Fixative  Formalin   Image Analysis  Not performed   Comment(s)  Her 2 FISH not amplified   .      Comment         Final Diagnosis7/21/23  1. Breast, Left 2 O'clock Position, Core Biopsy For Calcifications:  A. Incidental atypical lobular hyperplasia.  B. Microcalcifications identified in association with benign ductal structures and dilated cysts with apocrine  metaplasia.  C. No evidence of in-situ nor invasive malignancy                Final Diagnosis   1. Left Breast, Oriented Needle Localization Lumpectomy (26 grams):                A. Biopsy site changes without evidence of residual carcinoma (see Comment).               B. No atypical hyperplasia nor in situ carcinoma identified.               C. Biopsy site changes are present and the clip retrieved.  D. Hormone receptor status: ER % positive, NM 51-60% positive, HER2 2+ by IHC, negative by FISH,        Ki-67 8% (performed on prior biopsy [id number], slides reviewed).  E. Pathologic stage: pT1a, NX.     2. Left Breast, Additional Superior and Medial Margins, Oriented Excision:               A. Benign unremarkable breast tissue.     3. Left Breast, Additional Inferior and Lateral Margins, Oriented Excision:               A. Benign breast tissue with sclerosing adenosis with associated microcalcifications, usual ductal hyperplasia        and apocrine cysts.     4. Skin, Left Breast at 3 o'clock, Unoriented Excision:               A. Benign skin with dermal chronic active inflammation and changes consistent with prior biopsy.               B. No  atypical hyperplasia, in-situ nor invasive carcinoma identified.     5. Left Breast, 3 o'clock, Oriented Needle Localization Lumpectomy (6 grams):               A. Benign breast tissue with fibroadenomatoid change with associated calcifications and microcalcifications        associated with benign breast ducts.  B. Biopsy site change is present and clip retrieved.  C. No residual atypical hyperplasia, in-situ, nor invasive carcinoma identified (margins clear).     swm/pkm         Assessment & Plan   1.cT1aN0 IDC Grade 2 left breast cancer ER/IN + her 2 -2+ neg FISH post biopsy with a large postbiopsy hematoma - resolving  Additional biopsy 7/21/2023 on left breast at 2:00 shows atypical lobular hyperplasia incidental  Lumpectomy dated 8/20/2023 shows no residual cancer clear margins no lymph nodes were removed pT1aNx   Due to significant osteopenia in both hips Evista was given in 7/23 for prevention and no treatment was felt to be indicated  Tolerating Evista well as of 3/24    2.  Hereditary spherocytosis postsplenectomy age 3  Up-to-date on post splenectomy vaccines    3.  Cerebral aneurysm hereditary post ablation in 2015    4.  Coronary artery disease post coronary artery bypass in 2004    5.  TIA 2016 currently on Plavix    6.  Osteopenia    7.  Large postbiopsy hematoma possibly aggravated by fish oil which she will hold before her surgery    Plan  1.  Continue Evista 60 mg daily   2.  See me in follow-up in 12 months with mammo in 2 weeks bone density in June 2025

## 2025-03-04 ENCOUNTER — OFFICE VISIT (OUTPATIENT)
Dept: FAMILY MEDICINE CLINIC | Facility: CLINIC | Age: 75
End: 2025-03-04
Payer: MEDICARE

## 2025-03-04 VITALS
OXYGEN SATURATION: 99 % | WEIGHT: 146 LBS | TEMPERATURE: 98.3 F | HEART RATE: 74 BPM | BODY MASS INDEX: 24.92 KG/M2 | SYSTOLIC BLOOD PRESSURE: 148 MMHG | HEIGHT: 64 IN | DIASTOLIC BLOOD PRESSURE: 80 MMHG

## 2025-03-04 DIAGNOSIS — Z00.00 MEDICARE ANNUAL WELLNESS VISIT, SUBSEQUENT: Primary | ICD-10-CM

## 2025-03-04 DIAGNOSIS — Z85.3 HISTORY OF BREAST CANCER: ICD-10-CM

## 2025-03-04 DIAGNOSIS — I25.810 CORONARY ARTERY DISEASE INVOLVING OTHER CORONARY ARTERY BYPASS GRAFT, UNSPECIFIED WHETHER ANGINA PRESENT: ICD-10-CM

## 2025-03-04 DIAGNOSIS — E05.90 HYPERTHYROIDISM: ICD-10-CM

## 2025-03-04 DIAGNOSIS — Z86.73 HISTORY OF TIA (TRANSIENT ISCHEMIC ATTACK): ICD-10-CM

## 2025-03-04 DIAGNOSIS — Z98.890 S/P COIL EMBOLIZATION OF CEREBRAL ANEURYSM: ICD-10-CM

## 2025-03-04 DIAGNOSIS — F51.01 PRIMARY INSOMNIA: ICD-10-CM

## 2025-03-04 DIAGNOSIS — E78.2 MIXED HYPERLIPIDEMIA: ICD-10-CM

## 2025-03-04 DIAGNOSIS — I10 ESSENTIAL HYPERTENSION: ICD-10-CM

## 2025-03-04 PROCEDURE — 99214 OFFICE O/P EST MOD 30 MIN: CPT | Performed by: NURSE PRACTITIONER

## 2025-03-04 PROCEDURE — 1159F MED LIST DOCD IN RCRD: CPT | Performed by: NURSE PRACTITIONER

## 2025-03-04 PROCEDURE — 1126F AMNT PAIN NOTED NONE PRSNT: CPT | Performed by: NURSE PRACTITIONER

## 2025-03-04 PROCEDURE — 1160F RVW MEDS BY RX/DR IN RCRD: CPT | Performed by: NURSE PRACTITIONER

## 2025-03-04 PROCEDURE — G0439 PPPS, SUBSEQ VISIT: HCPCS | Performed by: NURSE PRACTITIONER

## 2025-03-04 PROCEDURE — 3079F DIAST BP 80-89 MM HG: CPT | Performed by: NURSE PRACTITIONER

## 2025-03-04 PROCEDURE — 3077F SYST BP >= 140 MM HG: CPT | Performed by: NURSE PRACTITIONER

## 2025-03-04 RX ORDER — EZETIMIBE 10 MG/1
10 TABLET ORAL DAILY
Qty: 90 TABLET | Refills: 1 | Status: SHIPPED | OUTPATIENT
Start: 2025-03-04

## 2025-03-04 RX ORDER — RAMIPRIL 2.5 MG/1
2.5 CAPSULE ORAL NIGHTLY
Qty: 90 CAPSULE | Refills: 1 | Status: SHIPPED | OUTPATIENT
Start: 2025-03-04

## 2025-03-04 NOTE — PROGRESS NOTES
The ABCs of the Annual Wellness Visit  Subsequent Medicare Wellness Visit    Subjective      Maddison Turcios is a 74 y.o. female who presents for a Subsequent Medicare Wellness Visit.    The following portions of the patient's history were reviewed and   updated as appropriate: allergies, current medications, past family history, past medical history, past social history, past surgical history, and problem list.    Compared to one year ago, the patient feels her physical   health is the same.    Compared to one year ago, the patient feels her mental   health is the same.    Recent Hospitalizations:  She was not admitted to the hospital during the last year.       Current Medical Providers:  Patient Care Team:  Head, RENATE Hernandez as PCP - General (Nurse Practitioner)  Helen Urbina MD as Consulting Physician (Cardiology)  Ruben Valderrama MD (Dermatology)  Sara Araiza MD as Consulting Physician (Obstetrics and Gynecology)  Jaleesa Hsieh MD as Referring Physician (Breast Surgery)  Aba White MD as Consulting Physician (Hematology and Oncology)  Toby Maher APRN as Nurse Practitioner (Breast Surgery)  Theron Arnold MD as Surgeon (Orthopedic Surgery)  Colin Ladd MD as Consulting Physician (Gastroenterology)  Omi Oswald MD as Consulting Physician (Neurology)    Outpatient Medications Prior to Visit   Medication Sig Dispense Refill    Cholecalciferol (VITAMIN D3) 5000 UNITS capsule capsule Take 1 capsule by mouth 1 (One) Time Per Week.      clopidogrel (PLAVIX) 75 MG tablet Take 1 tablet by mouth Daily. Indications: Resume on Monday, 8/7/2023. 90 tablet 3    Collagen-Vitamin C-Biotin (COLLAGEN PO) Take  by mouth. 1 scoop powder      FIBER PO Take  by mouth 3 (Three) Times a Week.      icosapent ethyl (VASCEPA) 1 g capsule capsule Take 2 g by mouth 2 (Two) Times a Day With Meals. Indications: Resume on Monday, 8/7/2023. 360 capsule 3     isosorbide dinitrate (ISORDIL) 5 MG tablet TAKE 1 TABLET BY MOUTH TWICE A  tablet 3    Loratadine ([place] ALLERCLEAR PO) Take  by mouth.      Melatonin 10 MG tablet Take 1 tablet by mouth Every Night.      multivitamin (THERAGRAN) tablet tablet Take 1 tablet by mouth Daily. HOLD PER MD INSTR      raloxifene (EVISTA) 60 MG tablet TAKE 1 TABLET BY MOUTH EVERY DAY 90 tablet 2    ezetimibe (ZETIA) 10 MG tablet Take 1 tablet by mouth Daily. 90 tablet 2    ramipril (ALTACE) 2.5 MG capsule Take 1 capsule by mouth Every Night. 90 capsule 1    amitriptyline (ELAVIL) 10 MG tablet TAKE 1 TABLET BY MOUTH EVERYDAY AT BEDTIME (Patient not taking: Reported on 3/4/2025) 90 tablet 1    montelukast (SINGULAIR) 10 MG tablet TAKE 1 TABLET BY MOUTH ONCE NIGHTLY AT BEDTIME 90 tablet 1     No facility-administered medications prior to visit.       No opioid medication identified on active medication list. I have reviewed chart for other potential  high risk medication/s and harmful drug interactions in the elderly.        Aspirin is not on active medication list.  Aspirin use is not indicated based on review of current medical condition/s. Risk of harm outweighs potential benefits.  .    Patient Active Problem List   Diagnosis    Cerebral aneurysm    Coronary artery disease    Mixed hyperlipidemia    Essential hypertension    Hyperthyroidism    Insomnia    Onychomycosis of toenail    Bilateral enlargement of atria    Sinus bradycardia    Transient cerebral ischemia    Skin lesion of right ear    Fatigue    Medicare annual wellness visit, subsequent    Osteopenia    Necrotic hand wound    Family history of breast cancer    S/P CABG (coronary artery bypass graft)    Chest pain    Neck strain, initial encounter    Need for influenza vaccination    Greater trochanteric bursitis of right hip    Prophylactic antibiotic    Nondisplaced fracture of fifth metatarsal bone, right foot, initial encounter for closed fracture    Irritable bowel  "syndrome with both constipation and diarrhea    Personal history of colonic polyps    Encounter for screening for malignant neoplasm of colon    Antiplatelet or antithrombotic long-term use    S/P coil embolization of cerebral aneurysm    Malignant neoplasm of upper-inner quadrant of left breast in female, estrogen receptor positive     Advance Care Planning   Advance Care Planning     Advance Directive is on file.  ACP discussion was held with the patient during this visit. Patient has an advance directive in EMR which is still valid.      Objective    Vitals:    25 1525   BP: 148/80   BP Location: Left arm   Patient Position: Sitting   Cuff Size: Adult   Pulse: 74   Temp: 98.3 °F (36.8 °C)   SpO2: 99%   Weight: 66.2 kg (146 lb)   Height: 162.6 cm (64.02\")   PainSc: 0-No pain     Estimated body mass index is 25.05 kg/m² as calculated from the following:    Height as of this encounter: 162.6 cm (64.02\").    Weight as of this encounter: 66.2 kg (146 lb).      Does the patient have evidence of cognitive impairment?   No    Lab Results   Component Value Date    CHLPL 202 (H) 2025    TRIG 67 2025    HDL 82 (H) 2025     (H) 2025    VLDL 12 2025          HEALTH RISK ASSESSMENT    Smoking Status:  Social History     Tobacco Use   Smoking Status Never    Passive exposure: Never   Smokeless Tobacco Never     Alcohol Consumption:  Social History     Substance and Sexual Activity   Alcohol Use Never    Comment: once or twice a year     Fall Risk Screen:    STEADI Fall Risk Assessment was completed, and patient is at MODERATE risk for falls. Assessment completed on:3/4/2025    Depression Screening:      3/4/2025     3:32 PM   PHQ-2/PHQ-9 Depression Screening   Little interest or pleasure in doing things Not at all   Feeling down, depressed, or hopeless Not at all       Health Habits and Functional and Cognitive Screenin/25/2025    11:41 AM   Functional & Cognitive Status   Do " you have difficulty preparing food and eating? No    Do you have difficulty bathing yourself, getting dressed or grooming yourself? No    Do you have difficulty using the toilet? No    Do you have difficulty moving around from place to place? No    Do you have trouble with steps or getting out of a bed or a chair? No    Current Diet Limited Junk Food    Dental Exam Up to date    Eye Exam Up to date    Exercise (times per week) 5 times per week    Current Exercises Include House Cleaning;Walking;Yard Work    Do you need help using the phone?  No    Are you deaf or do you have serious difficulty hearing?  No    Do you need help to go to places out of walking distance? No    Do you need help shopping? No    Do you need help preparing meals?  No    Do you need help with housework?  No    Do you need help with laundry? No    Do you need help taking your medications? No    Do you need help managing money? No    Do you ever drive or ride in a car without wearing a seat belt? No    Have you felt unusual stress, anger or loneliness in the last month? Yes    Who do you live with? Alone    If you need help, do you have trouble finding someone available to you? No    Have you been bothered in the last four weeks by sexual problems? No    Do you have difficulty concentrating, remembering or making decisions? No        Patient-reported       Age-appropriate Screening Schedule:  Refer to the list below for future screening recommendations based on patient's age, sex and/or medical conditions. Orders for these recommended tests are listed in the plan section. The patient has been provided with a written plan.    Health Maintenance   Topic Date Due    BMI FOLLOWUP  03/04/2025 (Originally 1/27/2023)    PAP SMEAR  04/26/2025 (Originally 3/22/2024)    COVID-19 Vaccine (8 - 2024-25 season) 09/30/2025 (Originally 11/9/2024)    COLORECTAL CANCER SCREENING  01/29/2026    LIPID PANEL  02/25/2026    ANNUAL WELLNESS VISIT  03/04/2026     MAMMOGRAM  03/13/2026    TDAP/TD VACCINES (5 - Td or Tdap) 06/29/2027    HEPATITIS C SCREENING  Completed    INFLUENZA VACCINE  Completed    Pneumococcal Vaccine 50+  Completed    ZOSTER VACCINE  Addressed    DXA SCAN  Discontinued                  CMS Preventative Services Quick Reference  Risk Factors Identified During Encounter:    Hearing Problem:  wears hearing aids  Immunizations Discussed/Encouraged: Influenza and RSV (Respiratory Syncytial Virus)  Dental Screening Recommended  Vision Screening Recommended    The above risks/problems have been discussed with the patient.  Pertinent information has been shared with the patient in the After Visit Summary.    Patient ID: Maddison Turcios is a 74 y.o. female     Patient Care Team:  Head, RENATE Hernandez as PCP - General (Nurse Practitioner)  Helen Urbina MD as Consulting Physician (Cardiology)  Ruben Valderrama MD (Dermatology)  Sara Araiza MD as Consulting Physician (Obstetrics and Gynecology)  Jaleesa Hsieh MD as Referring Physician (Breast Surgery)  Aba White MD as Consulting Physician (Hematology and Oncology)  Toby Maher APRN as Nurse Practitioner (Breast Surgery)  Theron Arnold MD as Surgeon (Orthopedic Surgery)  Colin Ladd MD as Consulting Physician (Gastroenterology)  Omi Oswald MD as Consulting Physician (Neurology)    Subjective     Chief Complaint   Patient presents with    Medicare Wellness-subsequent    Hypertension    Hyperlipidemia    Allergies       History of Present Illness      History of Present Illness  The patient presents for a 6-month checkup and Medicare wellness visit.    She reports no new health concerns. She has experienced weight gain, which she attributes to decreased physical activity during the winter months. She has been experiencing vivid dreams, which have improved since discontinuing amitriptyline and montelukast. She has been adhering to her  prescribed medication regimen, including ramipril for blood pressure management, which she takes at night. She has been monitoring her blood pressure at home, which has remained within normal limits. She has been under increased stress this year due to her full-time employment. She has been receiving allergy injections. She has been using a body pillow to prevent rolling over during sleep. She has been diligent in receiving all recommended vaccines.    She has been managing her allergies with medication. She has discontinued Livalo due to the onset of leg muscle issues, which she reports have resolved. She has been maintaining an active lifestyle, including regular exercise and yard work. She has a hereditary predisposition to high cholesterol. She has not been prescribed any injectable medications in the past for cholesterol.      She has been under the care of Dr. White, with whom she had a consultation yesterday. She has been seeing Dr. Valderrama for dermatology, Dr. Urbina, Dr. Ladd for gastroenterology, Marleen Lanier for GYN, Rosa Maher for breast surgery.    She has a history of breast cancer, diagnosed 2 years ago, and has been taking raloxifene.     She has a history of TIA, which she believes was stress-induced. She has been seeing a neurologist annually and has been following up with a surgeon, with an appointment scheduled for next year.    She has been managing her irritable bowel syndrome (IBS) without medication.    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    FAMILY HISTORY  Her sister has diabetes.    MEDICATIONS  Current: Zetia, ramipril, raloxifene  Discontinued: amitriptyline, montelukast, Livalo    Results  Laboratory Studies  Glucose is 105. BUN, creatinine, and GFR are normal. Sodium, potassium, and chloride are normal. Total bilirubin, phosphate, AST, ALT are normal. Cholesterol levels have slightly increased. HDL is very good. Thyroid  function tests are normal.       She denies any complaints of fever, chills, cough, chest pain, shortness of air, abdominal pain, nausea, or any other concerns.     The following portions of the patient's history were reviewed and updated as appropriate: allergies, current medications, past family history, past medical history, past social history, past surgical history and problem list.       ROS    Vitals:    03/04/25 1525   BP: 148/80   Pulse: 74   Temp: 98.3 °F (36.8 °C)   SpO2: 99%       Documented weights    03/04/25 1525   Weight: 66.2 kg (146 lb)     Body mass index is 25.05 kg/m².    Results for orders placed or performed in visit on 03/03/25   Comprehensive Metabolic Panel    Collection Time: 03/03/25 11:06 AM    Specimen: Blood   Result Value Ref Range    Glucose 104 (H) 65 - 99 mg/dL    BUN 19 8 - 23 mg/dL    Creatinine 0.58 0.57 - 1.00 mg/dL    Sodium 137 136 - 145 mmol/L    Potassium 4.5 3.5 - 5.2 mmol/L    Chloride 101 98 - 107 mmol/L    CO2 28.5 22.0 - 29.0 mmol/L    Calcium 9.7 8.6 - 10.5 mg/dL    Total Protein 7.0 6.0 - 8.5 g/dL    Albumin 4.3 3.5 - 5.2 g/dL    ALT (SGPT) 16 1 - 33 U/L    AST (SGOT) 24 1 - 32 U/L    Alkaline Phosphatase 61 39 - 117 U/L    Total Bilirubin 0.7 0.0 - 1.2 mg/dL    Globulin 2.7 gm/dL    A/G Ratio 1.6 g/dL    BUN/Creatinine Ratio 32.8 (H) 7.0 - 25.0    Anion Gap 7.5 5.0 - 15.0 mmol/L    eGFR 95.7 >60.0 mL/min/1.73   CBC Auto Differential    Collection Time: 03/03/25 11:06 AM    Specimen: Blood   Result Value Ref Range    WBC 8.94 3.40 - 10.80 10*3/mm3    RBC 3.99 3.77 - 5.28 10*6/mm3    Hemoglobin 12.9 12.0 - 15.9 g/dL    Hematocrit 36.7 34.0 - 46.6 %    MCV 92.0 79.0 - 97.0 fL    MCH 32.3 26.6 - 33.0 pg    MCHC 35.1 31.5 - 35.7 g/dL    RDW 12.1 (L) 12.3 - 15.4 %    RDW-SD 40.7 37.0 - 54.0 fl    MPV 9.0 6.0 - 12.0 fL    Platelets 314 140 - 450 10*3/mm3    Neutrophil % 60.4 42.7 - 76.0 %    Lymphocyte % 28.4 19.6 - 45.3 %    Monocyte % 7.2 5.0 - 12.0 %    Eosinophil % 2.7  0.3 - 6.2 %    Basophil % 0.9 0.0 - 1.5 %    Immature Grans % 0.4 0.0 - 0.5 %    Neutrophils, Absolute 5.40 1.70 - 7.00 10*3/mm3    Lymphocytes, Absolute 2.54 0.70 - 3.10 10*3/mm3    Monocytes, Absolute 0.64 0.10 - 0.90 10*3/mm3    Eosinophils, Absolute 0.24 0.00 - 0.40 10*3/mm3    Basophils, Absolute 0.08 0.00 - 0.20 10*3/mm3    Immature Grans, Absolute 0.04 0.00 - 0.05 10*3/mm3    nRBC 0.0 0.0 - 0.2 /100 WBC           Objective     Physical Exam  Vitals reviewed.   Constitutional:       General: She is not in acute distress.  HENT:      Head: Normocephalic and atraumatic.      Right Ear: Tympanic membrane normal. Decreased hearing noted.      Left Ear: Tympanic membrane normal. Decreased hearing noted.      Ears:      Comments: Bilateral hearing aids     Nose: No congestion.   Eyes:      Extraocular Movements: Extraocular movements intact.      Pupils: Pupils are equal, round, and reactive to light.   Cardiovascular:      Rate and Rhythm: Normal rate and regular rhythm.      Heart sounds: Murmur heard.   Pulmonary:      Effort: Pulmonary effort is normal.      Breath sounds: Normal breath sounds. No wheezing or rhonchi.   Abdominal:      General: Bowel sounds are normal. There is no distension.      Palpations: Abdomen is soft. There is no hepatomegaly or splenomegaly.      Tenderness: There is no abdominal tenderness.   Musculoskeletal:      Cervical back: Normal range of motion.      Right lower leg: No edema.      Left lower leg: No edema.   Lymphadenopathy:      Cervical: No cervical adenopathy.   Neurological:      Mental Status: She is alert and oriented to person, place, and time.   Psychiatric:         Mood and Affect: Mood normal.         Behavior: Behavior normal.         Physical Exam    Vital Signs  Blood pressure is 148/80, repeat blood pressure is 130/84.    Assessment & Plan     Assessment/Plan     Assessment & Plan  1. Medicare wellness visit.  Her blood pressure was initially recorded as 148/80  but decreased to 130/84 upon re-evaluation. Her glucose level is 105, which does not indicate diabetes. Her BUN, creatinine, and GFR levels are within normal ranges, suggesting good kidney function. Her sodium, potassium, and chloride levels are also within normal limits. Liver function tests, including total bilirubin, phosphate, AST, and ALT, are all normal. Her cholesterol levels have slightly increased since the last evaluation, but her HDL levels remain high. Thyroid function tests are within normal limits. She has been experiencing vivid dreams, which have improved since discontinuing amitriptyline and montelukast. She has been managing her allergies without medication. She has discontinued Livalo due to the onset of leg muscle issues, which have since resolved. She has been maintaining an active lifestyle, including regular exercise and yard work. She has a hereditary predisposition to high cholesterol. She has been under the care of Dr. White, with whom she had a consultation yesterday. She has been seeing Dr. Valderrama for dermatology, Dr. Urbina, Dr. Ladd for gastroenterology, Marleen Lanier for GYN, Rosa Maher for breast surgery, and a neurologist annually. She has a history of breast cancer, diagnosed 2 years ago, and has been taking raloxifene. She has a history of TIA, which she believes was stress-induced. She has been seeing a neurologist annually and has been following up with a surgeon due to aneurysm, with an appointment scheduled for next year. She has been managing her irritable bowel syndrome (IBS) without medication. She has been adhering to her prescribed medication regimen, including ramipril for blood pressure management, which she takes at night. She has been monitoring her blood pressure at home, which has remained within normal limits. She has been under increased stress this year due to her full-time employment. She has been receiving allergy injections. She has been using a  body pillow to prevent rolling over during sleep. She has been diligent in receiving all recommended vaccines. She has been advised to continue her current medication regimen, including ramipril, Zetia, and raloxifene. She has been instructed to inform her cardiologist about the discontinuation of Livalo.    2. Hyperlipidemia.  Her cholesterol levels have slightly increased since discontinuing Livalo 6 months ago due to muscle issues. She has been taking Zetia.  Her HDL levels are very good, which is favorable. She has been advised to continue Zetia.      3. Hypertension.  Her blood pressure was initially 148/80 but decreased to 130/84 after resting. Dr. Urbina prefers it to be 120/80 or less. She has been advised to continue taking ramipril at night and monitor her blood pressure at home.    4. Irritable Bowel Syndrome (IBS).  She has been doing well without taking amitriptyline. No changes in treatment are necessary at this time.    5. History of Breast Cancer.  She has been on raloxifene, which has been managed by Dr. White. She has been advised to continue her follow-ups with Dr. White and ensure her mammogram is scheduled.    6. Transient Ischemic Attack (TIA) and aneurysm  She has been seeing a neurologist annually and has been advised to continue these visits to stay in the system. No new symptoms have been reported.    Follow-up  The patient will follow up in 6 months.    Diagnoses and all orders for this visit:    1. Medicare annual wellness visit, subsequent (Primary)    2. Mixed hyperlipidemia  -     ezetimibe (ZETIA) 10 MG tablet; Take 1 tablet by mouth Daily.  Dispense: 90 tablet; Refill: 1  -     CBC (No Diff); Future  -     Comprehensive Metabolic Panel; Future  -     Lipid Panel With / Chol / HDL Ratio; Future  -     TSH Rfx On Abnormal To Free T4; Future    3. Essential hypertension  -     ramipril (ALTACE) 2.5 MG capsule; Take 1 capsule by mouth Every Night.  Dispense: 90 capsule; Refill: 1  -      CBC (No Diff); Future  -     Comprehensive Metabolic Panel; Future  -     Lipid Panel With / Chol / HDL Ratio; Future  -     TSH Rfx On Abnormal To Free T4; Future    4. Primary insomnia    5. History of TIA (transient ischemic attack)  -     CBC (No Diff); Future  -     Comprehensive Metabolic Panel; Future  -     Lipid Panel With / Chol / HDL Ratio; Future    6. Coronary artery disease involving other coronary artery bypass graft, unspecified whether angina present  -     CBC (No Diff); Future  -     Comprehensive Metabolic Panel; Future  -     Lipid Panel With / Chol / HDL Ratio; Future    7. Hyperthyroidism  -     TSH Rfx On Abnormal To Free T4; Future    8. History of breast cancer    9. S/P coil embolization of cerebral aneurysm          Follow Up:  Return in about 6 months (around 9/4/2025) for Recheck with fasting labs week before.  .    In the meantime, instructed to contact us sooner for any problems or concerns.    Patient was given instructions and counseling regarding condition or for health maintenance advice.  Please see specific information pulled into the AVS if appropriate.      Patient or patient representative verbalized consent for the use of Ambient Listening during the visit with  RENATE Loo for chart documentation. 3/4/2025  17:08 RENATE Preston  Family Medicine  [facility]  03/04/25  17:09 EST

## 2025-03-14 RX ORDER — ISOSORBIDE DINITRATE 5 MG/1
5 TABLET ORAL 2 TIMES DAILY
Qty: 180 TABLET | Refills: 1 | Status: SHIPPED | OUTPATIENT
Start: 2025-03-14

## 2025-03-17 ENCOUNTER — HOSPITAL ENCOUNTER (OUTPATIENT)
Dept: MAMMOGRAPHY | Facility: HOSPITAL | Age: 75
Discharge: HOME | End: 2025-03-17
Admitting: NURSE PRACTITIONER
Payer: MEDICARE

## 2025-03-17 DIAGNOSIS — Z12.31 ENCOUNTER FOR SCREENING MAMMOGRAM FOR MALIGNANT NEOPLASM OF BREAST: ICD-10-CM

## 2025-03-17 PROCEDURE — 77067 SCR MAMMO BI INCL CAD: CPT

## 2025-03-17 PROCEDURE — 77067 SCR MAMMO BI INCL CAD: CPT | Performed by: RADIOLOGY

## 2025-03-17 PROCEDURE — 77063 BREAST TOMOSYNTHESIS BI: CPT | Performed by: RADIOLOGY

## 2025-03-17 PROCEDURE — 77063 BREAST TOMOSYNTHESIS BI: CPT

## 2025-03-21 NOTE — PROGRESS NOTES
[facility]     Referring Provider: No ref. provider found     Chief complaint: left breast cancer follow up     Subjective   HPI:   5/18/2023:  Ms. Maddison Turcios is a 72 yo woman, seen at the request of Kasandra Mcdowell PA-C, for a new diagnosis of left breast cancer. This was initially detected as an imaging abnormality on routine screening. Her work-up is detailed in the oncologic history below.  Prior to the biopsy, she denies any breast lumps, pain, skin changes, or nipple discharge.  However after the biopsy, she developed a very large hematoma in her upper left breast.  She has a past history of 2 benign right breast surgical biopsies in the 1970s.  Her past history is significant for CAD sp CABG in 2004, as well as a TIA of unclear etiology in 2016 for which she is on Plavix.  She had a normal echo and stress test in 2022.  She has a family history of breast cancer in a sister (diagnosed in at age 66).      6/30/2023:  At her last visit, I aspirated a large postbiopsy hematoma.  She saw Dr. White, but we decided to hold off on preoperative endocrine therapy since surgery was not going to be delayed as long as we initially thought.  Genetic testing was negative for mutation.  She also saw cardiology and was cleared for surgery.  Prior to this visit, she underwent a repeat left mammogram to assess hematoma resolution and this demonstrated a new separate group of calcifications in the upper left breast that was not seen on her initial imaging.      8/10/2023  Prior to surgery she underwent an additional left stereotactic biopsy which showed incidental ALH and we decided to also excise this site.  She underwent left partial mastectomy and excisional biopsy on 8/1/23 (with no residual disease, see pathology details below). She has been recovering well and has no complaints.     11/7/2023   Patient presenting to the office today for routine follow-up.  She last saw her oncologist in August with no changes  made to the treatment plan.  She is currently on Evista and tolerating well.  She had a consultation with radiation oncology in late August and the patient decided to not proceed with radiation therapy.  She has no new breast complaints today.    3/19/2024  Presenting to the office today for routine follow-up.  On 3/13/2024 she had a bilateral screening mammogram that resulted as BI-RADS 2.  She last saw her oncologist earlier this month with no changes made to the treatment plan.  Is currently on Evista and tolerating that well. No new breast issues.    9/24/2024  Patient presenting to the office today for routine follow-up.  She has not seen her oncologist since March but she has a follow-up with them next March.  She is still on Evista and tolerating it well.  She has no new breast complaints or concerns today.    3/24/2025 interval history  Patient presents today for routine follow-up and exam.  Completed bilateral screening mammogram on 3/17/2025 resulting in BI-RADS 2.  Oncology on 3/3/2025, she will continue with Evista.  She is tolerating this well.  She has no new breast complaints or concerns.    Oncology/Hematology History   Malignant neoplasm of upper-inner quadrant of left breast in female, estrogen receptor positive   3/3/2022 Imaging    Screening MMG with [facility] ( [place])  FINDINGS: The breasts are heterogeneously dense, which may obscure small masses. There are no masses or suspicious calcifications.  BI-RADS 2: Benign Findings     3/7/2023 Initial Diagnosis    Malignant neoplasm of upper-inner quadrant of left breast in female, estrogen receptor positive     3/8/2023 Imaging    Screening MMG with [facility] ( [place])  Heterogeneously dense  There is a subtle small focus of ill-defined stellate sub centimeter asymmetric opacity in the deep upper left breast just medial to midline, 8 to 9 cm from the nipple, not visible on prior studies. Recall for supplemental diagnostic mammogram images is  recommended. Breast ultrasound may be added at that time if necessary.  The remainder the examination is negative and unchanged.  BI-RADS 0: Needs additional evaluation.     4/11/2023 Imaging    Left Diagnostic MMG with [facility] & Left Breast Limited US ([facility])  MMG:   Tiny nodular opacity measuring about 4 mm with the surrounding stellate architectural distortion is confirmed in the upper inner left breast along the 11:00 axis, 8 to 9 cm from the nipple. One or two tiny punctate calcifications within the nodule. The mammographic appearance is suspicious.  US:   The lesion is very difficult to visualize by ultrasound. There is a subtle isoechoic focus with inconsistent acoustic shadowing positioned along the 11:00 axis about 8 cm from the nipple measuring about 4 mm achieving accurate ultrasound-guided core biopsy of this focus would not be reliable.  BI-RADS 4: Suspicious     4/28/2023 Biopsy    Left Breast, Stereotactic Biopsy ( [facility]):    1. Left Breast at 11:00 o'clock, 8-9 cm from Nipple (for calcifications), Stereotactic Core Biopsy:                 A. Invasive ductal carcinoma:                              1. Overall Debbie grade II (tubular score=3, nuclear score=2, mitotic score=1).                            2. Invasive carcinoma measures at least 4 mm.                            3. No lymphovascular space invasion identified.               B. 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 Low-grade cribriform DCIS.    ER positive (%, strong)  IA positive (51-60%, moderate)  HER2 negative (IHC 2+; FISH copy #3.2, ratio 1.4)  Ki-67 8%     5/15/2023 Imaging    Bilateral Breast MRI ( [facility]):  RIGHT BREAST:    Benign-appearing postsurgical changes are identified in the right breast. No suspicious enhancing mass or area of non-mass enhancement is identified. There is intrinsic T1 hyperintense signal within multiple ducts in the anterior and middle retroareolar right breast,  consistent with proteinaceous and/or hemorrhagic contents.  The visualized axilla is within normal limits.    LEFT BREAST:    At 11:00 in the posterior left breast, centered 8.5 cm posterior to the nipple, there is a 3.5 cm AP dimension, 3.5 cm transverse dimension, 4.0 cm craniocaudal dimension predominantly T1 hyperintense mass/collection consistent with a postbiopsy hematoma. A focus of susceptibility from a biopsy clip along the anterior, superior, lateral margin of the hematoma marks the site of biopsy-proven malignancy. No suspicious enhancing mass or area of non-mass enhancement is identified at the biopsy site or ulcer within the left breast. No suspicious enhancement is identified in the left nipple or chest wall.  The visualized axilla is within normal limits.    EXTRAMAMMARY FINDINGS:   There are no pathologically enlarged internal mammary chain lymph nodes on either side.     There is susceptibility from sternotomy wires.  BI-RADS 6: Known malignancy.     5/18/2023 Genetic Testing    [employer] Common Hereditary Cancers Panel (47 genes):  Negative     5/24/2023 Cancer Staged    Staging form: Breast, AJCC 8th Edition  - Clinical: Stage IA (cT1a, cN0, cM0, G2, ER+, WA+, HER2-) - Signed by Aba White MD on 3/4/2024     6/20/2023 Imaging    Left Diagnostic MMG with [facility] ([facility]):  In the upper slightly inner posterior left breast, there is an approximately 1.3 cm focal asymmetry with architectural distortion and a central biopsy clip, which represents the site of biopsy-proven malignancy. The focal asymmetry likely reflects a combination of malignancy and residual post biopsy hematoma, which has significantly improved, previously measuring up to 4.0 cm on recent MRI.   In the slightly upper outer posterior left breast, there is a 1 cm group of amorphous calcifications, which does not layer on the lateral view and is not seen on more remote prior mammograms. These are different in configuration  compared to additional benign-appearing calcifications in the left breast and are suspicious.  BI-RADS 4: Suspicious.     7/21/2023 Biopsy    Left Breast, Stereotactic Biopsy ([facility]):    1. Breast, Left 2 O'clock Position, Core Biopsy For Calcifications:                 A. Incidental atypical lobular hyperplasia.   B. Microcalcifications identified in association with benign ductal structures and dilated cysts with apocrine metaplasia.               C. No evidence of in-situ nor invasive malignancy.  -Bowtie clip.      8/1/2023 Surgery    Left needle-localized partial mastectomy and left breast needle-localized excisional biopsy:    1. Left Breast, Oriented Needle Localization Lumpectomy (26 grams):                A. Biopsy site changes without evidence of residual carcinoma (see Comment).               B. No atypical hyperplasia nor in situ carcinoma identified.               C. Biopsy site changes are present and the clip retrieved.  D. Hormone receptor status: ER % positive, OR 51-60% positive, HER2 2+ by IHC, negative by FISH, Ki-67 8% (performed on prior biopsy [id number], slides reviewed).  E. Pathologic stage: pT1a, NX.     2. Left Breast, Additional Superior and Medial Margins, Oriented Excision:               A. Benign unremarkable breast tissue.     3. Left Breast, Additional Inferior and Lateral Margins, Oriented Excision:               A. Benign breast tissue with sclerosing adenosis with associated microcalcifications, usual ductal hyperplasia and apocrine cysts.     4. Skin, Left Breast at 3 o'clock, Unoriented Excision:               A. Benign skin with dermal chronic active inflammation and changes consistent with prior biopsy.               B. No atypical hyperplasia, in-situ nor invasive carcinoma identified.     5. Left Breast, 3 o'clock, Oriented Needle Localization Lumpectomy (6 grams):               A. Benign breast tissue with fibroadenomatoid change with associated calcifications and  microcalcifications associated with benign breast ducts.  B. Biopsy site change is present and clip retrieved.  C. No residual atypical hyperplasia, in-situ, nor invasive carcinoma identified (margins clear).     3/13/2024 Imaging    Bilateral screening mammogram at [facility]  FINDINGS:    Breast Density: The breasts are heterogeneously dense, which may obscure  small masses.  Postsurgical changes from left lumpectomy are noted.  There are no suspicious masses, calcifications or areas of distortion  seen. There are no findings to suggest malignancy at this time.     IMPRESSION:  No mammographic evidence of malignancy.     RECOMMENDATION: Screening mammogram in 1 year is recommended.      BI-RADS Category 2: Benign.     3/17/2025 Imaging    Bilateral screening mammogram at [facility]  FINDINGS:   The breast tissue is heterogeneously dense, which may  obscure small masses. No suspicious masses, microcalcifications or areas  of architectural  distortion are identified. Right breast post  excisional biopsy changes and left breast postlumpectomy changes are  stable.  IMPRESSION:  Benign screening mammogram.  RECOMMENDATION:  Continue annual screening mammography.  BI-RADS CATEGORY 2, BENIGN.         Review of Systems:  See interval history.       Medications:    Current Outpatient Medications:     Cholecalciferol (VITAMIN D3) 5000 UNITS capsule capsule, Take 1 capsule by mouth 1 (One) Time Per Week., Disp: , Rfl:     clopidogrel (PLAVIX) 75 MG tablet, Take 1 tablet by mouth Daily. Indications: Resume on Monday, 8/7/2023., Disp: 90 tablet, Rfl: 3    Collagen-Vitamin C-Biotin (COLLAGEN PO), Take  by mouth. 1 scoop powder, Disp: , Rfl:     ezetimibe (ZETIA) 10 MG tablet, Take 1 tablet by mouth Daily., Disp: 90 tablet, Rfl: 1    FIBER PO, Take  by mouth 3 (Three) Times a Week., Disp: , Rfl:     icosapent ethyl (VASCEPA) 1 g capsule capsule, Take 2 g by mouth 2 (Two) Times a Day With Meals. Indications: Resume on Monday,  8/7/2023., Disp: 360 capsule, Rfl: 3    isosorbide dinitrate (ISORDIL) 5 MG tablet, TAKE 1 TABLET BY MOUTH TWICE A DAY, Disp: 180 tablet, Rfl: 1    Loratadine (KS ALLERCLEAR PO), Take  by mouth., Disp: , Rfl:     Melatonin 10 MG tablet, Take 1 tablet by mouth Every Night., Disp: , Rfl:     multivitamin (THERAGRAN) tablet tablet, Take 1 tablet by mouth Daily. HOLD PER MD INSTR, Disp: , Rfl:     raloxifene (EVISTA) 60 MG tablet, TAKE 1 TABLET BY MOUTH EVERY DAY, Disp: 90 tablet, Rfl: 2    ramipril (ALTACE) 2.5 MG capsule, Take 1 capsule by mouth Every Night., Disp: 90 capsule, Rfl: 1      Allergies   Allergen Reactions    Adhesive Tape Rash     Redness, bruising and peeling of skin    *Use Paper Tape Only*  Redness, bruising and peeling of skin    *Use Paper Tape Only*    Beta Adrenergic Blockers Unknown - High Severity     hypotension  bradycardic    Statins Myalgia    Livalo [Pitavastatin] Myalgia    Elemental Sulfur Rash    Sulfa Antibiotics Rash       Family History   Problem Relation Age of Onset    Heart attack Mother     Heart failure Mother     Hypertension Mother     Stroke Mother     Cervical cancer Mother     Deep vein thrombosis Mother     Hypertension Father     Heart failure Father     Heart disease Father     Alcohol abuse Father     Aneurysm Sister     Clotting disorder Sister     Anxiety disorder Sister     Hearing loss Sister     Cancer Sister         Lymphoma    Breast cancer Sister     Diabetes Sister     Hypertension Sister     Cancer Sister         Bteast    Heart attack Brother     Cancer Brother     Lung cancer Brother     Alcohol abuse Brother     No Known Problems Daughter     No Known Problems Son     Ovarian cancer Neg Hx     Colon cancer Neg Hx     Pulmonary embolism Neg Hx     Malig Hyperthermia Neg Hx     Uterine cancer Neg Hx        Objective   PHYSICAL EXAMINATION:   There were no vitals filed for this visit.      ECOG 0 - Asymptomatic  General: NAD, well appearing  Psych: a&o x3,  normal mood and affect  Eyes: EOMI, no scleral icterus  ENMT: neck supple without masses or thyromegaly, mucous membranes moist  MSK: normal gait, normal ROM in bilateral shoulders  Lymph nodes: no cervical, supraclavicular or axillary lymphadenopathy  Breast: symmetric, moderate size, grade 3 ptosis, sternal scar   Right: No visible abnormalities on inspection while seated, with arms raised or hands on hips.  No masses skin changes or nipple abnormalities  Left: Well-healed superior horizontal and lateral curvilinear incisions.  No visible abnormalities on inspection while seated, with arms raised or hands on hips.  No masses, skin changes or nipple abnormalities        Assessment & Plan   Assessment:   74 y.o. F with a diagnosis of left breast cancer: Intermediate grade, invasive ductal carcinoma, ER/AL positive, Her2 negative. She developed a large postbiopsy hematoma and surgery was delayed to allow this to resolve.  Preoperative follow-up imaging then demonstrated a separate area of calcifications.  This was biopsied and showed incidental atypical lobular hyperplasia (ALH). She underwent left partial mastectomy and excisional biopsy on 8/1/23, pT1aNx (no residual disease in surgical specimen, 4 mm on core).    Plan:  -cont with Dr. Leon as needed   -Continue follow-up with Dr. Wihte.  -mammo and exam in 1 year  -monthly self breast exams  -rto if any new issues    [employer]EN[employer] Cleaning      CC:  No ref. provider found

## 2025-03-24 ENCOUNTER — OFFICE VISIT (OUTPATIENT)
Dept: SURGERY | Facility: CLINIC | Age: 75
End: 2025-03-24
Payer: MEDICARE

## 2025-03-24 VITALS
BODY MASS INDEX: 24.92 KG/M2 | SYSTOLIC BLOOD PRESSURE: 134 MMHG | DIASTOLIC BLOOD PRESSURE: 84 MMHG | HEART RATE: 74 BPM | HEIGHT: 64 IN | OXYGEN SATURATION: 95 % | WEIGHT: 146 LBS

## 2025-03-24 DIAGNOSIS — C50.212 MALIGNANT NEOPLASM OF UPPER-INNER QUADRANT OF LEFT BREAST IN FEMALE, ESTROGEN RECEPTOR POSITIVE: ICD-10-CM

## 2025-03-24 DIAGNOSIS — Z17.0 MALIGNANT NEOPLASM OF UPPER-INNER QUADRANT OF LEFT BREAST IN FEMALE, ESTROGEN RECEPTOR POSITIVE: ICD-10-CM

## 2025-03-24 DIAGNOSIS — Z12.31 ENCOUNTER FOR SCREENING MAMMOGRAM FOR MALIGNANT NEOPLASM OF BREAST: Primary | ICD-10-CM

## 2025-03-24 PROCEDURE — 1159F MED LIST DOCD IN RCRD: CPT | Performed by: NURSE PRACTITIONER

## 2025-03-24 PROCEDURE — 99213 OFFICE O/P EST LOW 20 MIN: CPT | Performed by: NURSE PRACTITIONER

## 2025-03-24 PROCEDURE — 3079F DIAST BP 80-89 MM HG: CPT | Performed by: NURSE PRACTITIONER

## 2025-03-24 PROCEDURE — 3075F SYST BP GE 130 - 139MM HG: CPT | Performed by: NURSE PRACTITIONER

## 2025-03-24 PROCEDURE — 1160F RVW MEDS BY RX/DR IN RCRD: CPT | Performed by: NURSE PRACTITIONER

## 2025-03-24 PROCEDURE — G2211 COMPLEX E/M VISIT ADD ON: HCPCS | Performed by: NURSE PRACTITIONER

## 2025-03-27 ENCOUNTER — OFFICE VISIT (OUTPATIENT)
Dept: GASTROENTEROLOGY | Facility: CLINIC | Age: 75
End: 2025-03-27
Payer: MEDICARE

## 2025-03-27 VITALS
WEIGHT: 139.8 LBS | SYSTOLIC BLOOD PRESSURE: 126 MMHG | DIASTOLIC BLOOD PRESSURE: 80 MMHG | HEIGHT: 64 IN | BODY MASS INDEX: 23.87 KG/M2

## 2025-03-27 DIAGNOSIS — K58.2 IRRITABLE BOWEL SYNDROME WITH BOTH CONSTIPATION AND DIARRHEA: ICD-10-CM

## 2025-03-27 DIAGNOSIS — K59.04 CHRONIC IDIOPATHIC CONSTIPATION: ICD-10-CM

## 2025-03-27 DIAGNOSIS — Z86.0100 HISTORY OF COLONIC POLYPS: Primary | ICD-10-CM

## 2025-03-27 NOTE — PROGRESS NOTES
"    PATIENT INFORMATION  Maddison Turcios       - 1950    CHIEF COMPLAINT  Chief Complaint   Patient presents with    Constipation    Diarrhea    Bloated       HISTORY OF PRESENT ILLNESS  Her for \" annual follow up but really due to her polyps which were 2021  So her recal ankur 2026 can be bumped to 2028.    Mild constipation with firm stool and incomplete evacuation and can skip up to 3 days witout a BM- her only rescue is SS.                  REVIEWED PERTINENT RESULTS/ LABS  Lab Results   Component Value Date    CASEREPORT  2023     Surgical Pathology Report                         Case: [id number]                                  Authorizing Provider:  Jaleesa Hsieh MD    Collected:           2023 12:32 PM          Ordering Location:     [facility]  Received:            2023 01:24 PM                                 MAIN OR                                                                      Pathologist:           Nicolasa Estrella MD                                                    Specimens:   1) - Breast, Left, Left partial mastectomy, ink marks margin - fresh for permanent                  2) - Breast, Left, Left partial mastectomy, additional superior and medial margin,                  ink marks margin - fresh for permanent                                                              3) - Breast, Left, Left partial mastectomy, additional inferior and lateral margins,                ink marks margin - fresh for permanent                                                              4) - Breast, Left, Left breast skin 3 o'clock mammotomy site - fresh for permanent                  5) - Breast, Left, Left breast excisional biopsy at 3 o' clock - ink marks margin -                 fresh for permanent                                                                        FINALDX  2023     1. Left Breast, Oriented Needle Localization Lumpectomy (26 " grams):    A. Biopsy site changes without evidence of residual carcinoma (see Comment).   B. No atypical hyperplasia nor in situ carcinoma identified.   C. Biopsy site changes are present and the clip retrieved.  D. Hormone receptor status: ER % positive, RI 51-60% positive, HER2 2+ by IHC, negative by FISH,        Ki-67 8% (performed on prior biopsy [id number], slides reviewed).  E. Pathologic stage: pT1a, NX.    2. Left Breast, Additional Superior and Medial Margins, Oriented Excision:   A. Benign unremarkable breast tissue.    3. Left Breast, Additional Inferior and Lateral Margins, Oriented Excision:   A. Benign breast tissue with sclerosing adenosis with associated microcalcifications, usual ductal hyperplasia        and apocrine cysts.    4. Skin, Left Breast at 3 o'clock, Unoriented Excision:   A. Benign skin with dermal chronic active inflammation and changes consistent with prior biopsy.   B. No atypical hyperplasia, in-situ nor invasive carcinoma identified.    5. Left Breast, 3 o'clock, Oriented Needle Localization Lumpectomy (6 grams):   A. Benign breast tissue with fibroadenomatoid change with associated calcifications and microcalcifications        associated with benign breast ducts.  B. Biopsy site change is present and clip retrieved.  C. No residual atypical hyperplasia, in-situ, nor invasive carcinoma identified (margins clear).    swm/pkm        Lab Results   Component Value Date    HGB 12.9 03/03/2025    MCV 92.0 03/03/2025     03/03/2025    ALT 16 03/03/2025    AST 24 03/03/2025    TRIG 67 02/25/2025      Mammo Screening Digital Tomosynthesis Bilateral With CAD  Result Date: 3/17/2025  Narrative: DIGITAL SCREENING MAMMOGRAM WITH TOMOSYNTHESIS  HISTORY: Screening Mammography.  Low dose full field digital breast tomosynthesis imaging was performed with 2D and 3D acquisitions consisting of bilateral CC and MLO views. Examination is compared to prior examination dating back to  3/3/2020. Examination is read in conjunction with computer aided detection.  FINDINGS:   The breast tissue is heterogeneously dense, which may obscure small masses. No suspicious masses, microcalcifications or areas of architectural  distortion are identified. Right breast post excisional biopsy changes and left breast postlumpectomy changes are stable.      Impression: Benign screening mammogram.  RECOMMENDATION:  Continue annual screening mammography.  BI-RADS CATEGORY 2, BENIGN.   CAD was utilized.  The standard false-negative rate of mammography is between 10% and 25%. Complex patterns or increased breast density will markedly elevate the false-negative rate of mammography.    A letter, in lay terminology, with the results of this exam will be mailed to the patient.  3/17/2025 3:40 PM by Dr. Liu Pressley MD on Workstation: [employer]        REVIEW OF SYSTEMS  Review of Systems   Constitutional:  Negative for activity change, chills, fever and unexpected weight change.   HENT:  Negative for congestion.    Eyes:  Negative for visual disturbance.   Respiratory:  Negative for shortness of breath.    Cardiovascular:  Negative for chest pain and palpitations.   Gastrointestinal:  Positive for abdominal distention, constipation and diarrhea. Negative for abdominal pain and blood in stool.   Endocrine: Negative for cold intolerance and heat intolerance.   Genitourinary:  Negative for hematuria.   Musculoskeletal:  Negative for gait problem.   Skin:  Negative for color change.   Allergic/Immunologic: Negative for immunocompromised state.   Neurological:  Negative for weakness and light-headedness.   Hematological:  Negative for adenopathy.   Psychiatric/Behavioral:  Negative for sleep disturbance. The patient is not nervous/anxious.          ACTIVE PROBLEMS  Patient Active Problem List    Diagnosis     Chronic idiopathic constipation [K59.04]     Malignant neoplasm of upper-inner quadrant of left breast in female,  estrogen receptor positive [C50.212, Z17.0]     Antiplatelet or antithrombotic long-term use [Z79.02]     S/P coil embolization of cerebral aneurysm [Z98.890]     Encounter for screening for malignant neoplasm of colon [Z12.11]     Irritable bowel syndrome with both constipation and diarrhea [K58.2]     Personal history of colonic polyps [Z86.0100]     Nondisplaced fracture of fifth metatarsal bone, right foot, initial encounter for closed fracture [S92.354A]     Prophylactic antibiotic [Z79.2]     Greater trochanteric bursitis of right hip [M70.61]     Need for influenza vaccination [Z23]     Neck strain, initial encounter [S16.1XXA]     Chest pain [R07.9]     S/P CABG (coronary artery bypass graft) [Z95.1]     Family history of breast cancer [Z80.3]     Necrotic hand wound [S61.409A, I96]     Osteopenia [M85.80]     Medicare annual wellness visit, subsequent [Z00.00]     Fatigue [R53.83]     Skin lesion of right ear [H61.91]     Transient cerebral ischemia [G45.9]     Bilateral enlargement of atria [I51.7]     Sinus bradycardia [R00.1]     Cerebral aneurysm [I67.1]     Coronary artery disease [I25.10]     Mixed hyperlipidemia [E78.2]     Essential hypertension [I10]     Hyperthyroidism [E05.90]     Insomnia [G47.00]     Onychomycosis of toenail [B35.1]          PAST MEDICAL HISTORY  Past Medical History:   Diagnosis Date    Abnormal blood chemistry     Acute UTI (urinary tract infection)     Allergic     Anemia     Anesthesia complication     SLOW TO WAKE UP    Aneurysm     Aneurysm, cerebral     Antiplatelet or antithrombotic long-term use 10/20/2021    Arthritis     Hands and spine    Baker's cyst     Bloating     Brain aneurysm     Bursitis     Left hip    CAD (coronary artery disease)     Cholelithiasis     Cholesterol blood reduced 2004    Chronic headaches     Colon polyp     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    Coronary artery disease     Coronary artery stenosis     Dysuria     Encounter for  screening colonoscopy     Environmental allergies     Fall from slip, trip, or stumble     Fractures     Gait disturbance     Ganglion cyst     H/O cardiovascular stress test 09/17/2013    Treadmill    H/O colonoscopy     H/O echocardiogram 09/25/2013    H/O fall     Heart murmur     Hereditary spherocytosis     History of EKG 07/31/2015    Hyperlipemia     Hyperlipidemia     Hypertension     Hyperthyroidism     Injury of back     Insomnia     Irritable bowel syndrome     Left forearm pain     Left leg pain     Left wrist pain     Lower abdominal pain     Malignant neoplasm of upper-inner quadrant of left breast in female, estrogen receptor positive 05/18/2023    Need for pneumococcal vaccination     Onychomycosis of toenail     Osteopenia 2023    Pelvic pressure in female     PONV (postoperative nausea and vomiting)     Right foot pain     Seasonal allergies 1998    Mold ,cats    Stroke     TIA (transient ischemic attack) 11/30/2016    URI (upper respiratory infection)     Urine frequency          SURGICAL HISTORY  Past Surgical History:   Procedure Laterality Date    BREAST BIOPSY  08/2023    Left    BREAST EXCISIONAL BIOPSY Right 1977    b9    BREAST EXCISIONAL BIOPSY Right 1979    b9    BREAST LUMPECTOMY Left 08/01/2023    Procedure: Left needle-localized partial mastectomy and left breast needle-localized excisional biopsy;  Surgeon: Jaleesa Hsieh MD;  Location: [facility];  Service: General;  Laterality: Left;    BUNIONECTOMY      CARDIAC CATHETERIZATION N/A 04/08/2019    Procedure: Left Heart Cath;  Surgeon: Jayme Ramirez MD;  Location: [facility] INVASIVE LOCATION;  Service: Cardiovascular    CARDIAC CATHETERIZATION N/A 04/08/2019    Procedure: Coronary angiography;  Surgeon: Jayme Ramirez MD;  Location: [facility] INVASIVE LOCATION;  Service: Cardiovascular    CARDIAC CATHETERIZATION N/A 04/08/2019    Procedure: Left ventriculography;  Surgeon: Jayme Ramirez MD;  Location:   [facility];  Service: Cardiovascular    CARDIAC SURGERY      CEREBRAL ANEURYSM REPAIR  2015    CHOLECYSTECTOMY      COLONOSCOPY N/A 01/29/2021    Procedure: COLONOSCOPY with polypectomy;  Surgeon: Colin Ladd MD;  Location:  [facility];  Service: Gastroenterology;  Laterality: N/A;  Diverticulosis  Sigmoid colon polyp    CORONARY ARTERY BYPASS GRAFT  2004    Triple    FOOT SURGERY Right 2016    HAND SURGERY      ROTATOR CUFF REPAIR Left     SHOULDER SURGERY      SPLENECTOMY  1953    TONSILLECTOMY  1984    TUBAL ABDOMINAL LIGATION  1979    UPPER GASTROINTESTINAL ENDOSCOPY      US GUIDED CYST ASPIRATION BREAST N/A 05/18/2023         FAMILY HISTORY  Family History   Problem Relation Age of Onset    Heart attack Mother         2006    Heart failure Mother     Hypertension Mother     Stroke Mother     Cervical cancer Mother     Deep vein thrombosis Mother     Anemia Mother     Hypertension Father     Heart failure Father     Heart disease Father     Alcohol abuse Father     Heart attack Father     Aneurysm Sister     Clotting disorder Sister     Asthma Sister     Anemia Sister     Anxiety disorder Sister     Hearing loss Sister     Cancer Sister         Lymphoma    Breast cancer Sister         2017    Diabetes Sister     Hypertension Sister     Cancer Sister         Bteast    Anemia Sister     Hearing loss Sister     Skin cancer Sister     Anxiety disorder Sister     Anesthesia problems Sister     Heart attack Brother     Cancer Brother     Lung cancer Brother     Liver cancer Brother     Alcohol abuse Brother     Heart attack Brother     No Known Problems Daughter     No Known Problems Son     Cancer Brother     Heart attack Brother     Ovarian cancer Neg Hx     Colon cancer Neg Hx     Pulmonary embolism Neg Hx     Malig Hyperthermia Neg Hx     Uterine cancer Neg Hx          SOCIAL HISTORY  Social History     Occupational History    Not on file   Tobacco Use    Smoking status: Never      Passive exposure: Never    Smokeless tobacco: Never   Vaping Use    Vaping status: Never Used   Substance and Sexual Activity    Alcohol use: Never     Comment: once or twice a year    Drug use: Never    Sexual activity: Not Currently     Partners: Male     Birth control/protection: Abstinence, Post-menopausal, Tubal ligation         CURRENT MEDICATIONS    Current Outpatient Medications:     amoxicillin (AMOXIL) 500 MG capsule, Take 1 capsule by mouth 3 (Three) Times a Day for 10 days., Disp: 30 capsule, Rfl: 0    Cholecalciferol (VITAMIN D3) 5000 UNITS capsule capsule, Take 1 capsule by mouth 1 (One) Time Per Week., Disp: , Rfl:     clopidogrel (PLAVIX) 75 MG tablet, Take 1 tablet by mouth Daily. Indications: Resume on Monday, 8/7/2023., Disp: 90 tablet, Rfl: 3    Collagen-Vitamin C-Biotin (COLLAGEN PO), Take  by mouth. 1 scoop powder, Disp: , Rfl:     ezetimibe (ZETIA) 10 MG tablet, Take 1 tablet by mouth Daily., Disp: 90 tablet, Rfl: 1    FIBER PO, Take  by mouth 3 (Three) Times a Week., Disp: , Rfl:     icosapent ethyl (VASCEPA) 1 g capsule capsule, Take 2 g by mouth 2 (Two) Times a Day With Meals. Indications: Resume on Monday, 8/7/2023., Disp: 360 capsule, Rfl: 3    isosorbide dinitrate (ISORDIL) 5 MG tablet, TAKE 1 TABLET BY MOUTH TWICE A DAY, Disp: 180 tablet, Rfl: 1    Loratadine (KS ALLERCLEAR PO), Take  by mouth., Disp: , Rfl:     Melatonin 10 MG tablet, Take 1 tablet by mouth Every Night., Disp: , Rfl:     multivitamin (THERAGRAN) tablet tablet, Take 1 tablet by mouth Daily. HOLD PER MD INSTR, Disp: , Rfl:     raloxifene (EVISTA) 60 MG tablet, TAKE 1 TABLET BY MOUTH EVERY DAY, Disp: 90 tablet, Rfl: 2    ramipril (ALTACE) 2.5 MG capsule, Take 1 capsule by mouth Every Night., Disp: 90 capsule, Rfl: 1    ALLERGIES  Adhesive tape, Beta adrenergic blockers, Statins, Livalo [pitavastatin], Elemental sulfur, and Sulfa antibiotics    VITALS  Vitals:    03/27/25 1115   BP: 126/80   BP Location: Left arm   Patient  "Position: Sitting   Cuff Size: Adult   Weight: 63.4 kg (139 lb 12.8 oz)   Height: 162.6 cm (64\")       PHYSICAL EXAM  Debilities/Disabilities Identified: None  Emotional Behavior: Appropriate  Wt Readings from Last 3 Encounters:   03/27/25 63.4 kg (139 lb 12.8 oz)   03/26/25 62.1 kg (137 lb)   03/24/25 66.2 kg (146 lb)     Ht Readings from Last 1 Encounters:   03/27/25 162.6 cm (64\")     Body mass index is 24 kg/m².  Physical Exam  Constitutional:       Appearance: She is well-developed. She is not diaphoretic.   HENT:      Head: Normocephalic and atraumatic.   Eyes:      General: No scleral icterus.     Conjunctiva/sclera: Conjunctivae normal.      Pupils: Pupils are equal, round, and reactive to light.   Neck:      Thyroid: No thyromegaly.   Cardiovascular:      Rate and Rhythm: Normal rate and regular rhythm.      Heart sounds: Normal heart sounds. No murmur heard.     No gallop.   Pulmonary:      Effort: Pulmonary effort is normal.      Breath sounds: Normal breath sounds. No wheezing or rales.   Abdominal:      General: Bowel sounds are normal. There is no distension or abdominal bruit.      Palpations: Abdomen is soft. There is no shifting dullness, fluid wave or mass.      Tenderness: There is no abdominal tenderness. There is no guarding. Negative signs include Person's sign.      Hernia: There is no hernia in the ventral area.   Musculoskeletal:         General: Normal range of motion.      Cervical back: Normal range of motion and neck supple.   Lymphadenopathy:      Cervical: No cervical adenopathy.   Skin:     General: Skin is warm and dry.      Findings: No erythema or rash.   Neurological:      Mental Status: She is alert and oriented to person, place, and time.   Psychiatric:         Mood and Affect: Mood normal.         Behavior: Behavior normal.         CLINICAL DATA REVIEWED   reviewed previous lab results and integrated with today's visit, reviewed notes from other physicians and/or last GI " "encounter, reviewed previous endoscopy results and available photos, reviewed surgical pathology results from previous biopsies    ASSESSMENT  Diagnoses and all orders for this visit:    Personal history of colonic polyps    Irritable bowel syndrome with both constipation and diarrhea    Chronic idiopathic constipation          PLAN  Will move her Colon recall back to 1/2028  Daily Fiber 12 gms- Benefiber, also her 64 ounces of \"water\" and daily exercise- Use Miralax nightly to PRN     Return in about 6 months (around 9/27/2025).    I have discussed the above plan with the patient.  They verbalize understanding and are in agreement with the plan.  They have been advised to contact the office for any questions, concerns, or changes related to their health.                      "

## 2025-03-31 ENCOUNTER — PATIENT ROUNDING (BHMG ONLY) (OUTPATIENT)
Dept: URGENT CARE | Facility: CLINIC | Age: 75
End: 2025-03-31
Payer: MEDICARE

## 2025-03-31 RX ORDER — CEFDINIR 300 MG/1
300 CAPSULE ORAL 2 TIMES DAILY
Qty: 14 CAPSULE | Refills: 0 | Status: SHIPPED | OUTPATIENT
Start: 2025-03-31 | End: 2025-04-07

## 2025-03-31 NOTE — ED NOTES
Thank you for letting us care for you in your recent visit to our urgent care center. We would love to hear about your experience with us. Was this the first time you have visited our location?    We’re always looking for ways to make our patients’ experiences even better. Do you have any recommendations on ways we may improve?     I appreciate you taking the time to respond. Please be on the lookout for a survey about your recent visit from [employer] via text or email. We would greatly appreciate if you could fill that out and turn it back in. We want your voice to be heard and we value your feedback.   Thank you for choosing [facility] for your healthcare needs.

## 2025-04-07 ENCOUNTER — OFFICE VISIT (OUTPATIENT)
Age: 75
End: 2025-04-07
Payer: MEDICARE

## 2025-04-07 VITALS
SYSTOLIC BLOOD PRESSURE: 140 MMHG | WEIGHT: 140 LBS | HEIGHT: 64 IN | BODY MASS INDEX: 23.9 KG/M2 | DIASTOLIC BLOOD PRESSURE: 80 MMHG | HEART RATE: 71 BPM | OXYGEN SATURATION: 97 %

## 2025-04-07 DIAGNOSIS — E78.2 MIXED HYPERLIPIDEMIA: ICD-10-CM

## 2025-04-07 DIAGNOSIS — Z98.890 S/P COIL EMBOLIZATION OF CEREBRAL ANEURYSM: ICD-10-CM

## 2025-04-07 DIAGNOSIS — I49.3 PVC'S (PREMATURE VENTRICULAR CONTRACTIONS): ICD-10-CM

## 2025-04-07 DIAGNOSIS — I10 ESSENTIAL HYPERTENSION: ICD-10-CM

## 2025-04-07 DIAGNOSIS — G45.9 TRANSIENT CEREBRAL ISCHEMIA, UNSPECIFIED TYPE: Primary | ICD-10-CM

## 2025-04-07 DIAGNOSIS — Z95.1 S/P CABG (CORONARY ARTERY BYPASS GRAFT): ICD-10-CM

## 2025-04-07 DIAGNOSIS — I25.10 CORONARY ARTERY DISEASE INVOLVING NATIVE CORONARY ARTERY OF NATIVE HEART WITHOUT ANGINA PECTORIS: ICD-10-CM

## 2025-04-07 DIAGNOSIS — C50.212 MALIGNANT NEOPLASM OF UPPER-INNER QUADRANT OF LEFT BREAST IN FEMALE, ESTROGEN RECEPTOR POSITIVE: ICD-10-CM

## 2025-04-07 DIAGNOSIS — Z17.0 MALIGNANT NEOPLASM OF UPPER-INNER QUADRANT OF LEFT BREAST IN FEMALE, ESTROGEN RECEPTOR POSITIVE: ICD-10-CM

## 2025-04-07 PROCEDURE — 1160F RVW MEDS BY RX/DR IN RCRD: CPT | Performed by: PHYSICIAN ASSISTANT

## 2025-04-07 PROCEDURE — 1159F MED LIST DOCD IN RCRD: CPT | Performed by: PHYSICIAN ASSISTANT

## 2025-04-07 PROCEDURE — 93000 ELECTROCARDIOGRAM COMPLETE: CPT | Performed by: PHYSICIAN ASSISTANT

## 2025-04-07 PROCEDURE — 99214 OFFICE O/P EST MOD 30 MIN: CPT | Performed by: PHYSICIAN ASSISTANT

## 2025-04-07 PROCEDURE — 3077F SYST BP >= 140 MM HG: CPT | Performed by: PHYSICIAN ASSISTANT

## 2025-04-07 PROCEDURE — 3079F DIAST BP 80-89 MM HG: CPT | Performed by: PHYSICIAN ASSISTANT

## 2025-04-07 NOTE — PROGRESS NOTES
CARDIOLOGY        Patient Name: Maddison Turcios  :1950  Age: 75 y.o.  Primary Cardiologist: Helen Urbina MD  Encounter Provider:  Tim Bonds PA-C    Date of Service: 25            CHIEF COMPLAINT / REASON FOR OFFICE VISIT     1 year follow-up    HISTORY OF PRESENT ILLNESS       HPI  Maddison Turcios is a 75 y.o. female who presents today for 1 year follow-up.     Pt has a  history significant for CAD s/p with LIMA to mid LAD and sequential saphenous vein graft to ramus intermedius and first obtuse marginal branch, history of splenectomy for hereditary spherocytosis, hyperlipidemia with statin intolerance except Livalo, hypertension, history of TIA, history of cerebral aneurysm status post coil embolization in 2015 ([place]), and breast cancer presents for 1 year follow-up.  Patient was last seen on 2024 where she had occasional palpitations but no dizziness or syncope.  She had no chest pain or chest pressure.  She did have some mild lower extremity edema but overall felt well.  She was set up for echocardiogram for PVCs.  Her echo showed normal left ventricular systolic function.  EF 58%.  There was mild calcification aortic valve, mild tricuspid valve regurgitation.  There was some left ventricular diastolic dysfunction.    Patient has been doing well since her last office visit.  She does have signs of infection currently.  Overall her CV status has been stable.  She denies any chest pain, chest pressure, shortness of air, palpitations, edema to her legs, lightheadedness, dizziness, or any bleeding issues.  She was having issues with muscle aches being on a statin therapy and is now currently on Zetia.  Patient says her blood pressure runs in the 120s at home.  She states it is elevated today due to going up the stairs.      Below is a summary of pertinent cardiology findings:  2004 she had a  a mildly abnormal stress study that was done for severe fatigue.  Cardiac  catheterization showed ostial 60% stenosis of the left main coronary artery; this was done at [facility] in [facility].  She then had CABG with LIMA to mid LAD and sequential SVG to the ramus intermedius and first OM branch at [facility].  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 Hewitt with neurosurgery.  November 2016 she was admitted to [facility] with TIA; MRI was negative for acute infarct.  She was treated for TIA versus complicated migraine.  A few days later she did have symptoms of feeling unbalanced and blurred vision.  She had DENY which did not show anything to explain TIA.  There was biatrial enlargement but was otherwise normal.  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  There is no evidence of new aneurysm.  January 2017 she had a normal 21-day event monitor.  April 2019 she was seen for exertional chest pressure while mowing; EMS was called and she was treated with nitro which helped.  She was seen at [facility] and ruled out for ACS.  She had repeat cardiac catheterization which showed no treated LIMA to LAD, patent SVG to ramus intermedius and then OM, 30% left main stenosis proximally but normal LAD, left circumflex, and RCA.  EF was normal.  May 2019 she had a nonischemic stress echocardiogram showing baseline EF 56%, grade 1 LV diastolic function, and post exercise EF of 81%.  April 2022 echocardiogram showed EF 65%, mild concentric LVH, normal diastolic function, and no significant valvular abnormalities.  She also had a stress nuclear perfusion study which showed no evidence of ischemia.  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  Dr. Jaleesa Hsieh did the surgery.  She had left lumpectomy at the end of June 2023; Arimidex is to be started  "by Dr. Johnson     The following portions of the patient's history were reviewed and updated as appropriate: allergies, current medications, past family history, past medical history, past social history, past surgical history and problem list.      VITAL SIGNS     Visit Vitals  /80 (BP Location: Left arm, Patient Position: Sitting, Cuff Size: Adult)   Pulse 71   Ht 162.6 cm (64\")   Wt 63.5 kg (140 lb)   SpO2 97%   BMI 24.03 kg/m²       @[email or web address]  Wt Readings from Last 3 Encounters:   04/07/25 63.5 kg (140 lb)   03/27/25 63.4 kg (139 lb 12.8 oz)   03/26/25 62.1 kg (137 lb)     Body mass index is 24.03 kg/m².        PHYSICAL EXAMINATION     Constitutional:       General: Awake. Not in acute distress.     Appearance: Not in distress.   Pulmonary:      Effort: Pulmonary effort is normal.      Breath sounds: Normal breath sounds.   Cardiovascular:      Normal rate. Regular rhythm.      Murmurs: There is a systolic murmur.   Skin:     General: Skin is warm.   Neurological:      Mental Status: Alert.   Psychiatric:         Behavior: Behavior is cooperative.           REVIEWED DATA       ECG 12 Lead    Date/Time: 4/7/2025 1:31 PM  Performed by: Tim Bonds PA-C    Authorized by: Tim Bonds PA-C  Comparison: compared with previous ECG   Similar to previous ECG  Rhythm: sinus rhythm  Rate: normal  BPM: 71  Comments: Similar to previous        Cardiac Procedures:    Transthoracic echo on 4/8/2024    Left ventricular systolic function is normal. Calculated left ventricular EF = 58.5%    Left ventricular wall thickness is consistent with mild concentric hypertrophy.    Left ventricular diastolic function is consistent with (grade I) impaired relaxation.    There is mild calcification of the aortic valve.    Mild tricuspid valve regurgitation is present.    Estimated right ventricular systolic pressure from tricuspid regurgitation is normal (<35 mmHg). Calculated right ventricular systolic " "pressure from tricuspid regurgitation is 23 mmHg.    Saline test results are negative.      Stress test on 4/20/2022  Findings consistent with a normal ECG stress test.  Left ventricular ejection fraction is hyperdynamic (Calculated EF > 70%). .  Myocardial perfusion imaging indicates a normal myocardial perfusion study with no evidence of ischemia.  Impressions are consistent with a low risk study.       Lipid Panel          8/23/2024    08:04 2/25/2025    08:23   Lipid Panel   Total Cholesterol 196  202    Triglycerides 104  67    HDL Cholesterol 76  82    VLDL Cholesterol 18  12    LDL Cholesterol  102  108        Lab Results   Component Value Date     03/03/2025     02/25/2025    K 4.5 03/03/2025    K 4.1 02/25/2025     03/03/2025     02/25/2025    CO2 28.5 03/03/2025    CO2 27.5 02/25/2025    BUN 19 03/03/2025    BUN 17 02/25/2025    CREATININE 0.58 03/03/2025    CREATININE 0.64 02/25/2025    EGFRIFNONA 89 01/17/2022    EGFRIFNONA 79 09/30/2020    EGFRIFAFRI 102 01/17/2022    EGFRIFAFRI 96 09/30/2020    GLUCOSE 104 (H) 03/03/2025    GLUCOSE 105 (H) 02/25/2025    CALCIUM 9.7 03/03/2025    CALCIUM 9.7 02/25/2025    ALBUMIN 4.3 03/03/2025    ALBUMIN 4.3 02/25/2025    BILITOT 0.7 03/03/2025    BILITOT 1.0 02/25/2025    AST 24 03/03/2025    AST 27 02/25/2025    ALT 16 03/03/2025    ALT 16 02/25/2025     Lab Results   Component Value Date    WBC 8.94 03/03/2025    WBC 7.62 02/25/2025    HGB 12.9 03/03/2025    HGB 13.2 02/25/2025    HCT 36.7 03/03/2025    HCT 37.3 02/25/2025    MCV 92.0 03/03/2025    MCV 93.5 02/25/2025     03/03/2025     02/25/2025     No results found for: \"PROBNP\", \"BNP\"  Lab Results   Component Value Date    TROPONINT <0.010 04/08/2019     Lab Results   Component Value Date    TSH 3.260 02/25/2025    TSH 2.580 08/23/2024             ASSESSMENT & PLAN     Diagnoses and all orders for this visit:    TIA. The etiology for this is uncertain.  She has had no " further instances since being on Plavix.   Hyperlipidemia, on Livalo, Vascepa and continue zetia.   Essential hypertension, goal less than 120/80.  On ramipril, continue.  History of coronary artery disease, s/p CABG. She has had angina, well treated with isordil.   Cerebral aneurysm, treated with coiling in 2015  Left breast cancer, to have lumpectomy 6/2023, has had no chemotherapy or radiation, sees Dr. White.  PVCs, She has no anginal chest pain at this time.  Echocardiogram on 4/8/2024 reviewed      Patient overall is doing well from a cardiovascular standpoint.  I will make no medication changes today.  She will continue watching her blood pressure.  She will follow-up with Dr. Urbina in 1 year.      Return in about 1 year (around 4/7/2026) for Dr. Urbina.    Future Appointments         Provider Department Center    4/9/2025 10:15 AM Marleen Lanier APRN [facility] OBGYN [facility]    6/30/2025 9:00 AM [facility] DEXA 1 [facility] BONE DENSITY [facility]    9/2/2025 9:10 AM [facility] PRIMARY CARE [facility]    9/9/2025 8:00 AM Head, Sara SILVA [facility] PRIMARY CARE [facility]    9/25/2025 11:00 AM Colin Ladd MD [facility] GASTROENTEROLOGY [facility]    3/2/2026 11:20 AM LAB CHAIR 1 CBC LAB [facility]    3/2/2026 11:40 AM Aba White MD [facility] HEMATOLOGY & ONCOLOGY [facility]    3/23/2026 8:20 AM [facility]AG MAMM 1 [facility] MAMMOGRAPHY [facility]    3/30/2026 8:20 AM Toby Maher APRRICARDO [facility] BREAST SURGERY [facility]                MEDICATIONS         Discharge Medications            Accurate as of April 7, 2025  1:39 PM. If you have any questions, ask your nurse or doctor.                Continue These Medications        Instructions Start Date   cefdinir 300 MG capsule  Commonly known as: OMNICEF   300 mg, Oral, 2 Times  Daily      clopidogrel 75 MG tablet  Commonly known as: PLAVIX   75 mg, Oral, Daily      COLLAGEN PO   Take  by mouth. 1 scoop powder      ezetimibe 10 MG tablet  Commonly known as: ZETIA   10 mg, Oral, Daily      FIBER PO   3 Times Weekly      icosapent ethyl 1 g capsule capsule  Commonly known as: VASCEPA   2 g, Oral, 2 Times Daily With Meals      isosorbide dinitrate 5 MG tablet  Commonly known as: ISORDIL   5 mg, Oral, 2 Times Daily      KS ALLERCLEAR PO   Take  by mouth.      Melatonin 10 MG tablet   10 mg, Nightly      multivitamin tablet tablet   1 tablet, Daily      raloxifene 60 MG tablet  Commonly known as: EVISTA   60 mg, Oral, Daily      ramipril 2.5 MG capsule  Commonly known as: ALTACE   2.5 mg, Oral, Nightly      vitamin D3 125 MCG (5000 UT) capsule capsule   5,000 Units, Weekly                   **Dragon Disclaimer:   Much of this encounter note is an electronic transcription/translation of spoken language to printed text. The electronic translation of spoken language may permit erroneous, or at times, nonsensical words or phrases to be inadvertently transcribed. Although I have reviewed the note for such errors, some may still exist.

## 2025-04-09 ENCOUNTER — OFFICE VISIT (OUTPATIENT)
Dept: OBSTETRICS AND GYNECOLOGY | Facility: CLINIC | Age: 75
End: 2025-04-09
Payer: MEDICARE

## 2025-04-09 VITALS
WEIGHT: 143.6 LBS | BODY MASS INDEX: 24.52 KG/M2 | DIASTOLIC BLOOD PRESSURE: 84 MMHG | SYSTOLIC BLOOD PRESSURE: 142 MMHG | HEIGHT: 64 IN

## 2025-04-09 DIAGNOSIS — Z80.3 FAMILY HISTORY OF BREAST CANCER IN SISTER: ICD-10-CM

## 2025-04-09 DIAGNOSIS — M85.80 OSTEOPENIA, UNSPECIFIED LOCATION: ICD-10-CM

## 2025-04-09 DIAGNOSIS — N39.498 OTHER URINARY INCONTINENCE: ICD-10-CM

## 2025-04-09 DIAGNOSIS — Z01.419 ROUTINE GYNECOLOGICAL EXAMINATION: Primary | ICD-10-CM

## 2025-04-09 DIAGNOSIS — Z85.3 PERSONAL HISTORY OF BREAST CANCER: ICD-10-CM

## 2025-04-09 LAB
BILIRUB BLD-MCNC: NEGATIVE MG/DL
CLARITY, POC: CLEAR
COLOR UR: YELLOW
GLUCOSE UR STRIP-MCNC: NEGATIVE MG/DL
KETONES UR QL: NEGATIVE
LEUKOCYTE EST, POC: NEGATIVE
NITRITE UR-MCNC: NEGATIVE MG/ML
PH UR: 7 [PH] (ref 5–8)
PROT UR STRIP-MCNC: ABNORMAL MG/DL
RBC # UR STRIP: NEGATIVE /UL
SP GR UR: 1 (ref 1–1.03)
UROBILINOGEN UR QL: ABNORMAL

## 2025-04-09 NOTE — PROGRESS NOTES
GYN Post-menopausal Annual Exam     Chief Complaint   Patient presents with    Gynecologic Exam       Maddison Turcios is a 75 y.o. female who presents for annual well woman exam. She is new to me - no.  She is postmenopausal- denies vaginal spotting or discharge.  Personal h/o breast cancer in 3/2023- left DCIS in 3/2023. ER+. S/p lumpectomy; did not need chemo or radiation. On Evista.  H/o TIA in 2016  Sister dx'd with lymphoma this past year.      Additional concerns- none    HPI   OB History          3    Para   2    Term   2            AB   1    Living             SAB        IAB        Ectopic        Molar        Multiple        Live Births   2          Obstetric Comments   2                Last Mammogram: 3/2025- neg  Last Dexa: 2023- osteopenia  Last Colonoscopy: 2021  Last Pap: 3/2021- neg  HRT: none  History of abnormal Pap smear: no  History of abnormal mammogram: yes - left DCIS in  . Right breast bx neg.   Family history of uterine, colon or ovarian cancer: no; mom h/o cervical cancer   Family history of breast cancer: yes - sister dx @ 66yo        Past Medical History:   Diagnosis Date    Abnormal blood chemistry     Acute UTI (urinary tract infection)     Allergic     Anemia     Anesthesia complication     SLOW TO WAKE UP    Aneurysm     Aneurysm, cerebral     Antiplatelet or antithrombotic long-term use 10/20/2021    Arthritis     Hands and spine    Baker's cyst     Bloating     Brain aneurysm     Bursitis     Left hip    CAD (coronary artery disease)     Cholelithiasis     Cholesterol blood reduced     Chronic headaches     Colon polyp     Congenital heart disease     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    Coronary artery disease     Coronary artery stenosis     Dysuria     Encounter for screening colonoscopy     Environmental allergies     Fall from slip, trip, or stumble     Fractures     Gait disturbance     Ganglion cyst     H/O cardiovascular stress test 2013    Treadmill     H/O colonoscopy     H/O echocardiogram 09/25/2013    H/O fall     Heart murmur     Hereditary spherocytosis     History of EKG 07/31/2015    Hyperlipemia     Hyperlipidemia     Hypertension     Hyperthyroidism     Injury of back     Insomnia     Irritable bowel syndrome     Left forearm pain     Left leg pain     Left wrist pain     Lower abdominal pain     Malignant neoplasm of upper-inner quadrant of left breast in female, estrogen receptor positive 05/18/2023    Need for pneumococcal vaccination     Onychomycosis of toenail     Osteopenia 2023    Pelvic pressure in female     PONV (postoperative nausea and vomiting)     Right foot pain     Seasonal allergies 1998    Mold ,cats    Stroke     TIA (transient ischemic attack) 11/30/2016    URI (upper respiratory infection)     Urine frequency        Past Surgical History:   Procedure Laterality Date    BREAST BIOPSY  08/2023    Left    BREAST EXCISIONAL BIOPSY Right 1977    b9    BREAST EXCISIONAL BIOPSY Right 1979    b9    BREAST LUMPECTOMY Left 08/01/2023    Procedure: Left needle-localized partial mastectomy and left breast needle-localized excisional biopsy;  Surgeon: Jaleesa Hsieh MD;  Location: [facility] MAIN OR;  Service: General;  Laterality: Left;    BUNIONECTOMY      CARDIAC CATHETERIZATION N/A 04/08/2019    Procedure: Left Heart Cath;  Surgeon: Jayme Ramirez MD;  Location:  [facility] CATH INVASIVE LOCATION;  Service: Cardiovascular    CARDIAC CATHETERIZATION N/A 04/08/2019    Procedure: Coronary angiography;  Surgeon: Jayme Ramirez MD;  Location:  [facility] CATH INVASIVE LOCATION;  Service: Cardiovascular    CARDIAC CATHETERIZATION N/A 04/08/2019    Procedure: Left ventriculography;  Surgeon: Jayme Ramirez MD;  [facility];  Service: Cardiovascular    CARDIAC SURGERY      CEREBRAL ANEURYSM REPAIR  2015    CHOLECYSTECTOMY      COLONOSCOPY N/A 01/29/2021    Procedure: COLONOSCOPY with polypectomy;  Surgeon:  Colin Ladd MD;  Location: [facility];  Service: Gastroenterology;  Laterality: N/A;  Diverticulosis  Sigmoid colon polyp    CORONARY ARTERY BYPASS GRAFT  2004    Triple    FOOT SURGERY Right 2016    HAND SURGERY      ROTATOR CUFF REPAIR Left     SHOULDER SURGERY      SPLENECTOMY  1953    TONSILLECTOMY  1984    TUBAL ABDOMINAL LIGATION  1979    UPPER GASTROINTESTINAL ENDOSCOPY      US GUIDED CYST ASPIRATION BREAST N/A 05/18/2023         Current Outpatient Medications:     Cholecalciferol (VITAMIN D3) 5000 UNITS capsule capsule, Take 1 capsule by mouth 1 (One) Time Per Week., Disp: , Rfl:     clopidogrel (PLAVIX) 75 MG tablet, Take 1 tablet by mouth Daily. Indications: Resume on Monday, 8/7/2023., Disp: 90 tablet, Rfl: 3    Collagen-Vitamin C-Biotin (COLLAGEN PO), Take  by mouth. 1 scoop powder, Disp: , Rfl:     ezetimibe (ZETIA) 10 MG tablet, Take 1 tablet by mouth Daily., Disp: 90 tablet, Rfl: 1    FIBER PO, Take  by mouth 3 (Three) Times a Week., Disp: , Rfl:     icosapent ethyl (VASCEPA) 1 g capsule capsule, Take 2 g by mouth 2 (Two) Times a Day With Meals. Indications: Resume on Monday, 8/7/2023., Disp: 360 capsule, Rfl: 3    isosorbide dinitrate (ISORDIL) 5 MG tablet, TAKE 1 TABLET BY MOUTH TWICE A DAY, Disp: 180 tablet, Rfl: 1    Loratadine (KS ALLERCLEAR PO), Take  by mouth., Disp: , Rfl:     Melatonin 10 MG tablet, Take 1 tablet by mouth Every Night., Disp: , Rfl:     multivitamin (THERAGRAN) tablet tablet, Take 1 tablet by mouth Daily. HOLD PER MD INSTR, Disp: , Rfl:     raloxifene (EVISTA) 60 MG tablet, TAKE 1 TABLET BY MOUTH EVERY DAY, Disp: 90 tablet, Rfl: 2    ramipril (ALTACE) 2.5 MG capsule, Take 1 capsule by mouth Every Night., Disp: 90 capsule, Rfl: 1    Allergies   Allergen Reactions    Adhesive Tape Rash     Redness, bruising and peeling of skin    *Use Paper Tape Only*  Redness, bruising and peeling of skin    *Use Paper Tape Only*    Beta Adrenergic Blockers Unknown - High  "Severity     hypotension  bradycardic    Statins Myalgia    Livalo [Pitavastatin] Myalgia    Elemental Sulfur Rash    Sulfa Antibiotics Rash       Social History     Tobacco Use    Smoking status: Never     Passive exposure: Never    Smokeless tobacco: Never   Vaping Use    Vaping status: Never Used   Substance Use Topics    Alcohol use: Never     Comment: once or twice a year    Drug use: Never       Family History   Problem Relation Age of Onset    Hypertension Father     Heart failure Father     Heart disease Father     Alcohol abuse Father     Heart attack Father     Heart attack Mother         2006    Heart failure Mother     Hypertension Mother     Stroke Mother     Cervical cancer Mother     Deep vein thrombosis Mother     Anemia Mother     Heart attack Brother     Cancer Brother     Lung cancer Brother     Liver cancer Brother     Alcohol abuse Brother     Heart attack Brother     Cancer Brother     Heart attack Brother     Aneurysm Sister     Clotting disorder Sister     Asthma Sister     Anemia Sister     Anxiety disorder Sister     Hearing loss Sister     Lymphoma Sister     Anemia Sister     Breast cancer Sister         2017    Diabetes Sister     Hypertension Sister     Cancer Sister         [place]    Anemia Sister     Hearing loss Sister     Skin cancer Sister     Anxiety disorder Sister     Anesthesia problems Sister     No Known Problems Son     No Known Problems Daughter     Ovarian cancer Neg Hx     Colon cancer Neg Hx     Pulmonary embolism Neg Hx     Malig Hyperthermia Neg Hx     Uterine cancer Neg Hx        Review of Systems   Genitourinary:  Positive for urinary incontinence (occasional). Negative for breast discharge, breast lump, breast pain, dysuria, pelvic pressure and vaginal bleeding.       /84   Ht 162.6 cm (64.02\")   Wt 65.1 kg (143 lb 9.6 oz)   LMP  (LMP Unknown)   Breastfeeding No   BMI 24.64 kg/m²     Physical Exam  Vitals reviewed.   Constitutional:       General: She is " awake. She is not in acute distress.     Appearance: She is not ill-appearing.   HENT:      Head: Normocephalic and atraumatic.   Eyes:      Conjunctiva/sclera: Conjunctivae normal.   Pulmonary:      Effort: Pulmonary effort is normal. No respiratory distress.   Chest:   Breasts:     Right: Normal.      Left: Normal.   Abdominal:      Palpations: Abdomen is soft. There is no mass.      Tenderness: There is no abdominal tenderness.   Genitourinary:     General: Normal vulva.      Exam position: Lithotomy position.      Pubic Area: No rash.       Labia:         Right: No rash or lesion.         Left: No rash or lesion.       Urethra: No urethral lesion.      Vagina: Normal.      Cervix: Normal.      Uterus: Normal.       Adnexa: Right adnexa normal and left adnexa normal.      Comments: Vaginal atrophy  Musculoskeletal:      Cervical back: Neck supple. No rigidity.   Lymphadenopathy:      Upper Body:      Right upper body: No axillary adenopathy.      Left upper body: No axillary adenopathy.      Lower Body: No right inguinal adenopathy. No left inguinal adenopathy.   Skin:     General: Skin is warm and dry.      Capillary Refill: Capillary refill takes less than 2 seconds.   Neurological:      Mental Status: She is alert and oriented to person, place, and time.   Psychiatric:         Mood and Affect: Mood and affect normal.         Behavior: Behavior normal.          Assessment     1) GYN annual well woman exam.   2) Postmenopausal      Plan     1) H/o breast cancer - Clinical breast exam & mammogram yearly, Self breast awareness. Schedule screening mammogram.   2) Pap - no longer needed; last one in 3/2021 NIL  3) STD- declined; not sexually active  4) Smoking status-  No  5) Colon health - screening colonoscopy UTD- 1/2021   6) Body mass index is 24.64 kg/m². Diet and exercise discussed.  7) Osteopenia - Weight bearing exercise, dietary calcium recommendations and vitamin D  reviewed.  Repeat DEXA 6/2025.  8)  Patient reports that she experiences any symptoms of urinary incontinence on occasion- only when she waits to void.  9) Postmenopausal- denies vaginal itching/irritation; denies PMVB    Follow up sacha Lanier, APRN  4/9/2025  11:00 EDT

## 2025-04-28 ENCOUNTER — HOSPITAL ENCOUNTER (OUTPATIENT)
Dept: GENERAL RADIOLOGY | Facility: HOSPITAL | Age: 75
Discharge: HOME | End: 2025-04-28
Admitting: NURSE PRACTITIONER
Payer: MEDICARE

## 2025-04-28 DIAGNOSIS — J06.9 URI WITH COUGH AND CONGESTION: Primary | ICD-10-CM

## 2025-04-28 DIAGNOSIS — J06.9 URI WITH COUGH AND CONGESTION: ICD-10-CM

## 2025-04-28 PROCEDURE — 71046 X-RAY EXAM CHEST 2 VIEWS: CPT

## 2025-04-29 DIAGNOSIS — J84.9 CHRONIC INTERSTITIAL LUNG DISEASE: Primary | ICD-10-CM

## 2025-04-29 DIAGNOSIS — R05.9 COUGH, UNSPECIFIED TYPE: ICD-10-CM

## 2025-04-29 RX ORDER — METHYLPREDNISOLONE 4 MG/1
TABLET ORAL
Qty: 21 EACH | Refills: 0 | Status: SHIPPED | OUTPATIENT
Start: 2025-04-29

## 2025-04-29 RX ORDER — BENZONATATE 200 MG/1
200 CAPSULE ORAL 3 TIMES DAILY PRN
Qty: 30 CAPSULE | Refills: 0 | Status: SHIPPED | OUTPATIENT
Start: 2025-04-29

## 2025-05-12 ENCOUNTER — OFFICE VISIT (OUTPATIENT)
Age: 75
End: 2025-05-12
Payer: MEDICARE

## 2025-05-12 VITALS
HEIGHT: 64 IN | DIASTOLIC BLOOD PRESSURE: 80 MMHG | WEIGHT: 141 LBS | BODY MASS INDEX: 24.07 KG/M2 | OXYGEN SATURATION: 98 % | HEART RATE: 68 BPM | SYSTOLIC BLOOD PRESSURE: 120 MMHG

## 2025-05-12 DIAGNOSIS — G45.9 TRANSIENT CEREBRAL ISCHEMIA, UNSPECIFIED TYPE: Primary | ICD-10-CM

## 2025-05-12 DIAGNOSIS — I25.10 CORONARY ARTERY DISEASE INVOLVING NATIVE CORONARY ARTERY OF NATIVE HEART WITHOUT ANGINA PECTORIS: ICD-10-CM

## 2025-05-12 DIAGNOSIS — I10 ESSENTIAL HYPERTENSION: ICD-10-CM

## 2025-05-12 DIAGNOSIS — I49.3 PVC'S (PREMATURE VENTRICULAR CONTRACTIONS): ICD-10-CM

## 2025-05-12 DIAGNOSIS — E78.2 MIXED HYPERLIPIDEMIA: ICD-10-CM

## 2025-05-12 PROCEDURE — 93000 ELECTROCARDIOGRAM COMPLETE: CPT | Performed by: PHYSICIAN ASSISTANT

## 2025-05-12 PROCEDURE — 3074F SYST BP LT 130 MM HG: CPT | Performed by: PHYSICIAN ASSISTANT

## 2025-05-12 PROCEDURE — 99214 OFFICE O/P EST MOD 30 MIN: CPT | Performed by: PHYSICIAN ASSISTANT

## 2025-05-12 PROCEDURE — 3079F DIAST BP 80-89 MM HG: CPT | Performed by: PHYSICIAN ASSISTANT

## 2025-05-12 RX ORDER — MONTELUKAST SODIUM 10 MG/1
10 TABLET ORAL NIGHTLY
COMMUNITY
Start: 2025-04-23

## 2025-05-12 NOTE — PROGRESS NOTES
CARDIOLOGY        Patient Name: Maddison Turcios  :1950  Age: 75 y.o.  Primary Cardiologist: Helen Urbina MD  Encounter Provider:  Tim Bonds PA-C    Date of Service: 25            CHIEF COMPLAINT / REASON FOR OFFICE VISIT     Blood pressure management    HISTORY OF PRESENT ILLNESS       HPI  Maddison Turcios is a 75 y.o. female who presents today for blood pressure management.     Pt has a  history significant for CAD s/p with LIMA to mid LAD and sequential saphenous vein graft to ramus intermedius and first obtuse marginal branch, history of splenectomy for hereditary spherocytosis, hyperlipidemia with statin intolerance except Livalo, hypertension, history of TIA, history of cerebral aneurysm status post coil embolization in 2015 ([place]), and breast cancer presents for 1 year follow-up.  Patient was last seen on 2024 where she had occasional palpitations but no dizziness or syncope.  She had no chest pain or chest pressure.  She did have some mild lower extremity edema but overall felt well.  She was set up for echocardiogram for PVCs.  Her echo showed normal left ventricular systolic function.  EF 58%.  There was mild calcification aortic valve, mild tricuspid valve regurgitation.  There was some left ventricular diastolic dysfunction.     On 25 she was doing well since her last office visit.  She does have signs of infection currently.  Overall her CV status has been stable.  She denies any chest pain, chest pressure, shortness of air, palpitations, edema to her legs, lightheadedness, dizziness, or any bleeding issues.  She was having issues with muscle aches being on a statin therapy and is now currently on Zetia.  Patient says her blood pressure runs in the 120s at home.       Patient mentions she has had a ongoing cough since the end of March.  Cough is worse when laying flat where she has a tickle in her throat.  She has been on steroids and Tessalon Perles.  She had a  chest x-ray at the urgent care that showed some scarring and was told to follow-up with pulmonology.  She continues to have cough that is worse at night when laying down.  She is concerned due to her ramipril causing her cough.  Patient does check her blood pressures at home and they have been running around with they are today.      Below is a summary of pertinent cardiology findings:  September 2004 she had a  a mildly abnormal stress study that was done for severe fatigue.  Cardiac catheterization showed ostial 60% stenosis of the left main coronary artery; this was done at [facility] in [facility].  She then had CABG with LIMA to mid LAD and sequential SVG to the ramus intermedius and first OM branch at [facility].  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 Hewitt with neurosurgery.  November 2016 she was admitted to [facility] with TIA; MRI was negative for acute infarct.  She was treated for TIA versus complicated migraine.  A few days later she did have symptoms of feeling unbalanced and blurred vision.  She had DENY which did not show anything to explain TIA.  There was biatrial enlargement but was otherwise normal.  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  There is no evidence of new aneurysm.  January 2017 she had a normal 21-day event monitor.  April 2019 she was seen for exertional chest pressure while mowing; EMS was called and she was treated with nitro which helped.  She was seen at [facility] and ruled out for ACS.  She had repeat cardiac catheterization which showed no treated LIMA to LAD, patent SVG to ramus intermedius and then OM, 30% left main stenosis proximally but normal LAD, left circumflex, and RCA.  EF was normal.  May 2019 she had a nonischemic stress echocardiogram showing baseline EF 56%, grade 1 LV  "diastolic function, and post exercise EF of 81%.  April 2022 echocardiogram showed EF 65%, mild concentric LVH, normal diastolic function, and no significant valvular abnormalities.  She also had a stress nuclear perfusion study which showed no evidence of ischemia.  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  Dr. Jaleesa Hsieh did the surgery.  She had left lumpectomy at the end of June 2023; Arimidex is to be started by Dr. Johnson    The following portions of the patient's history were reviewed and updated as appropriate: allergies, current medications, past family history, past medical history, past social history, past surgical history and problem list.      VITAL SIGNS     Visit Vitals  /80 (BP Location: Left arm, Patient Position: Sitting, Cuff Size: Adult)   Pulse 68   Ht 162.6 cm (64\")   Wt 64 kg (141 lb)   LMP  (LMP Unknown)   SpO2 98%   BMI 24.20 kg/m²       @[email or web address]  Wt Readings from Last 3 Encounters:   05/12/25 64 kg (141 lb)   04/09/25 65.1 kg (143 lb 9.6 oz)   04/07/25 63.5 kg (140 lb)     Body mass index is 24.2 kg/m².        PHYSICAL EXAMINATION     Constitutional:       General: Awake. Not in acute distress.     Appearance: Not in distress.   Pulmonary:      Effort: Pulmonary effort is normal.      Breath sounds: Normal breath sounds.   Cardiovascular:      Normal rate. Regular rhythm.      Murmurs: There is no murmur.   Skin:     General: Skin is warm.   Neurological:      Mental Status: Alert.   Psychiatric:         Behavior: Behavior is cooperative.           REVIEWED DATA       ECG 12 Lead    Date/Time: 5/12/2025 2:11 PM  Performed by: Tim Bonds PA-C    Authorized by: Tim Bonds PA-C  Comparison: compared with previous ECG   Similar to previous ECG  Rhythm: sinus rhythm  Rate: normal  BPM: 63  QRS axis: normal  Comments: Similar to previous          Cardiac Procedures:    Transthoracic " echo on 4/8/2024    Left ventricular systolic function is normal. Calculated left ventricular EF = 58.5%    Left ventricular wall thickness is consistent with mild concentric hypertrophy.    Left ventricular diastolic function is consistent with (grade I) impaired relaxation.    There is mild calcification of the aortic valve.    Mild tricuspid valve regurgitation is present.    Estimated right ventricular systolic pressure from tricuspid regurgitation is normal (<35 mmHg). Calculated right ventricular systolic pressure from tricuspid regurgitation is 23 mmHg.    Saline test results are negative.     Stress test on 4/20/2022  Findings consistent with a normal ECG stress test.  Left ventricular ejection fraction is hyperdynamic (Calculated EF > 70%). .  Myocardial perfusion imaging indicates a normal myocardial perfusion study with no evidence of ischemia.  Impressions are consistent with a low risk study.    Lipid Panel          8/23/2024    08:04 2/25/2025    08:23   Lipid Panel   Total Cholesterol 196  202    Triglycerides 104  67    HDL Cholesterol 76  82    VLDL Cholesterol 18  12    LDL Cholesterol  102  108        Lab Results   Component Value Date     03/03/2025     02/25/2025    K 4.5 03/03/2025    K 4.1 02/25/2025     03/03/2025     02/25/2025    CO2 28.5 03/03/2025    CO2 27.5 02/25/2025    BUN 19 03/03/2025    BUN 17 02/25/2025    CREATININE 0.58 03/03/2025    CREATININE 0.64 02/25/2025    EGFRIFNONA 89 01/17/2022    EGFRIFNONA 79 09/30/2020    EGFRIFAFRI 102 01/17/2022    EGFRIFAFRI 96 09/30/2020    GLUCOSE 104 (H) 03/03/2025    GLUCOSE 105 (H) 02/25/2025    CALCIUM 9.7 03/03/2025    CALCIUM 9.7 02/25/2025    ALBUMIN 4.3 03/03/2025    ALBUMIN 4.3 02/25/2025    BILITOT 0.7 03/03/2025    BILITOT 1.0 02/25/2025    AST 24 03/03/2025    AST 27 02/25/2025    ALT 16 03/03/2025    ALT 16 02/25/2025     Lab Results   Component Value Date    WBC 8.94 03/03/2025    WBC 7.62 02/25/2025    HGB  "12.9 03/03/2025    HGB 13.2 02/25/2025    HCT 36.7 03/03/2025    HCT 37.3 02/25/2025    MCV 92.0 03/03/2025    MCV 93.5 02/25/2025     03/03/2025     02/25/2025     No results found for: \"PROBNP\", \"BNP\"  Lab Results   Component Value Date    TROPONINT <0.010 04/08/2019     Lab Results   Component Value Date    TSH 3.260 02/25/2025    TSH 2.580 08/23/2024             ASSESSMENT & PLAN     Diagnoses and all orders for this visit:      TIA. The etiology for this is uncertain.  She has had no further instances since being on Plavix.   Hyperlipidemia, on Livalo, Vascepa and continue zetia.   Essential hypertension, goal less than 120/80.  On ramipril, continue.  History of coronary artery disease, s/p CABG. She has had angina, well treated with isordil.   Cerebral aneurysm, treated with coiling in 2015  Left breast cancer, to have lumpectomy 6/2023, has had no chemotherapy or radiation, sees Dr. White.  PVCs, She has no anginal chest pain at this time.  Echocardiogram on 4/8/2024 reviewed     Patient looks well.  Will try stopping her ramipril for now.  She will check her blood pressure in the mornings after isosorbide along with BP at night and bring a log in 2 to 3 weeks for blood pressure check and see how her symptoms are doing.  It sounds like her cough is due to post nasal drip but will try stopping her ramipril for now.     Return in about 2 weeks (around 5/26/2025).    Future Appointments         Provider Department Center    5/28/2025 9:30 AM (Arrive by 9:15 AM) [facility] NURSE[facility]MA [facility] CARDIOLOGY [facility]    6/30/2025 9:00 AM [facility] DEXA 1 [facility] BONE DENSITY UCE    9/2/2025 9:10 [facility]    9/9/2025 8:00 AM Head, RENATE Hernandez [facility] PRIMARY CARE [facility]    9/25/2025 11:00 NESTOR Ladd, Colin Stack MD [facility] GASTROENTEROLOGY LAG    3/2/2026 11:20 AM " [facility]    3/2/2026 11:40 AM Aba White MD [facility] HEMATOLOGY & ONCOLOGY [facility]    3/23/2026 8:20 AM LAG MAMM 1 [facility] MAMMOGRAPHY LAG    3/30/2026 8:20 AM Toby Maher APRN [facility] BREAST SURGERY [facility]    4/13/2026 11:20 AM Helen Urbina MD [facility] CARDIOLOGY LAG    4/15/2026 10:00 AM Marleen Lanier APRN [facility] OBGYN LAG                MEDICATIONS         Discharge Medications            Accurate as of May 12, 2025  2:26 PM. If you have any questions, ask your nurse or doctor.                Continue These Medications        Instructions Start Date   benzonatate 200 MG capsule  Commonly known as: TESSALON   200 mg, Oral, 3 Times Daily PRN      clopidogrel 75 MG tablet  Commonly known as: PLAVIX   75 mg, Oral, Daily      COLLAGEN PO   Take  by mouth. 1 scoop powder      ezetimibe 10 MG tablet  Commonly known as: ZETIA   10 mg, Oral, Daily      FIBER PO   3 Times Weekly      icosapent ethyl 1 g capsule capsule  Commonly known as: VASCEPA   2 g, Oral, 2 Times Daily With Meals      isosorbide dinitrate 5 MG tablet  Commonly known as: ISORDIL   5 mg, Oral, 2 Times Daily      KS ALLERCLEAR PO   Take  by mouth.      Melatonin 10 MG tablet   10 mg, Nightly      methylPREDNISolone 4 MG dose pack  Commonly known as: MEDROL   Take as directed on package instructions.      montelukast 10 MG tablet  Commonly known as: SINGULAIR   10 mg, Nightly      multivitamin tablet tablet   1 tablet, Daily      raloxifene 60 MG tablet  Commonly known as: EVISTA   60 mg, Oral, Daily      ramipril 2.5 MG capsule  Commonly known as: ALTACE   2.5 mg, Oral, Nightly      vitamin D3 125 MCG (5000 UT) capsule capsule   5,000 Units, Weekly                   **Dragon Disclaimer:   Much of this encounter note is an electronic transcription/translation of  spoken language to printed text. The electronic translation of spoken language may permit erroneous, or at times, nonsensical words or phrases to be inadvertently transcribed. Although I have reviewed the note for such errors, some may still exist.

## 2025-05-13 ENCOUNTER — OFFICE VISIT (OUTPATIENT)
Dept: FAMILY MEDICINE CLINIC | Facility: CLINIC | Age: 75
End: 2025-05-13
Payer: MEDICARE

## 2025-05-13 VITALS
HEIGHT: 64 IN | SYSTOLIC BLOOD PRESSURE: 190 MMHG | BODY MASS INDEX: 24.07 KG/M2 | HEART RATE: 72 BPM | DIASTOLIC BLOOD PRESSURE: 96 MMHG | TEMPERATURE: 98.2 F | OXYGEN SATURATION: 98 % | WEIGHT: 141 LBS

## 2025-05-13 DIAGNOSIS — G47.00 INSOMNIA, UNSPECIFIED TYPE: Primary | ICD-10-CM

## 2025-05-13 DIAGNOSIS — F41.9 ANXIETY: ICD-10-CM

## 2025-05-13 RX ORDER — TEMAZEPAM 7.5 MG/1
7.5 CAPSULE ORAL NIGHTLY PRN
Qty: 15 CAPSULE | Refills: 0 | Status: SHIPPED | OUTPATIENT
Start: 2025-05-13

## 2025-05-13 NOTE — PROGRESS NOTES
"  Subjective   Maddison Turcios is a 75 y.o. female who is here for   Chief Complaint   Patient presents with    Insomnia   .     Insomnia  Symptoms are new.   Onset was 1 to 4 weeks.   Symptoms occur constantly.   Symptoms have been worse since onset.   Symptoms include fatigue.    Pertinent negative symptoms include no fever, no rash, no sore throat and no dysuria.   Aggravating factors include stress.   Treatments tried include nothing.   Additional information:  Maddison Turcios has been struggling with anxiety as well as sleep.  Patient is currently trying to help take care of one of her friends who has multiple medical health problems and has been in the hospital on multiple occasions recently.  Patient's friend also lives in nursing home.  She also had a death of an ex- recently.  Patient states she is feels very overwhelmed at this time.      History of Present Illness      Review of Systems   Constitutional:  Positive for fatigue. Negative for fever.   HENT:  Negative for sore throat.    Genitourinary:  Negative for dysuria.   Skin:  Negative for rash.   Psychiatric/Behavioral:  Positive for sleep disturbance. The patient has insomnia.        Objective   Vitals:    05/13/25 1251   BP: (!) 190/96   BP Location: Left arm   Patient Position: Sitting   Cuff Size: Adult   Pulse: 72   Temp: 98.2 °F (36.8 °C)   SpO2: 98%   Weight: 64 kg (141 lb)   Height: 162.6 cm (64.02\")      Physical Exam  Vitals and nursing note reviewed.   Constitutional:       Appearance: Normal appearance. She is normal weight.   HENT:      Head: Normocephalic and atraumatic.   Cardiovascular:      Rate and Rhythm: Normal rate and regular rhythm.      Pulses: Normal pulses.      Heart sounds: No murmur heard.  Pulmonary:      Effort: Pulmonary effort is normal. No respiratory distress.      Breath sounds: Normal breath sounds. No wheezing.   Skin:     General: Skin is warm and dry.   Neurological:      General: No focal deficit present. "      Mental Status: She is alert.   Psychiatric:         Mood and Affect: Mood is anxious.         Thought Content: Thought content normal.       Physical Exam        Assessment & Plan   Assessment & Plan    Diagnoses and all orders for this visit:    1. Insomnia, unspecified type (Primary)  We will start Restoril 7.5 mg 1 tablet nightly.  Will monitor for improvement.  Her insomnia is likely related to her intense amount of anxiety related to the recent death of her ex- as well as her elderly friend in the hospital.  -     temazepam (Restoril) 7.5 MG capsule; Take 1 capsule by mouth At Night As Needed for Sleep or Anxiety.  Dispense: 15 capsule; Refill: 0    2. Anxiety  We will start Restoril 7.5 mg 1 tablet nightly.  Will monitor for improvement.  Her insomnia is likely related to her intense amount of anxiety related to the recent death of her ex- as well as her elderly friend in the hospital.  -     temazepam (Restoril) 7.5 MG capsule; Take 1 capsule by mouth At Night As Needed for Sleep or Anxiety.  Dispense: 15 capsule; Refill: 0      Results      There are no Patient Instructions on file for this visit.    Medications Discontinued During This Encounter   Medication Reason    benzonatate (TESSALON) 200 MG capsule *Therapy completed        Return in about 4 weeks (around 6/10/2025), or insomnia, for Recheck.  BMI is within normal parameters. No other follow-up for BMI required.        Sukhwinder Garza MD  [place], [place]

## 2025-05-14 ENCOUNTER — TELEPHONE (OUTPATIENT)
Dept: FAMILY MEDICINE CLINIC | Facility: CLINIC | Age: 75
End: 2025-05-14
Payer: MEDICARE

## 2025-05-28 ENCOUNTER — TELEPHONE (OUTPATIENT)
Dept: CARDIOLOGY | Facility: CLINIC | Age: 75
End: 2025-05-28
Payer: MEDICARE

## 2025-05-28 ENCOUNTER — CLINICAL SUPPORT (OUTPATIENT)
Dept: CARDIOLOGY | Facility: CLINIC | Age: 75
End: 2025-05-28
Payer: MEDICARE

## 2025-05-28 VITALS — DIASTOLIC BLOOD PRESSURE: 72 MMHG | HEART RATE: 63 BPM | SYSTOLIC BLOOD PRESSURE: 110 MMHG

## 2025-05-28 DIAGNOSIS — I10 ESSENTIAL HYPERTENSION: ICD-10-CM

## 2025-05-28 NOTE — PROGRESS NOTES
Pt present today for a BP Check. Allergies, Hx, and Medications reviewed and confirmed.     BP/HR are as follows:  110/72  HR 63       Pt states no c/o     Per NM, pt okay to go home today.    Recommendations:    Patient is to call today with med list as she is unsure she stopped the correct medication.     Sara MINER CMA

## 2025-06-06 ENCOUNTER — TRANSCRIBE ORDERS (OUTPATIENT)
Dept: ADMINISTRATIVE | Facility: HOSPITAL | Age: 75
End: 2025-06-06
Payer: MEDICARE

## 2025-06-06 DIAGNOSIS — R93.89 ABNORMAL CXR: Primary | ICD-10-CM

## 2025-06-12 ENCOUNTER — OFFICE VISIT (OUTPATIENT)
Dept: FAMILY MEDICINE CLINIC | Facility: CLINIC | Age: 75
End: 2025-06-12
Payer: MEDICARE

## 2025-06-12 VITALS
HEIGHT: 64 IN | OXYGEN SATURATION: 99 % | DIASTOLIC BLOOD PRESSURE: 80 MMHG | TEMPERATURE: 97.3 F | HEART RATE: 62 BPM | SYSTOLIC BLOOD PRESSURE: 120 MMHG | WEIGHT: 143 LBS | BODY MASS INDEX: 24.41 KG/M2

## 2025-06-12 DIAGNOSIS — I10 ESSENTIAL HYPERTENSION: ICD-10-CM

## 2025-06-12 DIAGNOSIS — F51.01 PRIMARY INSOMNIA: Primary | ICD-10-CM

## 2025-06-12 DIAGNOSIS — F41.9 ANXIETY: ICD-10-CM

## 2025-06-12 NOTE — PROGRESS NOTES
"  Subjective   Maddison Turcios is a 75 y.o. female who is here for   Chief Complaint   Patient presents with    Insomnia   .     Insomnia  Symptoms are new.   Symptoms have been improved since onset.   Pertinent negative symptoms include no chest pain and no nausea.   Treatments tried: Restoril.   Improvement on treatment was significant.   Additional information:  Patient is no longer taking at this time.   Anxiety  Visit:  Follow-up  Follow-up visit:     Medication compliance:  %    Side effects:  Fatigue    Symptoms: insomnia, irritability and malaise/fatigue      Symptoms: no chest pain, no confusion, no dizziness, no dry mouth, no excessive worry, no nausea, is not nervous/anxious, no obsessions, no palpitations and no shortness of breath      Frequency:  Most days    Severity:  Moderate    Hours of sleep per night:  7 hours    Sleep quality:  Fair    Additional information:  Patient states her anxiety has been improving.  Patient states she is getting better sleep.     History of Present Illness      Review of Systems   Constitutional:  Positive for irritability and malaise/fatigue.   Respiratory:  Negative for shortness of breath.    Cardiovascular:  Negative for chest pain and palpitations.   Gastrointestinal:  Negative for nausea.   Neurological:  Negative for dizziness.   Psychiatric/Behavioral:  Negative for confusion. The patient has insomnia. The patient is not nervous/anxious.        Objective   Vitals:    06/12/25 1445   BP: 120/80   Pulse: 62   Temp: 97.3 °F (36.3 °C)   SpO2: 99%   Weight: 64.9 kg (143 lb)   Height: 162.6 cm (64.02\")      Physical Exam  Vitals and nursing note reviewed.   Constitutional:       Appearance: Normal appearance. She is normal weight.   HENT:      Head: Normocephalic and atraumatic.   Cardiovascular:      Rate and Rhythm: Normal rate and regular rhythm.      Pulses: Normal pulses.      Heart sounds: No murmur heard.  Pulmonary:      Effort: Pulmonary effort is normal. " No respiratory distress.      Breath sounds: Normal breath sounds. No wheezing.   Skin:     General: Skin is warm and dry.   Neurological:      General: No focal deficit present.      Mental Status: She is alert.   Psychiatric:         Mood and Affect: Mood normal.         Thought Content: Thought content normal.       Physical Exam        Assessment & Plan   Assessment & Plan    Diagnoses and all orders for this visit:    1. Primary insomnia (Primary)  Improving.  Will discontinue Restoril at this time.  Discussed if symptoms recur patient is to notify us.  2. Anxiety  Much improved.  3. Essential hypertension  Blood pressure has returned to normal.  Recent blood pressure elevation was likely secondary to anxiety.  Currently on ramipril 2.5 mg daily.    Results      There are no Patient Instructions on file for this visit.    Medications Discontinued During This Encounter   Medication Reason    temazepam (Restoril) 7.5 MG capsule         Return in about 3 months (around 9/12/2025), or htn, for Recheck.  BMI is within normal parameters. No other follow-up for BMI required.    Sukhwinder Garza MD  [place], [place]

## 2025-06-30 ENCOUNTER — HOSPITAL ENCOUNTER (OUTPATIENT)
Dept: BONE DENSITY | Facility: HOSPITAL | Age: 75
Discharge: HOME | End: 2025-06-30
Admitting: INTERNAL MEDICINE
Payer: MEDICARE

## 2025-06-30 DIAGNOSIS — Z09 ENCOUNTER FOR FOLLOW-UP EXAMINATION AFTER COMPLETED TREATMENT FOR CONDITIONS OTHER THAN MALIGNANT NEOPLASM: ICD-10-CM

## 2025-06-30 DIAGNOSIS — C50.212 MALIGNANT NEOPLASM OF UPPER-INNER QUADRANT OF LEFT BREAST IN FEMALE, ESTROGEN RECEPTOR POSITIVE: ICD-10-CM

## 2025-06-30 DIAGNOSIS — Z17.0 MALIGNANT NEOPLASM OF UPPER-INNER QUADRANT OF LEFT BREAST IN FEMALE, ESTROGEN RECEPTOR POSITIVE: ICD-10-CM

## 2025-06-30 PROCEDURE — 77080 DXA BONE DENSITY AXIAL: CPT

## 2025-07-10 ENCOUNTER — HOSPITAL ENCOUNTER (OUTPATIENT)
Dept: CT IMAGING | Facility: HOSPITAL | Age: 75
Discharge: HOME | End: 2025-07-10
Admitting: STUDENT IN AN ORGANIZED HEALTH CARE EDUCATION/TRAINING PROGRAM
Payer: MEDICARE

## 2025-07-10 DIAGNOSIS — R93.89 ABNORMAL CXR: ICD-10-CM

## 2025-07-10 PROCEDURE — 71250 CT THORAX DX C-: CPT

## 2025-07-18 ENCOUNTER — TELEPHONE (OUTPATIENT)
Dept: GASTROENTEROLOGY | Facility: CLINIC | Age: 75
End: 2025-07-18
Payer: MEDICARE

## 2025-07-18 DIAGNOSIS — E78.2 MIXED HYPERLIPIDEMIA: ICD-10-CM

## 2025-07-18 DIAGNOSIS — J30.89 OTHER ALLERGIC RHINITIS: ICD-10-CM

## 2025-07-18 RX ORDER — AMITRIPTYLINE HYDROCHLORIDE 10 MG/1
10 TABLET ORAL
Qty: 90 TABLET | Refills: 1 | OUTPATIENT
Start: 2025-07-18

## 2025-07-18 RX ORDER — MONTELUKAST SODIUM 10 MG/1
TABLET ORAL
Qty: 90 TABLET | Refills: 1 | Status: SHIPPED | OUTPATIENT
Start: 2025-07-18

## 2025-07-18 RX ORDER — EZETIMIBE 10 MG/1
10 TABLET ORAL DAILY
Qty: 90 TABLET | Refills: 1 | OUTPATIENT
Start: 2025-07-18

## 2025-07-18 NOTE — TELEPHONE ENCOUNTER
Patient is experiencing gas, bloating and pencil thin bowel movements. She states that she has abdominal pain with the bloating. Some nausea.   She states she is having 1-2 BM a week, may go multiple times that day, very small BM.   She is taking fiber about 3 times a week and miralax every night.

## 2025-07-21 ENCOUNTER — TELEPHONE (OUTPATIENT)
Dept: GASTROENTEROLOGY | Facility: CLINIC | Age: 75
End: 2025-07-21
Payer: MEDICARE

## 2025-07-21 RX ORDER — PLECANATIDE 3 MG/1
1 TABLET ORAL DAILY
Qty: 30 TABLET | Refills: 2 | Status: SHIPPED | OUTPATIENT
Start: 2025-07-21

## 2025-07-22 NOTE — TELEPHONE ENCOUNTER
Approved on July 21 by [employer] 2017  CaseId:[id number];Status:Approved;Review Type:Prior Auth;Coverage Start Date:06/21/2025;Coverage End Date:07/21/2026;  Effective Date: 6/21/2025  Authorization Expiration Date: 7/21/2026

## 2025-07-23 RX ORDER — RALOXIFENE HYDROCHLORIDE 60 MG/1
60 TABLET, FILM COATED ORAL DAILY
Qty: 90 TABLET | Refills: 3 | Status: SHIPPED | OUTPATIENT
Start: 2025-07-23

## 2025-07-29 ENCOUNTER — OFFICE VISIT (OUTPATIENT)
Dept: FAMILY MEDICINE CLINIC | Facility: CLINIC | Age: 75
End: 2025-07-29
Payer: MEDICARE

## 2025-07-29 VITALS
OXYGEN SATURATION: 99 % | DIASTOLIC BLOOD PRESSURE: 72 MMHG | HEIGHT: 64 IN | BODY MASS INDEX: 25.27 KG/M2 | HEART RATE: 49 BPM | TEMPERATURE: 97.8 F | SYSTOLIC BLOOD PRESSURE: 110 MMHG | WEIGHT: 148 LBS

## 2025-07-29 DIAGNOSIS — L29.9 PRURITUS: Primary | ICD-10-CM

## 2025-07-29 PROCEDURE — 1126F AMNT PAIN NOTED NONE PRSNT: CPT | Performed by: FAMILY MEDICINE

## 2025-07-29 PROCEDURE — 3078F DIAST BP <80 MM HG: CPT | Performed by: FAMILY MEDICINE

## 2025-07-29 PROCEDURE — 99213 OFFICE O/P EST LOW 20 MIN: CPT | Performed by: FAMILY MEDICINE

## 2025-07-29 PROCEDURE — 3074F SYST BP LT 130 MM HG: CPT | Performed by: FAMILY MEDICINE

## 2025-07-29 RX ORDER — TRIAMCINOLONE ACETONIDE 1 MG/G
1 OINTMENT TOPICAL 2 TIMES DAILY
Qty: 80 G | Refills: 0 | Status: SHIPPED | OUTPATIENT
Start: 2025-07-29

## 2025-07-29 NOTE — PROGRESS NOTES
"  Subjective   Maddison Turcios is a 75 y.o. female who is here for   Chief Complaint   Patient presents with    FEET AND PALMS ITCHING   .       Intense it ladonna palms and souls o  Symptoms are: recurrent.   Onset was 1 to 6 months.   Symptoms occur: intermittently.  Symptoms include: change in stool.   Pertinent negative symptoms include no abdominal pain, no anorexia, no joint pain, no chest pain, no chills, no congestion, no cough, no diaphoresis, no fatigue, no fever, no headaches, no joint swelling, no myalgias, no nausea, no neck pain, no numbness, no rash, no sore throat, no swollen glands, no dysuria, no vertigo, no visual change, no vomiting and no weakness.   Treatment and/or Medications comments include: Anti itch creams and lotions      History of Present Illness      Review of Systems   Constitutional:  Negative for chills, diaphoresis, fatigue and fever.   HENT:  Negative for congestion and sore throat.    Respiratory:  Negative for cough.    Cardiovascular:  Negative for chest pain.   Gastrointestinal:  Negative for abdominal pain, anorexia, nausea and vomiting.   Genitourinary:  Negative for dysuria.   Musculoskeletal:  Negative for joint pain, myalgias and neck pain.   Skin:  Negative for rash.   Neurological:  Negative for vertigo, weakness, numbness and headaches.       Objective   Vitals:    07/29/25 0917   BP: 110/72   BP Location: Right arm   Patient Position: Sitting   Cuff Size: Adult   Pulse: (!) 49   Temp: 97.8 °F (36.6 °C)   SpO2: 99%   Weight: 67.1 kg (148 lb)   Height: 162.6 cm (64.02\")      Physical Exam  Vitals and nursing note reviewed.   Constitutional:       Appearance: Normal appearance. She is normal weight.   HENT:      Head: Normocephalic and atraumatic.   Cardiovascular:      Rate and Rhythm: Normal rate and regular rhythm.      Pulses: Normal pulses.      Heart sounds: No murmur heard.  Pulmonary:      Effort: Pulmonary effort is normal. No respiratory distress.      Breath " sounds: Normal breath sounds. No wheezing.   Skin:     General: Skin is warm and dry.      Findings: No erythema or rash.   Neurological:      General: No focal deficit present.      Mental Status: She is alert.   Psychiatric:         Mood and Affect: Mood normal.         Thought Content: Thought content normal.       Physical Exam        Assessment & Plan   Assessment & Plan    Diagnoses and all orders for this visit:    1. Pruritus (Primary)  Apply triamcinolone ointment twice daily.  Will obtain TSH CBC and CMP to evaluate for possible other causes of pruritus.  -     triamcinolone (KENALOG) 0.1 % ointment; Apply 1 Application topically to the appropriate area as directed 2 (Two) Times a Day.  Dispense: 80 g; Refill: 0      Results      There are no Patient Instructions on file for this visit.    There are no discontinued medications.     Return Itching, for Recheck.       Sukhwinder Garza MD  [place], [place]

## 2025-07-31 DIAGNOSIS — R53.82 CHRONIC FATIGUE: ICD-10-CM

## 2025-07-31 DIAGNOSIS — I10 ESSENTIAL HYPERTENSION: Primary | ICD-10-CM

## 2025-07-31 DIAGNOSIS — E78.2 MIXED HYPERLIPIDEMIA: ICD-10-CM

## 2025-07-31 DIAGNOSIS — E05.90 HYPERTHYROIDISM: ICD-10-CM

## 2025-08-02 LAB
ALBUMIN SERPL-MCNC: 4.6 G/DL (ref 3.5–5.2)
ALBUMIN/GLOB SERPL: 2 G/DL
ALP SERPL-CCNC: 55 U/L (ref 39–117)
ALT SERPL-CCNC: 16 U/L (ref 1–33)
APPEARANCE UR: ABNORMAL
AST SERPL-CCNC: 30 U/L (ref 1–32)
BACTERIA #/AREA URNS HPF: ABNORMAL /HPF
BASOPHILS # BLD AUTO: 0.07 10*3/MM3 (ref 0–0.2)
BASOPHILS NFR BLD AUTO: 1 % (ref 0–1.5)
BILIRUB SERPL-MCNC: 1.3 MG/DL (ref 0–1.2)
BILIRUB UR QL STRIP: NEGATIVE
BUN SERPL-MCNC: 14 MG/DL (ref 8–23)
BUN/CREAT SERPL: 17.9 (ref 7–25)
CALCIUM SERPL-MCNC: 10.2 MG/DL (ref 8.6–10.5)
CASTS URNS MICRO: ABNORMAL
CHLORIDE SERPL-SCNC: 101 MMOL/L (ref 98–107)
CHOLEST SERPL-MCNC: 185 MG/DL (ref 0–200)
CO2 SERPL-SCNC: 25.3 MMOL/L (ref 22–29)
COLOR UR: ABNORMAL
CREAT SERPL-MCNC: 0.78 MG/DL (ref 0.57–1)
CRYSTALS URNS MICRO: ABNORMAL
EGFRCR SERPLBLD CKD-EPI 2021: 79.8 ML/MIN/1.73
EOSINOPHIL # BLD AUTO: 0.08 10*3/MM3 (ref 0–0.4)
EOSINOPHIL NFR BLD AUTO: 1.1 % (ref 0.3–6.2)
EPI CELLS #/AREA URNS HPF: ABNORMAL /HPF
ERYTHROCYTE [DISTWIDTH] IN BLOOD BY AUTOMATED COUNT: 12 % (ref 12.3–15.4)
GLOBULIN SER CALC-MCNC: 2.3 GM/DL
GLUCOSE SERPL-MCNC: 94 MG/DL (ref 65–99)
GLUCOSE UR QL STRIP: NEGATIVE
HCT VFR BLD AUTO: 38.8 % (ref 34–46.6)
HDLC SERPL-MCNC: 87 MG/DL (ref 40–60)
HGB BLD-MCNC: 13.4 G/DL (ref 12–15.9)
HGB UR QL STRIP: NEGATIVE
IMM GRANULOCYTES # BLD AUTO: 0.03 10*3/MM3 (ref 0–0.05)
IMM GRANULOCYTES NFR BLD AUTO: 0.4 % (ref 0–0.5)
KETONES UR QL STRIP: ABNORMAL
LDLC SERPL CALC-MCNC: 88 MG/DL (ref 0–100)
LEUKOCYTE ESTERASE UR QL STRIP: ABNORMAL
LYMPHOCYTES # BLD AUTO: 2.4 10*3/MM3 (ref 0.7–3.1)
LYMPHOCYTES NFR BLD AUTO: 33.4 % (ref 19.6–45.3)
MCH RBC QN AUTO: 32.3 PG (ref 26.6–33)
MCHC RBC AUTO-ENTMCNC: 34.5 G/DL (ref 31.5–35.7)
MCV RBC AUTO: 93.5 FL (ref 79–97)
MONOCYTES # BLD AUTO: 0.69 10*3/MM3 (ref 0.1–0.9)
MONOCYTES NFR BLD AUTO: 9.6 % (ref 5–12)
MUCOUS THREADS URNS QL MICRO: ABNORMAL /HPF
NEUTROPHILS # BLD AUTO: 3.91 10*3/MM3 (ref 1.7–7)
NEUTROPHILS NFR BLD AUTO: 54.5 % (ref 42.7–76)
NITRITE UR QL STRIP: NEGATIVE
NRBC BLD AUTO-RTO: 0 /100 WBC (ref 0–0.2)
PH UR STRIP: 5.5 [PH] (ref 5–8)
PLATELET # BLD AUTO: 292 10*3/MM3 (ref 140–450)
POTASSIUM SERPL-SCNC: 5.2 MMOL/L (ref 3.5–5.2)
PROT SERPL-MCNC: 6.9 G/DL (ref 6–8.5)
PROT UR QL STRIP: ABNORMAL
RBC # BLD AUTO: 4.15 10*6/MM3 (ref 3.77–5.28)
RBC #/AREA URNS HPF: ABNORMAL /HPF
SODIUM SERPL-SCNC: 138 MMOL/L (ref 136–145)
SP GR UR STRIP: 1.02 (ref 1–1.03)
TRIGL SERPL-MCNC: 50 MG/DL (ref 0–150)
TSH SERPL DL<=0.005 MIU/L-ACNC: 1.95 UIU/ML (ref 0.27–4.2)
UROBILINOGEN UR STRIP-MCNC: ABNORMAL MG/DL
VLDLC SERPL CALC-MCNC: 10 MG/DL (ref 5–40)
WBC # BLD AUTO: 7.18 10*3/MM3 (ref 3.4–10.8)
WBC #/AREA URNS HPF: ABNORMAL /HPF

## 2025-08-13 RX ORDER — LUBIPROSTONE 8 UG/1
8 CAPSULE ORAL 2 TIMES DAILY WITH MEALS
Qty: 60 CAPSULE | Refills: 3 | Status: SHIPPED | OUTPATIENT
Start: 2025-08-13

## 2025-08-21 DIAGNOSIS — R10.84 GENERALIZED ABDOMINAL PAIN: Primary | ICD-10-CM

## (undated) DEVICE — CATH DIAG IMPULSE PIG 5F 100CM

## (undated) DEVICE — GW EMR FIX EXCHG J STD .035 3MM 260CM

## (undated) DEVICE — TBG PENCL TELESCP MEGADYNE SMOKE EVAC 10FT

## (undated) DEVICE — NDL HYPO ECLPS SFTY 22G 1 1/2IN

## (undated) DEVICE — ANTIBACTERIAL UNDYED BRAIDED (POLYGLACTIN 910), SYNTHETIC ABSORBABLE SUTURE: Brand: COATED VICRYL

## (undated) DEVICE — SYR LL TP 10ML STRL

## (undated) DEVICE — CATH DIAG IMPULSE FR4 5F 100CM

## (undated) DEVICE — GLV SURG SENSICARE PI MIC PF SZ7.5 LF STRL

## (undated) DEVICE — ADHS SKIN SURG TISS VISC PREMIERPRO EXOFIN HI/VISC FAST/DRY

## (undated) DEVICE — PATIENT RETURN ELECTRODE, SINGLE-USE, CONTACT QUALITY MONITORING, ADULT, WITH 9FT CORD, FOR PATIENTS WEIGING OVER 33LBS. (15KG): Brand: MEGADYNE

## (undated) DEVICE — JACKT LAB F/R KNIT CUFF/COLR XLG BLU

## (undated) DEVICE — APPL CHLORAPREP HI/LITE 26ML ORNG

## (undated) DEVICE — ELECTRD BLD EZ CLN MOD XLNG 2.75IN

## (undated) DEVICE — GLV SURG SENSICARE PI MIC PF SZ6.5 LF STRL

## (undated) DEVICE — PK CATH CARD 40

## (undated) DEVICE — PK UNIV COMPL 40

## (undated) DEVICE — SPNG GZ WOVN 4X4IN 12PLY 10/BX STRL

## (undated) DEVICE — SUCTION CANISTER, 3000CC,SAFELINER: Brand: DEROYAL

## (undated) DEVICE — GLIDESHEATH BASIC HYDROPHILIC COATED INTRODUCER SHEATH: Brand: GLIDESHEATH

## (undated) DEVICE — KT ORCA ORCAPOD DISP STRL

## (undated) DEVICE — KT INK TISS MARGINMARKER STD 6COLOR

## (undated) DEVICE — SYR LL 3CC

## (undated) DEVICE — INTRO SHEATH ART/FEM ENGAGE .035 5F12CM

## (undated) DEVICE — KT MANIFLD CARDIAC

## (undated) DEVICE — BW-412T DISP COMBO CLEANING BRUSH: Brand: SINGLE USE COMBINATION CLEANING BRUSH

## (undated) DEVICE — CATH DIAG IMPULSE FL4 5F 100CM

## (undated) DEVICE — Device

## (undated) DEVICE — GLV SURG SENSICARE POLYISPRN W/ALOE PF LF 6.5 GRN STRL

## (undated) DEVICE — GOWN,SIRUS,NON REINFRCD,LARGE,SET IN SL: Brand: MEDLINE

## (undated) DEVICE — TRAP FLD MINIVAC MEGADYNE 100ML

## (undated) DEVICE — FRCP BX RADJAW4 NDL 2.8 240CM LG OG BX40

## (undated) DEVICE — CATH VENT MIV RADL PIG ST TIP 5F 110CM

## (undated) DEVICE — LEGGINGS, PAIR, 31X48, STERILE: Brand: MEDLINE

## (undated) DEVICE — VIAL FORMALIN CAP 10P 40ML

## (undated) DEVICE — SUT MNCRYL PLS ANTIB UD 4/0 PS2 18IN

## (undated) DEVICE — STPLR SKIN VISISTAT WD 35CT

## (undated) DEVICE — CATH DIAG IMPULSE FL3.5 5F 100CM